# Patient Record
Sex: MALE | Race: WHITE
[De-identification: names, ages, dates, MRNs, and addresses within clinical notes are randomized per-mention and may not be internally consistent; named-entity substitution may affect disease eponyms.]

---

## 2017-10-18 ENCOUNTER — HOSPITAL ENCOUNTER (EMERGENCY)
Dept: HOSPITAL 45 - C.EDC | Age: 57
LOS: 1 days | Discharge: HOME | End: 2017-10-19
Payer: COMMERCIAL

## 2017-10-18 VITALS
WEIGHT: 152.34 LBS | BODY MASS INDEX: 22.56 KG/M2 | HEIGHT: 69.02 IN | BODY MASS INDEX: 22.56 KG/M2 | HEIGHT: 69.02 IN | WEIGHT: 152.34 LBS

## 2017-10-18 VITALS — TEMPERATURE: 98.6 F

## 2017-10-18 VITALS — OXYGEN SATURATION: 99 %

## 2017-10-18 DIAGNOSIS — R00.0: ICD-10-CM

## 2017-10-18 DIAGNOSIS — F17.200: ICD-10-CM

## 2017-10-18 DIAGNOSIS — F10.920: ICD-10-CM

## 2017-10-18 DIAGNOSIS — Z91.14: ICD-10-CM

## 2017-10-18 DIAGNOSIS — F41.9: Primary | ICD-10-CM

## 2017-10-18 LAB
ALP SERPL-CCNC: 83 U/L (ref 45–117)
ALT SERPL-CCNC: 30 U/L (ref 12–78)
ANION GAP SERPL CALC-SCNC: 11 MMOL/L (ref 3–11)
APAP SERPL-MCNC: < 2 UG/ML (ref 10–30)
APPEARANCE UR: CLEAR
AST SERPL-CCNC: 64 U/L (ref 15–37)
BASOPHILS # BLD: 0.02 K/UL (ref 0–0.2)
BASOPHILS NFR BLD: 0.4 %
BENZODIAZ UR-MCNC: (no result) UG/L
BILIRUB UR-MCNC: (no result) MG/DL
BUN SERPL-MCNC: 9 MG/DL (ref 7–18)
BUN/CREAT SERPL: 7 (ref 10–20)
CALCIUM SERPL-MCNC: 8.6 MG/DL (ref 8.5–10.1)
CHLORIDE SERPL-SCNC: 103 MMOL/L (ref 98–107)
CKMB/CK RATIO: 1.3 (ref 0–3)
CO2 SERPL-SCNC: 24 MMOL/L (ref 21–32)
COLOR UR: YELLOW
COMPLETE: YES
CREAT CL PREDICTED SERPL C-G-VRATE: 61.2 ML/MIN
CREAT SERPL-MCNC: 1.27 MG/DL (ref 0.6–1.4)
EOSINOPHIL NFR BLD AUTO: 223 K/UL (ref 130–400)
GLUCOSE SERPL-MCNC: 95 MG/DL (ref 70–99)
HCT VFR BLD CALC: 40.1 % (ref 42–52)
IG%: 0.2 %
IMM GRANULOCYTES NFR BLD AUTO: 29.6 %
INR PPP: 0.9 (ref 0.9–1.1)
LYMPHOCYTES # BLD: 1.58 K/UL (ref 1.2–3.4)
MAGNESIUM SERPL-MCNC: 2.1 MG/DL (ref 1.8–2.4)
MANUAL MICROSCOPIC REQUIRED?: NO
MCH RBC QN AUTO: 36.1 PG (ref 25–34)
MCHC RBC AUTO-ENTMCNC: 34.9 G/DL (ref 32–36)
MCV RBC AUTO: 103.4 FL (ref 80–100)
MONOCYTES NFR BLD: 12.4 %
NEUTROPHILS # BLD AUTO: 0 %
NEUTROPHILS NFR BLD AUTO: 57.4 %
NITRITE UR QL STRIP: (no result)
PARTIAL THROMBOPLASTIN RATIO: 1
PCP UR-MCNC: (no result) UG/L
PH UR STRIP: 5.5 [PH] (ref 4.5–7.5)
PMV BLD AUTO: 9 FL (ref 7.4–10.4)
POTASSIUM SERPL-SCNC: 3.4 MMOL/L (ref 3.5–5.1)
PROTHROMBIN TIME: 10 SECONDS (ref 9–12)
RBC # BLD AUTO: 3.88 M/UL (ref 4.7–6.1)
REVIEW REQ?: NO
SODIUM SERPL-SCNC: 138 MMOL/L (ref 136–145)
SP GR UR STRIP: 1.01 (ref 1–1.03)
URINE BILL WITH OR WITHOUT MIC: (no result)
URINE EPITHELIAL CELL AUTO: (no result) /LPF (ref 0–5)
UROBILINOGEN UR-MCNC: (no result) MG/DL
WBC # BLD AUTO: 5.34 K/UL (ref 4.8–10.8)

## 2017-10-18 NOTE — EMERGENCY ROOM VISIT NOTE
History


Report prepared by Samuel:  Anat Unger


Under the Supervision of:  Dr. Rodney Valdes D.O.


First contact with patient:  20:10


Chief Complaint:  ALCOHOL OVERDOSE


Stated Complaint:  alcohol overdose





History of Present Illness


The patient is a 57 year old male who presents to the Emergency Room with 

complaints of persistent alcohol intoxication starting PTA. The patient 

presents to the ED with the [facility] police. He was found walking around outside 

with a cup full of rum. He blew a .265 and was tachycardic with a rate of 152. 

He states that he had gone to the [facility] to speak with a 

counselor today because he has been stressed out. He also called [facility]. He 

notes that he ran out of his blood pressure medication and trazodone and has 

not taken them in several days. He fell last night after drinking. He denies 

any head injury or LOC. He has been having difficulty walking distances because 

he has been getting SOB. He denies any nausea, vomiting, chest pain, leg 

swelling, recent illness, abdominal pain, back pain, or fever. He denies having 

any thoughts of hurting himself. He feels anxious. He denies any drug use. He 

has a history of cancer, potassium problems, and cholecystectomy.





   Source of History:  patient, EMS


   Onset:  PTA


   Position:  other (global)


   Quality:  other (alcohol intoxication)


   Timing:  other (persistent)


   Associated Symptoms:  + SOB, No LOC, No fevers, No headache, No chest pain, 

No nausea, No vomiting, No abdominal pain, No back pain


Note:


Pt reports anxiety. Pt denies thoughts of hurting self, leg swelling, recent 

illness.





Review of Systems


See HPI for pertinent positives & negatives. A total of 10 systems reviewed and 

were otherwise negative.





Past Medical & Surgical


Medical Problems:


(1) History of torn meniscus of left knee


(2) History of torn meniscus of right knee


Surgical Problems:


(1) H/O knee surgery


(2) H/O prostate biopsy








Family History


No pertinent family history stated.





Social History


Smoking Status:  Current Every Day Smoker


Marital Status:  , in relationship


Occupation Status:  unemployed





Current/Historical Medications


Unable to Obtain Active Prescriptions or Reported Meds





Allergies


Coded Allergies:  


     No Known Allergies (Unverified , 10/18/17)





Physical Exam


Vital Signs











  Date Time  Temp Pulse Resp B/P (MAP) Pulse Ox O2 Delivery O2 Flow Rate FiO2


 


10/19/17 00:13  100 18 114/61 97 Nasal Cannula 2.0 


 


10/18/17 23:28  110 18 181/116 98 Room Air  


 


10/18/17 21:23  107 18 158/94 99 Room Air  


 


10/18/17 20:33  128      


 


10/18/17 20:28     99 Room Air  


 


10/18/17 20:22 37.0 135 18 179/118 99 Room Air  











Physical Exam


GENERAL:  Patient is awake, alert, somewhat anxious appearing but comfortable. 

Patient does not appear to be in pain. 


EYES: The conjunctivae are clear.  The pupils are round and reactive. 


EARS, NOSE, MOUTH AND THROAT: The nose is without any evidence of any 

deformity. Mucous membranes are moist tongue is midline 


NECK: The neck is nontender and supple.


RESPIRATORY: Normal respiratory effort is noted there is no evidence of 

wheezing rhonchi or rales


CARDIOVASCULAR:  Tachycardic but regular, no definite murmur noted. 


GASTROINTESTINAL: The abdomen is soft. Bowel sounds are present in all 

quadrants. Abdomen is nontender


BACK:  Abrasion and contusion noted to the left posterior ribs. No crepitus 

appreciated. No midline tenderness appreciated. 


MUSCULOSKELETAL/EXTREMITIES: There is no evidence of gross deformity full range 

of motion is noted in the hips and shoulders


SKIN: There is no obvious evidence of any rash. There are no petechiae, pallor 

or cyanosis noted. 


NEUROLOGIC:  Patient is awake alert and oriented x3 strength is symmetric





Medical Decision & Procedures


ER Provider


Diagnostic Interpretation:


X-ray results as stated below per interpretation by me and the radiologist. 

Radiology results as stated below per my review and radiologist interpretation:





SINGLE VIEW CHEST





CLINICAL HISTORY:  Overdose.





FINDINGS: An AP, portable, upright chest radiograph is compared to study dated


6/5/2014. The examination is degraded by portable technique and patient


rotation.  The heart is top normal for projection. There is mild atherosclerotic


calcification of the thoracic aorta. Chronic interstitial thickening is similar


to previous. No airspace consolidation, large pleural effusion, or pneumothorax


is seen. The skeletal structures appear osteopenic. There are healed left-sided


rib fractures.





IMPRESSION: No acute cardiopulmonary abnormality.





Electronically signed by:  Valente Sultana M.D.


10/18/2017 8:44 PM





Dictated Date/Time:  10/18/2017 8:43 PM








CT SCAN OF THE BRAIN WITHOUT IV CONTRAST





CLINICAL HISTORY: Overdose.





COMPARISON STUDY:  No priors.





TECHNIQUE: Unenhanced axial CT scan of the brain is performed from the vertex to


the skull base.





CT DOSE: 537.48 mGy.cm





FINDINGS:





Brain parenchyma: There are age advanced involutional changes noting  minimal


subcortical and periventricular microangiopathic change. There is no hemorrhage,


mass effect, or evidence of acute territorial ischemia by CT criteria.


Gray-white matter is preserved. No extra-axial fluid collection is seen.





Ventricles, sulci, cisterns: Prominent secondary to involutional change.





Intracranial vasculature: There is atherosclerotic calcification of the


cavernous carotid arteries.





Calvarium: Unremarkable.





Sinuses and mastoids: The visualized paranasal sinuses are clear. The mastoid


air cells are well pneumatized.





Orbits: The bony orbits are grossly intact.





IMPRESSION: There is no hemorrhage, mass effect, or evidence of acute


territorial ischemia by CT criteria.





Electronically signed by:  Valente Sultana M.D.


10/18/2017 9:15 PM





Dictated Date/Time:  10/18/2017 9:14 PM





Laboratory Results


10/18/17 20:35








Red Blood Count 3.88, Mean Corpuscular Volume 103.4, Mean Corpuscular 

Hemoglobin 36.1, Mean Corpuscular Hemoglobin Concent 34.9, Mean Platelet Volume 

9.0, Neutrophils (%) (Auto) 57.4, Lymphocytes (%) (Auto) 29.6, Monocytes (%) (

Auto) 12.4, Eosinophils (%) (Auto) 0.0, Basophils (%) (Auto) 0.4, Neutrophils # 

(Auto) 3.07, Lymphocytes # (Auto) 1.58, Monocytes # (Auto) 0.66, Eosinophils # (

Auto) 0.00, Basophils # (Auto) 0.02





10/18/17 20:35

















Test


  10/18/17


20:35 10/18/17


21:10


 


White Blood Count


  5.34 K/uL


(4.8-10.8) 


 


 


Red Blood Count


  3.88 M/uL


(4.7-6.1) 


 


 


Hemoglobin


  14.0 g/dL


(14.0-18.0) 


 


 


Hematocrit 40.1 % (42-52)  


 


Mean Corpuscular Volume


  103.4 fL


() 


 


 


Mean Corpuscular Hemoglobin


  36.1 pg


(25-34) 


 


 


Mean Corpuscular Hemoglobin


Concent 34.9 g/dl


(32-36) 


 


 


Platelet Count


  223 K/uL


(130-400) 


 


 


Mean Platelet Volume


  9.0 fL


(7.4-10.4) 


 


 


Neutrophils (%) (Auto) 57.4 %  


 


Lymphocytes (%) (Auto) 29.6 %  


 


Monocytes (%) (Auto) 12.4 %  


 


Eosinophils (%) (Auto) 0.0 %  


 


Basophils (%) (Auto) 0.4 %  


 


Neutrophils # (Auto)


  3.07 K/uL


(1.4-6.5) 


 


 


Lymphocytes # (Auto)


  1.58 K/uL


(1.2-3.4) 


 


 


Monocytes # (Auto)


  0.66 K/uL


(0.11-0.59) 


 


 


Eosinophils # (Auto)


  0.00 K/uL


(0-0.5) 


 


 


Basophils # (Auto)


  0.02 K/uL


(0-0.2) 


 


 


RDW Standard Deviation


  50.4 fL


(36.4-46.3) 


 


 


RDW Coefficient of Variation


  13.5 %


(11.5-14.5) 


 


 


Immature Granulocyte % (Auto) 0.2 %  


 


Immature Granulocyte # (Auto)


  0.01 K/uL


(0.00-0.02) 


 


 


Prothrombin Time


  10.0 SECONDS


(9.0-12.0) 


 


 


Prothromb Time International


Ratio 0.9 (0.9-1.1) 


  


 


 


Activated Partial


Thromboplast Time 24.8 SECONDS


(21.0-31.0) 


 


 


Partial Thromboplastin Ratio 1.0  


 


Anion Gap


  11.0 mmol/L


(3-11) 


 


 


Est Creatinine Clear Calc


Drug Dose 61.2 ml/min 


  


 


 


Estimated GFR (


American) 71.2 


  


 


 


Estimated GFR (Non-


American 61.4 


  


 


 


BUN/Creatinine Ratio 7.0 (10-20)  


 


Osmolality


  372 mOsm/kg


(280-300) 


 


 


Calcium Level


  8.6 mg/dl


(8.5-10.1) 


 


 


Magnesium Level


  2.1 mg/dl


(1.8-2.4) 


 


 


Total Bilirubin


  1.2 mg/dl


(0.2-1) 


 


 


Direct Bilirubin


  0.3 mg/dl


(0-0.2) 


 


 


Aspartate Amino Transf


(AST/SGOT) 64 U/L (15-37) 


  


 


 


Alanine Aminotransferase


(ALT/SGPT) 30 U/L (12-78) 


  


 


 


Alkaline Phosphatase


  83 U/L


() 


 


 


Total Creatine Kinase


  680 U/L


() 


 


 


Creatine Kinase MB


  9.0 ng/ml


(0.5-3.6) 


 


 


Creatine Kinase MB Ratio 1.3 (0-3.0)  


 


Troponin I


  0.024 ng/ml


(0-0.045) 


 


 


Total Protein


  8.7 gm/dl


(6.4-8.2) 


 


 


Albumin


  4.0 gm/dl


(3.4-5.0) 


 


 


Lipase


  292 U/L


() 


 


 


Salicylates Level


  < 1.7 mg/dl


(2.8-20) 


 


 


Acetaminophen Level


  < 2 ug/ml


(10-30) 


 


 


Ethyl Alcohol mg/dL


  294.7 mg/dl


(0-3) 


 


 


Urine Color  YELLOW 


 


Urine Appearance  CLEAR (CLEAR) 


 


Urine pH  5.5 (4.5-7.5) 


 


Urine Specific Gravity


  


  1.014


(1.000-1.030)


 


Urine Protein  1+ (NEG) 


 


Urine Glucose (UA)  NEG (NEG) 


 


Urine Ketones  NEG (NEG) 


 


Urine Occult Blood  1+ (NEG) 


 


Urine Nitrite  NEG (NEG) 


 


Urine Bilirubin  NEG (NEG) 


 


Urine Urobilinogen  NEG (NEG) 


 


Urine Leukocyte Esterase  NEG (NEG) 


 


Urine WBC (Auto)  0 /hpf (0-5) 


 


Urine RBC (Auto)  0-4 /hpf (0-4) 


 


Urine Hyaline Casts (Auto)  0 /lpf (0-5) 


 


Urine Epithelial Cells (Auto)


  


  5-10 /lpf


(0-5)


 


Urine Bacteria (Auto)  NEG (NEG) 


 


Urine Opiates Screen  NEG (NEG) 


 


Urine Methadone, Qualitative  NEG (NEG) 


 


Urine Barbiturates  NEG (NEG) 


 


Urine Phencyclidine (PCP)


Level 


  NEG (NEG) 


 


 


Ur


Amphetamine/Methamphetamine 


  NEG (NEG) 


 


 


MDMA (Ecstasy) Screen  NEG (NEG) 


 


Urine Benzodiazepines Screen  POS (NEG) 


 


Urine Cocaine Metabolite  NEG (NEG) 


 


Urine Marijuana (THC)  NEG (NEG) 





Laboratory results per my review.





Medications Administered











 Medications


  (Trade)  Dose


 Ordered  Sig/Purnima


 Route  Start Time


 Stop Time Status Last Admin


Dose Admin


 


 Sodium Chloride  1,000 ml @ 


 999 mls/hr  Q1H1M STAT


 IV  10/18/17 20:16


 10/18/17 21:16 DC 10/18/17 20:16


999 MLS/HR


 


 Ondansetron HCl


  (Zofran Inj)  4 mg  NOW  STAT


 IV  10/18/17 20:16


 10/18/17 20:17 DC 10/18/17 20:16


4 MG


 


 Trazodone HCl


  (Desyrel Tab)  400 mg  NOW  STAT


 PO  10/18/17 23:13


 10/18/17 23:14 DC 10/18/17 23:26


400 MG











ECG


Indication:  tachycardia


Rate (beats per minute):  119


Rhythm:  sinus tachycardia


Findings:  no ectopy, other (no acute ST segment abnormality)


Comparison ECG Date:  no prior available





ED Course


2014: The patient was evaluated in room C12B. A complete history and physical 

examination were performed. 





2016: Zofran Inj 4 mg IV, NSS 1000 ml @ 999 mls/hr IV.





2312: Mental health will evaluate the patient. 





2313: Trazodone HCl 400 mg PO.





2315: Trazodone HCl 400 mg PO.





Medical Decision


Prior records/ancillary studies reviewed.


Triage Nursing notes reviewed.


Additional history obtained from EMS.





The patient's history was concerning for possible psychiatric disturbance.





Differential diagnosis:


Etiologies such as mood disorder, infection, hypoglycemia, electrolyte 

abnormalities, cardiac sources, intracerebral event, toxicologic, neurologic, 

as well as others were entertained.





The patient is a 57-year-old male who presented to the emergency department for 

evaluation of anxiety and alcohol intoxication. The patient call crisis earlier 

today because of that and his family. The patient was found to be staggering 

and when police were called they also called for ambulance for medical 

evaluation. The patient was brought to the emergency Department awake and alert 

but clinically intoxicated. He was reevaluated multiple times. He was treated 

with IV fluids. He was also given some of his outpatient medication that he 

requested. The patient was evaluated by the mental health delegate and at this 

time the patient has significant anxiety over the death of a family member as 

well as his inability to get his outpatient medications. The patient was 

medically cleared in the emergency department. At this time we are still 

waiting for him to become less clinically intoxicated. I do feel that he will 

be able to be discharged home. He was evaluated by the mental health delegate 

in the emergency department and no suicidal or homicidal ideation with 

identified. The patient does not meet criteria this time for involuntary mental 

health admission. He will be reevaluated again in the morning when he is able 

to be discharged home. He was encouraged to follow-up with his primary care 

physician as well as his primary therapist as soon as possible. Also encouraged 

to return to the emergency department immediately if symptoms change worsen or 

need arises.





Medication Reconcilliation


Current Medication List:  was personally reviewed by me





Blood Pressure Screening


Patient's blood pressure:  Elevated blood pressure


Blood pressure disposition:  Referred to PCP





Impression





 Primary Impression:  


 Anxiety


 Additional Impressions:  


 Alcohol intoxication


 Tachycardia


 Noncompliance with medications





Scribe Attestation


The scribe's documentation has been prepared under my direction and personally 

reviewed by me in its entirety. I confirm that the note above accurately 

reflects all work, treatment, procedures, and medical decision making performed 

by me.





Departure Information


Dispostion


Home / Self-Care





Prescriptions





Unable to Obtain Active Prescriptions or Reported Meds





Referrals


Ted Jefferson M.D. (PCP)





Forms


HOME CARE DOCUMENTATION FORM,                                                 

               IMPORTANT VISIT INFORMATION





Patient Instructions


Anxiety Disorder, ED Alcohol Abuse, My [facility]





Additional Instructions





Call your family  in the morning to schedule a follow-up appointment. Avoid 

any further alcoholic beverages for the next 24 hours. Do not operate any heavy 

machinery including driving a vehicle for next 24 hours. Follow-up with your 

therapist as soon as possible. Call crisis or return to the emergency 

department immediately if symptoms change worsen or the need arises.





Problem Qualifiers








 Additional Impressions:  


 Alcohol intoxication


 Complication of substance-induced condition:  uncomplicated  Qualified Codes:  

F10.920 - Alcohol use, unspecified with intoxication, uncomplicated

## 2017-10-18 NOTE — DIAGNOSTIC IMAGING REPORT
CT SCAN OF THE BRAIN WITHOUT IV CONTRAST



CLINICAL HISTORY: Overdose.



COMPARISON STUDY:  No priors.



TECHNIQUE: Unenhanced axial CT scan of the brain is performed from the vertex to

the skull base.



CT DOSE: 537.48 mGy.cm



FINDINGS:



Brain parenchyma: There are age advanced involutional changes noting  minimal

subcortical and periventricular microangiopathic change. There is no hemorrhage,

mass effect, or evidence of acute territorial ischemia by CT criteria.

Gray-white matter is preserved. No extra-axial fluid collection is seen.



Ventricles, sulci, cisterns: Prominent secondary to involutional change.



Intracranial vasculature: There is atherosclerotic calcification of the

cavernous carotid arteries.



Calvarium: Unremarkable.



Sinuses and mastoids: The visualized paranasal sinuses are clear. The mastoid

air cells are well pneumatized.



Orbits: The bony orbits are grossly intact.





IMPRESSION: There is no hemorrhage, mass effect, or evidence of acute

territorial ischemia by CT criteria.







Electronically signed by:  Valente Sultana M.D.

10/18/2017 9:15 PM



Dictated Date/Time:  10/18/2017 9:14 PM

## 2017-10-18 NOTE — DIAGNOSTIC IMAGING REPORT
SINGLE VIEW CHEST



CLINICAL HISTORY:  Overdose.



FINDINGS: An AP, portable, upright chest radiograph is compared to study dated

6/5/2014. The examination is degraded by portable technique and patient

rotation.  The heart is top normal for projection. There is mild atherosclerotic

calcification of the thoracic aorta. Chronic interstitial thickening is similar

to previous. No airspace consolidation, large pleural effusion, or pneumothorax

is seen. The skeletal structures appear osteopenic. There are healed left-sided

rib fractures.



IMPRESSION: No acute cardiopulmonary abnormality.







Electronically signed by:  Valente Sultana M.D.

10/18/2017 8:44 PM



Dictated Date/Time:  10/18/2017 8:43 PM

## 2017-10-19 VITALS — OXYGEN SATURATION: 97 % | HEART RATE: 120 BPM | SYSTOLIC BLOOD PRESSURE: 137 MMHG | DIASTOLIC BLOOD PRESSURE: 82 MMHG

## 2017-10-21 LAB
HYDROXYETHYLFLURAZEPAM CONF: NEGATIVE NG/ML
HYDROXYMIDAZOLAM: NEGATIVE NG/ML
HYDROXYTRIAZOLAM CONF: NEGATIVE NG/ML
TEMAZEPAM UR-MCNC: NEGATIVE NG/ML

## 2017-12-12 ENCOUNTER — HOSPITAL ENCOUNTER (EMERGENCY)
Dept: HOSPITAL 45 - C.EDA | Age: 57
LOS: 1 days | Discharge: HOME | End: 2017-12-13
Payer: COMMERCIAL

## 2017-12-12 VITALS
HEIGHT: 69.02 IN | BODY MASS INDEX: 22.53 KG/M2 | BODY MASS INDEX: 22.53 KG/M2 | HEIGHT: 69.02 IN | WEIGHT: 152.12 LBS | WEIGHT: 152.12 LBS

## 2017-12-12 VITALS — TEMPERATURE: 98.24 F

## 2017-12-12 DIAGNOSIS — I10: ICD-10-CM

## 2017-12-12 DIAGNOSIS — Z98.890: ICD-10-CM

## 2017-12-12 DIAGNOSIS — F17.210: ICD-10-CM

## 2017-12-12 DIAGNOSIS — F10.129: Primary | ICD-10-CM

## 2017-12-12 DIAGNOSIS — F41.9: ICD-10-CM

## 2017-12-12 DIAGNOSIS — Z79.899: ICD-10-CM

## 2017-12-13 VITALS — OXYGEN SATURATION: 97 % | HEART RATE: 99 BPM | SYSTOLIC BLOOD PRESSURE: 177 MMHG | DIASTOLIC BLOOD PRESSURE: 106 MMHG

## 2017-12-13 LAB
ALBUMIN/GLOB SERPL: 0.8 {RATIO} (ref 0.9–2)
ALP SERPL-CCNC: 68 U/L (ref 45–117)
ALT SERPL-CCNC: 23 U/L (ref 12–78)
ANION GAP SERPL CALC-SCNC: 7 MMOL/L (ref 3–11)
APAP SERPL-MCNC: < 2 UG/ML (ref 10–30)
APPEARANCE UR: CLEAR
AST SERPL-CCNC: 29 U/L (ref 15–37)
BASOPHILS # BLD: 0.03 K/UL (ref 0–0.2)
BASOPHILS NFR BLD: 0.5 %
BENZODIAZ UR-MCNC: (no result) UG/L
BILIRUB UR-MCNC: (no result) MG/DL
BUN SERPL-MCNC: 12 MG/DL (ref 7–18)
BUN/CREAT SERPL: 14.8 (ref 10–20)
CALCIUM SERPL-MCNC: 8.1 MG/DL (ref 8.5–10.1)
CHLORIDE SERPL-SCNC: 106 MMOL/L (ref 98–107)
CO2 SERPL-SCNC: 25 MMOL/L (ref 21–32)
COLOR UR: YELLOW
COMPLETE: YES
CREAT CL PREDICTED SERPL C-G-VRATE: 92.4 ML/MIN
CREAT SERPL-MCNC: 0.84 MG/DL (ref 0.6–1.4)
EOSINOPHIL NFR BLD AUTO: 223 K/UL (ref 130–400)
GLOBULIN SER-MCNC: 4.7 GM/DL (ref 2.5–4)
GLUCOSE SERPL-MCNC: 96 MG/DL (ref 70–99)
HCT VFR BLD CALC: 40.4 % (ref 42–52)
IG%: 0.2 %
IMM GRANULOCYTES NFR BLD AUTO: 39.4 %
LYMPHOCYTES # BLD: 2.22 K/UL (ref 1.2–3.4)
MANUAL MICROSCOPIC REQUIRED?: NO
MCH RBC QN AUTO: 34.8 PG (ref 25–34)
MCHC RBC AUTO-ENTMCNC: 34.4 G/DL (ref 32–36)
MCV RBC AUTO: 101 FL (ref 80–100)
MONOCYTES NFR BLD: 12.6 %
NEUTROPHILS # BLD AUTO: 1.2 %
NEUTROPHILS NFR BLD AUTO: 46.1 %
NITRITE UR QL STRIP: (no result)
PCP UR-MCNC: (no result) UG/L
PH UR STRIP: 5 [PH] (ref 4.5–7.5)
PMV BLD AUTO: 9.3 FL (ref 7.4–10.4)
POTASSIUM SERPL-SCNC: 3.4 MMOL/L (ref 3.5–5.1)
RBC # BLD AUTO: 4 M/UL (ref 4.7–6.1)
REVIEW REQ?: NO
SODIUM SERPL-SCNC: 138 MMOL/L (ref 136–145)
SP GR UR STRIP: 1.02 (ref 1–1.03)
TSH SERPL-ACNC: 2.06 UIU/ML (ref 0.3–4.5)
URINE EPITHELIAL CELL AUTO: (no result) /LPF (ref 0–5)
UROBILINOGEN UR-MCNC: (no result) MG/DL
WBC # BLD AUTO: 5.64 K/UL (ref 4.8–10.8)
ZZUR CULT IF INDIC CLEAN CATCH: NO

## 2017-12-13 NOTE — EMERGENCY ROOM VISIT NOTE
History


Report prepared by Samuel:  Deng Estrada


Under the Supervision of:  Dr. Manav Marie M.D.


First contact with patient:  23:52


Chief Complaint:  ANXIETY


Stated Complaint:  ANXIETY





History of Present Illness


The patient is a 57 year old male who presents to the Emergency Room for a 

mental health evaluation. He has a past medical history of anxiety and cancer. 

This history is limited secondary to the patient's intoxication. He states that 

he was drinking alcohol all afternoon, with not having any drinks starting 6 

hours ago. He has been feeling extremely anxious over the past 5 days. He 

denies any suicidal ideation, homicidal ideation, or hallucinations. He notes 

that he recently got evicted from his house, but he now lives in an apartment. 

No one has been causing him any harm. He does not understand why he finds 

himself at bars drinking alcohol. He rotates his Trazodone with his alcohol 

consumption stating that he does not want to get addicted to either. He notes 

that he has been smoking more cigarettes recently. He denies any non-

prescription drugs.





   Source of History:  patient


   History Limited By:  intoxication


   Onset:  5 days ago


   Position:  other (Mental Health)


   Symptom Intensity:  mild


   Quality:  other (Anxiety)


   Timing:  constant


Note:


The patient is intoxicated with alcohol. He denies any SI, HI, or 

hallucinations.





Review of Systems


See HPI for pertinent positives & negatives. A total of 10 systems reviewed and 

were otherwise negative.





Past Medical & Surgical


Medical Problems:


(1) History of torn meniscus of left knee


(2) History of torn meniscus of right knee


Surgical Problems:


(1) H/O knee surgery


(2) H/O prostate biopsy








Family History


Omitted secondary to the patient's age.





Social History


Smoking Status:  Current Every Day Smoker


Alcohol Use:  heavy


Marital Status:  , in relationship


Housing Status:  lives alone


Occupation Status:  unemployed





Current/Historical Medications


Scheduled


Hydroxyzine Pamoate (Vistaril), 50 MG PO BID


Lisinopril (Prinivil), 20 MG PO DAILY


Montelukast Sodium (Montelukast Sodium), 1 TAB PO DAILY


Sertraline (Zoloft), 100 MG PO DAILY


Trazodone Hcl (Trazodone), 400 MG PO HS





Scheduled PRN


Flurbiprofen (Ansaid), 100 MG PO BID PRN for knee pain


Lorazepam (Ativan), 0.5 MG PO DAILY PRN for Anxiety





Allergies


Coded Allergies:  


     No Known Allergies (Unverified , 12/13/17)





Physical Exam


Vital Signs











  Date Time  Temp Pulse Resp B/P (MAP) Pulse Ox O2 Delivery O2 Flow Rate FiO2


 


12/13/17 01:08  99 18 177/106 97   


 


12/12/17 23:45 36.8 121 20 139/102 97 Room Air  











Physical Exam


GENERAL: Patient is heavily intoxicated.  Smells heavily of alcohol.  Well 

appearing and in no acute distress.


HEAD: No evidence of Trauma.  AT/NC


EYES: Injected conjunctiva.  Normal EOM.  Pupils equal/reactive.


ENT: Mucous membranes moist, no nasal congestion, .


NECK: No step-offs, no adenopathy, no meningismus, trachea is midline.


LUNGS: No dyspnea. Clear to auscultation and equal bilaterally. No wheeze, no 

rhonchi.


HEART: Mild tachycardic rate and regular rhythm.  No murmurs, rubs, gallops 

appreciated.


ABDOMEN: Soft, nontender, bowel sounds positive, no masses appreciated, no 

peritonitis.


BACK: No midline tenderness, no CVA tenderness


EXTREMITIES: Normal motion all extremities, no cyanosis, no edema.


NEUROLOGIC: Intoxicated. Alert, oriented.  No acute motor or sensory deficits, 

no focal weakness, cranial nerves grossly intact.


SKIN: No rash, no jaundice, no diaphoresis.





Medical Decision & Procedures


Laboratory Results


12/13/17 00:09








Red Blood Count 4.00, Mean Corpuscular Volume 101.0, Mean Corpuscular 

Hemoglobin 34.8, Mean Corpuscular Hemoglobin Concent 34.4, Mean Platelet Volume 

9.3, Neutrophils (%) (Auto) 46.1, Lymphocytes (%) (Auto) 39.4, Monocytes (%) (

Auto) 12.6, Eosinophils (%) (Auto) 1.2, Basophils (%) (Auto) 0.5, Neutrophils # 

(Auto) 2.60, Lymphocytes # (Auto) 2.22, Monocytes # (Auto) 0.71, Eosinophils # (

Auto) 0.07, Basophils # (Auto) 0.03





12/13/17 00:09

















Test


  12/12/17


23:54 12/13/17


00:09


 


Urine Color YELLOW  


 


Urine Appearance CLEAR (CLEAR)  


 


Urine pH 5.0 (4.5-7.5)  


 


Urine Specific Gravity


  1.024


(1.000-1.030) 


 


 


Urine Protein 1+ (NEG)  


 


Urine Glucose (UA) NEG (NEG)  


 


Urine Ketones NEG (NEG)  


 


Urine Occult Blood TRACE (NEG)  


 


Urine Nitrite NEG (NEG)  


 


Urine Bilirubin NEG (NEG)  


 


Urine Urobilinogen NEG (NEG)  


 


Urine Leukocyte Esterase NEG (NEG)  


 


Urine WBC (Auto) 1-5 /hpf (0-5)  


 


Urine RBC (Auto) 0-4 /hpf (0-4)  


 


Urine Hyaline Casts (Auto) 1-5 /lpf (0-5)  


 


Urine Epithelial Cells (Auto) 0-5 /lpf (0-5)  


 


Urine Bacteria (Auto) NEG (NEG)  


 


Urine Opiates Screen NEG (NEG)  


 


Urine Methadone, Qualitative NEG (NEG)  


 


Urine Barbiturates NEG (NEG)  


 


Urine Phencyclidine (PCP)


Level NEG (NEG) 


  


 


 


Ur


Amphetamine/Methamphetamine NEG (NEG) 


  


 


 


MDMA (Ecstasy) Screen NEG (NEG)  


 


Urine Benzodiazepines Screen NEG (NEG)  


 


Urine Cocaine Metabolite NEG (NEG)  


 


Urine Marijuana (THC) NEG (NEG)  


 


White Blood Count


  


  5.64 K/uL


(4.8-10.8)


 


Red Blood Count


  


  4.00 M/uL


(4.7-6.1)


 


Hemoglobin


  


  13.9 g/dL


(14.0-18.0)


 


Hematocrit  40.4 % (42-52) 


 


Mean Corpuscular Volume


  


  101.0 fL


()


 


Mean Corpuscular Hemoglobin


  


  34.8 pg


(25-34)


 


Mean Corpuscular Hemoglobin


Concent 


  34.4 g/dl


(32-36)


 


Platelet Count


  


  223 K/uL


(130-400)


 


Mean Platelet Volume


  


  9.3 fL


(7.4-10.4)


 


Neutrophils (%) (Auto)  46.1 % 


 


Lymphocytes (%) (Auto)  39.4 % 


 


Monocytes (%) (Auto)  12.6 % 


 


Eosinophils (%) (Auto)  1.2 % 


 


Basophils (%) (Auto)  0.5 % 


 


Neutrophils # (Auto)


  


  2.60 K/uL


(1.4-6.5)


 


Lymphocytes # (Auto)


  


  2.22 K/uL


(1.2-3.4)


 


Monocytes # (Auto)


  


  0.71 K/uL


(0.11-0.59)


 


Eosinophils # (Auto)


  


  0.07 K/uL


(0-0.5)


 


Basophils # (Auto)


  


  0.03 K/uL


(0-0.2)


 


RDW Standard Deviation


  


  50.3 fL


(36.4-46.3)


 


RDW Coefficient of Variation


  


  13.6 %


(11.5-14.5)


 


Immature Granulocyte % (Auto)  0.2 % 


 


Immature Granulocyte # (Auto)


  


  0.01 K/uL


(0.00-0.02)


 


Anion Gap


  


  7.0 mmol/L


(3-11)


 


Est Creatinine Clear Calc


Drug Dose 


  92.4 ml/min 


 


 


Estimated GFR (


American) 


  111.1 


 


 


Estimated GFR (Non-


American 


  95.8 


 


 


BUN/Creatinine Ratio  14.8 (10-20) 


 


Calcium Level


  


  8.1 mg/dl


(8.5-10.1)


 


Total Bilirubin


  


  0.2 mg/dl


(0.2-1)


 


Aspartate Amino Transf


(AST/SGOT) 


  29 U/L (15-37) 


 


 


Alanine Aminotransferase


(ALT/SGPT) 


  23 U/L (12-78) 


 


 


Alkaline Phosphatase


  


  68 U/L


()


 


Total Protein


  


  8.3 gm/dl


(6.4-8.2)


 


Albumin


  


  3.6 gm/dl


(3.4-5.0)


 


Globulin


  


  4.7 gm/dl


(2.5-4.0)


 


Albumin/Globulin Ratio  0.8 (0.9-2) 


 


Thyroid Stimulating Hormone


(TSH) 


  2.060 uIu/ml


(0.300-4.500)


 


Salicylates Level


  


  3.8 mg/dl


(2.8-20)


 


Acetaminophen Level


  


  < 2 ug/ml


(10-30)


 


Ethyl Alcohol mg/dL


  


  263.0 mg/dl


(0-3)





Laboratory results as reviewed by me.





ED Course


2352: The patient was evaluated in room A6. A complete history and physical 

exam was performed.





0041: The patient is now demanding discharge and becoming very belligerent. I 

was able to verbally deescalate him. I told him that he is unable to leave 

while he is intoxicated. I told him that he is able to call friends or family 

to come pick him up. He continues to deny any suicidal ideation or homicidal 

ideation.





0050: The patient is able to get a ride. 





0106: His friend is now here and is willing to take care of him. He understands 

that the patient is heavily intoxicated and that his blood pressure is 

elevated. I told him that when the patient olimpia up, he should tell them to 

follow up with his PCP about his pressures. He and patient agree that he is not 

a risk to himself or others.





0125: Reevaluated the patient. Discussed results and discharge instructions: He 

verbalized understanding and agreement.   The patient is ready for discharge.





Medical Decision


Differential: Alcohol Intoxication, Drug Intoxication, Electrolyte Abnormality, 

Trauma, Intracranial Event, Toxicological, Excited Delirium, Serotonin Syndrome

, amongst other pathologies entertained.





57 yr old male arrives via ems for evaluation of anxiety which has resolved.  

He is clearly intoxicated.  Labs unremarkable.  He is demanding discharge.  He 

has HTN which he states he knows about.  Discussed treatment but he wishes to 

go home.  Patient so irritated that he was here that he refused to sign 

discharge paperwork.  He is aware of our concerns regarding his alcohol use.  

He however has at no point made any suicidal/homicidal statements nor gestures.

  As he has sober friend here wishing to monitor him and take him home I do not 

have further reason to keep patient in emergency department.  He is aware he 

can return at any time if worsening or other concerns.  I tried explaining the 

need to get his blood pressure under better control and need for him to see 

PCP.  That said, BP of 170/100 is not grounds to keep him against his will even 

with intoxication.





Medication Reconcilliation


Current Medication List:  was personally reviewed by me





Impression





 Primary Impression:  


 Alcohol intoxication


 Additional Impression:  


 Anxiety





Scribe Attestation


The scribe's documentation has been prepared under my direction and personally 

reviewed by me in its entirety. I confirm that the note above accurately 

reflects all work, treatment, procedures, and medical decision making performed 

by me.





Departure Information


Dispostion


Home / Self-Care





Referrals


LIYAH Langston M.D. (MEDICAL) (PCP)





Forms


HOME CARE DOCUMENTATION FORM,                                                 

               IMPORTANT VISIT INFORMATION





Patient Instructions


Alcohol Intoxication - [facility], My [facility]





Additional Instructions





You initially came to Emergency Department due to anxiety, though have stated 

on arrival you were no longer anxious.  


You have declined further evaluation and deny other problems.


Your alcohol use is concerning for alcoholism.  You should discuss this with 

your primary care provider and loved ones.


We are always here to help you.  If you feel you need further evaluation return 

or call 911.


Call 911 if you ever feel you are at risk of harming yourself or others.





Problem Qualifiers

## 2018-01-05 ENCOUNTER — HOSPITAL ENCOUNTER (EMERGENCY)
Dept: HOSPITAL 45 - C.EDA | Age: 58
Discharge: HOME | End: 2018-01-05
Payer: COMMERCIAL

## 2018-01-05 VITALS — OXYGEN SATURATION: 93 % | SYSTOLIC BLOOD PRESSURE: 172 MMHG | DIASTOLIC BLOOD PRESSURE: 84 MMHG | HEART RATE: 101 BPM

## 2018-01-05 VITALS — TEMPERATURE: 97.7 F

## 2018-01-05 VITALS
WEIGHT: 147.49 LBS | BODY MASS INDEX: 21.84 KG/M2 | HEIGHT: 69.02 IN | WEIGHT: 147.49 LBS | HEIGHT: 69.02 IN | BODY MASS INDEX: 21.84 KG/M2
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
## 2018-01-05 DIAGNOSIS — F10.20: Primary | ICD-10-CM

## 2018-01-05 DIAGNOSIS — F17.200: ICD-10-CM

## 2018-01-05 DIAGNOSIS — Z87.828: ICD-10-CM

## 2018-01-05 DIAGNOSIS — Z79.899: ICD-10-CM

## 2018-01-05 DIAGNOSIS — Z98.890: ICD-10-CM

## 2018-01-05 LAB
ALBUMIN SERPL-MCNC: 3.9 GM/DL (ref 3.4–5)
ALP SERPL-CCNC: 63 U/L (ref 45–117)
ALT SERPL-CCNC: 86 U/L (ref 12–78)
AST SERPL-CCNC: 139 U/L (ref 15–37)
BASOPHILS # BLD: 0.03 K/UL (ref 0–0.2)
BASOPHILS NFR BLD: 0.7 %
BUN SERPL-MCNC: 13 MG/DL (ref 7–18)
CALCIUM SERPL-MCNC: 8.5 MG/DL (ref 8.5–10.1)
CO2 SERPL-SCNC: 27 MMOL/L (ref 21–32)
CREAT SERPL-MCNC: 0.95 MG/DL (ref 0.6–1.4)
EOS ABS #: 0.05 K/UL (ref 0–0.5)
EOSINOPHIL NFR BLD AUTO: 118 K/UL (ref 130–400)
GLUCOSE SERPL-MCNC: 89 MG/DL (ref 70–99)
HCT VFR BLD CALC: 37.6 % (ref 42–52)
HGB BLD-MCNC: 13 G/DL (ref 14–18)
IG#: 0 K/UL (ref 0–0.02)
IMM GRANULOCYTES NFR BLD AUTO: 35.9 %
LYMPHOCYTES # BLD: 1.59 K/UL (ref 1.2–3.4)
MCH RBC QN AUTO: 34.5 PG (ref 25–34)
MCHC RBC AUTO-ENTMCNC: 34.6 G/DL (ref 32–36)
MCV RBC AUTO: 99.7 FL (ref 80–100)
MONO ABS #: 0.52 K/UL (ref 0.11–0.59)
MONOCYTES NFR BLD: 11.7 %
NEUT ABS #: 2.24 K/UL (ref 1.4–6.5)
NEUTROPHILS # BLD AUTO: 1.1 %
NEUTROPHILS NFR BLD AUTO: 50.6 %
PMV BLD AUTO: 9.7 FL (ref 7.4–10.4)
POTASSIUM SERPL-SCNC: 3.2 MMOL/L (ref 3.5–5.1)
PROT SERPL-MCNC: 8.4 GM/DL (ref 6.4–8.2)
RED CELL DISTRIBUTION WIDTH CV: 14.8 % (ref 11.5–14.5)
RED CELL DISTRIBUTION WIDTH SD: 53.6 FL (ref 36.4–46.3)
SODIUM SERPL-SCNC: 138 MMOL/L (ref 136–145)
WBC # BLD AUTO: 4.43 K/UL (ref 4.8–10.8)

## 2018-01-05 NOTE — EMERGENCY ROOM VISIT NOTE
History


First contact with patient:  02:22


Chief Complaint:  DETOX REQUEST


Stated Complaint:  ALCOHOL OVERDOSE


Nursing Triage Summary:  


Pt requesting detox.  Last drink 1 hour ago.





History of Present Illness


The patient is a 57 year old male who presents to the Emergency Room requesting 

detox from alcohol.  The patient states that he is trying to quit drinking 

alcohol.  He states that his last drink was one hour ago.  He admits to 

drinking rum and coke tonight.  The patient states that typically when he stops 

drinking alcohol, he develops a "stomach flu," with symptoms of hot and cold 

flashes.  He states that he has detoxed before and has had inpatient treatment 

for this, however no rehab facilities will accept him due to his prostate 

cancer.  He states that he has had surgery and radiation for his prostate 

cancer and is still has an elevated PSA.  The patient reports that he drinks 

all day and typically has 4-6 drinks per day, each of which contain 

approximately 4 ounces of liquor.  He does not believe that he has a problem 

with alcohol.  The patient does have depression and recently quit taking his 

trazodone and Zoloft because he felt they were not helping him.  He denies any 

suicidal or homicidal ideations.  He denies any drug use.





Review of Systems


A complete 10 point review of systems was reviewed with the patient with 

pertinent positives and negatives as per history of present illness. All else 

were negative.





Past Medical/Surgical History


Medical Problems:


(1) History of torn meniscus of left knee


(2) History of torn meniscus of right knee


Surgical Problems:


(1) H/O knee surgery


(2) H/O prostate biopsy








Social History


Smoking Status:  Current Every Day Smoker


Alcohol Use:  heavy


Marital Status:  , in relationship


Housing Status:  lives alone


Occupation Status:  unemployed





Current/Historical Medications


Scheduled


Chlordiazepoxide (Librium), 25 MG PO UD


Hydroxyzine Pamoate (Vistaril), 50 MG PO BID


Lisinopril (Prinivil), 20 MG PO DAILY


Montelukast Sodium (Montelukast Sodium), 1 TAB PO DAILY


Sertraline (Zoloft), 100 MG PO DAILY


Trazodone Hcl (Trazodone), 400 MG PO HS





Scheduled PRN


Flurbiprofen (Ansaid), 100 MG PO BID PRN for knee pain


Lorazepam (Ativan), 0.5 MG PO DAILY PRN for Anxiety





Physical Exam


Vital Signs











  Date Time  Temp Pulse Resp B/P (MAP) Pulse Ox O2 Delivery O2 Flow Rate FiO2


 


1/5/18 06:38  101 20 172/84 93 Room Air  


 


1/5/18 06:35  107      


 


1/5/18 05:26  90 18 140/81 97 Nasal Cannula 2.0 


 


1/5/18 03:59  92 18 147/83 98 Nasal Cannula 2.0 


 


1/5/18 03:06  94 18 125/81 91 Room Air  


 


1/5/18 02:24  94      


 


1/5/18 02:22 36.5 100 20 146/103 97 Room Air  











Physical Exam


VITALS: Vitals are noted on the nurse's note and reviewed by myself.  Vital 

signs stable.


GENERAL: This is a 57-year-old male, lying in bed, appears to be visibly 

intoxicated, smells of ETOH.


SKIN: The skin was without erythema, edema, or bruising.  


HEAD: Normocephalic atraumatic.  


EARS: External auditory canals clear.  No hemotympanum.


EYES: Pupils equal round and reactive to light and accommodation. 


NOSE: No deformities noted. 


MOUTH: No loose or chipped teeth.  


NECK: No cervical spine tenderness.


HEART: Regular rate and rhythm without murmurs gallops or rubs.


LUNGS: Clear to auscultation bilaterally without wheezes, rales or rhonchi.  


ABDOMEN: Soft, nontender.


MUSCULOSKELETAL: Full range of motion throughout.  Strength intact throughout.  


NEURO: Patient was alert and oriented to person place and time.  Speech 

slurred.  Gross sensation intact.  Patient cooperative with examiner.





Medical Decision & Procedures


Laboratory Results


1/5/18 02:47








Red Blood Count 3.77, Mean Corpuscular Volume 99.7, Mean Corpuscular Hemoglobin 

34.5, Mean Corpuscular Hemoglobin Concent 34.6, Mean Platelet Volume 9.7, 

Neutrophils (%) (Auto) 50.6, Lymphocytes (%) (Auto) 35.9, Monocytes (%) (Auto) 

11.7, Eosinophils (%) (Auto) 1.1, Basophils (%) (Auto) 0.7, Neutrophils # (Auto

) 2.24, Lymphocytes # (Auto) 1.59, Monocytes # (Auto) 0.52, Eosinophils # (Auto

) 0.05, Basophils # (Auto) 0.03





1/5/18 02:47

















Test


  1/5/18


02:47


 


White Blood Count


  4.43 K/uL


(4.8-10.8)


 


Red Blood Count


  3.77 M/uL


(4.7-6.1)


 


Hemoglobin


  13.0 g/dL


(14.0-18.0)


 


Hematocrit 37.6 % (42-52) 


 


Mean Corpuscular Volume


  99.7 fL


()


 


Mean Corpuscular Hemoglobin


  34.5 pg


(25-34)


 


Mean Corpuscular Hemoglobin


Concent 34.6 g/dl


(32-36)


 


Platelet Count


  118 K/uL


(130-400)


 


Mean Platelet Volume


  9.7 fL


(7.4-10.4)


 


Neutrophils (%) (Auto) 50.6 % 


 


Lymphocytes (%) (Auto) 35.9 % 


 


Monocytes (%) (Auto) 11.7 % 


 


Eosinophils (%) (Auto) 1.1 % 


 


Basophils (%) (Auto) 0.7 % 


 


Neutrophils # (Auto)


  2.24 K/uL


(1.4-6.5)


 


Lymphocytes # (Auto)


  1.59 K/uL


(1.2-3.4)


 


Monocytes # (Auto)


  0.52 K/uL


(0.11-0.59)


 


Eosinophils # (Auto)


  0.05 K/uL


(0-0.5)


 


Basophils # (Auto)


  0.03 K/uL


(0-0.2)


 


RDW Standard Deviation


  53.6 fL


(36.4-46.3)


 


RDW Coefficient of Variation


  14.8 %


(11.5-14.5)


 


Immature Granulocyte % (Auto) 0.0 % 


 


Immature Granulocyte # (Auto)


  0.00 K/uL


(0.00-0.02)


 


Anion Gap


  12.0 mmol/L


(3-11)


 


Est Creatinine Clear Calc


Drug Dose 79.2 ml/min 


 


 


Estimated GFR (


American) 101.1 


 


 


Estimated GFR (Non-


American 87.3 


 


 


BUN/Creatinine Ratio 13.6 (10-20) 


 


Calcium Level


  8.5 mg/dl


(8.5-10.1)


 


Total Bilirubin


  1.1 mg/dl


(0.2-1)


 


Aspartate Amino Transf


(AST/SGOT) 139 U/L


(15-37)


 


Alanine Aminotransferase


(ALT/SGPT) 86 U/L (12-78) 


 


 


Alkaline Phosphatase


  63 U/L


()


 


Total Protein


  8.4 gm/dl


(6.4-8.2)


 


Albumin


  3.9 gm/dl


(3.4-5.0)


 


Globulin


  4.5 gm/dl


(2.5-4.0)


 


Albumin/Globulin Ratio 0.9 (0.9-2) 


 


Ethyl Alcohol mg/dL


  286.0 mg/dl


(0-3)











Medical Decision


Differential diagnosis includes alcohol intoxication, drug intoxication, 

metabolic abnormality, hypoglycemia, depression, anxiety among others.





The patient is a 57-year-old male who presents today complaining of alcohol 

intoxication and requesting rehabilitation.  Labs revealed a alcohol of 286.  

LFTs are elevated consistent with the patient's chronic alcohol use.  I had a 

lengthy discussion with the patient.  He is a daily drinker and states that he 

does not feel like he has an alcohol problem, however he does get withdrawal 

symptoms when he tries to stop drinking.  He feels that if he could avoid the 

withdrawal symptoms, he would be able to stop drinking.  I do feel the patient 

will be a good candidate for a Librium taper and this was prescribed to him.  

He has outpatient mental health resources and will contact them after discharge.





The patient's case was reviewed with Dr. Marie, ED attending physician, who 

agreed with my assessment and treatment plan.





Based on the patient's presentation and work up, I feel the patient is stable 

for outpatient treatment.  The patient was educated to return to the emergency 

department for any worsening of their current condition or new/concerning 

symptoms.  He will follow up with his PCP and mental health provider.





Impression





 Primary Impression:  


 Alcoholism





Departure Information


Dispostion


Home / Self-Care





Condition


GOOD





Prescriptions





Chlordiazepoxide (Librium) 25 Mg Cap


25 MG PO UD, #15 CAP


   50 mg q6 x 1 day, 25 mg q6 x 1 day, 25 mg bid x 1 day, then 25 mg at


   night x 1 day.


   Prov: Marina Winter .LUISA         1/5/18





Referrals


LIYAH Langston M.D. (MEDICAL) (PCP)





Patient Instructions


My [facility]





Additional Instructions





Take the Librium as prescribed.  This will help to prevent withdrawal symptoms.

  





Make sure to drink plenty of fluids.





Contact your mental health provider today to schedule follow up.





Return to the emergency department with any worsening or new/concerning 

symptoms.

## 2018-01-14 ENCOUNTER — HOSPITAL ENCOUNTER (EMERGENCY)
Dept: HOSPITAL 45 - C.EDC | Age: 58
LOS: 1 days | Discharge: HOME | End: 2018-01-15
Payer: COMMERCIAL

## 2018-01-14 VITALS
WEIGHT: 161.16 LBS | HEIGHT: 69.02 IN | BODY MASS INDEX: 23.87 KG/M2 | HEIGHT: 69.02 IN | WEIGHT: 161.16 LBS | BODY MASS INDEX: 23.87 KG/M2

## 2018-01-14 VITALS — TEMPERATURE: 97.7 F

## 2018-01-14 DIAGNOSIS — Z91.81: ICD-10-CM

## 2018-01-14 DIAGNOSIS — F17.200: ICD-10-CM

## 2018-01-14 DIAGNOSIS — E87.6: ICD-10-CM

## 2018-01-14 DIAGNOSIS — R41.82: Primary | ICD-10-CM

## 2018-01-14 DIAGNOSIS — Z98.890: ICD-10-CM

## 2018-01-14 DIAGNOSIS — F10.10: ICD-10-CM

## 2018-01-14 LAB
ALBUMIN SERPL-MCNC: 3.9 GM/DL (ref 3.4–5)
ALP SERPL-CCNC: 52 U/L (ref 45–117)
ALT SERPL-CCNC: 32 U/L (ref 12–78)
AST SERPL-CCNC: 23 U/L (ref 15–37)
BASOPHILS # BLD: 0.05 K/UL (ref 0–0.2)
BASOPHILS NFR BLD: 1 %
BUN SERPL-MCNC: 10 MG/DL (ref 7–18)
CALCIUM SERPL-MCNC: 8.4 MG/DL (ref 8.5–10.1)
CK MB SERPL-MCNC: 0.8 NG/ML (ref 0.5–3.6)
CO2 SERPL-SCNC: 30 MMOL/L (ref 21–32)
CREAT SERPL-MCNC: 0.88 MG/DL (ref 0.6–1.4)
EOS ABS #: 0.05 K/UL (ref 0–0.5)
EOSINOPHIL NFR BLD AUTO: 308 K/UL (ref 130–400)
GLUCOSE SERPL-MCNC: 96 MG/DL (ref 70–99)
HCT VFR BLD CALC: 37 % (ref 42–52)
HGB BLD-MCNC: 12.3 G/DL (ref 14–18)
IG#: 0.01 K/UL (ref 0–0.02)
IMM GRANULOCYTES NFR BLD AUTO: 33.6 %
INR PPP: 0.9 (ref 0.9–1.1)
LIPASE: 289 U/L (ref 73–393)
LYMPHOCYTES # BLD: 1.65 K/UL (ref 1.2–3.4)
MCH RBC QN AUTO: 34.1 PG (ref 25–34)
MCHC RBC AUTO-ENTMCNC: 33.2 G/DL (ref 32–36)
MCV RBC AUTO: 102.5 FL (ref 80–100)
MONO ABS #: 1.17 K/UL (ref 0.11–0.59)
MONOCYTES NFR BLD: 23.8 %
NEUT ABS #: 1.98 K/UL (ref 1.4–6.5)
NEUTROPHILS # BLD AUTO: 1 %
NEUTROPHILS NFR BLD AUTO: 40.4 %
PMV BLD AUTO: 9.1 FL (ref 7.4–10.4)
POTASSIUM SERPL-SCNC: 2.9 MMOL/L (ref 3.5–5.1)
PROT SERPL-MCNC: 8 GM/DL (ref 6.4–8.2)
PTT PATIENT: 21 SECONDS (ref 21–31)
RED CELL DISTRIBUTION WIDTH CV: 14.1 % (ref 11.5–14.5)
RED CELL DISTRIBUTION WIDTH SD: 52.9 FL (ref 36.4–46.3)
SODIUM SERPL-SCNC: 144 MMOL/L (ref 136–145)
WBC # BLD AUTO: 4.91 K/UL (ref 4.8–10.8)

## 2018-01-14 NOTE — DIAGNOSTIC IMAGING REPORT
CHEST ONE VIEW PORTABLE



HISTORY:      EVALUATE ALTERED MENTAL STATUS/WEAKNESS



COMPARISON: Chest 10/18/2017.



FINDINGS: The lungs are clear. The heart is normal in size. No pleural

effusions. No pneumothorax. Old, healed left-sided rib fractures.



IMPRESSION:

No acute process.







Electronically signed by:  Jose Bo M.D.

1/14/2018 10:04 PM



Dictated Date/Time:  1/14/2018 9:19 PM

## 2018-01-14 NOTE — EMERGENCY ROOM VISIT NOTE
History


Report prepared by Samuel:  Cornelius Dangelo


Under the Supervision of:  Dr. Rodney Valdes D.O.


First contact with patient:  20:56


Chief Complaint:  ALTERED MENTAL STATUS


Stated Complaint:  AMS





History of Present Illness


This HPI is limited due to the altered mental status of the patient. The 

patient is a 57 year old male who presents to the Emergency Room via emergency 

medical services due to a altered mental status. The patient notes that her 

fell "3 or 4 times." He also stated that he was just discharged from the 

hospital after a 5 days detox from alcohol. He denies drinking any alcohol 

today. EMS was called by the patient's neighbor who believed he was incoherent 

this evening .





   Source of History:  patient, EMS


   Position:  other (AMS)


   Quality:  other (AMS)


Note:


Patient notes 3-4 falling episodes.





Review of Systems


See HPI for pertinent positives & negatives. A total of 10 systems reviewed and 

were otherwise negative.





Past Medical & Surgical


Medical Problems:


(1) History of torn meniscus of left knee


(2) History of torn meniscus of right knee


Surgical Problems:


(1) H/O knee surgery


(2) H/O prostate biopsy








Social History


Smoking Status:  Current Every Day Smoker


Alcohol Use:  heavy


Marital Status:  , in relationship


Housing Status:  lives alone


Occupation Status:  unemployed





Current/Historical Medications


Scheduled


Chlordiazepoxide (Librium), 25 MG PO UD


Hydroxyzine Pamoate (Vistaril), 50 MG PO BID


Lisinopril (Prinivil), 20 MG PO DAILY


Montelukast Sodium (Singulair), 10 MG PO DAILY


Sertraline (Zoloft), 100 MG PO QAM


Trazodone Hcl (Trazodone), 400 MG PO HS





Scheduled PRN


Flurbiprofen (Flurbiprofen), 100 MG PO BID PRN for Pain


Lorazepam (Ativan), 0.5 MG PO DAILY PRN for Anxiety





Allergies


Coded Allergies:  


     No Known Allergies (Unverified , 1/14/18)





Physical Exam


Vital Signs











  Date Time  Temp Pulse Resp B/P (MAP) Pulse Ox O2 Delivery O2 Flow Rate FiO2


 


1/15/18 06:00  77 19 149/91 91 Room Air  


 


1/15/18 05:30    146/92    


 


1/15/18 05:05  85 18  92 Room Air  


 


1/15/18 05:00    137/89    


 


1/15/18 04:35  80 22  95 Room Air  


 


1/15/18 04:31    157/97    


 


1/15/18 04:16    123/98    


 


1/15/18 04:05  84 20  100 Room Air  


 


1/15/18 04:03  81      


 


1/15/18 04:00    148/93    


 


1/15/18 03:35  76 19  97 Room Air  


 


1/15/18 03:30    149/98    


 


1/15/18 03:05  80 20  97 Room Air  


 


1/15/18 03:00    138/91    


 


1/15/18 02:35  82 17  92 Room Air  


 


1/15/18 02:30    126/85    


 


1/15/18 02:05  83 21  91 Room Air  


 


1/15/18 02:00    146/89    


 


1/15/18 01:35  89 27  94 Room Air  


 


1/15/18 01:30  95 32  99 Room Air  


 


1/15/18 01:28    162/102    


 


1/15/18 01:00  84 19 145/93 99 Room Air  


 


1/15/18 00:32  83      


 


1/15/18 00:30  83 19 156/96 100   


 


1/15/18 00:27     87 Room Air  


 


1/15/18 00:26     98 Nasal Cannula 2.0 


 


1/15/18 00:00  87 19 139/89 94   


 


1/14/18 23:30  90 23 123/85 92   


 


1/14/18 23:00  91 23 133/87 93   


 


1/14/18 22:09 36.5 88 18 142/87 93 Room Air  


 


1/14/18 21:13     99 Room Air  


 


1/14/18 21:13     99 Room Air  


 


1/14/18 21:06  91      


 


1/14/18 20:56 36.3 95 20 173/109 99 Room Air  











Physical Exam


GENERAL:  Patient is awake, alert, somewhat anxious/aggressive appearing. 


EYES: The conjunctivae are clear.  The pupils are round and reactive. 


EARS, NOSE, MOUTH AND THROAT: The nose is without any evidence of any 

deformity. Mucous membranes are moist tongue is midline 


NECK: The neck is nontender and supple.


RESPIRATORY: Normal respiratory effort is noted there is no evidence of 

wheezing rhonchi or rales


CARDIOVASCULAR:  Regular rate and rhythm noted there no murmurs rubs or gallops 

normal S1 normal S2 


GASTROINTESTINAL: The abdomen is soft. Bowel sounds are present in all 

quadrants. Abdomen is nontender


MUSCULOSKELETAL/EXTREMITIES: There is no evidence of gross deformity full range 

of motion is noted in the hips and shoulders


SKIN: There is no obvious evidence of any rash. There are no petechiae, pallor 

or cyanosis noted. 


NEUROLOGIC:  Patient is oriented to person, place, and situation. Strength was 

symmetric, gait was steady.





Medical Decision & Procedures


ER Provider


Diagnostic Interpretation:


Radiology results as stated below per my review and radiologist interpretation:





CHEST ONE VIEW PORTABLE





HISTORY:      EVALUATE ALTERED MENTAL STATUS/WEAKNESS





COMPARISON: Chest 10/18/2017.





FINDINGS: The lungs are clear. The heart is normal in size. No pleural


effusions. No pneumothorax. Old, healed left-sided rib fractures.





IMPRESSION:


No acute process.











Electronically signed by:  Jose Bo M.D.


1/14/2018 10:04 PM





Dictated Date/Time:  1/14/2018 9:19 PM








HEAD CT NONCONTRAST





CT DOSE: 537.48 mGy.cm





HISTORY:      EVALUATE ALTERED MENTAL STATUS/WEAKNESS





TECHNIQUE: Multiaxial CT images of the head were performed without the use of


intravenous contrast. Automated exposure control was utilized for this study.  A


dose lowering technique was utilized adhering to the principles of ALARA.





Comparison: Head CT 10/18/2017.





Findings: The paranasal sinuses and mastoid air cells are clear. There is no


mass, hematoma, midline shift, acute infarct. The calvarium and skull base are


intact. Mild atrophic changes within the brain, unchanged. Focal small areas of


encephalomalacia within the left inferior frontal lobe and left anterior


temporal lobe remain unchanged. This is likely due to old trauma or old infarct.





Impression:


No significant change compared to the prior study. No acute intracranial


abnormality. 











Electronically signed by:  Jose Bo M.D.


1/14/2018 10:03 PM





Dictated Date/Time:  1/14/2018 10:00 PM





Laboratory Results


1/14/18 21:05








Red Blood Count 3.61, Mean Corpuscular Volume 102.5, Mean Corpuscular 

Hemoglobin 34.1, Mean Corpuscular Hemoglobin Concent 33.2, Mean Platelet Volume 

9.1, Neutrophils (%) (Auto) 40.4, Lymphocytes (%) (Auto) 33.6, Monocytes (%) (

Auto) 23.8, Eosinophils (%) (Auto) 1.0, Basophils (%) (Auto) 1.0, Neutrophils # 

(Auto) 1.98, Lymphocytes # (Auto) 1.65, Monocytes # (Auto) 1.17, Eosinophils # (

Auto) 0.05, Basophils # (Auto) 0.05





1/14/18 21:05

















Test


  1/14/18


21:01 1/14/18


21:05 1/14/18


21:08


 


Bedside Glucose


  96 mg/dl


(70-99) 


  


 


 


White Blood Count


  


  4.91 K/uL


(4.8-10.8) 


 


 


Red Blood Count


  


  3.61 M/uL


(4.7-6.1) 


 


 


Hemoglobin


  


  12.3 g/dL


(14.0-18.0) 


 


 


Hematocrit  37.0 % (42-52)  


 


Mean Corpuscular Volume


  


  102.5 fL


() 


 


 


Mean Corpuscular Hemoglobin


  


  34.1 pg


(25-34) 


 


 


Mean Corpuscular Hemoglobin


Concent 


  33.2 g/dl


(32-36) 


 


 


Platelet Count


  


  308 K/uL


(130-400) 


 


 


Mean Platelet Volume


  


  9.1 fL


(7.4-10.4) 


 


 


Neutrophils (%) (Auto)  40.4 %  


 


Lymphocytes (%) (Auto)  33.6 %  


 


Monocytes (%) (Auto)  23.8 %  


 


Eosinophils (%) (Auto)  1.0 %  


 


Basophils (%) (Auto)  1.0 %  


 


Neutrophils # (Auto)


  


  1.98 K/uL


(1.4-6.5) 


 


 


Lymphocytes # (Auto)


  


  1.65 K/uL


(1.2-3.4) 


 


 


Monocytes # (Auto)


  


  1.17 K/uL


(0.11-0.59) 


 


 


Eosinophils # (Auto)


  


  0.05 K/uL


(0-0.5) 


 


 


Basophils # (Auto)


  


  0.05 K/uL


(0-0.2) 


 


 


RDW Standard Deviation


  


  52.9 fL


(36.4-46.3) 


 


 


RDW Coefficient of Variation


  


  14.1 %


(11.5-14.5) 


 


 


Immature Granulocyte % (Auto)  0.2 %  


 


Immature Granulocyte # (Auto)


  


  0.01 K/uL


(0.00-0.02) 


 


 


Prothrombin Time


  


  9.8 SECONDS


(9.0-12.0) 


 


 


Prothromb Time International


Ratio 


  0.9 (0.9-1.1) 


  


 


 


Activated Partial


Thromboplast Time 


  21.0 SECONDS


(21.0-31.0) 


 


 


Partial Thromboplastin Ratio  0.8  


 


Anion Gap


  


  7.0 mmol/L


(3-11) 


 


 


Est Creatinine Clear Calc


Drug Dose 


  90.4 ml/min 


  


 


 


Estimated GFR (


American) 


  109.0 


  


 


 


Estimated GFR (Non-


American 


  94.0 


  


 


 


BUN/Creatinine Ratio  11.8 (10-20)  


 


Calcium Level


  


  8.4 mg/dl


(8.5-10.1) 


 


 


Magnesium Level


  


  1.8 mg/dl


(1.8-2.4) 


 


 


Total Bilirubin


  


  0.2 mg/dl


(0.2-1) 


 


 


Direct Bilirubin


  


  < 0.1 mg/dl


(0-0.2) 


 


 


Aspartate Amino Transf


(AST/SGOT) 


  23 U/L (15-37) 


  


 


 


Alanine Aminotransferase


(ALT/SGPT) 


  32 U/L (12-78) 


  


 


 


Alkaline Phosphatase


  


  52 U/L


() 


 


 


Total Creatine Kinase


  


  84 U/L


() 


 


 


Creatine Kinase MB


  


  0.8 ng/ml


(0.5-3.6) 


 


 


Creatine Kinase MB Ratio  1.0 (0-3.0)  


 


Troponin I


  


  < 0.015 ng/ml


(0-0.045) 


 


 


Total Protein


  


  8.0 gm/dl


(6.4-8.2) 


 


 


Albumin


  


  3.9 gm/dl


(3.4-5.0) 


 


 


Lipase


  


  289 U/L


() 


 


 


Thyroid Stimulating Hormone


(TSH) 


  1.040 uIu/ml


(0.300-4.500) 


 


 


Ethyl Alcohol mg/dL


  


  281.7 mg/dl


(0-3) 


 


 


Urine Color   YELLOW 


 


Urine Appearance   CLEAR (CLEAR) 


 


Urine pH   5.5 (4.5-7.5) 


 


Urine Specific Gravity


  


  


  1.010


(1.000-1.030)


 


Urine Protein   NEG (NEG) 


 


Urine Glucose (UA)   NEG (NEG) 


 


Urine Ketones   NEG (NEG) 


 


Urine Occult Blood   NEG (NEG) 


 


Urine Nitrite   NEG (NEG) 


 


Urine Bilirubin   NEG (NEG) 


 


Urine Urobilinogen   NEG (NEG) 


 


Urine Leukocyte Esterase   NEG (NEG) 


 


Urine Opiates Screen   NEG (NEG) 


 


Urine Methadone, Qualitative   NEG (NEG) 


 


Urine Barbiturates   NEG (NEG) 


 


Urine Phencyclidine (PCP)


Level 


  


  NEG (NEG) 


 


 


Ur


Amphetamine/Methamphetamine 


  


  NEG (NEG) 


 


 


MDMA (Ecstasy) Screen   NEG (NEG) 


 


Urine Benzodiazepines Screen   POS (NEG) 


 


Urine Cocaine Metabolite   NEG (NEG) 


 


Urine Marijuana (THC)   NEG (NEG) 





Laboratory results per my review.





Medications Administered











 Medications


  (Trade)  Dose


 Ordered  Sig/Purnima


 Route  Start Time


 Stop Time Status Last Admin


Dose Admin


 


 Potassium Chloride


  (Kcl 10 Meq /


 Wtr)  10 meq  NOW  STAT


 IV  1/14/18 21:54


 1/14/18 21:55 DC 1/14/18 22:10


10 MEQ











ECG


Indication:  altered mental status


Rate (beats per minute):  88


Rhythm:  normal sinus


Findings:  no acute ischemic change, no ectopy


Comparison ECG Date:  10/18/2017


Change:  no significant change





ED Course


2059: The patient was evaluated in room C9. A complete history and physical 

examination were performed. 





2054: Ordered Potassium Chloride 10 meq IV. 





0022: I checked on the patient at this time. He is sleeping in bed. 





0211: The patient will be signed out to Dr. Michelle at change of shift.





Medical Decision


Differential diagnosis:


Etiologies such as metabolic, infection, hypoglycemia, electrolyte abnormalities

, cardiac sources, intracerebral event, toxicologic, neurologic, as well as 

others were entertained.





Nursing notes reviewed. He slowly started to improve.





Additional history is obtained from the prehospital personnel.





Patient's previous electronic medical records were reviewed.





The patient is a 57-year-old male who presented to the emergency apartment for 

evaluation of altered mental status. Review the patient's previous medical 

records shows that he was here previously for alcohol intoxication and was 

recently admitted for alcohol detox. He did go home with a prescription for 

Librium. At this time I feel he may have taken too much alcohol and to much of 

his Librium. He was reevaluated multiple times. He slowly started to improve 

but I felt he required more time of observation. He was signed out to the night 

physician at change of shift. I reviewed the patient's laboratory and 

radiographic studies with her. Please see her note for continuation of care and 

final disposition.





Medication Reconcilliation


Current Medication List:  was personally reviewed by me





Blood Pressure Screening


Patient's blood pressure:  Elevated blood pressure


Blood pressure disposition:  Elevated BP felt to be situational





Impression





 Primary Impression:  


 Altered mental status


 Additional Impressions:  


 ETOH abuse


 Hypokalemia





Scribe Attestation


The scribe's documentation has been prepared under my direction and personally 

reviewed by me in its entirety. I confirm that the note above accurately 

reflects all work, treatment, procedures, and medical decision making performed 

by me.





Departure Information


Dispostion


Still a Patient (Patient will be signed out to Dr. Michelle at [facility])





Referrals


LIYAH Langston M.D. (MEDICAL) (PCP)





Forms


HOME CARE DOCUMENTATION FORM,                                                 

               IMPORTANT VISIT INFORMATION





Patient Instructions


My [facility]





Additional Instructions





Continue all medications as prescribed. Call your family  in the morning to 

schedule a follow-up appointment. Rest and avoid any strenuous activity. Avoid 

any further alcoholic beverages. Do not operate any heavy machinery including 

driving a vehicle for the next 24 hours.





Problem Qualifiers








 Primary Impression:  


 Altered mental status


 Altered mental status type:  unspecified  Qualified Codes:  R41.82 - Altered 

mental status, unspecified

## 2018-01-14 NOTE — DIAGNOSTIC IMAGING REPORT
HEAD CT NONCONTRAST



CT DOSE: 537.48 mGy.cm



HISTORY:      EVALUATE ALTERED MENTAL STATUS/WEAKNESS



TECHNIQUE: Multiaxial CT images of the head were performed without the use of

intravenous contrast. Automated exposure control was utilized for this study.  A

dose lowering technique was utilized adhering to the principles of ALARA.



Comparison: Head CT 10/18/2017.



Findings: The paranasal sinuses and mastoid air cells are clear. There is no

mass, hematoma, midline shift, acute infarct. The calvarium and skull base are

intact. Mild atrophic changes within the brain, unchanged. Focal small areas of

encephalomalacia within the left inferior frontal lobe and left anterior

temporal lobe remain unchanged. This is likely due to old trauma or old infarct.



Impression:

No significant change compared to the prior study. No acute intracranial

abnormality. 







Electronically signed by:  Jose Bo M.D.

1/14/2018 10:03 PM



Dictated Date/Time:  1/14/2018 10:00 PM

## 2018-01-15 VITALS — OXYGEN SATURATION: 98 %

## 2018-01-15 VITALS — OXYGEN SATURATION: 91 % | SYSTOLIC BLOOD PRESSURE: 149 MMHG | DIASTOLIC BLOOD PRESSURE: 91 MMHG | HEART RATE: 77 BPM

## 2018-01-15 NOTE — EMERGENCY ROOM VISIT NOTE
ED Visit Note


First contact with patient:  06:00


Patient signed out to me by Dr. Valdes.  Patient awake and alert at this time.  

States he only had one drink of Gatorade and vodka last night.  Discussed with 

patient and avoidance of continued alcohol use, risks of long-term alcohol use 

and potential complications to his health, he verbalized understanding.  

Patient with stable vital signs, and billing with a steady gait, no evidence of 

acute alcohol withdrawal or delirium tremens.

## 2018-01-22 ENCOUNTER — HOSPITAL ENCOUNTER (EMERGENCY)
Dept: HOSPITAL 45 - C.EDA | Age: 58
Discharge: HOME | End: 2018-01-22
Payer: COMMERCIAL

## 2018-01-22 VITALS
WEIGHT: 149.25 LBS | BODY MASS INDEX: 22.11 KG/M2 | HEIGHT: 69.02 IN | WEIGHT: 149.25 LBS | HEIGHT: 69.02 IN | BODY MASS INDEX: 22.11 KG/M2

## 2018-01-22 VITALS — DIASTOLIC BLOOD PRESSURE: 95 MMHG | SYSTOLIC BLOOD PRESSURE: 148 MMHG | OXYGEN SATURATION: 96 % | HEART RATE: 85 BPM

## 2018-01-22 VITALS — TEMPERATURE: 98.24 F

## 2018-01-22 DIAGNOSIS — F10.920: Primary | ICD-10-CM

## 2018-01-22 DIAGNOSIS — Z79.899: ICD-10-CM

## 2018-01-22 DIAGNOSIS — F32.9: ICD-10-CM

## 2018-01-22 DIAGNOSIS — C61: ICD-10-CM

## 2018-01-22 DIAGNOSIS — F17.200: ICD-10-CM

## 2018-01-22 DIAGNOSIS — Z87.828: ICD-10-CM

## 2018-01-22 DIAGNOSIS — F43.9: ICD-10-CM

## 2018-01-22 NOTE — EMERGENCY ROOM VISIT NOTE
History


Report prepared by Danielibmichael:  Fili Rodas


Under the Supervision of:  Dr. Paloma Sanches D.O.


First contact with patient:  00:38


Chief Complaint:  ALCOHOL OVERDOSE


Stated Complaint:  ALCOHOL OVERDOSE





History of Present Illness


The patient is a 57 year old male who presents to the Emergency Room with 

complaints of constant alcohol intoxication beginning today. He was seen in the 

ED a few days ago for similar symptoms. The patient states that he called 

mental health crisis today because he was feeling depressed, and was found to 

be unresponsive upon arrival of the . He states that he feels 

extremely depressed and stressed because he has a history of prostate cancer, 

and had blood work which showed that his "cancer levels are five times what 

they normally are" earlier this week. He states that he has been on a drinking 

binge due to the stress and depression. He notes that he has not been sleeping 

recently. The patient states that he was sleeping on the floor when the case 

manager arrived. He states that it is not abnormal for him to sleep on the 

floor due to his history in the .





   Source of History:  patient


   Onset:  Today


   Quality:  other (alcohol intoxication)


   Timing:  constant





Review of Systems


See HPI for pertinent positives & negatives. A total of 10 systems reviewed and 

were otherwise negative.





Past Medical & Surgical


Medical Problems:


(1) History of torn meniscus of left knee


(2) History of torn meniscus of right knee


Surgical Problems:


(1) H/O knee surgery


(2) H/O prostate biopsy








Family History


No pertinent family history stated.





Social History


Smoking Status:  Current Every Day Smoker


Alcohol Use:  heavy


Marital Status:  , in relationship


Housing Status:  lives alone


Occupation Status:  unemployed





Current/Historical Medications


Scheduled


Chlordiazepoxide (Librium), 25 MG PO UD


Hydroxyzine Pamoate (Vistaril), 50 MG PO BID


Lisinopril (Prinivil), 20 MG PO DAILY


Montelukast Sodium (Singulair), 10 MG PO DAILY


Sertraline (Zoloft), 100 MG PO QAM


Trazodone Hcl (Trazodone), 400 MG PO HS





Scheduled PRN


Flurbiprofen (Flurbiprofen), 100 MG PO BID PRN for Pain


Lorazepam (Ativan), 0.5 MG PO DAILY PRN for Anxiety





Allergies


Coded Allergies:  


     No Known Allergies (Unverified , 1/22/18)





Physical Exam


Vital Signs











  Date Time  Temp Pulse Resp B/P (MAP) Pulse Ox O2 Delivery O2 Flow Rate FiO2


 


1/22/18 02:14  85 18 148/95 96   


 


1/22/18 01:00  83 18 152/97 96 Room Air  


 


1/22/18 00:55 36.8 100 21 183/110 98 Room Air  


 


1/22/18 00:55  92      











Physical Exam


General: Pleasant, cooperative, coherent. Does not appear to be intoxicated. 

Smells of alcohol. 


HEENT: Head - normocephalic and atraumatic   Pupils are equal, round, and 

reactive to light.  Extraocular eye muscles are intact, and sclera are 

anicteric.   Nose -  moist nasal mucosa without discharge. Mouth - moist buccal 

mucosa.  Oropharynx is nonerythematous and there is no tonsillar exudate or 

edema noted.


Neck: Supple; no JVD, nuchal rigidity, cervical lymphadenopathy.


Heart: Regular rate and rhythm with no murmurs  appreciated.


Lungs: Expiratory wheezing. 


Abdomen: Soft, completely nontender, nondistended, with good bowel sounds.  

There are no palpable pulsatile masses or hepatosplenomegaly.  There is no 

guarding, rigidity, or rebound noted.


Extremities: No evidence of cyanosis, clubbing, or edema.  There are easily 

palpable peripheral pulses.


Skin: warm and dry with good turgor and no rashes.


Psych: Denies suicidal or homicidal ideation.





Medical Decision & Procedures


ED Course


0056: Past medical records reviewed. The patient was evaluated in room A9B. A 

complete history and physical exam was performed.





0110: The patient will be evaluated by the .  The patient is not 

suicidal and does not require inpatient psychiatric care.  He notes that he has 

money in his pocket for a taxi.





0130: Upon reevaluation, the patient is resting comfortably. He has been 

evaluated by the  who feels that the patient is safe for discharge, 

as he is not suicidal or homicidal. I discussed findings and results with the 

patient. He verbalized agreement of the treatment plan. The patient was 

discharged home.





Medical Decision


The patient is a 57 year old male who presents to the ED with alcohol 

intoxication. Differential diagnosis includes alcohol intoxication, syncope, 

mood disorder, and suicidal ideation.   





The patient called mobile crisis this evening.  When he arrived at his apartment

, he was on the floor unresponsive.  EMS and police were called.  The patient 

states that he did not fall onto the floor.  He laid himself down and explains 

that he will sleep on the floor occasionally he did call can help so that 

somebody would respond and talk to him.  He has been feeling more depressed 

secondary to his recent diagnosis of prostate cancer.  However, he is not 

suicidal or homicidal.  He has been drinking alcohol this evening but states 

that he drinks every day.  He does not appear to be acutely intoxicated.  He 

simply explains that he wanted to talk to someone because he was feeling 

anxious and depressed about his prostate cancer.





There were no signs of trauma on physical exam.  The patient's vitals were 

stable.





Medication Reconcilliation


Current Medication List:  was personally reviewed by me





Blood Pressure Screening


Patient's blood pressure:  Elevated blood pressure


Blood pressure disposition:  Elevated BP felt to be situational





Impression





 Primary Impression:  


 Alcohol intoxication





Scribe Attestation


The scribe's documentation has been prepared under my direction and personally 

reviewed by me in its entirety. I confirm that the note above accurately 

reflects all work, treatment, procedures, and medical decision making performed 

by me.





Departure Information


Dispostion


Home / Self-Care





Referrals


LIYAH Langston M.D. (MEDICAL) (PCP)





Forms


HOME CARE DOCUMENTATION FORM,                                                 

               IMPORTANT VISIT INFORMATION





Patient Instructions


ED Alcohol Intoxication, My [facility]





Additional Instructions





Rest.





Limit alcohol intake





Problem Qualifiers








 Primary Impression:  


 Alcohol intoxication


 Complication of substance-induced condition:  uncomplicated  Qualified Codes:  

F10.920 - Alcohol use, unspecified with intoxication, uncomplicated

## 2018-03-04 ENCOUNTER — HOSPITAL ENCOUNTER (EMERGENCY)
Dept: HOSPITAL 45 - C.EDB | Age: 58
Discharge: HOME | End: 2018-03-04
Payer: COMMERCIAL

## 2018-03-04 VITALS — OXYGEN SATURATION: 99 % | DIASTOLIC BLOOD PRESSURE: 79 MMHG | HEART RATE: 90 BPM | SYSTOLIC BLOOD PRESSURE: 120 MMHG

## 2018-03-04 VITALS — HEIGHT: 69.02 IN

## 2018-03-04 DIAGNOSIS — S01.91XA: Primary | ICD-10-CM

## 2018-03-04 DIAGNOSIS — F17.200: ICD-10-CM

## 2018-03-04 DIAGNOSIS — F10.99: ICD-10-CM
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
## 2018-03-04 NOTE — EMERGENCY ROOM VISIT NOTE
History


Report prepared by Samuel:  Sukh Clay


Under the Supervision of:  Dr. Jose Nunez D.O.


First contact with patient:  17:32


Chief Complaint:  LACERATION/CUT (SUT/DERMABOND)


Stated Complaint:  FALL, HEAD LACERATION





History of Present Illness


The patient is a 57 year old male who presents to the Emergency Room with 

complaints of a head laceration s/p mechanical fall. Per EMS, the patient took 

a tumble in his apartment and hit his head on the radiator. He states that for 

the past 3 weeks, he has been on a detox program for his alcoholism and today, 

he had 3 shots of rum and Coca Cola. He states for the first two days, he went 

through the delirium tremens. He states that his body was unable to handle the 

detox, was very stressed, and that he had to crawl on his hands and knees to 

use the bathroom. He states he has been on 10 days worth of pills and was on 

his last pill today (EMS found Hydroxyzine, Buspirone, Naltrexone, and 

Chlordiazepoxide from Dr. Langston). Per EMS, when he blacked out, there were 

several blood clots on the ground. The patient states that he has been through 

a detox program in the past. He states that his tetanus is UTD. Of note, the 

patient has cancer and would like his CT scans sent to [facility].





   Source of History:  patient, EMS


   Onset:  PTA


   Position:  head, other (global)


   Symptom Intensity:  pain rated as 0/10


   Timing:  other (1 episode of falling)


   Associated Symptoms:  + LOC


Note:


The patient reports delirium tremens. He has a bloody laceration.





Review of Systems


See HPI for pertinent positives & negatives. A total of 10 systems reviewed and 

were otherwise negative.





Past Medical & Surgical


Medical Problems:


(1) History of torn meniscus of left knee


(2) History of torn meniscus of right knee


Surgical Problems:


(1) H/O knee surgery


(2) H/O prostate biopsy








Social History


Smoking Status:  Current Every Day Smoker


Alcohol Use:  heavy


Marital Status:  , in relationship


Housing Status:  lives alone


Occupation Status:  unemployed





Current/Historical Medications


Scheduled


Chlordiazepoxide (Librium), 25 MG PO UD


Hydroxyzine Pamoate (Vistaril), 50 MG PO BID


Lisinopril (Prinivil), 20 MG PO DAILY


Montelukast Sodium (Singulair), 10 MG PO DAILY


Sertraline (Zoloft), 100 MG PO QAM


Trazodone Hcl (Trazodone), 400 MG PO HS





Scheduled PRN


Flurbiprofen (Flurbiprofen), 100 MG PO BID PRN for Pain


Lorazepam (Ativan), 0.5 MG PO DAILY PRN for Anxiety





Allergies


Coded Allergies:  


     No Known Allergies (Unverified , 3/4/18)





Physical Exam


Vital Signs











  Date Time  Temp Pulse Resp B/P (MAP) Pulse Ox O2 Delivery O2 Flow Rate FiO2


 


3/4/18 19:01  98 16 125/87 96 Room Air  


 


3/4/18 17:38  102 18 133/93 99 Room Air  











Physical Exam


CONSTITUTIONAL/VITAL SIGNS: Reviewed / noted above.


GENERAL: Non-toxic in appearance. 


INTEGUMENTARY: Warm, dry, and Pink.


HEAD: Normocephalic.3 cm laceration to the posterior apex of the head that is 

well approximated, non contaminated, and not bleeding.


EYES: without scleral icterus or trauma.


ENT/OROPHARYNX: clear and moist.


LYMPHADENOPATHY/NECK: Is supple without lymphadenopathy or meningismus.


RESPIRATORY: Lungs clear and equal.


CARDIOVASCULAR: Regular rate and rhythm.


GI/ABDOMEN: Soft and nontender. No organomegaly or pulsatile mass. No rebound 

or guarding. Normal bowel sounds.


EXTREMITIES: Warm and well perfused.


BACK: No CVA tenderness.


NEUROLOGICAL: Intact without focal deficits. 


PSYCHIATRIC: normal affect.


MUSCULOSKELETAL: Normally developed with good muscle tone.





Medical Decision & Procedures


ER Provider


Diagnostic Interpretation:


Radiology results as stated below per my review and radiologist interpretation:





CT SCAN OF THE BRAIN WITHOUT IV CONTRAST





CLINICAL HISTORY: Fall with head injury.





COMPARISON STUDY:  CT of the brain dated 1/14/2018.





TECHNIQUE: Unenhanced axial CT scan of the brain is performed from the vertex to


the skull base.





CT DOSE: 712.55 mGy.cm





FINDINGS:





Brain parenchyma: There are age advanced involutional changes noting  minimal


subcortical and periventricular microangiopathic change. Small foci of left


frontal and left temporal encephalomalacia are unchanged and consistent with


remote insults. There is no hemorrhage, mass effect, or evidence of acute


territorial ischemia by CT criteria. Gray-white matter is preserved. No


extra-axial fluid collection is seen.





Ventricles, sulci, cisterns: Prominent secondary to involutional change.





Intracranial vasculature: There is atherosclerotic calcification of the


cavernous carotid arteries.





Calvarium: There is no depressed calvarial fracture.





Soft tissues: There is a posterior parietal scalp contusion/laceration.





Sinuses and mastoids: The visualized paranasal sinuses are clear. The mastoid


air cells are well pneumatized.





Orbits: The bony orbits are grossly intact.








IMPRESSION: There is no hemorrhage, mass effect, or evidence of acute


territorial ischemia by CT criteria.











Electronically signed by:  Valente Sultana M.D.


3/4/2018 6:19 PM





Dictated Date/Time:  3/4/2018 6:17 PM





ED Course


1732: Previous medical records were reviewed. The patient was evaluated in room 

B2. A complete history and physical examination was performed.





1930: On reevaluation, the patient is doing well. I discussed the results and 

findings with the patient. He verbalized agreement of the treatment plan. The 

patient was discharged home.





Medical Decision


Differential includes close head injury, intracranial bleed, facial trauma, 

cervical spine trauma, chest and thoracic trauma, abdominal and intra-abdominal 

trauma, spine neurologic trauma, extremity trauma.





This is a 57-year-old male who presents the ED with a chief complaint of a 

fall.  The patient tripped and lost his balance and hit the back of his head on 

a radiator.  He suffered a small laceration of the posterior apex of his head.  

This is not bleeding and is not contaminated and was well approximated and does 

not require repair.  CT scan of brain did not show acute process.  The patient 

was told results.  He denies any additional injuries and his exam was otherwise 

unremarkable.  He is felt to be stable for discharge.  Tetanus shot is up-to-

date.





Medication Reconcilliation


Current Medication List:  was personally reviewed by me





Blood Pressure Screening


Patient's blood pressure:  Normal blood pressure


Blood pressure disposition:  Did not require urgent referral





Impression





 Primary Impression:  


 Head contusion





Scribe Attestation


The scribe's documentation has been prepared under my direction and personally 

reviewed by me in its entirety. I confirm that the note above accurately 

reflects all work, treatment, procedures, and medical decision making performed 

by me.





Departure Information


Dispostion


Home / Self-Care





Referrals


LIYAH Langston M.D. (MEDICAL) (PCP)





Patient Instructions


My [facility]





Additional Instructions





Follow-up with your doctor for further care and evaluation in 1-2 days.  Return 

to the emergency department for worsening or new symptoms or any concerns.


You have been examined and treated today on an emergency basis only. This is 

not a substitute for, or an effort to provide, complete comprehensive medical 

care. It is impossible to recognize and treat all injuries or illnesses in a 

single emergency department visit. It is therefore important that you follow up 

closely with your doctor.  Call as soon as possible for an appointment.

## 2018-03-04 NOTE — DIAGNOSTIC IMAGING REPORT
CT SCAN OF THE BRAIN WITHOUT IV CONTRAST



CLINICAL HISTORY: Fall with head injury.



COMPARISON STUDY:  CT of the brain dated 1/14/2018.



TECHNIQUE: Unenhanced axial CT scan of the brain is performed from the vertex to

the skull base.



CT DOSE: 712.55 mGy.cm



FINDINGS:



Brain parenchyma: There are age advanced involutional changes noting  minimal

subcortical and periventricular microangiopathic change. Small foci of left

frontal and left temporal encephalomalacia are unchanged and consistent with

remote insults. There is no hemorrhage, mass effect, or evidence of acute

territorial ischemia by CT criteria. Gray-white matter is preserved. No

extra-axial fluid collection is seen.



Ventricles, sulci, cisterns: Prominent secondary to involutional change.



Intracranial vasculature: There is atherosclerotic calcification of the

cavernous carotid arteries.



Calvarium: There is no depressed calvarial fracture.



Soft tissues: There is a posterior parietal scalp contusion/laceration.



Sinuses and mastoids: The visualized paranasal sinuses are clear. The mastoid

air cells are well pneumatized.



Orbits: The bony orbits are grossly intact.





IMPRESSION: There is no hemorrhage, mass effect, or evidence of acute

territorial ischemia by CT criteria.







Electronically signed by:  Valente Sultana M.D.

3/4/2018 6:19 PM



Dictated Date/Time:  3/4/2018 6:17 PM

## 2018-03-21 ENCOUNTER — HOSPITAL ENCOUNTER (INPATIENT)
Dept: HOSPITAL 45 - C.EDB | Age: 58
LOS: 5 days | Discharge: HOME | DRG: 897 | End: 2018-03-26
Attending: HOSPITALIST | Admitting: HOSPITALIST
Payer: COMMERCIAL

## 2018-03-21 VITALS
OXYGEN SATURATION: 97 % | DIASTOLIC BLOOD PRESSURE: 94 MMHG | TEMPERATURE: 98.78 F | SYSTOLIC BLOOD PRESSURE: 158 MMHG | HEART RATE: 100 BPM

## 2018-03-21 VITALS
HEIGHT: 69 IN | WEIGHT: 146.83 LBS | BODY MASS INDEX: 21.75 KG/M2 | WEIGHT: 146.83 LBS | BODY MASS INDEX: 21.75 KG/M2 | HEIGHT: 69 IN

## 2018-03-21 VITALS
OXYGEN SATURATION: 96 % | HEART RATE: 105 BPM | DIASTOLIC BLOOD PRESSURE: 74 MMHG | SYSTOLIC BLOOD PRESSURE: 103 MMHG | TEMPERATURE: 98.24 F

## 2018-03-21 DIAGNOSIS — F32.9: ICD-10-CM

## 2018-03-21 DIAGNOSIS — C61: ICD-10-CM

## 2018-03-21 DIAGNOSIS — D69.6: ICD-10-CM

## 2018-03-21 DIAGNOSIS — F10.239: Primary | ICD-10-CM

## 2018-03-21 DIAGNOSIS — F17.200: ICD-10-CM

## 2018-03-21 DIAGNOSIS — F41.8: ICD-10-CM

## 2018-03-21 DIAGNOSIS — Z91.14: ICD-10-CM

## 2018-03-21 DIAGNOSIS — I10: ICD-10-CM

## 2018-03-21 LAB
ALBUMIN SERPL-MCNC: 3.7 GM/DL (ref 3.4–5)
ALP SERPL-CCNC: 105 U/L (ref 45–117)
ALT SERPL-CCNC: 72 U/L (ref 12–78)
AST SERPL-CCNC: 117 U/L (ref 15–37)
BUN SERPL-MCNC: 10 MG/DL (ref 7–18)
CALCIUM SERPL-MCNC: 7.9 MG/DL (ref 8.5–10.1)
CO2 SERPL-SCNC: 22 MMOL/L (ref 21–32)
CREAT SERPL-MCNC: 0.84 MG/DL (ref 0.6–1.4)
EOSINOPHIL NFR BLD AUTO: 109 K/UL (ref 130–400)
GLUCOSE SERPL-MCNC: 68 MG/DL (ref 70–99)
HCT VFR BLD CALC: 36.5 % (ref 42–52)
HGB BLD-MCNC: 12.6 G/DL (ref 14–18)
MCH RBC QN AUTO: 35.2 PG (ref 25–34)
MCHC RBC AUTO-ENTMCNC: 34.5 G/DL (ref 32–36)
MCV RBC AUTO: 102 FL (ref 80–100)
PMV BLD AUTO: 9.2 FL (ref 7.4–10.4)
POTASSIUM SERPL-SCNC: 3.4 MMOL/L (ref 3.5–5.1)
PROT SERPL-MCNC: 8.4 GM/DL (ref 6.4–8.2)
RED CELL DISTRIBUTION WIDTH CV: 15.8 % (ref 11.5–14.5)
RED CELL DISTRIBUTION WIDTH SD: 58.8 FL (ref 36.4–46.3)
SODIUM SERPL-SCNC: 140 MMOL/L (ref 136–145)
WBC # BLD AUTO: 4.14 K/UL (ref 4.8–10.8)

## 2018-03-21 RX ADMIN — ALUMINUM ZIRCONIUM TRICHLOROHYDREX GLY SCH EA: 0.2 STICK TOPICAL at 23:34

## 2018-03-21 NOTE — DIAGNOSTIC IMAGING REPORT
CHEST CT WITH CONTRAST



CT DOSE: 219.62 mGy.cm



HISTORY: Acute head rotation with widening of the right paratracheal stripe seen

on comparison chest radiograph of same day  right side medistinum wide on chest

film



TECHNIQUE: Multiaxial CT images of the chest were performed following the

intravenous administration of contrast.  A dose lowering technique was utilized

adhering to the principles of ALARA.



COMPARISON:  Chest radiograph 3/21/2018 and 1/14/2018.



FINDINGS: 

Thyroid is homogeneous. No pathologic adenopathy identified. No mass of the

mediastinum to correlate with mediastinal prominence seen on comparison chest

radiograph. This finding was likely projectional and secondary to underlying

vascular pedicle.



Heart is normal in size. No pericardial effusion. Coronary arterial disease with

calcification of the aortic annulus. Moderate atherosclerosis of the thoracic

aorta. The left vertebral artery and a strictly from the aortic arch. There is

prominent atherosclerotic plaquing at the origin of the right subclavian artery

causing less than 50% luminal narrowing. The opacified pulmonary arterial tree

is unremarkable.



Mild upper lobe predominant centrilobular emphysema. No pneumothorax or pleural

effusion. There is mild bilateral bronchial wall thickening. There are no

suspicious pulmonary nodules or masses identified. Moderate amount of tracheal

secretions are noted.



Prior cholecystectomy. Fatty infiltration of the liver. Nodular thickening of

the left adrenal gland suggesting adrenal hyperplasia. Small sliding-type hiatal

hernia. Soft tissues are within normal limits. Multiple healed remote left-sided

rib fractures. Additionally there are multiple healing subacute appearing

fractures involving the anterior left third through seventh ribs.



IMPRESSION: 

1. No acute intrathoracic abnormality identified.

2. No focal abnormality, adenopathy or mass of the mediastinum to correlate with

mediastinal prominence seen on comparison chest radiograph.

3. Mild emphysema.

4. Hepatic steatosis.

5. Healing subacute appearing nondisplaced fractures of the anterior left third

through seventh ribs.

6. Prior cholecystectomy.







Electronically signed by:  Gonzales Monroe M.D.

3/21/2018 7:23 PM



Dictated Date/Time:  3/21/2018 7:16 PM

## 2018-03-21 NOTE — EMERGENCY ROOM VISIT NOTE
History


Report prepared by Samuel:  Willi Paredes


Under the Supervision of:  Dr. Valente Hernandez M.D.


First contact with patient:  17:05


Chief Complaint:  ALCOHOL OVERDOSE


Stated Complaint:  ETOH, MHID





History of Present Illness


The patient is a 57 year old male who presents to the Emergency Room with 

complaints of constant alcohol intoxication and suicidal ideations starting 

earlier today. Per the nursing staff, the patient has a history of alcohol abuse

, terminal stage 3 prostate cancer, and liver disease. The patient states that 

he has been getting treatment for his prostate cancer, and his next treatment 

is hormone treatment. He has not received chemotherapy or radiation. Per the 

nursing staff the patient has a history of withdrawal and seizures as well. The 

nursing staff states that the patient was making statements of "blowing his 

head off". The patient states that he currently feels "pretty drunk" and he 

states that he has been trying to detox for the past week. He is taking Librium 

trying to detox. The patient additionally notes that he has fallen 7 times 

within the past week.





Per the 302 statement, the patient wanted a last meal of steak, eggs, potato 

salad, and milton slaw, and then he was going to shoot himself with a rifle 

because he does not want his kids to have to spoon feed him like he had to do 

with his mother.





   Source of History:  patient, nursing staff, other (302 statement)


   Onset:  earlier today


   Position:  other (global)


   Symptom Intensity:  "pretty drunk"


   Quality:  other (alcohol intoxication and suicidal ideations)


   Timing:  constant


Note:


Associated symptoms: 7 falls this week





Review of Systems


See HPI for pertinent positives & negatives. A total of 10 systems reviewed and 

were otherwise negative.





Past Medical & Surgical


Medical Problems:


(1) History of torn meniscus of left knee


(2) History of torn meniscus of right knee


Surgical Problems:


(1) H/O knee surgery


(2) H/O prostate biopsy








Social History


Smoking Status:  Current Every Day Smoker


Alcohol Use:  heavy


Marital Status:  , in relationship


Housing Status:  lives alone


Occupation Status:  unemployed





Current/Historical Medications


Scheduled


Chlordiazepoxide (Librium), 25 MG PO TID


Hydroxyzine Pamoate (Vistaril), 1 CAP PO QAM


Lisinopril (Prinivil), 20 MG PO DAILY


Mometasone Furoate-Formoterol (Dulera 100/5 Mcg), 2 PUFFS INH BID


Montelukast Sodium (Singulair), 10 MG PO DAILY


Multivitamins/Minerals (Mvi With Minerals), 1 TAB PO DAILY


Sertraline (Zoloft), 100 MG PO QAM


Thiamine Hcl (Vitamin B-1), 100 MG PO DAILY


Trazodone Hcl (Trazodone), 400 MG PO HS





Scheduled PRN


Albuterol Hfa (Ventolin Hfa), 2 PUFF INH QID PRN for SOB/Wheezing





Allergies


Coded Allergies:  


     No Known Allergies (Unverified , 3/4/18)





Physical Exam


Vital Signs











  Date Time  Temp Pulse Resp B/P (MAP) Pulse Ox O2 Delivery O2 Flow Rate FiO2


 


3/21/18 19:08  90 18 173/105 100 Room Air  


 


3/21/18 18:11  75 19 125/103 97 Room Air  


 


3/21/18 17:40  96 20 155/101 96 Room Air  


 


3/21/18 16:59  87      


 


3/21/18 16:50     94 Room Air  


 


3/21/18 16:50 36.6 88 15 121/109 94 Room Air  











Physical Exam


GENERAL: Patient is in no acute distress.


HEENT: No acute trauma, normocephalic atraumatic, mucous membranes moist, no 

nasal congestion, no scleral icterus.


NECK: No stridor, no adenopathy, no meningismus, trachea is midline.


LUNGS: Clear to auscultation bilaterally, no wheeze, no rhonchi, breath sounds 

equal.


HEART: Without murmurs gallops or rubs, regular rate and rhythm.


ABDOMEN: Soft, nontender, bowel sounds positive, no hernias, no peritonitis.


EXTREMITIES: No cyanosis or edema, full range of motion of all the joints 

without pain or difficulty, no signs for acute trauma.


NEUROLOGIC: Seems intoxicated with alcohol. Moving all extremities. Some speech 

slur. Slightly confused. No focal motor deficits.


SKIN: No rash, no jaundice, no diaphoresis.





Medical Decision & Procedures


ER Provider


Diagnostic Interpretation:


Radiology results as stated below per my review and radiologist interpretation:











HEAD WITHOUT CONTRAST (CT)





CLINICAL HISTORY: 57 years-old Male with EVALUATE FOR PSYCH CLEARANCE.  Acute


alcohol intoxication. 6 right 3 clearance.  





TECHNIQUE: Multiple axial CT images of the head were obtained without contrast. 


A dose lowering technique was utilized adhering to the principles of ALARA.





CT DOSE:  782.75 mGycm





COMPARISON: CT head 3/4/2018.





FINDINGS: 





No acute intracranial hemorrhage, midline shift, intracranial mass,


hydrocephalus, territorial ischemia or abnormal extra-axial collection. Mild


brain atrophy. 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


redemonstrated. Ill-defined areas of low-attenuation within the subcortical and


periventricular white matter suggest chronic microvascular ischemic changes.





The calvarium is intact.  The paranasal sinuses, mastoid air cells, and middle


ear cavities are clear. Ovoid sclerotic focus of the right frontal bone adjacent


to the right frontal sinus is noted, likely benign.





IMPRESSION:  No acute intracranial abnormality.











The above report was generated using voice recognition software. It may contain


grammatical, syntax or spelling errors.





Electronically signed by:  Gonzales Monroe M.D.


3/21/2018 6:04 PM





Dictated Date/Time:  3/21/2018 6:01 PM




















CHEST ONE VIEW PORTABLE





HISTORY:  57 years-old Male Agitation acute agitation





COMPARISON: Chest radiograph 1/14/2018





TECHNIQUE: Portable AP view of the chest





FINDINGS: 


Cardiac silhouette is within normal limits.There is prominence of the right


paratracheal stripe appears new from prior study with mild mediastinal widening.


Atherosclerosis of the aorta. No pneumothorax, pleural effusion, focal airspace


consolidation or overt pulmonary edema. Degenerative changes are seen within the


shoulders and spine. Healed remote left-sided rib fractures.





IMPRESSION: 


1. Mild mediastinal prominence with widening of the right paratracheal stripe.


2. The lungs appear clear. 





The above report was generated using voice recognition software. It may contain


grammatical, syntax or spelling errors.





Electronically signed by:  Gonzales Monroe M.D.


3/21/2018 5:41 PM





Dictated Date/Time:  3/21/2018 5:39 PM


























CHEST CT WITH CONTRAST





CT DOSE: 219.62 mGy.cm





HISTORY: Acute head rotation with widening of the right paratracheal stripe seen


on comparison chest radiograph of same day  right side medistinum wide on chest


film





TECHNIQUE: Multiaxial CT images of the chest were performed following the


intravenous administration of contrast.  A dose lowering technique was utilized


adhering to the principles of ALARA.





COMPARISON:  Chest radiograph 3/21/2018 and 1/14/2018.





FINDINGS: 


Thyroid is homogeneous. No pathologic adenopathy identified. No mass of the


mediastinum to correlate with mediastinal prominence seen on comparison chest


radiograph. This finding was likely projectional and secondary to underlying


vascular pedicle.





Heart is normal in size. No pericardial effusion. Coronary arterial disease with


calcification of the aortic annulus. Moderate atherosclerosis of the thoracic


aorta. The left vertebral artery and a strictly from the aortic arch. There is


prominent atherosclerotic plaquing at the origin of the right subclavian artery


causing less than 50% luminal narrowing. The opacified pulmonary arterial tree


is unremarkable.





Mild upper lobe predominant centrilobular emphysema. No pneumothorax or pleural


effusion. There is mild bilateral bronchial wall thickening. There are no


suspicious pulmonary nodules or masses identified. Moderate amount of tracheal


secretions are noted.





Prior cholecystectomy. Fatty infiltration of the liver. Nodular thickening of


the left adrenal gland suggesting adrenal hyperplasia. Small sliding-type hiatal


hernia. Soft tissues are within normal limits. Multiple healed remote left-sided


rib fractures. Additionally there are multiple healing subacute appearing


fractures involving the anterior left third through seventh ribs.





IMPRESSION: 


1. No acute intrathoracic abnormality identified.


2. No focal abnormality, adenopathy or mass of the mediastinum to correlate with


mediastinal prominence seen on comparison chest radiograph.


3. Mild emphysema.


4. Hepatic steatosis.


5. Healing subacute appearing nondisplaced fractures of the anterior left third


through seventh ribs.


6. Prior cholecystectomy.





Electronically signed by:  Gonzales Monroe M.D.


3/21/2018 7:23 PM





Dictated Date/Time:  3/21/2018 7:16 PM





Laboratory Results


3/21/18 17:24








3/21/18 17:24

















Test


  3/21/18


17:24 3/21/18


17:33


 


Red Blood Count


  3.58 M/uL


(4.7-6.1) 


 


 


Mean Corpuscular Volume


  102.0 fL


() 


 


 


Mean Corpuscular Hemoglobin


  35.2 pg


(25-34) 


 


 


Mean Corpuscular Hemoglobin


Concent 34.5 g/dl


(32-36) 


 


 


RDW Standard Deviation


  58.8 fL


(36.4-46.3) 


 


 


RDW Coefficient of Variation


  15.8 %


(11.5-14.5) 


 


 


Mean Platelet Volume


  9.2 fL


(7.4-10.4) 


 


 


Anion Gap


  16.0 mmol/L


(3-11) 


 


 


Est Creatinine Clear Calc


Drug Dose 89.2 ml/min 


  


 


 


Estimated GFR (


American) 111.1 


  


 


 


Estimated GFR (Non-


American 95.8 


  


 


 


BUN/Creatinine Ratio 12.5 (10-20)  


 


Calcium Level


  7.9 mg/dl


(8.5-10.1) 


 


 


Magnesium Level


  1.9 mg/dl


(1.8-2.4) 


 


 


Total Bilirubin


  0.9 mg/dl


(0.2-1) 


 


 


Aspartate Amino Transf


(AST/SGOT) 117 U/L


(15-37) 


 


 


Alanine Aminotransferase


(ALT/SGPT) 72 U/L (12-78) 


  


 


 


Alkaline Phosphatase


  105 U/L


() 


 


 


Ammonia


  14.0 umol/L


(11-32) 


 


 


Troponin I


  0.018 ng/ml


(0-0.045) 


 


 


Total Protein


  8.4 gm/dl


(6.4-8.2) 


 


 


Albumin


  3.7 gm/dl


(3.4-5.0) 


 


 


Globulin


  4.7 gm/dl


(2.5-4.0) 


 


 


Albumin/Globulin Ratio 0.8 (0.9-2)  


 


Thyroid Stimulating Hormone


(TSH) 1.970 uIu/ml


(0.300-4.500) 


 


 


Salicylates Level


  < 1.7 mg/dl


(2.8-20) 


 


 


Acetaminophen Level


  < 2 ug/ml


(10-30) 


 


 


Ethyl Alcohol mg/dL


  413.9 mg/dl


(0-3) 


 


 


Urine Color  YELLOW 


 


Urine Appearance  CLEAR (CLEAR) 


 


Urine pH  6.0 (4.5-7.5) 


 


Urine Specific Gravity


  


  1.005


(1.000-1.030)


 


Urine Protein  TRACE (NEG) 


 


Urine Glucose (UA)  NEG (NEG) 


 


Urine Ketones  1+ (NEG) 


 


Urine Occult Blood  TRACE (NEG) 


 


Urine Nitrite  NEG (NEG) 


 


Urine Bilirubin  NEG (NEG) 


 


Urine Urobilinogen  NEG (NEG) 


 


Urine Leukocyte Esterase  NEG (NEG) 


 


Urine WBC (Auto)  0 /hpf (0-5) 


 


Urine RBC (Auto)  0-4 /hpf (0-4) 


 


Urine Hyaline Casts (Auto)  0 /lpf (0-5) 


 


Urine Epithelial Cells (Auto)  0-5 /lpf (0-5) 


 


Urine Bacteria (Auto)  NEG (NEG) 


 


Urine Opiates Screen  NEG (NEG) 


 


Urine Methadone, Qualitative  NEG (NEG) 


 


Urine Barbiturates  NEG (NEG) 


 


Urine Phencyclidine (PCP)


Level 


  NEG (NEG) 


 


 


Ur


Amphetamine/Methamphetamine 


  NEG (NEG) 


 


 


MDMA (Ecstasy) Screen  NEG (NEG) 


 


Urine Benzodiazepines Screen  NEG (NEG) 


 


Urine Cocaine Metabolite  NEG (NEG) 


 


Urine Marijuana (THC)  NEG (NEG) 





Laboratory results reviewed by me.





Medications Administered











 Medications


  (Trade)  Dose


 Ordered  Sig/Purnima


 Route  Start Time


 Stop Time Status Last Admin


Dose Admin


 


 Sodium Chloride  500 ml @ 


 999 mls/hr  Q31M STAT


 IV  3/21/18 17:08


 3/21/18 17:38 DC 3/21/18 17:39


999 MLS/HR


 


 Multivitamins 10


 ml/Thiamine HCl


 100 mg/Folic Acid


 1 mg/Sodium


 Chloride  1,011.2 ml


  @ 500 mls/


 hr  Q2H2M ONCE


 IV  3/21/18 17:15


 3/21/18 19:22 DC 3/21/18 17:39


500 MLS/HR











ECG Per My Interpretation


Indication:  other (alcohol intoxication)


Rate (beats per minute):  92


Rhythm:  normal sinus


Findings:  nonspecific-ST abn, other (no PVC or ST elevations)





ED Course


1705: The patient was evaluated in room B4. A complete history and physical 

exam was performed.





1708: Sodium Chloride 500 ml @ 999 mls/hr IV





1715: Multivitamins 10ml/ Thiamine HCl 100mg/ Folic Acid 1mg/ Sodium Chloride 

1011.2ml @ 500mls/hr IV





1920: Lisinopril 20mg PO





1940: Discussed the patient's case with Dr. Cristian Murguia Hospitalist. The 

patient will be evaluated for further management.





Medical Decision


Differential diagnoses considered include alcohol or drug abuse, suicidal 

ideation, electrolyte imbalance, anemia, dehydration, intracranial bleeding, 

pneumonia, and infection.





Patient does have a low white count, hemoglobin and platelet count-all these 

numbers are slightly low and not of real clinical concern.  No significant 

electrolyte abnormality, no kidney failure.  There was a slight elevation to 

the AST.  The patient appears to be in a euthyroid state.  EKG shows a sinus 

rhythm, no acute ischemia.  Cardiac enzyme testing 1 is not consistent with 

acute cardiac injury.  Brain CT shows no acute bleed or mass-effect.  Chest 

film does not show pneumonia, the mediastinum was slightly wide.  Chest CT was 

done, no evidence for aortic dissection or mediastinal mass.  Some old, healing 

rib fractures were seen.  Ammonia level was not elevated.  Urine tox was 

negative.  Alcohol level was over 400.  Aspirin and Tylenol levels were 

undetectable.  Urinalysis did not show evidence for infection.





The patient presents with suicidal ideation.  There is a 302 petition with a 

signed 302 warrant.  Patient talked about having a last meal and then shooting 

himself with his rifle.  He does need psychiatric care however, he is quite 

intoxicated with alcohol and has a history of delirium tremens and seizures 

with alcohol withdrawal.  He is not stable for a psychiatry admission.





I spoke with case management, I did talk with the psychiatric  as 

well.  I spoke with the patient.  The on-call medical hospitalist was 

consulted.  





Patient was given oral lisinopril for the higher blood pressure.  He received 

IV saline and then he received IV saline with multivitamins, thiamine and 

folate.





Medication Reconcilliation


Current Medication List:  was personally reviewed by me





Blood Pressure Screening


Patient's blood pressure:  Elevated blood pressure


Monitored by the hospitalist.





Consults


Time Called:  1938


Consulting Physician:  Dr. Cristian Murguia Hospitalist


Returned Call:  1940


Discussed the patient's case with Dr. Cristian Rendon. The patient 

will be evaluated for further management.





Impression





 Primary Impression:  


 Suicidal ideation


 Additional Impressions:  


 Alcohol overdose


 Alcohol abuse


 302 evaluation





Scribe Attestation


The scribe's documentation has been prepared under my direction and personally 

reviewed by me in its entirety. I confirm that the note above accurately 

reflects all work, treatment, procedures, and medical decision making performed 

by me.





Departure Information


Dispostion


Being Evaluated By Hospitalist





Referrals


LIYAH Langston M.D. (MEDICAL) (PCP)





Patient Instructions


My [facility]





Problem Qualifiers

## 2018-03-21 NOTE — EMERGENCY ROOM VISIT NOTE
ED Visit Note


First contact with patient:  17:05


The nursing staff asked me to see the patient again after he was seen by the 

hospitalist.  The patient had questions, I did answer the questions.  He had a 

lot of questions regarding the 302 petition.  As the patient was somewhat 

agitated, I did give a milligram of IV Ativan.  The Ativan may need to be 

repeated as his alcohol clears further.

## 2018-03-21 NOTE — DIAGNOSTIC IMAGING REPORT
CHEST ONE VIEW PORTABLE



HISTORY:  57 years-old Male Agitation acute agitation



COMPARISON: Chest radiograph 1/14/2018



TECHNIQUE: Portable AP view of the chest



FINDINGS: 

Cardiac silhouette is within normal limits.There is prominence of the right

paratracheal stripe appears new from prior study with mild mediastinal widening.

Atherosclerosis of the aorta. No pneumothorax, pleural effusion, focal airspace

consolidation or overt pulmonary edema. Degenerative changes are seen within the

shoulders and spine. Healed remote left-sided rib fractures.



IMPRESSION: 

1. Mild mediastinal prominence with widening of the right paratracheal stripe.

2. The lungs appear clear. 







The above report was generated using voice recognition software. It may contain

grammatical, syntax or spelling errors.







Electronically signed by:  Gonzales Monroe M.D.

3/21/2018 5:41 PM



Dictated Date/Time:  3/21/2018 5:39 PM

## 2018-03-21 NOTE — HISTORY AND PHYSICAL
History & Physical


Date & Time of Service:


Mar 21, 2018 at 21:06


Chief Complaint:


Etoh, Mhid


Primary Care Physician:


LIYAH Langston M.D. (MEDICAL)


History of Present Illness


This is a 57 year old M who with review of chart and emergency department 

records has history of alcohol misuse and patient's neighbor arranged for 

patient to be brought into hospital for concern of suicidal ideations. There is 

a signed document for involuntary examination and treatment as stated by a 

Hill Dutta who is the patient's neighbor where there is attestation of the 

following. "Juan Pablo has stated in my presents of a last meal of steak egg potato 

salad and milton slaw and then go for a bus ride and shoot himself with his rifle 

that is presently with his ex girlfriends mother. Juan Pablo made these statement 

today 03/21/2018 at 1530 and in the last 30 days he has told me this. The 

reason is because he does no want his children spoonfeeding him like he did his 

mother"





An agency called [facility] also involved in the 302 status. 





In the ED, patient was hypertensive to 180s and elevated alcohol level. Patient 

received banana bag, IV fluids, and Ativan





When seen by medical doctor for hospital admission patient reports that he is 

in the hospital because his neighbor thought he was confused. Patient reports 

that he also has history of blackouts which he attributes to recently reducing 

alcohol use and being on anti-alcohol medications. He cannot recall his active 

medications. Patient reports being on medications for prostate cancer. Patient 

denies problems with walking. 





When patient was told he was being admitted, patient became agitated saying "

you will not 302 me." Medical doctor explained to the patient of the situation. 

Patient also wanted to speak with emergency room physician. Patient was then 

subsequently more cooperative.





Past Medical/Surgical History


Medical Problems:


(1) Alcohol intoxication


(2) Alcohol intoxication


(3) Alcohol intoxication


(4) Alcoholism


(5) Altered mental status


(6) Anxiety


(7) Anxiety


(8) ETOH abuse


(9) Head contusion


(10) History of torn meniscus of left knee


(11) History of torn meniscus of right knee


(12) Hypokalemia


(13) Noncompliance with medications


(14) Prostate cancer


(15) Psychiatric exam requested by authority


(16) Right knee sprain


(17) Tachycardia


(18) Unsteady gait


(19) UTI (urinary tract infection)


(20) UTI (urinary tract infection)


Surgical Problems:


(1) H/O knee surgery


(2) H/O prostate biopsy








Social History


Smoking Status:  Current Every Day Smoker


Marital Status:  , in relationship


Occupational Status:  unemployed





Allergies


Coded Allergies:  


     No Known Allergies (Unverified , 3/4/18)





Home Medications


Scheduled


Chlordiazepoxide (Librium), 25 MG PO TID


Hydroxyzine Pamoate (Vistaril), 1 CAP PO QAM


Lisinopril (Prinivil), 20 MG PO DAILY


Mometasone Furoate-Formoterol (Dulera 100/5 Mcg), 2 PUFFS INH BID


Montelukast Sodium (Singulair), 10 MG PO DAILY


Multivitamins/Minerals (Mvi With Minerals), 1 TAB PO DAILY


Sertraline (Zoloft), 100 MG PO QAM


Thiamine Hcl (Vitamin B-1), 100 MG PO DAILY


Trazodone Hcl (Trazodone), 400 MG PO HS





Scheduled PRN


Albuterol Hfa (Ventolin Hfa), 2 PUFF INH QID PRN for SOB/Wheezing





Review of Systems


Constitutional:  No fever


Eyes:  No worsening of vision


ENT:  No hearing loss


Respiratory:  No cough, No sputum, No shortness of breath


Cardiovascular:  No chest pain


Abdomen:  No pain


Musculoskeletal:  No joint pain


Genitourinary - Male:  No dysuria


Neurologic:  + problem reported (reports history of confusion and blackouts)


Psychiatric:  + substance abuse (alcohol)


Endocrine:  No fatigue


Hematologic / Lymphatic:  No abnormal bleeding/bruising


Integumentary:  No rash, No itch





Physical Exam


Vital Signs











  Date Time  Temp Pulse Resp B/P (MAP) Pulse Ox O2 Delivery O2 Flow Rate FiO2


 


3/21/18 20:59     99 Nasal Cannula 2.0 


 


3/21/18 20:59     84 Room Air  


 


3/21/18 20:59  105      


 


3/21/18 20:31    170/112    


 


3/21/18 20:30  110 19  99 Room Air  


 


3/21/18 20:01    185/120    


 


3/21/18 20:00  107 18  98 Room Air  


 


3/21/18 19:31    182/121    


 


3/21/18 19:30  94 17  97 Room Air  


 


3/21/18 19:08  90 18 173/105 100 Room Air  


 


3/21/18 18:11  75 19 125/103 97 Room Air  


 


3/21/18 17:40  96 20 155/101 96 Room Air  


 


3/21/18 16:59  87      


 


3/21/18 16:50     94 Room Air  


 


3/21/18 16:50 36.6 88 15 121/109 94 Room Air  








General Appearance:  no apparent distress


Head:  normocephalic, atraumatic


Eyes:  normal inspection, PERRL, EOMI, sclerae normal


ENT:  normal ENT inspection, hearing grossly normal, pharynx normal


Neck:  supple, no JVD, trachea midline


Respiratory/Chest:  chest non-tender, lungs clear, normal breath sounds, no 

respiratory distress, no accessory muscle use


Cardiovascular:  regular rate, rhythm, no edema, no gallop, no murmur, normal 

peripheral pulses


Abdomen/GI:  normal bowel sounds, non tender, soft, no organomegaly


Back:  normal inspection, no CVA tenderness, no muscle spasm, normal range of 

motion


Extremities/Musculoskelatal:  normal inspection, no calf tenderness, no pedal 

edema, normal range of motion, non-tender


Neurologic/Psych:  alert, oriented x 3


Skin:  normal color, warm/dry, no rash





Diagnostics


Laboratory Results





Results Past 24 Hours








Test


  3/21/18


17:24 3/21/18


17:33 Range/Units


 


 


White Blood Count 4.14  4.8-10.8  K/uL


 


Red Blood Count 3.58  4.7-6.1  M/uL


 


Hemoglobin 12.6  14.0-18.0  g/dL


 


Hematocrit 36.5  42-52  %


 


Mean Corpuscular Volume 102.0    fL


 


Mean Corpuscular Hemoglobin 35.2  25-34  pg


 


Mean Corpuscular Hemoglobin


Concent 34.5


  


  32-36  g/dl


 


 


RDW Standard Deviation 58.8  36.4-46.3  fL


 


RDW Coefficient of Variation 15.8  11.5-14.5  %


 


Platelet Count 109  130-400  K/uL


 


Mean Platelet Volume 9.2  7.4-10.4  fL


 


Sodium Level 140  136-145  mmol/L


 


Potassium Level 3.4  3.5-5.1  mmol/L


 


Chloride Level 102    mmol/L


 


Carbon Dioxide Level 22  21-32  mmol/L


 


Anion Gap 16.0  3-11  mmol/L


 


Blood Urea Nitrogen 10  7-18  mg/dl


 


Creatinine


  0.84


  


  0.60-1.40


mg/dl


 


Est Creatinine Clear Calc


Drug Dose 89.2


  


   ml/min


 


 


Estimated GFR (


American) 111.1


  


   


 


 


Estimated GFR (Non-


American 95.8


  


   


 


 


BUN/Creatinine Ratio 12.5  10-20  


 


Random Glucose 68  70-99  mg/dl


 


Calcium Level 7.9  8.5-10.1  mg/dl


 


Magnesium Level 1.9  1.8-2.4  mg/dl


 


Total Bilirubin 0.9  0.2-1  mg/dl


 


Aspartate Amino Transf


(AST/SGOT) 117


  


  15-37  U/L


 


 


Alanine Aminotransferase


(ALT/SGPT) 72


  


  12-78  U/L


 


 


Alkaline Phosphatase 105    U/L


 


Ammonia 14.0  11-32  umol/L


 


Troponin I 0.018  0-0.045  ng/ml


 


Total Protein 8.4  6.4-8.2  gm/dl


 


Albumin 3.7  3.4-5.0  gm/dl


 


Globulin 4.7  2.5-4.0  gm/dl


 


Albumin/Globulin Ratio 0.8  0.9-2  


 


Thyroid Stimulating Hormone


(TSH) 1.970


  


  0.300-4.500


uIu/ml


 


Salicylates Level < 1.7  2.8-20  mg/dl


 


Acetaminophen Level < 2  10-30  ug/ml


 


Ethyl Alcohol mg/dL 413.9  0-3  mg/dl


 


Urine Color  YELLOW  


 


Urine Appearance  CLEAR CLEAR  


 


Urine pH  6.0 4.5-7.5  


 


Urine Specific Gravity  1.005 1.000-1.030  


 


Urine Protein  TRACE NEG  


 


Urine Glucose (UA)  NEG NEG  


 


Urine Ketones  1+ NEG  


 


Urine Occult Blood  TRACE NEG  


 


Urine Nitrite  NEG NEG  


 


Urine Bilirubin  NEG NEG  


 


Urine Urobilinogen  NEG NEG  


 


Urine Leukocyte Esterase  NEG NEG  


 


Urine WBC (Auto)  0 0-5  /hpf


 


Urine RBC (Auto)  0-4 0-4  /hpf


 


Urine Hyaline Casts (Auto)  0 0-5  /lpf


 


Urine Epithelial Cells (Auto)  0-5 0-5  /lpf


 


Urine Bacteria (Auto)  NEG NEG  


 


Urine Opiates Screen  NEG NEG  


 


Urine Methadone, Qualitative  NEG NEG  


 


Urine Barbiturates  NEG NEG  


 


Urine Phencyclidine (PCP)


Level 


  NEG


  NEG  


 


 


Ur


Amphetamine/Methamphetamine 


  NEG


  NEG  


 


 


MDMA (Ecstasy) Screen  NEG NEG  


 


Urine Benzodiazepines Screen  NEG NEG  


 


Urine Cocaine Metabolite  NEG NEG  


 


Urine Marijuana (THC)  NEG NEG  











Impression


Assessment and Plan


57 year old M with alcohol misuse with elevated alcohol level and admitted 

under 302 because of reported suicidal ideations from third party (patient's 

neighbor)





-awaiting full psychiatry evaluation for possible suicidal ideations, patient 

under 302 and cannot leave against medical advice


-admitted to telemetry service for monitoring for possible alcohol withdrawal, 

recheck alcohol level 


-patient on alcohol withdrawal protocol of gabapentin, Ativan, thiamine, folic 

acid


-CT head: No acute intracranial abnormality. Monitor for alcohol withdrawal or 

blackouts


-monitor for hypotension while on gabapentin protocol


-hypertension resolving after receiving lisinopril 20 mg in the emergency room. 

Continue Lisinopril daily


-check and replete electrolytes: mild hypokalemia serum potassium 3.4, will 

give IV potassium. serum magnesium 1.9 which is near normal and will give 1 

gram Iv magnesium to obtain serum magnesium level of 2


-review of outpatient medication records show patient has had in the past been 

on high dose trazodone 400 mg qhs, will hold this medication for now pending 

psychiatry evaluation to avoid excessive sedation , continue Zoloft for history 

of depression, adjust medications as recommended by psychiatry 


-history of prostate cancer, patient does not know what medication he has been 

taking for this problem, monitor urine output


-encourage substance cessation including smoking cessation and continue oral 

inhalers, patient may have COPD from smoking but this is unclear whether COPD 

was made in the past


-CT Chest: 1. No acute intrathoracic abnormality identified. 2. No focal 

abnormality, adenopathy or mass of the mediastinum to correlate with 

mediastinal prominence seen on comparison chest radiograph. 3. Mild emphysema. 

4. Hepatic steatosis. 5. Healing subacute appearing nondisplaced fractures of 

the anterior left third through seventh ribs.


6. Prior cholecystectomy.


-DVT ppx with SCDs due to thrombocytopenia





Resuscitation Status








VTE Prophylaxis


Will order VTE Prophylaxis:  Yes

## 2018-03-21 NOTE — DIAGNOSTIC IMAGING REPORT
HEAD WITHOUT CONTRAST (CT)



CLINICAL HISTORY: 57 years-old Male with EVALUATE FOR PSYCH CLEARANCE.  Acute

alcohol intoxication. 6 right 3 clearance.  



TECHNIQUE: Multiple axial CT images of the head were obtained without contrast. 

A dose lowering technique was utilized adhering to the principles of ALARA.



CT DOSE:  782.75 mGycm



COMPARISON: CT head 3/4/2018.



FINDINGS: 



No acute intracranial hemorrhage, midline shift, intracranial mass,

hydrocephalus, territorial ischemia or abnormal extra-axial collection. Mild

brain atrophy. 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

redemonstrated. Ill-defined areas of low-attenuation within the subcortical and

periventricular white matter suggest chronic microvascular ischemic changes.



The calvarium is intact.  The paranasal sinuses, mastoid air cells, and middle

ear cavities are clear. Ovoid sclerotic focus of the right frontal bone adjacent

to the right frontal sinus is noted, likely benign.



IMPRESSION:  No acute intracranial abnormality.







The above report was generated using voice recognition software. It may contain

grammatical, syntax or spelling errors.







Electronically signed by:  Gonzales Monroe M.D.

3/21/2018 6:04 PM



Dictated Date/Time:  3/21/2018 6:01 PM

## 2018-03-22 VITALS
HEART RATE: 101 BPM | OXYGEN SATURATION: 94 % | DIASTOLIC BLOOD PRESSURE: 90 MMHG | SYSTOLIC BLOOD PRESSURE: 160 MMHG | TEMPERATURE: 98.42 F

## 2018-03-22 VITALS
TEMPERATURE: 99.14 F | DIASTOLIC BLOOD PRESSURE: 103 MMHG | SYSTOLIC BLOOD PRESSURE: 161 MMHG | HEART RATE: 97 BPM | OXYGEN SATURATION: 97 %

## 2018-03-22 VITALS
TEMPERATURE: 98.06 F | DIASTOLIC BLOOD PRESSURE: 89 MMHG | HEART RATE: 102 BPM | OXYGEN SATURATION: 96 % | SYSTOLIC BLOOD PRESSURE: 151 MMHG

## 2018-03-22 VITALS
HEART RATE: 88 BPM | TEMPERATURE: 99.5 F | DIASTOLIC BLOOD PRESSURE: 88 MMHG | SYSTOLIC BLOOD PRESSURE: 152 MMHG | OXYGEN SATURATION: 96 %

## 2018-03-22 VITALS
TEMPERATURE: 99.5 F | DIASTOLIC BLOOD PRESSURE: 98 MMHG | SYSTOLIC BLOOD PRESSURE: 132 MMHG | OXYGEN SATURATION: 96 % | HEART RATE: 103 BPM

## 2018-03-22 VITALS — DIASTOLIC BLOOD PRESSURE: 90 MMHG | HEART RATE: 113 BPM | SYSTOLIC BLOOD PRESSURE: 160 MMHG

## 2018-03-22 VITALS
DIASTOLIC BLOOD PRESSURE: 97 MMHG | HEART RATE: 91 BPM | TEMPERATURE: 99.14 F | OXYGEN SATURATION: 97 % | SYSTOLIC BLOOD PRESSURE: 149 MMHG

## 2018-03-22 VITALS — SYSTOLIC BLOOD PRESSURE: 147 MMHG | DIASTOLIC BLOOD PRESSURE: 93 MMHG

## 2018-03-22 VITALS — OXYGEN SATURATION: 97 %

## 2018-03-22 LAB
ALBUMIN SERPL-MCNC: 3.2 GM/DL (ref 3.4–5)
ALP SERPL-CCNC: 89 U/L (ref 45–117)
ALT SERPL-CCNC: 54 U/L (ref 12–78)
AST SERPL-CCNC: 78 U/L (ref 15–37)
BASOPHILS # BLD: 0.01 K/UL (ref 0–0.2)
BASOPHILS NFR BLD: 0.2 %
BUN SERPL-MCNC: 9 MG/DL (ref 7–18)
CALCIUM SERPL-MCNC: 7.9 MG/DL (ref 8.5–10.1)
CO2 SERPL-SCNC: 23 MMOL/L (ref 21–32)
CREAT SERPL-MCNC: 0.74 MG/DL (ref 0.6–1.4)
EOS ABS #: 0.07 K/UL (ref 0–0.5)
EOSINOPHIL NFR BLD AUTO: 99 K/UL (ref 130–400)
GLUCOSE SERPL-MCNC: 101 MG/DL (ref 70–99)
HCT VFR BLD CALC: 33.9 % (ref 42–52)
HGB BLD-MCNC: 11.2 G/DL (ref 14–18)
IG#: 0.01 K/UL (ref 0–0.02)
IMM GRANULOCYTES NFR BLD AUTO: 20.3 %
LYMPHOCYTES # BLD: 1.13 K/UL (ref 1.2–3.4)
MCH RBC QN AUTO: 33.7 PG (ref 25–34)
MCHC RBC AUTO-ENTMCNC: 33 G/DL (ref 32–36)
MCV RBC AUTO: 102.1 FL (ref 80–100)
MONO ABS #: 0.55 K/UL (ref 0.11–0.59)
MONOCYTES NFR BLD: 9.9 %
NEUT ABS #: 3.79 K/UL (ref 1.4–6.5)
NEUTROPHILS # BLD AUTO: 1.3 %
NEUTROPHILS NFR BLD AUTO: 68.1 %
PHOSPHATE SERPL-MCNC: 2 MG/DL (ref 2.5–4.9)
PMV BLD AUTO: 9.4 FL (ref 7.4–10.4)
POTASSIUM SERPL-SCNC: 3.3 MMOL/L (ref 3.5–5.1)
PROT SERPL-MCNC: 7.1 GM/DL (ref 6.4–8.2)
RED CELL DISTRIBUTION WIDTH CV: 15.6 % (ref 11.5–14.5)
RED CELL DISTRIBUTION WIDTH SD: 57.6 FL (ref 36.4–46.3)
SODIUM SERPL-SCNC: 138 MMOL/L (ref 136–145)
WBC # BLD AUTO: 5.56 K/UL (ref 4.8–10.8)

## 2018-03-22 RX ADMIN — POTASSIUM CHLORIDE SCH MLS/HR: 10 INJECTION, SOLUTION INTRAVENOUS at 02:49

## 2018-03-22 RX ADMIN — LISINOPRIL SCH MG: 20 TABLET ORAL at 07:38

## 2018-03-22 RX ADMIN — GABAPENTIN SCH MG: 600 TABLET, FILM COATED ORAL at 17:56

## 2018-03-22 RX ADMIN — ALUMINUM ZIRCONIUM TRICHLOROHYDREX GLY SCH EA: 0.2 STICK TOPICAL at 07:36

## 2018-03-22 RX ADMIN — LORAZEPAM PRN MG: 2 INJECTION INTRAMUSCULAR; INTRAVENOUS at 11:37

## 2018-03-22 RX ADMIN — LORAZEPAM PRN MG: 2 INJECTION INTRAMUSCULAR; INTRAVENOUS at 08:52

## 2018-03-22 RX ADMIN — MONTELUKAST SODIUM SCH MG: 10 TABLET, FILM COATED ORAL at 07:37

## 2018-03-22 RX ADMIN — ALUMINUM ZIRCONIUM TRICHLOROHYDREX GLY SCH EA: 0.2 STICK TOPICAL at 15:59

## 2018-03-22 RX ADMIN — Medication SCH MG: at 07:37

## 2018-03-22 RX ADMIN — POTASSIUM CHLORIDE SCH MLS/HR: 10 INJECTION, SOLUTION INTRAVENOUS at 01:36

## 2018-03-22 RX ADMIN — GABAPENTIN SCH MG: 600 TABLET, FILM COATED ORAL at 03:17

## 2018-03-22 RX ADMIN — SERTRALINE HYDROCHLORIDE SCH MG: 100 TABLET, FILM COATED ORAL at 07:38

## 2018-03-22 RX ADMIN — Medication SCH TAB: at 07:37

## 2018-03-22 RX ADMIN — GABAPENTIN SCH MG: 600 TABLET, FILM COATED ORAL at 08:30

## 2018-03-22 RX ADMIN — FOLIC ACID SCH MLS/MIN: 5 INJECTION, SOLUTION INTRAMUSCULAR; INTRAVENOUS; SUBCUTANEOUS at 07:37

## 2018-03-22 RX ADMIN — POTASSIUM CHLORIDE SCH MLS/HR: 10 INJECTION, SOLUTION INTRAVENOUS at 00:15

## 2018-03-22 NOTE — PSYCHIATRIC CONSULTATION
Consultation


Date of Consultation


Mar 22, 2018.





Identifying Data


Juan Pablo Ferrer Jr. is a 57-year-old  male brought to the ED on a 

302 warrant with petitioning statement from neighbor for suicidal statements.  

Pt's BAL was 413.9 in the emergency room.  Pt admitted to the medical floor for 

alcohol detoxification and to be evaluated for suicidal statements.  Psychiatry 

consult requested to evaluate SI.  Pt unable to tolerate length of interview 

due to fatigue, reliability of history is unclear at this time.





Chief Complaint


"I'm fine, I don't even know why I need to be here.  I detox at home all the 

time".





History of Present Illness


Juan Pablo Ferrer Jr. is a 57-year-old male diagnosed with "terminal stage 3 

prostate cancer" seen on psychiatric consult service due to 302 for suicidal 

ideations and alcohol abuse.  Pt admitted to the medical floor for alcohol 

detox and placed on AWSS protocol as BAL was 413.9 in the ED.  302 petitioning 

statement completed by neighbor shares statements made on 3/21/18 from the 

patient stating he was planning to eat his "last supper, take a bus ride" and 

then shoot himself.  Pt is reported to have shared concerns about "being spoon-

fed" in light of his cancer diagnosis.  Pt was seen today along with Chano Colin RN.  Pt states he does not feel he needs to be in the hospital as his 

has a history of completing detox at home several times.  Pt states he took his 

last "pill" (presumed to be prescribed Librium) on Saturday, but began drinking 

again on 3/21/18.  Pt states he does not recall statements made to his neighbor 

about a plan for suicide and denies SI previously.  Pt states "my neighbor just 

likes to get in my business and make a mess of things, he's the one who called 

the ."  Pt adamantly denies SI despite descriptive plan written in 

petitioning statement.  Due to patient's current medical condition, not able to 

tolerate a more in-depth conversation at this time.  Pt shares that he sees 

Jannet at [facility] for medication management and was increased to 100mg of 

Zoloft about 1 year ago.  He denies low mood, change in appetite, SI, or any 

other indication of worsening depression.  Pt reports poor sleep for the past 

"12 years" and states he takes 400mg of Trazodone at bedtime.  Pt also sees 

Dimitry Mckeon for therapy.  Pt denies need to adjust psychiatric medications as 

he reports he has been doing well.  Pt does not share much about his cancer 

diagnosis during this encounter, and as he is frequently in-and-out of sleep, 

the provider was unable to gather more information.  Pt denies previous suicide 

attempts or inpatient mental health hospitalizations.  





Pt reports he had been in inpatient alcohol rehabilitation for 21 days in 2006 

where he states, "they even told me I wasn't an alcoholic, they said I was just 

a problem drinker".  Pt does state he has cut back his drinking since that 

time.  Pt reports drinking "half a pint" of rum on days he drinks.  Pt reports 

he frequently detoxes at home from his alcohol use.  Pt has 2 prescriptions for 

Librium from 2 provider appearing on [place] PDMP (01/05/2018 and 01/25/2018) as 

well as an older prescription for Ativan 0.5mg from a third prescriber.  Pt 

declines referral for inpatient rehabilitation at this time and states he does 

not have a problem.  Pt is unable to tolerate lengthy interview and drifts into 

sleep frequently despite verbal stimuli.  Will continue to gather collateral as 

he is likely to require medical attention for the next few days before 

consideration for rehab vs. inpatient mental health treatment.





Past Psychiatric History


Current OP Treatment:  psychiatrist (Jannet Jc PA-C ([facility])), 

therapist (Dimitry Mckeon)


Prior Psych Hospitalizations:  none


Access to a Gun:  No


Suicide Attempts:  No


Past Medication Trials


Unable to assess at initial encounter





Past Medical/Surgical History


History of Concussion/Seizure:  Yes (recent falls/contusions reported)





(1) Prostate cancer


(2) Alcohol abuse





Allergies


Allergies:  


Coded Allergies:  


     No Known Allergies (Unverified , 3/4/18)





Home Medications


Scheduled


Chlordiazepoxide (Librium), 25 MG PO TID


Hydroxyzine Pamoate (Vistaril), 1 CAP PO QAM


Lisinopril (Prinivil), 20 MG PO DAILY


Mometasone Furoate-Formoterol (Dulera 100/5 Mcg), 2 PUFFS INH BID


Montelukast Sodium (Singulair), 10 MG PO DAILY


Multivitamins/Minerals (Mvi With Minerals), 1 TAB PO DAILY


Sertraline (Zoloft), 100 MG PO QAM


Thiamine Hcl (Vitamin B-1), 100 MG PO DAILY


Trazodone Hcl (Trazodone), 400 MG PO HS





Scheduled PRN


Albuterol Hfa (Ventolin Hfa), 2 PUFF INH QID PRN for SOB/Wheezing





Alcohol Use


Alcohol Use In Past 12 Months:  Yes





Smoking Use


Smoking Status:  Current Every Day Smoker





Personal History


Lives in:  [place]


Work History:


disabled


Relationship History:  


Children:  3 children, aged 38, 36, and 19


Legal History:  reported ("driving under suspension", records of assault and 

terroristic threats in 2005)


Psychological Trauma History:  Denies Hx Traumatic Event





Review of Systems


Psych:  denies symptoms other than stated above


Constitutional: denied


Cardiovascular: denied


GI: reports diarrhea


Neurologic: denied


Musculoskeletal:  reports bilateral knee pain


Remainder of 10 body systems also reviewed and denied other than noted above.





Examination


Vital Signs





Vital Signs Past 12 Hours








  Date Time  Temp Pulse Resp B/P (MAP) Pulse Ox O2 Delivery O2 Flow Rate FiO2


 


3/22/18 11:06 36.9 101 18 160/90 (113) 94 Room Air  


 


3/22/18 08:00      Room Air  


 


3/22/18 07:33 37.5 103 20 132/98 (109) 96 Room Air  


 


3/22/18 04:00     97 Room Air  


 


3/22/18 04:00 36.7 102 18 151/89 (109) 96 Room Air  


 


3/22/18 00:00     97 Room Air  











Laboratory Results





Last 24 Hours








Test


  3/21/18


17:24 3/21/18


17:33 3/22/18


05:44


 


White Blood Count 4.14 K/uL   5.56 K/uL 


 


Red Blood Count 3.58 M/uL   3.32 M/uL 


 


Hemoglobin 12.6 g/dL   11.2 g/dL 


 


Hematocrit 36.5 %   33.9 % 


 


Mean Corpuscular Volume 102.0 fL   102.1 fL 


 


Mean Corpuscular Hemoglobin 35.2 pg   33.7 pg 


 


Mean Corpuscular Hemoglobin


Concent 34.5 g/dl 


  


  33.0 g/dl 


 


 


RDW Standard Deviation 58.8 fL   57.6 fL 


 


RDW Coefficient of Variation 15.8 %   15.6 % 


 


Platelet Count 109 K/uL   99 K/uL 


 


Mean Platelet Volume 9.2 fL   9.4 fL 


 


Sodium Level 140 mmol/L   138 mmol/L 


 


Potassium Level 3.4 mmol/L   3.3 mmol/L 


 


Chloride Level 102 mmol/L   101 mmol/L 


 


Carbon Dioxide Level 22 mmol/L   23 mmol/L 


 


Anion Gap 16.0 mmol/L   13.0 mmol/L 


 


Blood Urea Nitrogen 10 mg/dl   9 mg/dl 


 


Creatinine 0.84 mg/dl   0.74 mg/dl 


 


Est Creatinine Clear Calc


Drug Dose 89.2 ml/min 


  


  97.4 ml/min 


 


 


Estimated GFR (


American) 111.1 


  


  117.0 


 


 


Estimated GFR (Non-


American 95.8 


  


  101.0 


 


 


BUN/Creatinine Ratio 12.5   12.0 


 


Random Glucose 68 mg/dl   101 mg/dl 


 


Calcium Level 7.9 mg/dl   7.9 mg/dl 


 


Magnesium Level 1.9 mg/dl   1.8 mg/dl 


 


Total Bilirubin 0.9 mg/dl   1.1 mg/dl 


 


Aspartate Amino Transf


(AST/SGOT) 117 U/L 


  


  78 U/L 


 


 


Alanine Aminotransferase


(ALT/SGPT) 72 U/L 


  


  54 U/L 


 


 


Alkaline Phosphatase 105 U/L   89 U/L 


 


Ammonia 14.0 umol/L   


 


Troponin I 0.018 ng/ml   


 


Total Protein 8.4 gm/dl   7.1 gm/dl 


 


Albumin 3.7 gm/dl   3.2 gm/dl 


 


Globulin 4.7 gm/dl   3.9 gm/dl 


 


Albumin/Globulin Ratio 0.8   0.8 


 


Thyroid Stimulating Hormone


(TSH) 1.970 uIu/ml 


  


  


 


 


Salicylates Level < 1.7 mg/dl   


 


Acetaminophen Level < 2 ug/ml   


 


Ethyl Alcohol mg/dL 413.9 mg/dl   58.9 mg/dl 


 


Urine Color  YELLOW  


 


Urine Appearance  CLEAR  


 


Urine pH  6.0  


 


Urine Specific Gravity  1.005  


 


Urine Protein  TRACE  


 


Urine Glucose (UA)  NEG  


 


Urine Ketones  1+  


 


Urine Occult Blood  TRACE  


 


Urine Nitrite  NEG  


 


Urine Bilirubin  NEG  


 


Urine Urobilinogen  NEG  


 


Urine Leukocyte Esterase  NEG  


 


Urine WBC (Auto)  0 /hpf  


 


Urine RBC (Auto)  0-4 /hpf  


 


Urine Hyaline Casts (Auto)  0 /lpf  


 


Urine Epithelial Cells (Auto)  0-5 /lpf  


 


Urine Bacteria (Auto)  NEG  


 


Urine Opiates Screen  NEG  


 


Urine Methadone, Qualitative  NEG  


 


Urine Barbiturates  NEG  


 


Urine Phencyclidine (PCP)


Level 


  NEG 


  


 


 


Ur


Amphetamine/Methamphetamine 


  NEG 


  


 


 


MDMA (Ecstasy) Screen  NEG  


 


Urine Benzodiazepines Screen  NEG  


 


Urine Cocaine Metabolite  NEG  


 


Urine Marijuana (THC)  NEG  


 


Neutrophils (%) (Auto)   68.1 % 


 


Lymphocytes (%) (Auto)   20.3 % 


 


Monocytes (%) (Auto)   9.9 % 


 


Eosinophils (%) (Auto)   1.3 % 


 


Basophils (%) (Auto)   0.2 % 


 


Neutrophils # (Auto)   3.79 K/uL 


 


Lymphocytes # (Auto)   1.13 K/uL 


 


Monocytes # (Auto)   0.55 K/uL 


 


Eosinophils # (Auto)   0.07 K/uL 


 


Basophils # (Auto)   0.01 K/uL 


 


Immature Granulocyte % (Auto)   0.2 % 


 


Immature Granulocyte # (Auto)   0.01 K/uL 


 


Platelet Estimate   DECREASED 


 


Phosphorus Level   2.0 mg/dl 


 


Vitamin B12 Level   397 pg/mL 











Mental Examination


During interview pt is:  cooperative


Appearance:  disheveled (in hospital gown, malodorous, hair unkempt)


Eye contact is:  fair


Motor behavior is:  tremor


Speech:  normal in rate, rhythm & volume


Affect:  irritable (mildly when discussing neighbor and 302 warrant)


Mood is:  other ("fine")


Thought process:  goal directed, clear, coherent


Thought content:  reality based without delusions


Suicidal thought are:  denied (but reported by both neighbor and nursing )


Homicidal thoughts are:  denied


Hallucinations:  other (unable to assess during focused exam)


Cognition:  attention grossly intact, language grossly intact


Intelligence estimated to be:  consistent with level of education


Insight:  limited


Judgement:  limited





Impression / Recommendations


Impression


58 y/o  male admitted to the medical floor on a 302 warrant with a BAL 

of 413.9 for detoxification.  302 petition completed by patient's neighbor 

reporting statements of SI made over the last 30 days.  One remark on 3/21/18 

reporting patient had shared that he wanted his "last supper" and then planned 

to shoot himself.  Pt denying SI currently or in the past.  Pt's nurse reports 

statements made to her as well about SI pertaining to the progression of his 

prostate cancer diagnosis.  Primary recommendation would be inpatient 

rehabilitation for alcohol abuse, which patient is unwilling for at this point 

in time.  Will continue to monitor for mood and SI as he continues through 

detox to determine criteria for inpatient mental health treatment if patient 

continues to refuse rehab.  





At this point, patient's reports are not consistent with 302 petitioning 

statement.  Will need to gather collateral as well as continue to assess 

patient to determine extent of reported SI.  Pt unable to engage in in-depth 

conversation about cancer diagnosis and suicidal thoughts due to current 

medical state.  Denies SI, but will need to address statements made to neighbor 

and nursing when patient is better able to tolerate a full evaluation.





Risk Factors Assessment


Male:  Yes


:  Yes


/single/:  Yes


Access to guns:  No


Health problems:  Yes


Mental Health Diagnoses:  Yes


Substance use disorders:  Yes


Previous attempt:  No





Protective Factors Assessment


:  No


Responsible for young children:  No


Employed:  No





Recommendations





(1) Alcohol abuse


3/22


   - Primary recommendation for inpatient rehabilitation for alcohol abuse; pt 

reports he is unwilling at this time


   - Will continue to assess as detox continues; continue AWSS protocol and 

current medications


   - Would consider continuing to hold Trazodone in light of medications 

administered with AWSS protocol.  


   - Gather collateral from children and supports





(2) Depression


3/22


   - Continue Zoloft 100mg qAM


   - Will continue to assess SI and mood as detox continues


   - Gather collateral from children and other supports as to stability of mood


   - PCP, Dr. Langston, informed of multiple Librium/benzo prescribers as 

indicated on PDMP for future consideration


   - Will continue to offer recommendations as patient clears and can offer 

more history





Dr. Riana Estrada has personally been involved in the review of the above case 

and development of recommendations.

## 2018-03-22 NOTE — PROGRESS NOTE
Internal Med Progress Note


Date of Service:


Mar 22, 2018.


Provider Documentation:





SUBJECTIVE:





The patient was seen and examined in telemetry unit.


He was admitted with the suicidal ideation with a history of alcoholism.


He complains to have palpitation and bilateral hand tremors this morning.


Denies history of any chest pain shortness of breath or palpitation, she does 

not have any nausea and/or vomiting








OBJECTIVE:





Vital Signs-as noted below





Exam:


General-minimal distress at rest, anxiety and bilateral hand tremors


Eyes-normal


ENT-normal


Neck-supple


Lungs-clear to auscultate bilaterally


Heart-S1-S2 regular, no murmur appreciated


Abdomen-benign, soft, nontender, bowel sounds present


Extremities-no edema bilaterally


Neuro-alert awake oriented 3.


Tremor involving bilateral outstretched hands.


No focal neuro deficit noted.





Lab data as noted below.








CT Chest::1. No acute intrathoracic abnormality identified.


2. No focal abnormality, adenopathy or mass of the mediastinum to correlate with


mediastinal prominence seen on comparison chest radiograph.


3. Mild emphysema.


4. Hepatic steatosis.


5. Healing subacute appearing nondisplaced fractures of the anterior left third


through seventh ribs.


6. Prior cholecystectomy.








CT Head-Negative


ASSESSMENT & PLAN:








57 year old M with alcohol misuse with elevated alcohol level with h/o 

Alcoholism  and admitted under 302 because of reported suicidal ideations from 

third party (patient's neighbor)





 Suicidal Ideation 


-admitted to telemetry service for monitoring for possible alcohol withdrawal, 

recheck alcohol level 


-patient on alcohol withdrawal protocol of gabapentin, Ativan, thiamine, folic 

acid


-CT head: No acute intracranial abnormality. Monitor for alcohol withdrawal or 

blackouts


-Psychiatry consulted


-Likely to need In patient psychiatry care 








Alcoholism with Imminent withdrawal


Gabapentin Protocol


Ativan PRN


Check and replace Electrolytes as needed





Hypertension


Resolving after receiving lisinopril 20 mg in the emergency room. Continue 

Lisinopril daily


May need to add Clonidine 





Depression/Anxiety


Has been on high dose trazodone 400 mg qhs, will hold this medication for now 

pending psychiatry evaluation to avoid excessive sedation , 


Continue Zoloft for history of depression, adjust medications as recommended by 

psychiatry 





Prostate Cancer


-history of prostate cancer, patient does not know what medication he has been 

taking for this problem, monitor urine output











DVT ppx with SCDs due to thrombocytopenia





Code Status-Full





DISPOSITION


Likely to need Inpatient Psychiatry Care


Vital Signs:











  Date Time  Temp Pulse Resp B/P (MAP) Pulse Ox O2 Delivery O2 Flow Rate FiO2


 


3/22/18 11:06 36.9 101 18 160/90 (113) 94 Room Air  


 


3/22/18 08:00      Room Air  


 


3/22/18 07:33 37.5 103 20 132/98 (109) 96 Room Air  


 


3/22/18 04:00     97 Room Air  


 


3/22/18 04:00 36.7 102 18 151/89 (109) 96 Room Air  


 


3/22/18 00:00     97 Room Air  


 


3/21/18 23:36 36.8 105 18 103/74 (84) 96 Room Air  


 


3/21/18 22:04 37.1 100 19 158/94 97 Room Air  


 


3/21/18 21:49  114 18 124/83 97   


 


3/21/18 21:30  94 18 124/83 94 Nasal Cannula 2.0 


 


3/21/18 21:27  97 18 156/94 95 Nasal Cannula 2.0 


 


3/21/18 20:59     99 Nasal Cannula 2.0 


 


3/21/18 20:59     84 Room Air  


 


3/21/18 20:59  105      


 


3/21/18 20:31    170/112    


 


3/21/18 20:30  110 19  99 Room Air  


 


3/21/18 20:01    185/120    


 


3/21/18 20:00  107 18  98 Room Air  


 


3/21/18 19:31    182/121    


 


3/21/18 19:30  94 17  97 Room Air  


 


3/21/18 19:08  90 18 173/105 100 Room Air  


 


3/21/18 18:11  75 19 125/103 97 Room Air  


 


3/21/18 17:40  96 20 155/101 96 Room Air  


 


3/21/18 16:59  87      


 


3/21/18 16:50     94 Room Air  


 


3/21/18 16:50 36.6 88 15 121/109 94 Room Air  








Lab Results:





Results Past 24 Hours








Test


  3/21/18


17:24 3/21/18


17:33 3/22/18


05:44 Range/Units


 


 


White Blood Count 4.14  5.56 4.8-10.8  K/uL


 


Red Blood Count 3.58  3.32 4.7-6.1  M/uL


 


Hemoglobin 12.6  11.2 14.0-18.0  g/dL


 


Hematocrit 36.5  33.9 42-52  %


 


Mean Corpuscular Volume 102.0  102.1   fL


 


Mean Corpuscular Hemoglobin 35.2  33.7 25-34  pg


 


Mean Corpuscular Hemoglobin


Concent 34.5


  


  33.0


  32-36  g/dl


 


 


RDW Standard Deviation 58.8  57.6 36.4-46.3  fL


 


RDW Coefficient of Variation 15.8  15.6 11.5-14.5  %


 


Platelet Count 109  99 130-400  K/uL


 


Mean Platelet Volume 9.2  9.4 7.4-10.4  fL


 


Sodium Level 140  138 136-145  mmol/L


 


Potassium Level 3.4  3.3 3.5-5.1  mmol/L


 


Chloride Level 102  101   mmol/L


 


Carbon Dioxide Level 22  23 21-32  mmol/L


 


Anion Gap 16.0  13.0 3-11  mmol/L


 


Blood Urea Nitrogen 10  9 7-18  mg/dl


 


Creatinine


  0.84


  


  0.74


  0.60-1.40


mg/dl


 


Est Creatinine Clear Calc


Drug Dose 89.2


  


  97.4


   ml/min


 


 


Estimated GFR (


American) 111.1


  


  117.0


   


 


 


Estimated GFR (Non-


American 95.8


  


  101.0


   


 


 


BUN/Creatinine Ratio 12.5  12.0 10-20  


 


Random Glucose 68  101 70-99  mg/dl


 


Calcium Level 7.9  7.9 8.5-10.1  mg/dl


 


Magnesium Level 1.9  1.8 1.8-2.4  mg/dl


 


Total Bilirubin 0.9  1.1 0.2-1  mg/dl


 


Aspartate Amino Transf


(AST/SGOT) 117


  


  78


  15-37  U/L


 


 


Alanine Aminotransferase


(ALT/SGPT) 72


  


  54


  12-78  U/L


 


 


Alkaline Phosphatase 105  89   U/L


 


Ammonia 14.0   11-32  umol/L


 


Troponin I 0.018   0-0.045  ng/ml


 


Total Protein 8.4  7.1 6.4-8.2  gm/dl


 


Albumin 3.7  3.2 3.4-5.0  gm/dl


 


Globulin 4.7  3.9 2.5-4.0  gm/dl


 


Albumin/Globulin Ratio 0.8  0.8 0.9-2  


 


Thyroid Stimulating Hormone


(TSH) 1.970


  


  


  0.300-4.500


uIu/ml


 


Salicylates Level < 1.7   2.8-20  mg/dl


 


Acetaminophen Level < 2   10-30  ug/ml


 


Ethyl Alcohol mg/dL 413.9  58.9 0-3  mg/dl


 


Urine Color  YELLOW   


 


Urine Appearance  CLEAR  CLEAR  


 


Urine pH  6.0  4.5-7.5  


 


Urine Specific Gravity  1.005  1.000-1.030  


 


Urine Protein  TRACE  NEG  


 


Urine Glucose (UA)  NEG  NEG  


 


Urine Ketones  1+  NEG  


 


Urine Occult Blood  TRACE  NEG  


 


Urine Nitrite  NEG  NEG  


 


Urine Bilirubin  NEG  NEG  


 


Urine Urobilinogen  NEG  NEG  


 


Urine Leukocyte Esterase  NEG  NEG  


 


Urine WBC (Auto)  0  0-5  /hpf


 


Urine RBC (Auto)  0-4  0-4  /hpf


 


Urine Hyaline Casts (Auto)  0  0-5  /lpf


 


Urine Epithelial Cells (Auto)  0-5  0-5  /lpf


 


Urine Bacteria (Auto)  NEG  NEG  


 


Urine Opiates Screen  NEG  NEG  


 


Urine Methadone, Qualitative  NEG  NEG  


 


Urine Barbiturates  NEG  NEG  


 


Urine Phencyclidine (PCP)


Level 


  NEG


  


  NEG  


 


 


Ur


Amphetamine/Methamphetamine 


  NEG


  


  NEG  


 


 


MDMA (Ecstasy) Screen  NEG  NEG  


 


Urine Benzodiazepines Screen  NEG  NEG  


 


Urine Cocaine Metabolite  NEG  NEG  


 


Urine Marijuana (THC)  NEG  NEG  


 


Neutrophils (%) (Auto)   68.1  %


 


Lymphocytes (%) (Auto)   20.3  %


 


Monocytes (%) (Auto)   9.9  %


 


Eosinophils (%) (Auto)   1.3  %


 


Basophils (%) (Auto)   0.2  %


 


Neutrophils # (Auto)   3.79 1.4-6.5  K/uL


 


Lymphocytes # (Auto)   1.13 1.2-3.4  K/uL


 


Monocytes # (Auto)   0.55 0.11-0.59  K/uL


 


Eosinophils # (Auto)   0.07 0-0.5  K/uL


 


Basophils # (Auto)   0.01 0-0.2  K/uL


 


Immature Granulocyte % (Auto)   0.2  %


 


Immature Granulocyte # (Auto)   0.01 0.00-0.02  K/uL


 


Platelet Estimate   DECREASED  


 


Phosphorus Level   2.0 2.5-4.9  mg/dl


 


Vitamin B12 Level   397 211-911  pg/mL

## 2018-03-23 VITALS
DIASTOLIC BLOOD PRESSURE: 91 MMHG | SYSTOLIC BLOOD PRESSURE: 105 MMHG | HEART RATE: 98 BPM | TEMPERATURE: 98.6 F | OXYGEN SATURATION: 97 %

## 2018-03-23 VITALS — DIASTOLIC BLOOD PRESSURE: 91 MMHG | SYSTOLIC BLOOD PRESSURE: 137 MMHG

## 2018-03-23 VITALS
TEMPERATURE: 97.88 F | OXYGEN SATURATION: 96 % | SYSTOLIC BLOOD PRESSURE: 151 MMHG | HEART RATE: 93 BPM | DIASTOLIC BLOOD PRESSURE: 103 MMHG

## 2018-03-23 VITALS
TEMPERATURE: 98.6 F | SYSTOLIC BLOOD PRESSURE: 122 MMHG | OXYGEN SATURATION: 96 % | DIASTOLIC BLOOD PRESSURE: 81 MMHG | HEART RATE: 87 BPM

## 2018-03-23 VITALS
OXYGEN SATURATION: 97 % | SYSTOLIC BLOOD PRESSURE: 115 MMHG | DIASTOLIC BLOOD PRESSURE: 84 MMHG | TEMPERATURE: 99.14 F | HEART RATE: 90 BPM

## 2018-03-23 VITALS — OXYGEN SATURATION: 100 %

## 2018-03-23 VITALS
OXYGEN SATURATION: 100 % | HEART RATE: 89 BPM | DIASTOLIC BLOOD PRESSURE: 91 MMHG | SYSTOLIC BLOOD PRESSURE: 128 MMHG | TEMPERATURE: 98.96 F

## 2018-03-23 LAB
BUN SERPL-MCNC: 10 MG/DL (ref 7–18)
CALCIUM SERPL-MCNC: 8.5 MG/DL (ref 8.5–10.1)
CO2 SERPL-SCNC: 25 MMOL/L (ref 21–32)
CREAT SERPL-MCNC: 0.69 MG/DL (ref 0.6–1.4)
EOSINOPHIL NFR BLD AUTO: 74 K/UL (ref 130–400)
GLUCOSE SERPL-MCNC: 88 MG/DL (ref 70–99)
HCT VFR BLD CALC: 35.3 % (ref 42–52)
HGB BLD-MCNC: 11.9 G/DL (ref 14–18)
MCH RBC QN AUTO: 34.6 PG (ref 25–34)
MCHC RBC AUTO-ENTMCNC: 33.7 G/DL (ref 32–36)
MCV RBC AUTO: 102.6 FL (ref 80–100)
PHOSPHATE SERPL-MCNC: 2.9 MG/DL (ref 2.5–4.9)
PMV BLD AUTO: 9.5 FL (ref 7.4–10.4)
POTASSIUM SERPL-SCNC: 3.4 MMOL/L (ref 3.5–5.1)
RED CELL DISTRIBUTION WIDTH CV: 15.3 % (ref 11.5–14.5)
RED CELL DISTRIBUTION WIDTH SD: 56.9 FL (ref 36.4–46.3)
SODIUM SERPL-SCNC: 131 MMOL/L (ref 136–145)
WBC # BLD AUTO: 4.08 K/UL (ref 4.8–10.8)

## 2018-03-23 RX ADMIN — GABAPENTIN SCH MG: 600 TABLET, FILM COATED ORAL at 10:03

## 2018-03-23 RX ADMIN — ALUMINUM ZIRCONIUM TRICHLOROHYDREX GLY SCH EA: 0.2 STICK TOPICAL at 08:50

## 2018-03-23 RX ADMIN — LORAZEPAM PRN MG: 2 INJECTION INTRAMUSCULAR; INTRAVENOUS at 11:15

## 2018-03-23 RX ADMIN — GABAPENTIN SCH MG: 600 TABLET, FILM COATED ORAL at 01:28

## 2018-03-23 RX ADMIN — MONTELUKAST SODIUM SCH MG: 10 TABLET, FILM COATED ORAL at 07:39

## 2018-03-23 RX ADMIN — SERTRALINE HYDROCHLORIDE SCH MG: 100 TABLET, FILM COATED ORAL at 07:38

## 2018-03-23 RX ADMIN — LOPERAMIDE HYDROCHLORIDE PRN MG: 2 CAPSULE ORAL at 10:03

## 2018-03-23 RX ADMIN — ALUMINUM ZIRCONIUM TRICHLOROHYDREX GLY SCH EA: 0.2 STICK TOPICAL at 23:50

## 2018-03-23 RX ADMIN — Medication SCH TAB: at 07:39

## 2018-03-23 RX ADMIN — LISINOPRIL SCH MG: 20 TABLET ORAL at 07:38

## 2018-03-23 RX ADMIN — ACETAMINOPHEN SCH MG: 500 TABLET, COATED ORAL at 20:40

## 2018-03-23 RX ADMIN — ALUMINUM ZIRCONIUM TRICHLOROHYDREX GLY SCH EA: 0.2 STICK TOPICAL at 00:00

## 2018-03-23 RX ADMIN — GABAPENTIN SCH MG: 600 TABLET, FILM COATED ORAL at 20:42

## 2018-03-23 RX ADMIN — Medication SCH MG: at 07:39

## 2018-03-23 RX ADMIN — ALUMINUM ZIRCONIUM TRICHLOROHYDREX GLY SCH EA: 0.2 STICK TOPICAL at 23:45

## 2018-03-23 RX ADMIN — FOLIC ACID SCH MLS/MIN: 5 INJECTION, SOLUTION INTRAMUSCULAR; INTRAVENOUS; SUBCUTANEOUS at 08:28

## 2018-03-23 NOTE — PSYCHIATRIC PROGRESS NOTES
Progress Note


Date of Service


Mar 23, 2018.





Interval History


Juan Pablo Ferrer Jr. is a 57-year-old  male brought to the ED on a 

302 warrant with petitioning statement from neighbor for suicidal statements.  

Pt's BAL was 413.9 in the emergency room.  Pt admitted to the medical floor for 

alcohol detoxification and to be evaluated for suicidal statements.  Psychiatry 

consult requested to evaluate SI.





Chief Complaint


"I'm feeling a little bit better, they had me up and walking around today - I 

was a little unstable".





Subjective


Patient's records reviewed and updates discussed with psychiatric nurse 

liaison.  Pt was seen for follow-up visit to further assess suicidal ideation 

and determine recommendations for discharge planning.  Pt was willing to share 

more about his interaction with his neighbor leading to the 302 warrant.  Pt 

states he was watching TV, "made a comment about the people down there are 

going to kill themselves - and next thing I knew my neighbor had called the 

police".  Pt states he has joked about suicide in front of his neighbor and 

feels that his comments were blown out of proportion.  It is also highly likely 

that the statements were made while the patient was intoxicated as he arrived 

at the ED with a BAL of 413.9.  Pt states he had a conversation with his son 

several years ago about his plan to kill himself by "taking a bottle of pills, 

having a drink, watching my sports, and dying" if he got to the point of 

requiring constant assistance with ADLs.  Pt is future oriented at this time 

and denies any intent to act on these plans in the near future.  Pt states, "I'

m no where near that point, I'm still living."  Pt shares his prostate CA 

diagnosis was made in 2014 with immediate removal of his prostate to follow.  

He states his PSA continues to be elevated despite prostatectomy, although 

recent scans have not showed any metastasis elsewhere in the body.  He states 

he is undergoing hormonal treatment at this time and will begin chemotherapy if 

this is not effective.  Pt states, "I could have 10-15 years, maybe longer."  

Pt states, "I was down and out for the first 2 years after they told me, and 

then I woke up and said f*** this, I'm going to live my life.  I have no 

intention to stop doing that right now."  Pt reports that he has shared with 

his therapist his plan to die by suicide when the assistance he requires 

becomes too great.  Pt also freely shares that he has support from both his 

therapist and [facility] and states he has used these resources in the past and 

feels able to use them again for any mental health needs in the meantime.  Pt 

denies current active SI and continues to refuse recommendations for inpatient 

ETOH rehabilitation or voluntary mental health admission.  He denies any other 

needs or concerns today.





Review of Systems


Psych:  denies symptoms other than stated above


Constitutional: denied


Cardiovascular: reports lightheadedness with standing


GI: reports occasional nausea


Neurologic: denied


Remainder of 10 body systems also reviewed and denied other than noted above.





Mental Status Exam


During interview pt is:  cooperative


Appearance:  disheveled (in hospital gown, malodorous, hair unkempt)


Eye contact is:  good


Motor behavior is:  no abnormal motor movements (pt observed while laying in bed

; reports instability with ambulation)


Speech:  normal in rate, rhythm & volume


Affect:  irritable (when discussing 302), other (limited range of affect; but 

not necessarilty depressed)


Mood is:  other ("better")


Thought process:  goal directed, clear, coherent


Thought content:  reality based without delusions


Suicidal thought are:  denied (but reports intermittent SI in relation to 

terminal CA diagnosis), Plan: present (has previously mentioned shooting 

himself or overdosing on pills), Intent: denied (Denies intent to act on 

thoughts; "it could be 10-15 years, who knows!")


Homicidal thoughts are:  denied


Hallucinations:  denies auditory, denies visual


Cognition:  attention grossly intact, language grossly intact


Intelligence estimated to be:  consistent with level of education


Insight:  limited


Judgement:  limited





Impression


56 y/o  male admitted to the medical floor on a 302 warrant with a BAL 

of 413.9 for detoxification.  302 petition completed by patient's neighbor 

reporting statements of SI made over the last 30 days.  One remark on 3/21/18 

reporting patient had shared that he wanted his "last supper" and then planned 

to shoot himself.  Neighbor address that patient does not have access to a gun 

in his petitioning statement.  





Pt confirms intermittent SI when considering potential decline of health in 

light of his prostate CA diagnosis.  He states he is unsure of the prognosis of 

his current treatment but "could have 10-15 more years."  Pt appears hopeful 

about this and declines any active SI or safety concerns if he were to be 

discharged.  It is not likely that patient would benefit from inpatient mental 

health treatment, especially as it would be an involuntary admission and his SI 

is consistently targeted to if he reaches a point of significant deterioration 

in health and functioning.  Multiple attempts have been made to obtain extra 

collateral information for safety planning.  Liaison nurse and this provider 

have attempted for BEVERLY for son and have called petitioner and patient's 

therapist for collateral information.  Follow up for medication management and 

therapy appointment have been made by liaison nurse.  





Would recommend inpatient ETOH rehabilitation, but no acute indication for 

involuntary inpatient mental health treatment.  Due to the unlikelihood of 

mitigation of risk factors if admitted to the MHU, would recommend disposition 

of the 302 warrant by treating physician.  Pt aware of recommendations given by 

this team and should ideally be discharged AMA if choosing to leave without 

adhering to these recommendations or those made by his primary medical team.





Plan





(1) Alcohol abuse


3/22


   - Primary recommendation for inpatient rehabilitation for alcohol abuse; pt 

reports he is unwilling at this time


   - Will continue to assess as detox continues; continue AWSS protocol and 

current medications


   - Would consider continuing to hold Trazodone in light of medications 

administered with AWSS protocol.  


   - Gather collateral from children and supports





3/23


   - Again, primary recommendation would be for consideration of inpatient D&A 

rehabilitation - pt remains unwilling


   - AWSS protocol with taper per medical team; would advise against benzos at 

discharge due to history of alcohol abuse


   - Would recommend disposing of any benzodiazepines patient may have brought 

with him to the ED


   - Attempts made to gather collateral from 19-year-old son (refused to sign 

BEVERLY) and neighbor (petitioner - no callback)


   - Recommendation to discharge AMA as patient unwilling to follow 

recommendations for rehabilitation at this time





(2) Depression


3/22


   - Continue Zoloft 100mg qAM


   - Will continue to assess SI and mood as detox continues


   - Gather collateral from children and other supports as to stability of mood


   - PCP, Dr. Langston, informed of multiple Librium/benzo prescribers as 

indicated on PDMP for future consideration


   - Will continue to offer recommendations as patient clears and can offer 

more history





3/23


   - Continue Zoloft 100mg qAM


   - Admits to intermittent SI; no acts of furtherance or current intent.  


   - Several attempts made in obtaining extra collateral for safety planning:  

neighbor (302 petitioner) called twice, no callback, pt refused to sign BEVERLY for 

son, and attempts made to speak with patient's therapist.  


   - Appointment made with PCP, Dr. Langston, to manage psychiatric medications as 

patient is non-compliant and has been fired from previous prescriber.  


   - Therapy appointment scheduled with Dimitry Mckeon, patient's therapist, for 

soonest appointment available on April 4th.  


   - 302 petitioning statements reports patient's gun is currently with his ex-

girlfriend's mother; patient reports he does not have access to a gun.  


   - Will continue to attempt to gain collateral as patient nears medical 

clearance; however, unlikely to receive information to change current 

recommendations


   - Would recommend disposition of 302 warrant and AMA discharge once patient 

is medically cleared, as he is not agreeable to rehab and is not likely to 

benefit from inpatient mental health treatment.





Dr. Salgado has personally been involved in the review of the above case and 

development of recommendations.





Risk Factors Assessment


Male:  Yes


:  Yes


/single/:  Yes


Health problems:  Yes


Mental Health Diagnoses:  Yes


Substance use disorders:  Yes


Previous attempt:  No





Protective Factors Assessment


:  No


Responsible for young children:  No


Employed:  No





Data


Vital Signs Last 24 Hrs:











  Date Time  Temp Pulse Resp B/P (MAP) Pulse Ox O2 Delivery O2 Flow Rate FiO2


 


3/23/18 15:18 37.3 90 18 115/84 (94) 97 Room Air  


 


3/23/18 12:00      Room Air  


 


3/23/18 11:20 37.0 98 18 105/91 (96) 97 Room Air  


 


3/23/18 08:00      Room Air  


 


3/23/18 07:15 37.0 87 18 122/81 (95) 96 Room Air  


 


3/23/18 04:01 36.6 93 18 151/103 (119) 96 Room Air  


 


3/23/18 04:00      Room Air  


 


3/22/18 23:59      Room Air  


 


3/22/18 23:18 37.3 97 20 161/103 (122) 97 Room Air  


 


3/22/18 20:00      Room Air  


 


3/22/18 19:19 37.3 91 20 149/97 (114) 97 Room Air  


 


3/22/18 16:00      Room Air  








Meds Administered Last 24 Hrs:





Meds Administered (Past 24Hrs)








 Medications


  (Trade)  Dose


 Ordered  Sig/Purnima


 Route  Start Time


 Stop Time Status Last Admin


Dose Admin


 


 Sodium Chloride  500 ml @ 


 999 mls/hr  Q31M STAT


 IV  3/21/18 17:08


 3/21/18 17:38 DC 3/21/18 17:39


999 MLS/HR


 


 Multivitamins 10


 ml/Thiamine HCl


 100 mg/Folic Acid


 1 mg/Sodium


 Chloride  1,011.2 ml


  @ 500 mls/


 hr  Q2H2M ONCE


 IV  3/21/18 17:15


 3/21/18 19:22 DC 3/21/18 17:39


500 MLS/HR


 


 Lisinopril


  (Zestril Tab)  20 mg  NOW  STAT


 PO  3/21/18 19:20


 3/21/18 19:22 DC 3/21/18 20:20


20 MG


 


 Lorazepam


  (Ativan Inj)  1 mg  NOW  STAT


 IV  3/21/18 20:15


 3/21/18 20:16 DC 3/21/18 20:21


1 MG


 


 Folic Acid 1 mg/


 Syringe  10 ml @ 5


 mls/min  QAM


 IV  3/22/18 09:00


 4/21/18 08:59  3/23/18 08:28


5 MLS/MIN


 


 Lorazepam


  (Ativan Inj)  2 mg  Q8H  PRN


 IV  3/21/18 21:15


 4/20/18 21:14  3/23/18 11:15


2 MG


 


 Lorazepam


  (Ativan Inj)  PRN Dosing


 -Active


 Protocol  Q1H  PRN


 IV  3/21/18 21:15


 4/20/18 21:14  3/22/18 11:37


1 MG


 


 Gabapentin


  (Neurontin Tab)  1,200 mg  NOW  STAT


 PO  3/21/18 22:10


 3/21/18 22:17 DC 3/21/18 22:41


1,200 MG


 


 Lisinopril


  (Zestril Tab)  20 mg  DAILY


 PO  3/22/18 09:00


 4/21/18 08:59  3/23/18 07:38


20 MG


 


 Montelukast Sodium


  (Singulair Tab)  10 mg  DAILY


 PO  3/22/18 09:00


 4/21/18 08:59  3/23/18 07:39


10 MG


 


 Multivitamins/


 Minerals


  (Multivitamin W/


 Minerals Tab)  1 tab  DAILY


 PO  3/22/18 09:00


 4/21/18 08:59  3/23/18 07:39


1 TAB


 


 Sertraline HCl


  (Zoloft Tab)  100 mg  QAM


 PO  3/22/18 09:00


 4/21/18 08:59  3/23/18 07:38


100 MG


 


 Thiamine HCl


  (Vitamin B-1 Tab)  100 mg  DAILY


 PO  3/22/18 09:00


 4/21/18 08:59  3/23/18 07:39


100 MG


 


 Gabapentin


  (Neurontin Tab)  600 mg  Q6H


 PO  3/22/18 04:00


 3/22/18 10:01 DC 3/22/18 08:30


600 MG


 


 Gabapentin


  (Neurontin Tab)  600 mg  Q8H


 PO  3/22/18 18:00


 3/23/18 10:01 DC 3/23/18 10:03


600 MG


 


 Magnesium Sulfate


 1 gm/Prmx  100 ml @ 


 100 mls/hr  NOW  STAT


 IV  3/21/18 23:35


 3/22/18 00:34 DC 3/22/18 00:15


100 MLS/HR


 


 Potassium


 Chloride 10 meq/


 Prmx  100 ml @ 


 100 mls/hr  Q1H


 IV  3/21/18 23:45


 3/22/18 02:44 DC 3/22/18 02:49


100 MLS/HR


 


 Potassium Chloride


  (Klor-Con M10)  40 meq  NOW  STAT


 PO  3/22/18 07:31


 3/22/18 07:38 DC 3/22/18 07:48


40 MEQ


 


 Potassium


 Phosphate 24 mmol/


 Sodium Chloride  508 ml @ 


 125 mls/hr  ONE  ONCE


 IV  3/22/18 08:00


 3/22/18 12:03 DC 3/22/18 08:30


125 MLS/HR


 


 Nicotine


  (Nicoderm Cq 7


 Mg Patch)  1 patch  NOW  STAT


 TD  3/23/18 00:37


 3/23/18 00:42 DC 3/23/18 01:25


1 PATCH


 


 Potassium Chloride


  (Klor-Con M10)  40 meq  NOW  STAT


 PO  3/23/18 08:08


 3/23/18 08:11 DC 3/23/18 08:27


40 MEQ


 


 Loperamide HCl


  (Imodium Cap)  2 mg  Q6H  PRN


 PO  3/23/18 09:45


 4/22/18 09:44  3/23/18 10:03


2 MG








Lab Results Last 24 Hrs:





Last 24 Hours








Test


  3/23/18


05:55


 


White Blood Count 4.08 K/uL 


 


Red Blood Count 3.44 M/uL 


 


Hemoglobin 11.9 g/dL 


 


Hematocrit 35.3 % 


 


Mean Corpuscular Volume 102.6 fL 


 


Mean Corpuscular Hemoglobin 34.6 pg 


 


Mean Corpuscular Hemoglobin


Concent 33.7 g/dl 


 


 


RDW Standard Deviation 56.9 fL 


 


RDW Coefficient of Variation 15.3 % 


 


Platelet Count 74 K/uL 


 


Mean Platelet Volume 9.5 fL 


 


Sodium Level 131 mmol/L 


 


Potassium Level 3.4 mmol/L 


 


Chloride Level 97 mmol/L 


 


Carbon Dioxide Level 25 mmol/L 


 


Anion Gap 9.0 mmol/L 


 


Blood Urea Nitrogen 10 mg/dl 


 


Creatinine 0.69 mg/dl 


 


Est Creatinine Clear Calc


Drug Dose 104.7 ml/min 


 


 


Estimated GFR (


American) 120.4 


 


 


Estimated GFR (Non-


American 103.9 


 


 


BUN/Creatinine Ratio 14.8 


 


Random Glucose 88 mg/dl 


 


Calcium Level 8.5 mg/dl 


 


Phosphorus Level 2.9 mg/dl 


 


Magnesium Level 1.6 mg/dl

## 2018-03-23 NOTE — PROGRESS NOTE
Internal Med Progress Note


Date of Service:


Mar 23, 2018.


Provider Documentation:





SUBJECTIVE:





The patient was seen and examined in telemetry unit.


He was admitted with the suicidal ideation with a history of alcoholism.


He complains to have palpitation and bilateral hand tremors this morning.


Denies history of any chest pain shortness of breath or palpitation, she does 

not have any nausea and/or vomiting





Clinically a lot better 


Mild tachycardia on ambulation


Withdrawal symptoms are better 








OBJECTIVE:





Vital Signs-as noted below





Exam:


General-minimal distress at rest, anxiety and bilateral hand tremors


Eyes-normal


ENT-normal


Neck-supple


Lungs-clear to auscultate bilaterally


Heart-S1-S2 regular, no murmur appreciated


Abdomen-benign, soft, nontender, bowel sounds present


Extremities-no edema bilaterally


Neuro-alert awake oriented 3.


Tremor involving bilateral outstretched hands.


No focal neuro deficit noted.





Lab data as noted below.








CT Chest::1. No acute intrathoracic abnormality identified.


2. No focal abnormality, adenopathy or mass of the mediastinum to correlate with


mediastinal prominence seen on comparison chest radiograph.


3. Mild emphysema.


4. Hepatic steatosis.


5. Healing subacute appearing nondisplaced fractures of the anterior left third


through seventh ribs.


6. Prior cholecystectomy.








CT Head-Negative


ASSESSMENT & PLAN:








57 year old M with alcohol misuse with elevated alcohol level with h/o 

Alcoholism  and admitted under 302 because of reported suicidal ideations from 

third party (patient's neighbor)





 Suicidal Ideation 


-admitted to telemetry service for monitoring for possible alcohol withdrawal, 

recheck alcohol level 


-patient on alcohol withdrawal protocol of gabapentin, Ativan, thiamine, folic 

acid


-CT head: No acute intracranial abnormality. Monitor for alcohol withdrawal or 

blackouts


-Psychiatry consulted-appreciate input 


-Needs further evaluation of this issue and probable inpatient psychiatric care

  











Alcoholism with Imminent withdrawal


Gabapentin Protocol


Ativan PRN


Check and replace Electrolytes as needed


No more withdrawal symptoms





Hypertension


Resolving after receiving lisinopril 20 mg in the emergency room. Continue 

Lisinopril daily


May need to add Clonidine 


Controlled 





Depression/Anxiety


Has been on high dose trazodone 400 mg qhs, will hold this medication for now 

pending psychiatry evaluation to avoid excessive sedation , 


Continue Zoloft for history of depression, adjust medications as recommended by 

psychiatry 





Prostate Cancer


-history of prostate cancer, patient does not know what medication he has been 

taking for this problem, monitor urine output











DVT ppx with SCDs due to thrombocytopenia





Code Status-Full





DISPOSITION


Likely to need Inpatient Psychiatry Care


Medically stable to go to Inpatient psychiatric acre


Vital Signs:











  Date Time  Temp Pulse Resp B/P (MAP) Pulse Ox O2 Delivery O2 Flow Rate FiO2


 


3/23/18 12:00      Room Air  


 


3/23/18 11:20 37.0 98 18 105/91 (96) 97 Room Air  


 


3/23/18 08:00      Room Air  


 


3/23/18 07:15 37.0 87 18 122/81 (95) 96 Room Air  


 


3/23/18 04:01 36.6 93 18 151/103 (119) 96 Room Air  


 


3/23/18 04:00      Room Air  


 


3/22/18 23:59      Room Air  


 


3/22/18 23:18 37.3 97 20 161/103 (122) 97 Room Air  


 


3/22/18 20:00      Room Air  


 


3/22/18 19:19 37.3 91 20 149/97 (114) 97 Room Air  


 


3/22/18 16:00      Room Air  


 


3/22/18 15:22 37.5 88 20 152/88 (109) 96 Room Air  








Lab Results:





Results Past 24 Hours








Test


  3/23/18


05:55 Range/Units


 


 


White Blood Count 4.08 4.8-10.8  K/uL


 


Red Blood Count 3.44 4.7-6.1  M/uL


 


Hemoglobin 11.9 14.0-18.0  g/dL


 


Hematocrit 35.3 42-52  %


 


Mean Corpuscular Volume 102.6   fL


 


Mean Corpuscular Hemoglobin 34.6 25-34  pg


 


Mean Corpuscular Hemoglobin


Concent 33.7


  32-36  g/dl


 


 


RDW Standard Deviation 56.9 36.4-46.3  fL


 


RDW Coefficient of Variation 15.3 11.5-14.5  %


 


Platelet Count 74 130-400  K/uL


 


Mean Platelet Volume 9.5 7.4-10.4  fL


 


Sodium Level 131 136-145  mmol/L


 


Potassium Level 3.4 3.5-5.1  mmol/L


 


Chloride Level 97   mmol/L


 


Carbon Dioxide Level 25 21-32  mmol/L


 


Anion Gap 9.0 3-11  mmol/L


 


Blood Urea Nitrogen 10 7-18  mg/dl


 


Creatinine


  0.69


  0.60-1.40


mg/dl


 


Est Creatinine Clear Calc


Drug Dose 104.7


   ml/min


 


 


Estimated GFR (


American) 120.4


   


 


 


Estimated GFR (Non-


American 103.9


   


 


 


BUN/Creatinine Ratio 14.8 10-20  


 


Random Glucose 88 70-99  mg/dl


 


Calcium Level 8.5 8.5-10.1  mg/dl


 


Phosphorus Level 2.9 2.5-4.9  mg/dl


 


Magnesium Level 1.6 1.8-2.4  mg/dl

## 2018-03-23 NOTE — PSYCHIATRIC PROGRESS NOTES
Psychiatric Progress Note


Date of Service


Mar 23, 2018.





Notes


administrative review of case as [facility] unit director as at risk patient with 

active 302 warrant nearing medical clearance for detox from [facility].  The patient 

has consistently denied active SI.  He does have a diagnosis of prostate CA and 

is pursuing additional treatment on an ongoing basis.  He mentions that when it 

got to the point he couldn't care for himself he'd plan to OD on pills and has 

made general statements about self-harm while intoxicated.  Liaison staff and [employer] have made significant efforts to encourage him to sign BEVERLY for family to 

encourage family in safety planning; liaison has left 2 messages for neighbor (

petitioner).  302 warrant was utilized to bring patient to ED for treatment for 

severe intoxication and he has been cooperative as being managed medically for 

withdrawal but is refusing rehab placement.





Alcohol dependence is primary issue and there is no current psychosis or active 

SI interfering with his medical decision making.  Petition confirms that no gun 

in home.  Patient was likely given benzo tapers in the past for detox, 

currently on Neurontin taper.  I would advise against him receiving any prn 

Ativan at discharge and if any controlled substance scripts are among his 

possessions I would recommend that they be destroyed.  





He does have risk factors for SI and would benefit from more intensive 

treatment for his alcohol use disorder but he is declining and he is not 

commitable under [place] law.  There is no evidence that his ETOH ingestion was a 

suicide attempt.  He is refusing inpatient mental health admission on a 

voluntary basis and inpatient mental health treatment is unlikely to mitigate 

remaining risk factors.





If patient would attempt to leave the hospital prior to medical clearance it 

would clearly be against medical advice but it is team recommendation that 302 

warrant be dispositioned (declined) by treating physician.  He is already aware 

that refusal of rehab rec is AMA.

## 2018-03-24 VITALS
SYSTOLIC BLOOD PRESSURE: 155 MMHG | DIASTOLIC BLOOD PRESSURE: 90 MMHG | OXYGEN SATURATION: 96 % | TEMPERATURE: 98.42 F | HEART RATE: 98 BPM

## 2018-03-24 VITALS
DIASTOLIC BLOOD PRESSURE: 79 MMHG | HEART RATE: 75 BPM | SYSTOLIC BLOOD PRESSURE: 126 MMHG | OXYGEN SATURATION: 98 % | TEMPERATURE: 99.32 F

## 2018-03-24 VITALS
DIASTOLIC BLOOD PRESSURE: 109 MMHG | TEMPERATURE: 98.42 F | SYSTOLIC BLOOD PRESSURE: 174 MMHG | OXYGEN SATURATION: 97 % | HEART RATE: 75 BPM

## 2018-03-24 VITALS — HEART RATE: 90 BPM | DIASTOLIC BLOOD PRESSURE: 94 MMHG | SYSTOLIC BLOOD PRESSURE: 152 MMHG

## 2018-03-24 VITALS
SYSTOLIC BLOOD PRESSURE: 162 MMHG | OXYGEN SATURATION: 97 % | TEMPERATURE: 98.78 F | HEART RATE: 81 BPM | DIASTOLIC BLOOD PRESSURE: 94 MMHG

## 2018-03-24 VITALS — DIASTOLIC BLOOD PRESSURE: 84 MMHG | SYSTOLIC BLOOD PRESSURE: 126 MMHG

## 2018-03-24 VITALS
DIASTOLIC BLOOD PRESSURE: 107 MMHG | HEART RATE: 102 BPM | OXYGEN SATURATION: 98 % | TEMPERATURE: 98.42 F | SYSTOLIC BLOOD PRESSURE: 142 MMHG

## 2018-03-24 VITALS
SYSTOLIC BLOOD PRESSURE: 161 MMHG | OXYGEN SATURATION: 98 % | DIASTOLIC BLOOD PRESSURE: 97 MMHG | TEMPERATURE: 98.78 F | HEART RATE: 76 BPM

## 2018-03-24 VITALS — OXYGEN SATURATION: 96 %

## 2018-03-24 LAB
BUN SERPL-MCNC: 14 MG/DL (ref 7–18)
CALCIUM SERPL-MCNC: 8.4 MG/DL (ref 8.5–10.1)
CO2 SERPL-SCNC: 24 MMOL/L (ref 21–32)
CREAT SERPL-MCNC: 0.79 MG/DL (ref 0.6–1.4)
EOSINOPHIL NFR BLD AUTO: 83 K/UL (ref 130–400)
GLUCOSE SERPL-MCNC: 114 MG/DL (ref 70–99)
HCT VFR BLD CALC: 32.3 % (ref 42–52)
HGB BLD-MCNC: 11 G/DL (ref 14–18)
MCH RBC QN AUTO: 34.7 PG (ref 25–34)
MCHC RBC AUTO-ENTMCNC: 34.1 G/DL (ref 32–36)
MCV RBC AUTO: 101.9 FL (ref 80–100)
PHOSPHATE SERPL-MCNC: 2.5 MG/DL (ref 2.5–4.9)
PMV BLD AUTO: 10 FL (ref 7.4–10.4)
POTASSIUM SERPL-SCNC: 3.4 MMOL/L (ref 3.5–5.1)
RED CELL DISTRIBUTION WIDTH CV: 15 % (ref 11.5–14.5)
RED CELL DISTRIBUTION WIDTH SD: 56 FL (ref 36.4–46.3)
SODIUM SERPL-SCNC: 130 MMOL/L (ref 136–145)
WBC # BLD AUTO: 4.85 K/UL (ref 4.8–10.8)

## 2018-03-24 RX ADMIN — Medication SCH TAB: at 08:28

## 2018-03-24 RX ADMIN — Medication SCH MG: at 08:28

## 2018-03-24 RX ADMIN — SERTRALINE HYDROCHLORIDE SCH MG: 100 TABLET, FILM COATED ORAL at 08:28

## 2018-03-24 RX ADMIN — LOPERAMIDE HYDROCHLORIDE PRN MG: 2 CAPSULE ORAL at 08:30

## 2018-03-24 RX ADMIN — ACETAMINOPHEN SCH MG: 500 TABLET, COATED ORAL at 22:19

## 2018-03-24 RX ADMIN — ACETAMINOPHEN SCH MG: 500 TABLET, COATED ORAL at 05:32

## 2018-03-24 RX ADMIN — MAGNESIUM SULFATE IN DEXTROSE SCH MLS/HR: 10 INJECTION, SOLUTION INTRAVENOUS at 10:00

## 2018-03-24 RX ADMIN — ACETAMINOPHEN SCH MG: 500 TABLET, COATED ORAL at 14:12

## 2018-03-24 RX ADMIN — MONTELUKAST SODIUM SCH MG: 10 TABLET, FILM COATED ORAL at 08:28

## 2018-03-24 RX ADMIN — ALUMINUM ZIRCONIUM TRICHLOROHYDREX GLY SCH EA: 0.2 STICK TOPICAL at 23:41

## 2018-03-24 RX ADMIN — GABAPENTIN SCH MG: 600 TABLET, FILM COATED ORAL at 08:28

## 2018-03-24 RX ADMIN — LORAZEPAM PRN MG: 2 INJECTION INTRAMUSCULAR; INTRAVENOUS at 22:19

## 2018-03-24 RX ADMIN — NICOTINE SCH PATCH: 7 PATCH, EXTENDED RELEASE TRANSDERMAL at 08:29

## 2018-03-24 RX ADMIN — MAGNESIUM SULFATE IN DEXTROSE SCH MLS/HR: 10 INJECTION, SOLUTION INTRAVENOUS at 11:00

## 2018-03-24 RX ADMIN — LISINOPRIL SCH MG: 20 TABLET ORAL at 08:28

## 2018-03-24 RX ADMIN — FOLIC ACID SCH MLS/MIN: 5 INJECTION, SOLUTION INTRAMUSCULAR; INTRAVENOUS; SUBCUTANEOUS at 08:28

## 2018-03-24 NOTE — PSYCHIATRIC PROGRESS NOTES
Psychiatric Progress Note


Date of Service


Mar 24, 2018.





Notes


see note from yesterday--patient now on 2nd floor, hasn't required any more prn 

Ativan, he remains oriented and continues to deny suicidal ideation without any 

evidence of delirium or psychosis impeding his medical decision making about 

declining rehab.  He does not meet inpatient admission criteria for mental 

health at this time, particularly not on an involuntary commitment.  Patient 

states he has had no contact with ex and her family for 6 years.  He is 

agreeable to seeing Dimitry Mckeon.  302 warrant dispositioned.  He is encouraged 

to continue his medical treatment until primary service feels appropriate for 

discharge.  ROIs have already been addressed for his outpatient providers and I 

have no addition recs other than what is outlined in yesterday's note.

## 2018-03-24 NOTE — PROGRESS NOTE
Internal Med Progress Note


Date of Service:


Mar 24, 2018.


Provider Documentation:





SUBJECTIVE:





The patient was seen and examined in telemetry unit.


He was admitted with the suicidal ideation with a history of alcoholism.


He complains to have palpitation and bilateral hand tremors this morning.


Denies history of any chest pain shortness of breath or palpitation, she does 

not have any nausea and/or vomiting








Tachycardia resolved 


Ambulating without any difficulty





OBJECTIVE:





Vital Signs-as noted below





Exam:


General-minimal distress at rest, anxiety and bilateral hand tremors


Eyes-normal


ENT-normal


Neck-supple


Lungs-clear to auscultate bilaterally


Heart-S1-S2 regular, no murmur appreciated


Abdomen-benign, soft, nontender, bowel sounds present


Extremities-no edema bilaterally


Neuro-alert awake oriented 3.


Tremor involving bilateral outstretched hands.


No focal neuro deficit noted.





Lab data as noted below.








CT Chest::1. No acute intrathoracic abnormality identified.


2. No focal abnormality, adenopathy or mass of the mediastinum to correlate with


mediastinal prominence seen on comparison chest radiograph.


3. Mild emphysema.


4. Hepatic steatosis.


5. Healing subacute appearing nondisplaced fractures of the anterior left third


through seventh ribs.


6. Prior cholecystectomy.








CT Head-Negative


ASSESSMENT & PLAN:








57 year old M with alcohol misuse with elevated alcohol level with h/o 

Alcoholism  and admitted under 302 because of reported suicidal ideations from 

third party (patient's neighbor)





 Suicidal Ideation 


-admitted to telemetry service for monitoring for possible alcohol withdrawal, 

recheck alcohol level 


-patient on alcohol withdrawal protocol of gabapentin, Ativan, thiamine, folic 

acid


-CT head: No acute intracranial abnormality. Monitor for alcohol withdrawal or 

blackouts


-Psychiatry consulted-appreciate input 


-Needs further evaluation of this issue and probable inpatient psychiatric care 


-Denies any more suicidal Ideation 


-Appreciate Psychiatry follow up and recommendation 


 











Alcoholism with Imminent withdrawal


Gabapentin Protocol


Ativan PRN


Check and replace Electrolytes as needed


No more withdrawal symptoms


Continue Gabapentin protocol





Hypertension


Resolving after receiving lisinopril 20 mg in the emergency room. Continue 

Lisinopril daily


May need to add Clonidine 


Controlled now 





Depression/Anxiety


Has been on high dose trazodone 400 mg qhs, will hold this medication for now 

pending psychiatry evaluation to avoid excessive sedation , 


Continue Zoloft for history of depression, adjust medications as recommended by 

psychiatry 


Has OP appointment with his Psychologist





Prostate Cancer


-history of prostate cancer, patient does not know what medication he has been 

taking for this problem, monitor urine output


-no acute issue











DVT ppx with SCDs due to thrombocytopenia





Code Status-Full





DISPOSITION


Likely to need Inpatient Psychiatry Care


Medically stable to go to Inpatient psychiatric acre


No inpatient Psychiatry care needed as per Psychiatry


Increase ambulation 


Likely to be discharged in a day or two.


Vital Signs:











  Date Time  Temp Pulse Resp B/P (MAP) Pulse Ox O2 Delivery O2 Flow Rate FiO2


 


3/24/18 08:00      Room Air  


 


3/24/18 07:42  90  152/94 (113)  Room Air  


 


3/24/18 07:39 36.9 102 22 142/107 (119) 98 Room Air  


 


3/24/18 06:20    126/84 (98)    


 


3/24/18 04:00      Room Air  


 


3/24/18 03:51 36.9 75 19 174/109 (130) 97 Room Air  


 


3/24/18 00:30 37.1 76 19 161/97 (118) 98 Room Air  


 


3/23/18 23:15    137/91 (106)    


 


3/23/18 23:15      Room Air  


 


3/23/18 20:06     100 Room Air  


 


3/23/18 19:36 37.2 89 18 128/91 (103) 100 Room Air  


 


3/23/18 16:00      Room Air  


 


3/23/18 15:18 37.3 90 18 115/84 (94) 97 Room Air  


 


3/23/18 12:00      Room Air  








Lab Results:





Results Past 24 Hours








Test


  3/24/18


05:47 Range/Units


 


 


White Blood Count 4.85 4.8-10.8  K/uL


 


Red Blood Count 3.17 4.7-6.1  M/uL


 


Hemoglobin 11.0 14.0-18.0  g/dL


 


Hematocrit 32.3 42-52  %


 


Mean Corpuscular Volume 101.9   fL


 


Mean Corpuscular Hemoglobin 34.7 25-34  pg


 


Mean Corpuscular Hemoglobin


Concent 34.1


  32-36  g/dl


 


 


RDW Standard Deviation 56.0 36.4-46.3  fL


 


RDW Coefficient of Variation 15.0 11.5-14.5  %


 


Platelet Count 83 130-400  K/uL


 


Mean Platelet Volume 10.0 7.4-10.4  fL


 


Sodium Level 130 136-145  mmol/L


 


Potassium Level 3.4 3.5-5.1  mmol/L


 


Chloride Level 97   mmol/L


 


Carbon Dioxide Level 24 21-32  mmol/L


 


Anion Gap 9.0 3-11  mmol/L


 


Blood Urea Nitrogen 14 7-18  mg/dl


 


Creatinine


  0.79


  0.60-1.40


mg/dl


 


Est Creatinine Clear Calc


Drug Dose 94.6


   ml/min


 


 


Estimated GFR (


American) 113.9


   


 


 


Estimated GFR (Non-


American 98.3


   


 


 


BUN/Creatinine Ratio 17.4 10-20  


 


Random Glucose 114 70-99  mg/dl


 


Calcium Level 8.4 8.5-10.1  mg/dl


 


Phosphorus Level 2.5 2.5-4.9  mg/dl


 


Magnesium Level 1.5 1.8-2.4  mg/dl

## 2018-03-25 VITALS
SYSTOLIC BLOOD PRESSURE: 160 MMHG | DIASTOLIC BLOOD PRESSURE: 97 MMHG | HEART RATE: 77 BPM | OXYGEN SATURATION: 99 % | TEMPERATURE: 98.78 F

## 2018-03-25 VITALS
HEART RATE: 77 BPM | SYSTOLIC BLOOD PRESSURE: 174 MMHG | OXYGEN SATURATION: 99 % | TEMPERATURE: 98.6 F | DIASTOLIC BLOOD PRESSURE: 93 MMHG

## 2018-03-25 VITALS
OXYGEN SATURATION: 99 % | TEMPERATURE: 98.6 F | SYSTOLIC BLOOD PRESSURE: 174 MMHG | DIASTOLIC BLOOD PRESSURE: 93 MMHG | HEART RATE: 77 BPM

## 2018-03-25 VITALS
OXYGEN SATURATION: 98 % | HEART RATE: 78 BPM | DIASTOLIC BLOOD PRESSURE: 85 MMHG | TEMPERATURE: 98.6 F | SYSTOLIC BLOOD PRESSURE: 139 MMHG

## 2018-03-25 VITALS
HEART RATE: 72 BPM | OXYGEN SATURATION: 99 % | SYSTOLIC BLOOD PRESSURE: 110 MMHG | TEMPERATURE: 98.06 F | DIASTOLIC BLOOD PRESSURE: 68 MMHG

## 2018-03-25 VITALS — OXYGEN SATURATION: 99 %

## 2018-03-25 LAB
BUN SERPL-MCNC: 11 MG/DL (ref 7–18)
CALCIUM SERPL-MCNC: 9 MG/DL (ref 8.5–10.1)
CO2 SERPL-SCNC: 23 MMOL/L (ref 21–32)
CREAT SERPL-MCNC: 0.7 MG/DL (ref 0.6–1.4)
GLUCOSE SERPL-MCNC: 89 MG/DL (ref 70–99)
PHOSPHATE SERPL-MCNC: 3.2 MG/DL (ref 2.5–4.9)
POTASSIUM SERPL-SCNC: 4.5 MMOL/L (ref 3.5–5.1)
SODIUM SERPL-SCNC: 128 MMOL/L (ref 136–145)

## 2018-03-25 RX ADMIN — MONTELUKAST SODIUM SCH MG: 10 TABLET, FILM COATED ORAL at 07:49

## 2018-03-25 RX ADMIN — GABAPENTIN SCH MG: 600 TABLET, FILM COATED ORAL at 10:53

## 2018-03-25 RX ADMIN — ACETAMINOPHEN SCH MG: 500 TABLET, COATED ORAL at 06:06

## 2018-03-25 RX ADMIN — ALUMINUM ZIRCONIUM TRICHLOROHYDREX GLY SCH EA: 0.2 STICK TOPICAL at 23:25

## 2018-03-25 RX ADMIN — ACETAMINOPHEN SCH MG: 500 TABLET, COATED ORAL at 21:45

## 2018-03-25 RX ADMIN — GABAPENTIN SCH MG: 600 TABLET, FILM COATED ORAL at 11:34

## 2018-03-25 RX ADMIN — ACETAMINOPHEN SCH MG: 500 TABLET, COATED ORAL at 14:35

## 2018-03-25 RX ADMIN — Medication SCH TAB: at 07:49

## 2018-03-25 RX ADMIN — ALUMINUM ZIRCONIUM TRICHLOROHYDREX GLY SCH EA: 0.2 STICK TOPICAL at 07:48

## 2018-03-25 RX ADMIN — NICOTINE SCH PATCH: 7 PATCH, EXTENDED RELEASE TRANSDERMAL at 07:48

## 2018-03-25 RX ADMIN — LOPERAMIDE HYDROCHLORIDE PRN MG: 2 CAPSULE ORAL at 05:05

## 2018-03-25 RX ADMIN — LORAZEPAM PRN MG: 2 INJECTION INTRAMUSCULAR; INTRAVENOUS at 07:55

## 2018-03-25 RX ADMIN — CLONIDINE HYDROCHLORIDE SCH MG: 0.1 TABLET ORAL at 20:03

## 2018-03-25 RX ADMIN — ALUMINUM ZIRCONIUM TRICHLOROHYDREX GLY SCH EA: 0.2 STICK TOPICAL at 15:22

## 2018-03-25 RX ADMIN — SERTRALINE HYDROCHLORIDE SCH MG: 100 TABLET, FILM COATED ORAL at 07:49

## 2018-03-25 RX ADMIN — LISINOPRIL SCH MG: 20 TABLET ORAL at 07:48

## 2018-03-25 RX ADMIN — Medication SCH MG: at 07:49

## 2018-03-25 NOTE — PROGRESS NOTE
Internal Med Progress Note


Date of Service:


Mar 25, 2018.


Provider Documentation:





SUBJECTIVE:





The patient was seen and examined in telemetry unit.


He was admitted with the suicidal ideation with a history of alcoholism.


He complains to have palpitation and bilateral hand tremors this morning.


Denies history of any chest pain shortness of breath or palpitation, she does 

not have any nausea and/or vomiting








Tachycardia resolved 


Ambulating without any difficulty





3/25


No complaints ,ambulating well ,wants to be discharged





OBJECTIVE:





Vital Signs-as noted below





Exam:


General-minimal distress at rest,


Minimal to no tremor of the outstretched hands


Eyes-normal


ENT-normal


Neck-supple


Lungs-clear to auscultate bilaterally


Heart-S1-S2 regular, no murmur appreciated


Abdomen-benign, soft, nontender, bowel sounds present


Extremities-no edema bilaterally


Neuro-alert awake oriented 3.


Tremor involving bilateral outstretched hands.-much improved 


No focal neuro deficit noted.





Lab data as noted below.








CT Chest::1. No acute intrathoracic abnormality identified.


2. No focal abnormality, adenopathy or mass of the mediastinum to correlate with


mediastinal prominence seen on comparison chest radiograph.


3. Mild emphysema.


4. Hepatic steatosis.


5. Healing subacute appearing nondisplaced fractures of the anterior left third


through seventh ribs.


6. Prior cholecystectomy.








CT Head-Negative


ASSESSMENT & PLAN:








57 year old M with alcohol misuse with elevated alcohol level with h/o 

Alcoholism  and admitted under 302 because of reported suicidal ideations from 

third party (patient's neighbor)





 Suicidal Ideation -denied to have any to the Psychiatrist


-admitted to telemetry service for monitoring for possible alcohol withdrawal, 

recheck alcohol level 


-patient on alcohol withdrawal protocol of gabapentin, Ativan, thiamine, folic 

acid


-CT head: No acute intracranial abnormality. Monitor for alcohol withdrawal or 

blackouts


-Psychiatry consulted-appreciate input 


-Needs further evaluation of this issue and probable inpatient psychiatric care 


-Denies any more suicidal Ideation and does not require any inpatient 

psychiatry care 


-Social service to arrange area of aging follow up as an OP -to keep an eye on 

him


-Denies to go for any in patient rehab for Alcoholism


 











Alcoholism with Imminent withdrawal


Gabapentin Protocol


Ativan PRN


Check and replace Electrolytes as needed


No more withdrawal symptoms


Continue Gabapentin protocol





Hypertension


Resolving after receiving lisinopril 20 mg in the emergency room. Continue 

Lisinopril daily


May need to add Clonidine 


Controlled now 


A little high today


Add clonidine  for better control 





Depression/Anxiety


Has been on high dose trazodone 400 mg qhs, will hold this medication for now 

pending psychiatry evaluation to avoid excessive sedation , 


Continue Zoloft for history of depression, adjust medications as recommended by 

psychiatry 


Has OP appointment with his Psychologist





Prostate Cancer


-history of prostate cancer, patient does not know what medication he has been 

taking for this problem, monitor urine output


-no acute issue











DVT ppx with SCDs due to thrombocytopenia





Code Status-Full





DISPOSITION


Likely to need Inpatient Psychiatry Care


Medically stable to go to Inpatient psychiatric acre


No inpatient Psychiatry care needed as per Psychiatry


Increase ambulation 


Likely to be discharged in a day or two.


Vital Signs:











  Date Time  Temp Pulse Resp B/P (MAP) Pulse Ox O2 Delivery O2 Flow Rate FiO2


 


3/25/18 07:43 37.0 77 18 174/93 (120) 99 Room Air  


 


3/25/18 04:02 37.1 77 18 160/97 (118) 99 Room Air  


 


3/25/18 04:00      Room Air  


 


3/25/18 00:19 37.0 78 19 139/85 (103) 98 Room Air  


 


3/24/18 23:40      Room Air  


 


3/24/18 20:00     96 Room Air  


 


3/24/18 19:41 36.9 98 18 155/90 (111) 96 Room Air  


 


3/24/18 16:00      Room Air  


 


3/24/18 15:29 37.4 75 20 126/79 (95) 98 Room Air  


 


3/24/18 12:00      Room Air  


 


3/24/18 11:45 37.1 81 20 162/94 (116) 97 Room Air  








Lab Results:





Results Past 24 Hours








Test


  3/25/18


05:29 Range/Units


 


 


Sodium Level 128 136-145  mmol/L


 


Potassium Level 4.5 3.5-5.1  mmol/L


 


Chloride Level 97   mmol/L


 


Carbon Dioxide Level 23 21-32  mmol/L


 


Anion Gap 8.0 3-11  mmol/L


 


Blood Urea Nitrogen 11 7-18  mg/dl


 


Creatinine


  0.70


  0.60-1.40


mg/dl


 


Est Creatinine Clear Calc


Drug Dose 107.0


   ml/min


 


 


Estimated GFR (


American) 119.7


   


 


 


Estimated GFR (Non-


American 103.3


   


 


 


BUN/Creatinine Ratio 15.3 10-20  


 


Random Glucose 89 70-99  mg/dl


 


Calcium Level 9.0 8.5-10.1  mg/dl


 


Phosphorus Level 3.2 2.5-4.9  mg/dl


 


Magnesium Level 1.9 1.8-2.4  mg/dl

## 2018-03-26 VITALS
SYSTOLIC BLOOD PRESSURE: 144 MMHG | OXYGEN SATURATION: 97 % | HEART RATE: 69 BPM | DIASTOLIC BLOOD PRESSURE: 85 MMHG | TEMPERATURE: 98.06 F

## 2018-03-26 VITALS
OXYGEN SATURATION: 99 % | SYSTOLIC BLOOD PRESSURE: 109 MMHG | DIASTOLIC BLOOD PRESSURE: 68 MMHG | TEMPERATURE: 97.88 F | HEART RATE: 69 BPM

## 2018-03-26 VITALS
TEMPERATURE: 98.24 F | DIASTOLIC BLOOD PRESSURE: 82 MMHG | HEART RATE: 56 BPM | OXYGEN SATURATION: 100 % | SYSTOLIC BLOOD PRESSURE: 147 MMHG

## 2018-03-26 VITALS
DIASTOLIC BLOOD PRESSURE: 82 MMHG | TEMPERATURE: 98.24 F | OXYGEN SATURATION: 100 % | SYSTOLIC BLOOD PRESSURE: 147 MMHG | HEART RATE: 56 BPM

## 2018-03-26 LAB
BUN SERPL-MCNC: 14 MG/DL (ref 7–18)
CALCIUM SERPL-MCNC: 8.9 MG/DL (ref 8.5–10.1)
CO2 SERPL-SCNC: 27 MMOL/L (ref 21–32)
CREAT SERPL-MCNC: 0.83 MG/DL (ref 0.6–1.4)
GLUCOSE SERPL-MCNC: 81 MG/DL (ref 70–99)
POTASSIUM SERPL-SCNC: 4.2 MMOL/L (ref 3.5–5.1)
SODIUM SERPL-SCNC: 132 MMOL/L (ref 136–145)

## 2018-03-26 RX ADMIN — SERTRALINE HYDROCHLORIDE SCH MG: 100 TABLET, FILM COATED ORAL at 07:57

## 2018-03-26 RX ADMIN — NICOTINE SCH PATCH: 7 PATCH, EXTENDED RELEASE TRANSDERMAL at 07:56

## 2018-03-26 RX ADMIN — ACETAMINOPHEN SCH MG: 500 TABLET, COATED ORAL at 14:00

## 2018-03-26 RX ADMIN — Medication SCH TAB: at 07:56

## 2018-03-26 RX ADMIN — ACETAMINOPHEN SCH MG: 500 TABLET, COATED ORAL at 06:14

## 2018-03-26 RX ADMIN — Medication SCH MG: at 07:57

## 2018-03-26 RX ADMIN — ALUMINUM ZIRCONIUM TRICHLOROHYDREX GLY SCH EA: 0.2 STICK TOPICAL at 15:15

## 2018-03-26 RX ADMIN — CLONIDINE HYDROCHLORIDE SCH MG: 0.1 TABLET ORAL at 07:56

## 2018-03-26 RX ADMIN — MONTELUKAST SODIUM SCH MG: 10 TABLET, FILM COATED ORAL at 07:56

## 2018-03-26 RX ADMIN — ALUMINUM ZIRCONIUM TRICHLOROHYDREX GLY SCH EA: 0.2 STICK TOPICAL at 07:54

## 2018-03-26 RX ADMIN — LISINOPRIL SCH MG: 20 TABLET ORAL at 07:57

## 2018-03-26 NOTE — DISCHARGE INSTRUCTIONS
Discharge Instructions


Date of Service


Mar 26, 2018.





Admission


Reason for Admission:  Alcohol Abuse, Psychiatric Exam Requested By Autho





Discharge


Discharge Diagnosis / Problem:  Possible Suicidal Ideation-ruled out,Depression,

Alcohol abuse





Discharge Goals


Goal(s):  Prevent Disease Progression





Activity Recommendations


Activity Limitations:  resume your previous activity





.





Instructions / Follow-Up


Instructions / Follow-Up


DR Langston on 3/28/18 at 1:45 PM. Therapy with Dimitry Mckeon on 4/4/18





Current Hospital Diet


Patient's current hospital diet: Regular Diet





Discharge Diet


Recommended Diet:  Regular Diet





Pending Studies


Studies pending at discharge:  no





Medical Emergencies








.


Who to Call and When:





Medical Emergencies:  If at any time you feel your situation is an emergency, 

please call 911 immediately.





.





Non-Emergent Contact


Non-Emergency issues call your:  Primary Care Provider





.





Past History


Medical & Surgical History:  


(1) Suicidal ideation


(2) Alcohol abuse


(3) Depression


(4) Prostate cancer


(5) H/O knee surgery


(6) H/O prostate biopsy


.








"Provider Documentation" section prepared by Silvano Carmona.








.

## 2018-03-26 NOTE — PROGRESS NOTE
Internal Med Progress Note


Date of Service:


Mar 26, 2018.


Provider Documentation:





SUBJECTIVE:





The patient was seen and examined in telemetry unit.


He was admitted with the suicidal ideation with a history of alcoholism.


He complains to have palpitation and bilateral hand tremors this morning.


Denies history of any chest pain shortness of breath or palpitation, she does 

not have any nausea and/or vomiting








Tachycardia resolved 


Ambulating without any difficulty





3/25


No complaints ,ambulating well ,wants to be discharged





3/26


Denies any symptoms 


Ambulating without any difficulty 


Cleared from psychiatrist to be discharged 








OBJECTIVE:





Vital Signs-as noted below





Exam:


General-minimal distress at rest,


Minimal to no tremor of the outstretched hands


Eyes-normal


ENT-normal


Neck-supple


Lungs-clear to auscultate bilaterally


Heart-S1-S2 regular, no murmur appreciated


Abdomen-benign, soft, nontender, bowel sounds present


Extremities-no edema bilaterally


Neuro-alert awake oriented 3.


Tremor involving bilateral outstretched hands.-much improved 


No focal neuro deficit noted.





Lab data as noted below.








CT Chest::1. No acute intrathoracic abnormality identified.


2. No focal abnormality, adenopathy or mass of the mediastinum to correlate with


mediastinal prominence seen on comparison chest radiograph.


3. Mild emphysema.


4. Hepatic steatosis.


5. Healing subacute appearing nondisplaced fractures of the anterior left third


through seventh ribs.


6. Prior cholecystectomy.








CT Head-Negative


ASSESSMENT & PLAN:








57 year old M with alcohol misuse with elevated alcohol level with h/o 

Alcoholism  and admitted under 302 because of reported suicidal ideations from 

third party (patient's neighbor)





 Suicidal Ideation -denied to have any to the Psychiatrist


-admitted to telemetry service for monitoring for possible alcohol withdrawal, 

recheck alcohol level 


-patient on alcohol withdrawal protocol of gabapentin, Ativan, thiamine, folic 

acid


-CT head: No acute intracranial abnormality. Monitor for alcohol withdrawal or 

blackouts


-Psychiatry consulted-appreciate input 


-Needs further evaluation of this issue and probable inpatient psychiatric care 


-Denies any more suicidal Ideation and does not require any inpatient 

psychiatry care 


-Social service to arrange area of aging follow up as an OP -to keep an eye on 

him


-Denies to go for any in patient rehab for Alcoholism


-Will discharge home today 


Has OP Psychologist appointment 





 


Alcoholism with Imminent withdrawal


Gabapentin Protocol


Ativan PRN


Check and replace Electrolytes as needed


No more withdrawal symptoms


Continue Gabapentin protocol


No In patient rehab





Hypertension


Resolving after receiving lisinopril 20 mg in the emergency room. Continue 

Lisinopril daily


May need to add Clonidine 


Controlled now 


A little high today


Add clonidine  for better control 


Controlled now





Depression/Anxiety


Has been on high dose trazodone 400 mg qhs, will hold this medication for now 

pending psychiatry evaluation to avoid excessive sedation , 


Continue Zoloft for history of depression, adjust medications as recommended by 

psychiatry 


Has OP appointment with his Psychologist





Prostate Cancer


-history of prostate cancer, patient does not know what medication he has been 

taking for this problem, monitor urine output


-no acute issue











DVT ppx with SCDs due to thrombocytopenia





Code Status-Full





DISPOSITION


Likely to need Inpatient Psychiatry Care


Medically stable to go to Inpatient psychiatric acre


No inpatient Psychiatry care needed as per Psychiatry


Increase ambulation 


Discharge today


Vital Signs:











  Date Time  Temp Pulse Resp B/P (MAP) Pulse Ox O2 Delivery O2 Flow Rate FiO2


 


3/26/18 08:14 36.7 69 16 144/85 (104) 97 Room Air  


 


3/26/18 08:00      Room Air  


 


3/26/18 00:31 36.6 69 18 109/68 (82) 99 Room Air  


 


3/26/18 00:15      Room Air  


 


3/25/18 16:00     99 Room Air  


 


3/25/18 15:44 36.7 72 16 110/68 (82) 99 Room Air  


 


3/25/18 13:48     99 Room Air  








Lab Results:





Results Past 24 Hours








Test


  3/26/18


10:41 Range/Units


 


 


Sodium Level 132 136-145  mmol/L


 


Potassium Level 4.2 3.5-5.1  mmol/L


 


Chloride Level 99   mmol/L


 


Carbon Dioxide Level 27 21-32  mmol/L


 


Anion Gap 6.0 3-11  mmol/L


 


Blood Urea Nitrogen 14 7-18  mg/dl


 


Creatinine


  0.83


  0.60-1.40


mg/dl


 


Est Creatinine Clear Calc


Drug Dose 90.3


   ml/min


 


 


Estimated GFR (


American) 111.6


   


 


 


Estimated GFR (Non-


American 96.3


   


 


 


BUN/Creatinine Ratio 17.2 10-20  


 


Random Glucose 81 70-99  mg/dl


 


Calcium Level 8.9 8.5-10.1  mg/dl
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
## 2018-04-08 ENCOUNTER — HOSPITAL ENCOUNTER (EMERGENCY)
Dept: HOSPITAL 45 - C.EDC | Age: 58
LOS: 1 days | Discharge: HOME | End: 2018-04-09
Payer: COMMERCIAL

## 2018-04-08 VITALS
BODY MASS INDEX: 21.49 KG/M2 | WEIGHT: 145.06 LBS | WEIGHT: 145.06 LBS | HEIGHT: 69.02 IN | HEIGHT: 69.02 IN | TEMPERATURE: 98.42 F | BODY MASS INDEX: 21.49 KG/M2

## 2018-04-08 DIAGNOSIS — Z85.46: ICD-10-CM

## 2018-04-08 DIAGNOSIS — F17.210: ICD-10-CM

## 2018-04-08 DIAGNOSIS — Z79.899: ICD-10-CM

## 2018-04-08 DIAGNOSIS — F32.9: ICD-10-CM

## 2018-04-08 DIAGNOSIS — R31.0: Primary | ICD-10-CM

## 2018-04-08 NOTE — EMERGENCY ROOM VISIT NOTE
History


Report prepared by Samuel:  Rima Pelaez


Under the Supervision of:  Dr. Paloma Sanches D.O.


First contact with patient:  23:36


Chief Complaint:  HEMATURIA


Stated Complaint:  HEMATURIA





History of Present Illness


The patient is a 57 year old male who presents to the Emergency Room brought in 

by EMS with complaints of persistent general hematuria since today. He states 

that he usually urinates pink urine intermittently, though states that he has 

been urinating pure red urine since today. He has a history of prostate cancer. 

He notes that he has had radiation therapy. He states his next step is hormone 

treatment. He notes that he has been delaying the CT scan required to begin the 

hormonal treatment. He notes his last PSA level was five times higher than his 

original diagnosing PSA level. He reports that he is paranoid to the point of 

avoiding treatment. He notes his bone scan performed at [facility] was 

reassuring. He states that he has been drinking tonight. Per EMS, the patient 

admitted to consuming a fifth of alcohol. He states he smokes a pack of 

cigarettes every three days. He denies any abdominal pain. He denies any issues 

passing urine. He notes his prostate surgery was about four years ago.





   Source of History:  patient


   Onset:  since today


   Position:  other (general )


   Quality:  other (hematuria)


   Timing:  other (persistent)


   Associated Symptoms:  No abdominal pain


Note:


He denies any issues passing urine.





Review of Systems


See HPI for pertinent positives & negatives. A total of 10 systems reviewed and 

were otherwise negative.





Past Medical & Surgical


Medical Problems:


(1) Depression


(2) History of torn meniscus of left knee


(3) History of torn meniscus of right knee


(4) Psychiatric exam requested by authority


Surgical Problems:


(1) H/O knee surgery


(2) H/O prostate biopsy








Family History





No pertinent family history





Social History


Smoking Status:  Current Every Day Smoker


Alcohol Use:  heavy


Marital Status:  


Housing Status:  lives alone


Occupation Status:  unemployed





Current/Historical Medications


Scheduled


Clonidine HCl (Clonidine HCl), 0.1 MG PO BID


Folic Acid (Folic Acid), 1 MG PO DAILY


Lisinopril (Prinivil), 20 MG PO DAILY


Mometasone Furoate-Formoterol (Dulera 100/5 Mcg), 2 PUFFS INH BID


Montelukast Sodium (Singulair), 10 MG PO DAILY


Multivitamins/Minerals (Mvi With Minerals), 1 TAB PO DAILY


Nicotine (Nicoderm Cq 7 Mg Patch), 1 PATCH EXT QAM


Sertraline (Zoloft), 100 MG PO QAM


Thiamine Hcl (Vitamin B-1), 100 MG PO DAILY





Scheduled PRN


Albuterol Hfa (Ventolin Hfa), 2 PUFF INH QID PRN for SOB/Wheezing





Allergies


Coded Allergies:  


     No Known Allergies (Unverified , 4/9/18)





Physical Exam


Vital Signs











  Date Time  Temp Pulse Resp B/P (MAP) Pulse Ox O2 Delivery O2 Flow Rate FiO2


 


4/9/18 01:19  88 18 144/86 97   


 


4/8/18 23:28 36.9 103 18 164/100 99 Room Air  











Physical Exam


HEENT: Head - normocephalic and atraumatic   Pupils are equal, round, and 

reactive to light.  Extraocular eye muscles are intact, and sclera are 

anicteric.   Nose -  moist nasal mucosa without discharge. Mouth - moist buccal 

mucosa.  Oropharynx is nonerythematous and there is no tonsillar exudate or 

edema noted.


Neck: Supple; no JVD, nuchal rigidity, cervical lymphadenopathy.


Heart: Regular rate and rhythm.  There is a normal S1 and S2 with no murmurs, 

clicks, or gallops appreciated.


Lungs: Inspiratory and expiratory wheezing bilaterally.


Abdomen: Soft, completely nontender, nondistended, with good bowel sounds.  

There are no palpable pulsatile masses or hepatosplenomegaly.  There is no 

guarding, rigidity, or rebound noted.


Extremities: No evidence of cyanosis, clubbing, or edema.  There are easily 

palpable peripheral pulses.


Skin: warm and dry with good turgor and no rashes.





Medical Decision & Procedures


Laboratory Results


4/9/18 00:08








4/9/18 00:08

















Test


  4/9/18


00:08


 


Red Blood Count


  3.50 M/uL


(4.7-6.1)


 


Mean Corpuscular Volume


  102.6 fL


()


 


Mean Corpuscular Hemoglobin


  35.7 pg


(25-34)


 


Mean Corpuscular Hemoglobin


Concent 34.8 g/dl


(32-36)


 


RDW Standard Deviation


  55.3 fL


(36.4-46.3)


 


RDW Coefficient of Variation


  14.8 %


(11.5-14.5)


 


Mean Platelet Volume


  9.2 fL


(7.4-10.4)


 


Prothrombin Time


  9.2 SECONDS


(9.0-12.0)


 


Prothromb Time International


Ratio 0.9 (0.9-1.1) 


 


 


Activated Partial


Thromboplast Time 23.4 SECONDS


(21.0-31.0)


 


Partial Thromboplastin Ratio 0.9 


 


Anion Gap


  15.0 mmol/L


(3-11)


 


Est Creatinine Clear Calc


Drug Dose 89.2 ml/min 


 


 


Estimated GFR (


American) 111.6 


 


 


Estimated GFR (Non-


American 96.3 


 


 


BUN/Creatinine Ratio 16.4 (10-20) 


 


Calcium Level


  8.8 mg/dl


(8.5-10.1)


 


Total Bilirubin


  0.5 mg/dl


(0.2-1)


 


Aspartate Amino Transf


(AST/SGOT) 93 U/L (15-37) 


 


 


Alanine Aminotransferase


(ALT/SGPT) 64 U/L (12-78) 


 


 


Alkaline Phosphatase


  108 U/L


()


 


Total Protein


  8.1 gm/dl


(6.4-8.2)


 


Albumin


  3.7 gm/dl


(3.4-5.0)


 


Globulin


  4.4 gm/dl


(2.5-4.0)


 


Albumin/Globulin Ratio 0.8 (0.9-2) 





Laboratory results per my review.





Procedure


Bladder scan-162 mL





The patient refused Alas catheter placement.





ED Course


0038: Past medical records reviewed. The patient was evaluated in room C5. A 

complete history and physical exam was performed.  Labs were drawn as above.  

Bladder scan was obtained.  I suggested that we place a Alas catheter in order 

to irrigate the patient's bladder but he refused.





0005: He urinated in a cup and it was light pink.





0104: I reassessed the patient at this time. He is feeling better and resting 

comfortably. He urinated again and it was light pink. I discussed the results 

and treatment plan with the patient. I answered all pertaining questions that 

he had. He expressed understanding and verbalized agreement.  I encouraged the 

patient to follow through with the CT scan as suggested by his primary doctors.

  I also suggest that he contact Dr. Marcelino today with regards to the 

hematuria.  The patient will be discharged home.





Medical Decision


The patient is a 57 year old male who presents to the ED with hematuria. 

Differential diagnosis includes UTI, hematuria, bladder carcinoma, alcohol 

intoxication, and metastatic prostate cancer.





Lab results showed: WBC 5.9. Hemoglobin 12.5. Normal renal function. Normal 

glucose. Normal LFTs, expect AST is 93. Normal COAGs.





This is a 57-year-old male patient presents to the emergency department with 

gross hematuria.  Patient has a history of prostate cancer for which he 

underwent resection and radiation.  He describes frequently having light pink 

urine but became more concerned today when he had gross marleen blood from the 

urethra.  The patient had no symptoms of urinary retention.  On my physical exam

, the patient was able to urinate on its own a light pink urine.  He refused 

Alas catheter placement for irrigation of the bladder.  I recommended follow-

up with his urologist.  He was told return to the emergency department for 

increased hematuria and/or urinary retention symptoms.





Medication Reconcilliation


Current Medication List:  was personally reviewed by me





Blood Pressure Screening


Patient's blood pressure:  Elevated blood pressure


secondary to intoxication





Impression





 Primary Impression:  


 Hematuria





Scribe Attestation


The scribe's documentation has been prepared under my direction and personally 

reviewed by me in its entirety. I confirm that the note above accurately 

reflects all work, treatment, procedures, and medical decision making performed 

by me.





Departure Information


Dispostion


Home / Self-Care





Referrals


LIYAH Langston M.D. (MEDICAL) (PCP)





Patient Instructions


My [facility]





Problem Qualifiers








 Primary Impression:  


 Hematuria


 Hematuria type:  gross  Qualified Codes:  R31.0 - Gross hematuria

## 2018-04-09 VITALS — OXYGEN SATURATION: 97 % | SYSTOLIC BLOOD PRESSURE: 144 MMHG | DIASTOLIC BLOOD PRESSURE: 86 MMHG | HEART RATE: 88 BPM

## 2018-04-09 LAB
ALBUMIN SERPL-MCNC: 3.7 GM/DL (ref 3.4–5)
ALP SERPL-CCNC: 108 U/L (ref 45–117)
ALT SERPL-CCNC: 64 U/L (ref 12–78)
AST SERPL-CCNC: 93 U/L (ref 15–37)
BUN SERPL-MCNC: 14 MG/DL (ref 7–18)
CALCIUM SERPL-MCNC: 8.8 MG/DL (ref 8.5–10.1)
CO2 SERPL-SCNC: 21 MMOL/L (ref 21–32)
CREAT SERPL-MCNC: 0.83 MG/DL (ref 0.6–1.4)
EOSINOPHIL NFR BLD AUTO: 191 K/UL (ref 130–400)
GLUCOSE SERPL-MCNC: 96 MG/DL (ref 70–99)
HCT VFR BLD CALC: 35.9 % (ref 42–52)
HGB BLD-MCNC: 12.5 G/DL (ref 14–18)
INR PPP: 0.9 (ref 0.9–1.1)
MCH RBC QN AUTO: 35.7 PG (ref 25–34)
MCHC RBC AUTO-ENTMCNC: 34.8 G/DL (ref 32–36)
MCV RBC AUTO: 102.6 FL (ref 80–100)
PMV BLD AUTO: 9.2 FL (ref 7.4–10.4)
POTASSIUM SERPL-SCNC: 3.7 MMOL/L (ref 3.5–5.1)
PROT SERPL-MCNC: 8.1 GM/DL (ref 6.4–8.2)
PTT PATIENT: 23.4 SECONDS (ref 21–31)
RED CELL DISTRIBUTION WIDTH CV: 14.8 % (ref 11.5–14.5)
RED CELL DISTRIBUTION WIDTH SD: 55.3 FL (ref 36.4–46.3)
SODIUM SERPL-SCNC: 140 MMOL/L (ref 136–145)
WBC # BLD AUTO: 5.98 K/UL (ref 4.8–10.8)

## 2018-08-19 ENCOUNTER — HOSPITAL ENCOUNTER (EMERGENCY)
Dept: HOSPITAL 45 - C.EDA | Age: 58
Discharge: HOME | End: 2018-08-19
Payer: COMMERCIAL

## 2018-08-19 VITALS
WEIGHT: 142.64 LBS | HEIGHT: 69.02 IN | WEIGHT: 142.64 LBS | TEMPERATURE: 98.24 F | HEIGHT: 69.02 IN | BODY MASS INDEX: 21.13 KG/M2 | BODY MASS INDEX: 21.13 KG/M2

## 2018-08-19 VITALS — SYSTOLIC BLOOD PRESSURE: 148 MMHG | HEART RATE: 98 BPM | OXYGEN SATURATION: 98 % | DIASTOLIC BLOOD PRESSURE: 72 MMHG

## 2018-08-19 VITALS — OXYGEN SATURATION: 100 %
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
## 2018-08-19 DIAGNOSIS — F17.210: ICD-10-CM

## 2018-08-19 DIAGNOSIS — C61: ICD-10-CM

## 2018-08-19 DIAGNOSIS — F10.220: Primary | ICD-10-CM

## 2018-08-19 DIAGNOSIS — Z79.899: ICD-10-CM

## 2018-08-19 DIAGNOSIS — F32.9: ICD-10-CM

## 2018-08-19 LAB
ALBUMIN SERPL-MCNC: 2.9 GM/DL (ref 3.4–5)
ALP SERPL-CCNC: 258 U/L (ref 45–117)
ALT SERPL-CCNC: 99 U/L (ref 12–78)
AST SERPL-CCNC: 259 U/L (ref 15–37)
BASOPHILS # BLD: 0.01 K/UL (ref 0–0.2)
BASOPHILS NFR BLD: 0.3 %
BUN SERPL-MCNC: 18 MG/DL (ref 7–18)
CALCIUM SERPL-MCNC: 7.7 MG/DL (ref 8.5–10.1)
CO2 SERPL-SCNC: 26 MMOL/L (ref 21–32)
CREAT SERPL-MCNC: 1.01 MG/DL (ref 0.6–1.4)
EOS ABS #: 0 K/UL (ref 0–0.5)
EOSINOPHIL NFR BLD AUTO: 213 K/UL (ref 130–400)
GLUCOSE SERPL-MCNC: 86 MG/DL (ref 70–99)
HCT VFR BLD CALC: 35.6 % (ref 42–52)
HGB BLD-MCNC: 12.4 G/DL (ref 14–18)
IG#: 0 K/UL (ref 0–0.02)
IMM GRANULOCYTES NFR BLD AUTO: 31.1 %
INR PPP: 1.1 (ref 0.9–1.1)
LYMPHOCYTES # BLD: 1.15 K/UL (ref 1.2–3.4)
MCH RBC QN AUTO: 36.3 PG (ref 25–34)
MCHC RBC AUTO-ENTMCNC: 34.8 G/DL (ref 32–36)
MCV RBC AUTO: 104.1 FL (ref 80–100)
MONO ABS #: 0.41 K/UL (ref 0.11–0.59)
MONOCYTES NFR BLD: 11.1 %
NEUT ABS #: 2.13 K/UL (ref 1.4–6.5)
NEUTROPHILS # BLD AUTO: 0 %
NEUTROPHILS NFR BLD AUTO: 57.5 %
PMV BLD AUTO: 9.8 FL (ref 7.4–10.4)
POTASSIUM SERPL-SCNC: 3.4 MMOL/L (ref 3.5–5.1)
PROT SERPL-MCNC: 7.7 GM/DL (ref 6.4–8.2)
RED CELL DISTRIBUTION WIDTH CV: 15.6 % (ref 11.5–14.5)
RED CELL DISTRIBUTION WIDTH SD: 59 FL (ref 36.4–46.3)
SODIUM SERPL-SCNC: 135 MMOL/L (ref 136–145)
WBC # BLD AUTO: 3.7 K/UL (ref 4.8–10.8)

## 2018-08-19 NOTE — EMERGENCY ROOM VISIT NOTE
History


First contact with patient:  16:58


Chief Complaint:  WEAKNESS


Stated Complaint:  LETHARGIC


Nursing Triage Summary:  


Patient presents to ER via EMS.  Per EMS, patient c/o of lethargy this morning 


and blood in the urine.  Patient reports "I have prostate cancer.  I finished 


the radiation therapy several months ago.  I have an appointment next week with 


my doctor.  He is starting me on hormone therapy.  I've been weak.  I drank 


alcohol today but could not drink like I usually do because I was weak.  Yes, I 


am an alcoholic.  I drink every day.  I have blood in the urine because of the 


cancer.  It started after surgery."





History of Present Illness


The patient is a 58 year old male who presents to the Emergency Room via EMS 

with complaints of weakness and lethargy.  The patient is slightly altered; 

therefore, the history is slightly limited.  The patient says that he has been 

weak to the point where he has difficulty walking short distances for the last 

several days.  The patient has a history of alcoholism.  He says that he has 

decreased his alcohol intake to 2 drinks per day from 6 drinks per day (vodka 

and a iced tea) over the last week.  The patient is currently being treated for 

prostate cancer.  He is supposed to start hormone therapy this week.  He denies 

any infectious etiology such as fever or chills.  No dysuria.  No cough.  No 

nausea or vomiting.  No changes in bowel movements.  No abdominal pain.





Review of Systems


10 system review performed and  negative unless noted in HPI or below





Past Medical/Surgical History


Medical Problems:


(1) Depression


(2) History of torn meniscus of left knee


(3) History of torn meniscus of right knee


(4) Psychiatric exam requested by authority


(5) Seizure


Surgical Problems:


(1) H/O knee surgery


(2) H/O prostate biopsy


Prostate cancer


Alcoholism





Family History





No pertinent family history





Social History


Smoking Status:  Current Every Day Smoker


Alcohol Use:  heavy


Drug Use:  none


Marital Status:  


Housing Status:  lives alone


Occupation Status:  unemployed, disabled





Current/Historical Medications


Scheduled


Bicalutamide (Casodex), 50 MG PO DAILY


Cephalexin Monohydrate (Cephalexin), 500 MG PO TID


Flurbiprofen (Ansaid), 100 MG PO BID


Folic Acid (Folvite), 1 MG PO DAILY


Mometasone Furoate-Formoterol (Dulera 200/5 Mcg), 2 PUFFS INH BID


Montelukast Sodium (Montelukast Sodium), 10 MG PO DAILY


Multiple Vitamin (Multivitamin), 1 TAB PO DAILY


Nicotine (Nicoderm Cq 14MG Patch), 1 PATCH TD QAM


Phenytoin Sodium (Dilantin), 100 MG PO TID


Quetiapine Fumarate (Quetiapine Fumarate), 50 MG PO TID


Ranitidine HCl (Ranitidine HCl), 150 MG PO BID


Sertraline (Zoloft), 100 MG PO DAILY


Thiamine Hcl (Vitamin B-1), 100 MG PO DAILY


Tolterodine Tartrate (Tolterodine Tartrate), 2 MG PO BID


Trazodone Hcl (Trazodone), 200 MG PO HS





Scheduled PRN


Albuterol Hfa (Ventolin Hfa), 2 PUFFS INH QID PRN for Cough/Wheezing


Lorazepam (Ativan), 0.5 MG PO BID PRN for Withdrawal Symptoms





Physical Exam


Vital Signs











  Date Time  Temp Pulse Resp B/P (MAP) Pulse Ox O2 Delivery O2 Flow Rate FiO2


 


8/19/18 21:28  98 18 148/72 98   


 


8/19/18 20:39  101      


 


8/19/18 19:35  90 20 129/90 100 Room Air  


 


8/19/18 18:05  88 17 125/86 93 Room Air  


 


8/19/18 16:43     100 Room Air  


 


8/19/18 16:34  105      


 


8/19/18 16:20 36.8 97 18 113/81 100 Room Air  











Physical Exam


GENERAL: 58-year-old male, appears older than stated age.  Slightly altered., 

in no acute distress, 


SKIN: The skin was warm and dry


HEAD: Normocephalic atraumatic.  


EYES: Pupils equal round and reactive to light and accommodation.  Conjunctivae 

without injection, sclerae without icterus.  Extraocular movements intact.  

Horizontal nystagmus noted


MOUTH: Mucous membranes dry.  White plaque noted on the tongue.


NECK: Supple without nuchal rigidity.  Mild JVD noted.


HEART: Regular rate and rhythm without murmurs gallops or rubs.


LUNGS: Clear to auscultation bilaterally without wheezes, rales or rhonchi.   

No accessory muscle use.


ABDOMEN: Positive bowel sounds x 4.Soft, nontender, without organomegaly. No 

guarding or rebound tenderness.


MUSCULOSKELETAL: No muscle atrophy, erythema, or edema noted. Strength 5/5 

throughout.


NEURO: Patient was alert and oriented to person place and time.  Patient 

slightly confused and altered.  Answering some questions appropriately.  Does 

not follow commands.





Medical Decision & Procedures


ER Provider


Diagnostic Interpretation:


CT of the head without contrast


IMPRESSION:  No acute intracranial findings. 











Electronically signed by:  Watson Francis M.D.





Chest x-ray


IMPRESSION:  Possible minimal right upper lung airspace opacity. 














Electronically signed by:  Watson Francis M.D.


8/19/2018 5:55 PM





Dictated Date/Time:  8/19/2018 5:53 PM





 The status of this report is Signed.   


 Draft = Not yet reviewed or approved by Radiologist.  


 Signed = Reviewed and approved by Radiologist.


<AttendingPhy></AttendingPhy> <FamilyPhy>Celso Gilliam D.O.</FamilyPhy> <

PrimaryPhy>Celso Gilliam D.O.</PrimaryPhy> <UnitNumber>[id number]</

UnitNumber> <VisitNumber>





Laboratory Results


8/19/18 17:28








Red Blood Count 3.42, Mean Corpuscular Volume 104.1, Mean Corpuscular 

Hemoglobin 36.3, Mean Corpuscular Hemoglobin Concent 34.8, Mean Platelet Volume 

9.8, Neutrophils (%) (Auto) 57.5, Lymphocytes (%) (Auto) 31.1, Monocytes (%) (

Auto) 11.1, Eosinophils (%) (Auto) 0.0, Basophils (%) (Auto) 0.3, Neutrophils # 

(Auto) 2.13, Lymphocytes # (Auto) 1.15, Monocytes # (Auto) 0.41, Eosinophils # (

Auto) 0.00, Basophils # (Auto) 0.01





8/19/18 17:28

















Test


  8/19/18


17:28 8/19/18


17:37 8/19/18


18:11


 


White Blood Count


  3.70 K/uL


(4.8-10.8) 


  


 


 


Red Blood Count


  3.42 M/uL


(4.7-6.1) 


  


 


 


Hemoglobin


  12.4 g/dL


(14.0-18.0) 


  


 


 


Hematocrit 35.6 % (42-52)   


 


Mean Corpuscular Volume


  104.1 fL


() 


  


 


 


Mean Corpuscular Hemoglobin


  36.3 pg


(25-34) 


  


 


 


Mean Corpuscular Hemoglobin


Concent 34.8 g/dl


(32-36) 


  


 


 


Platelet Count


  213 K/uL


(130-400) 


  


 


 


Mean Platelet Volume


  9.8 fL


(7.4-10.4) 


  


 


 


Neutrophils (%) (Auto) 57.5 %   


 


Lymphocytes (%) (Auto) 31.1 %   


 


Monocytes (%) (Auto) 11.1 %   


 


Eosinophils (%) (Auto) 0.0 %   


 


Basophils (%) (Auto) 0.3 %   


 


Neutrophils # (Auto)


  2.13 K/uL


(1.4-6.5) 


  


 


 


Lymphocytes # (Auto)


  1.15 K/uL


(1.2-3.4) 


  


 


 


Monocytes # (Auto)


  0.41 K/uL


(0.11-0.59) 


  


 


 


Eosinophils # (Auto)


  0.00 K/uL


(0-0.5) 


  


 


 


Basophils # (Auto)


  0.01 K/uL


(0-0.2) 


  


 


 


RDW Standard Deviation


  59.0 fL


(36.4-46.3) 


  


 


 


RDW Coefficient of Variation


  15.6 %


(11.5-14.5) 


  


 


 


Immature Granulocyte % (Auto) 0.0 %   


 


Immature Granulocyte # (Auto)


  0.00 K/uL


(0.00-0.02) 


  


 


 


Prothrombin Time


  11.9 SECONDS


(9.0-12.0) 


  


 


 


Prothromb Time International


Ratio 1.1 (0.9-1.1) 


  


  


 


 


Anion Gap


  10.0 mmol/L


(3-11) 


  


 


 


Est Creatinine Clear Calc


Drug Dose 71.2 ml/min 


  


  


 


 


Estimated GFR (


American) 93.3 


  


  


 


 


Estimated GFR (Non-


American 80.5 


  


  


 


 


BUN/Creatinine Ratio 18.2 (10-20)   


 


Calcium Level


  7.7 mg/dl


(8.5-10.1) 


  


 


 


Magnesium Level


  1.7 mg/dl


(1.8-2.4) 


  


 


 


Total Bilirubin


  1.9 mg/dl


(0.2-1) 


  


 


 


Aspartate Amino Transf


(AST/SGOT) 259 U/L


(15-37) 


  


 


 


Alanine Aminotransferase


(ALT/SGPT) 99 U/L (12-78) 


  


  


 


 


Alkaline Phosphatase


  258 U/L


() 


  


 


 


Total Protein


  7.7 gm/dl


(6.4-8.2) 


  


 


 


Albumin


  2.9 gm/dl


(3.4-5.0) 


  


 


 


Globulin


  4.8 gm/dl


(2.5-4.0) 


  


 


 


Albumin/Globulin Ratio 0.6 (0.9-2)   


 


Ethyl Alcohol mg/dL


  260.5 mg/dl


(0-3) 


  


 


 


Ammonia


  


  29.5 umol/L


(11-32) 


 


 


Vitamin B12 Level


  


  


  1063 pg/mL


(211-911)


 


Folate


  


  


  5.14 ng/mL


(>5.38)











Medications Administered











 Medications


  (Trade)  Dose


 Ordered  Sig/Purnima


 Route  Start Time


 Stop Time Status Last Admin


Dose Admin


 


 Sodium Chloride  1,000 ml @ 


 200 mls/hr  Q5H


 IV  8/19/18 17:15


 8/19/18 19:03 DC 8/19/18 17:42


200 MLS/HR


 


 Multivitamins 10


 ml/Thiamine HCl


 100 mg/Folic Acid


 1 mg/Sodium


 Chloride  1,011.2 ml


  @ 0 mls/hr  Q0M ONCE


 IV  8/19/18 19:15


 8/19/18 19:16 DC 8/19/18 19:15


0 MLS/HR


 


 Magnesium Oxide


  (Mag-Ox Tab)  400 mg  STK-MED ONCE


 .ROUTE  8/19/18 21:26


 8/19/18 21:27 DC 8/19/18 21:26


400 MG











ECG Per My Interpretation


Indication:  altered mental status


Rate (beats per minute):  89


Rhythm:  normal sinus


Findings:  prolonged QT (QT interval has increased)


Change:  no significant change (QT slightly longer.  Otherwise, unchanged)





ED Course


Patient was seen and examined


Vital signs including  blood pressure were reviewed


medications list was verified with patient


Labs were obtained, and a saline lock was established


An EKG was performed and reviewed.  The patient was put on a monitor.


He was started on normal saline at 200 mL's an hour.  This was later switched 

to a banana bag at 200 cc/h


Imaging was performed and reviewed


The patient was reassessed and much more coherent.  We discussed his workup.  

He voiced understanding.  He was comfortable being discharged home.


The patient was given 1 dose of mag oxide 400 mg p.o. prior to departure


The case was also discussed with my supervising physician who is in agreement 

with my plan


I reviewed discharge instructions the patient.  They voiced understanding and 

had no further questions.





Medical Decision


Differential diagnosis: Metabolic encephalopathy secondary to dehydration, 

electrolyte abnormality, infection, hepatorenal disease, intoxication, drug use

, intracranial abnormality among others were entertained





This patient is a 58-year-old male who presents to the emergency department 

with increased fatigue.  He has a history of alcoholism.  On exam, he was 

altered.  Labs conform an elevated alcohol level at 260.  This likely explains 

his altered mental status.  The patient did mentally clear throughout his 

emergency department stay.  His vital signs are stable.  His H&H are stable.  

His LFTs are significantly elevated-likely secondary to drinking.  This was 

discussed with the patient.  He was also complaining of possibly passing blood 

through his urine or stool.  He was unsure.  I performed a guaiac, which was 

negative.  He does have blood in his urine, which happens to him occasionally 

since being diagnosed with prostate cancer.  The patient underwent radiation 

therapy a few weeks ago for prostate cancer.  He is afebrile.  He has no 

infectious complaints.  I do not suspect pneumonia.  The patient has a slightly 

prolonged QT, likely secondary to some of his medications.  He was given 1 dose 

of mag oxide prior to departure.  He is asymptomatic with this.  I do not 

believe this is the cause of his weakness.  I believe his fatigue is likely 

secondary to his LFTs.  As his mental status has improved, I believe he is 

stable to be discharged home.  The patient was highly encouraged to follow-up 

closely with his primary care physician.  He was cautioned on signs for which 

to return to the emergency department.





This chart was completed in part utilizing Dragon Speech Voice Recognition 

software. Attempts were made to minimize the grammatical errors, random word 

insertions, pronoun errors and incomplete sentences. Any formal questions or 

concerns about the content, text or information contained within the body of 

this dictation should be directly addressed to the provider for clarification.





Medication Reconcilliation


Current Medication List:  was personally reviewed by me





Blood Pressure Screening


Patient's blood pressure:  Low blood pressure





Impression





 Primary Impression:  


 Alcohol intoxication





Departure Information


Dispostion


Home / Self-Care





Condition


FAIR





Referrals


Celso Gilliam, D.O. (PCP)





Patient Instructions


My [facility]





Additional Instructions





You have been evaluated in the emergency department for weakness.  Your alcohol 

level was elevated today.  Your liver function tests are also abnormal.  This 

is likely contributing to your symptoms.





Please continue current medications as prescribed.





Please follow-up with your primary care physician as soon as possible.  Call 

tomorrow morning for a follow-up appointment.





Do not hesitate to return to the emergency department with any new, worsening 

or concerning symptoms; especially, increased weakness, lightheadedness, heart 

palpitations, vomiting, abdominal pain, passing blood per rectum or fever





It was a pleasure participating in your care today

## 2018-08-19 NOTE — DIAGNOSTIC IMAGING REPORT
CHEST ONE VIEW PORTABLE



CLINICAL HISTORY: Altered mental status.    



COMPARISON STUDY:  Chest radiograph July 10, 2018.



FINDINGS: Incidental note is made of multiple old left-sided rib fractures.

There is no pneumothorax or pleural effusion. There may be minimal right upper

lung airspace opacity. Cardiomediastinal silhouette is normal. 



IMPRESSION:  Possible minimal right upper lung airspace opacity. 









Electronically signed by:  Watson Francis M.D.

8/19/2018 5:55 PM



Dictated Date/Time:  8/19/2018 5:53 PM

## 2018-08-19 NOTE — DIAGNOSTIC IMAGING REPORT
CT OF THE HEAD WITHOUT CONTRAST



CLINICAL HISTORY: Altered mental status.    



COMPARISON STUDY:  Head CT July 1, 2018. 



CT DOSE: 537.48 mGy.cm



TECHNIQUE: Helical axial images of the head were obtained without IV contrast.

Automated exposure control was utilized for the study.  A dose lowering

technique was utilized adhering to the principles of ALARA.





FINDINGS: No acute intracranial hemorrhage, midline shift or mass effect is

present. Ventricular system is stable. Basilar cisterns are patent.

Encephalomalacia within the left anterior temporal and frontal lobe is

unchanged. There are no findings to suggest acute dural sinus thrombosis or

acute territorial infarct. The appearance of the brain is unchanged. There are

no significant calvarial abnormalities. Visualized portions of the sinuses and

the mastoid air cells are clear.



IMPRESSION:  No acute intracranial findings. 







Electronically signed by:  Watson Francis M.D.

8/19/2018 6:10 PM



Dictated Date/Time:  8/19/2018 6:07 PM

## 2021-06-07 ENCOUNTER — HOSPITAL ENCOUNTER (INPATIENT)
Dept: HOSPITAL 45 - ED | Age: 61
LOS: 25 days | Discharge: NURSING, REHAB OR LONG-TERM CARE | DRG: 668 | End: 2021-07-02

## 2021-06-07 LAB
ALBUMIN SERPL-MCNC: 2.4 GM/DL (ref 3.4–5)
ALBUMIN/GLOB SERPL: 0.6 {RATIO} (ref 0.9–2)
ALP SERPL-CCNC: 133 U/L (ref 45–117)
ALT SERPL-CCNC: 38 U/L (ref 12–78)
ANION GAP SERPL CALC-SCNC: 15 MMOL/L (ref 3–11)
ARTERIAL PATENCY WRIST A: (no result)
BASE EXCESS BLDV CALC-SCNC: -4.5 MEQ/L
BILIRUB SERPL-MCNC: 1.4 MG/DL (ref 0.2–1)
BUN SERPL-MCNC: 40 MG/DL (ref 7–18)
CALCIUM SERPL-MCNC: 7.7 MG/DL (ref 8.5–10.1)
CHLORIDE SERPL-SCNC: 120 MMOL/L (ref 98–107)
CK SERPL-CCNC: 258 U/L (ref 39–308)
CO2 SERPL-SCNC: 15 MMOL/L (ref 21–32)
CREAT CL PREDICTED SERPL C-G-VRATE: 31.7 ML/MIN
DEPRECATED RDW RBC: 59.4 FL (ref 36.4–46.3)
EPI CELLS #/AREA URNS LPF: (no result) /LPF (ref 0–5)
GLOBULIN SER CALC-MCNC: 4.1 GM/DL (ref 2.5–4)
GLUCOSE SERPL-MCNC: 119 MG/DL (ref 70–99)
HCO3 BLDA-SCNC: 16 MMOL/L (ref 19–24)
HCO3 BLDV-SCNC: 19 MMOL/L
HCT VFR BLD AUTO: 8.2 % (ref 42–52)
HCT VFR BLD AUTO: 8.4 % (ref 42–52)
HGB BLD-MCNC: 2.4 G/DL (ref 14–18)
HGB BLD-MCNC: 2.4 G/DL (ref 14–18)
IMM GRANULOCYTES # BLD: 0.02 K/UL (ref 0–0.02)
IMM GRANULOCYTES # BLD: 0.03 K/UL (ref 0–0.02)
IMM GRANULOCYTES NFR BLD AUTO: 0.3 %
IMM GRANULOCYTES NFR BLD AUTO: 0.4 %
LIPASE SERPL-CCNC: 204 U/L (ref 73–393)
MAGNESIUM SERPL-MCNC: 1.7 MG/DL (ref 1.8–2.4)
MCH RBC QN AUTO: 28.6 PG (ref 25–34)
MCH RBC QN AUTO: 29.6 PG (ref 25–34)
MCV RBC: 100 FL (ref 80–100)
MCV RBC: 101.2 FL (ref 80–100)
PCO2 BLDA: 24 MMHG (ref 35–46)
PCO2 BLDV: 29 MMHG (ref 38–50)
PH BLDV: 7.44 [PH] (ref 7.36–7.41)
PLATELET # BLD AUTO: 205 K/UL (ref 130–400)
PLATELET # BLD AUTO: 231 K/UL (ref 130–400)
PO2 BLDA: 159 MMHG (ref 80–95)
PO2 BLDV: 24 MMHG
POTASSIUM SERPL-SCNC: 4 MMOL/L (ref 3.5–5.1)
PROT SERPL-MCNC: 6.5 GM/DL (ref 6.4–8.2)
PROTHROM ACT/NOR PPP: 1.3 % (ref 0.9–1.1)
PROTHROM ACT/NOR PPP: 12.8 SECONDS (ref 9–12)
RBC # BLD AUTO: 0.81 M/UL (ref 4.7–6.1)
RBC # BLD AUTO: 0.84 M/UL (ref 4.7–6.1)
SAO2 % BLDA: 99.3 % (ref 90–95)
SAO2 % BLDV: 63.6 %
SODIUM SERPL-SCNC: 150 MMOL/L (ref 136–145)
TROPONIN I: < 0.015 NG/ML (ref 0–0.04)
WBC # BLD AUTO: 7.32 K/UL (ref 4.8–10.8)
WBC # BLD AUTO: 8.53 K/UL (ref 4.8–10.8)

## 2021-06-07 RX ADMIN — PANTOPRAZOLE SODIUM SCH MLS/HR: 40 INJECTION, POWDER, FOR SOLUTION INTRAVENOUS at 22:05

## 2021-06-07 NOTE — CT SCAN REPORT
CT ANGIOGRAPHY OF THE CHEST, PULMONARY EMBOLUS PROTOCOL



CLINICAL HISTORY: Metastatic ca, cp, fall, PE



COMPARISON STUDY:  Chest CT March 21, 2020. Chest radiograph performed earlier today. 



TECHNIQUE: Following IV administration of 117 mL of Optiray, helical axial images of the chest were o
btained utilizing the pulmonary embolus protocol.  Maximal intensity projections and sagittal and cor
onal reformats were viewed on an independent 3D workstation.  IV contrast was administered without co
mplication.  Automated exposure control was utilized for the study.  A dose lowering technique was ut
ilized adhering to the principles of ALARA.



FINDINGS:  There is no evidence for traumatic injury to the thoracic aorta. No central pulmonary embo
li are identified. Remainder of pulmonary arteries are suboptimally assessed due to respiratory motio
n. No pneumothorax or pleural effusion is noted. Lungs are suboptimally assessed due to respiratory m
otion. There may be minimal groundglass opacity within the left lower lobe. There is no significant c
onsolidation. Numerous healing bilateral rib fractures are noted. There is a compression fracture of 
the superior endplate of T11 which is likely subacute. There is an old T12 compression fracture. Sens
itivity for detection of acute rib fractures is diminished given motion artifact. An acute nondisplac
ed proximal left humeral fracture is noted. Abdomen and pelvis will be reported separately.



IMPRESSION:  



1. Exam significantly compromised by motion artifact. No central pulmonary emboli. Remainder of pulmo
nary arteries suboptimally assessed on this exam.



2. Acute nondisplaced proximal left humeral fracture better depicted on the left shoulder radiographs
.



3. Numerous healing bilateral rib fractures. No acute rib fractures identified although sensitivity d
iminished given motion artifact.



4. T11 compression fracture, likely subacute. Old T12 compression fracture.





: Negative or not required by law.















Electronically signed by:  Watson Francis M.D.

6/7/2021 7:59 PM

## 2021-06-07 NOTE — XRAY REPORT
XR chest 1V portable



CLINICAL HISTORY: Chest Pain



COMPARISON STUDY:  Chest radiograph April 30, 2021.



FINDINGS: Lung volumes are normal. Lungs are clear. There is no pneumothorax or pleural effusion. Car
diac size is normal. Mediastinal contours are normal. There is no evidence for pulmonary edema. Sever
al old bilateral fractures are noted. Apparent increased sclerosis of the diaphysis of the left humer
us is noted.



IMPRESSION:  No acute cardiopulmonary findings. 





: Negative or not required by law.









Electronically signed by:  Watson Francis M.D.

6/7/2021 5:42 PM

## 2021-06-07 NOTE — EMERGENCY DEPARTMENT NOTE
Impression & Plan


 Severe anemia, Alcohol withdrawal, Metabolic acidosis, Acute hypernatremia, 

Acute renal failure, Hypomagnesemia, Gross hematuria, Closed fracture of left 

proximal humerus





ED Provider Note





 NAME: CITLALY FERRO JR


AGE: 60 SEX: M


ARRIVES VIA: Ambulance


INFORMANT: Patient,


ED PROVIDER(S): Gera Braun MD





CHIEF COMPLAINT: Weakness, fall, alcoholism





PLAN:


Disposition: Admit





MEDICAL DECISION MAKING:


The patient is a 60-year-old gentleman with a past medical history of metastatic

prostate cancer, alcoholism, UTI who presents to the emergency department for 

evaluation of gross blood in his urine in setting of being seen in the emergency

department a month ago for similar complaint noted to be intoxicated at that 

time, diagnosed with UTI and treated with cefdinir.  Patient himself is a poor 

historian and provides completely different story than EMS report.  He has 

ecchymosis to his left shoulder which she reports was from his home nursing aide

where her large necklace hit him in the shoulder and because of the extensive 

bruising. He denies any falls to me. He reports ongoing blood in his urine. 

Denies bloody or black stools. Per EMS report the patient was brought in for 

presumed fall where he was found on the floor with empty vodka bottles 

throughout his apartment.  It was noted that he was confused, dizzy, weak and 

shortness of breath.  He was wheezing. The patient reported that he could not 

walk more than 6 feet.  He was found to be 82% on room air per EMS.  BSG was 222





On arrival the patient is acute on chronically ill-appearing, fatigued but no 

acute distress, afebrile with heart rate in the 110s/120s and vital signs 

otherwise stable.  Brought in by EMS on 3 L nasal cannula but denies being on 

oxygen at home.  He has scattered wheezes throughout.  Abdomen with mild 

fullness suprapubically and post void bladder scan did show retention with 750 

cc noted and so Alas catheter was placed.





EKG with accelerated junctional tachycardia without overt ST elevation.  Chest 

x-ray negative for acute cardiopulmonary process.  Plain film of the left 

shoulder shows nondisplaced left proximal humerus fracture.


Initial CBC was abnormal and so this was repeated to ensure accuracy and was 

similarly abnormal with H/H 2.4/8.4 down from 11.2/34.4 on May 6th.  MCV is 

macrocytic and so likely reflects a component of malnutrition in setting with 

alcoholism.  Otherwise WBC and platelets are within normal limits.


Initial chemistry demonstrates anion gap metabolic acidosis with anion gap 15, 

bicarb of 15 and acute renal failure with creatinine of 2.2 with BUN 40.  Sodium

is 150 with approximate 3 L free water deficit.  Lactate initially was 3.8, 

improved to 2.2. VBG without significant acidemia with pH of 7.44 but with a c

omponent of compensation with PCO2 of 29.  Serum osmolality is 323 with no 

osmolar gap. The patient's blood alcohol was undetectable. Thus, raising 

suspicion for component of alcohol withdrawal and seizures which may have led to

the patient's fall.  Additionally, given the patient is cachectic appearing in 

setting of alcoholism there is suspicion for a component of alcoholic 

ketoacidosis and so in addition to banana bag he was given D5 NSS.  Mental 

status was noted to improve with treatment though was noted to become more 

tremulous suspicious for withdrawal and so was given Ativan with some im

provement in his tremulousness. COVID-19 PCR was negative. 


The patient's hypoxia and acidosis decision was made to proceed with CT of the 

chest to exclude PE given his critical illness.  Additionally, the patient was 

consented for blood transfusion and type and cross ordered with transfusion 

initiated given confirmation of his anemia.  CT head negative for acute process.

CT of the chest was negative for PE or focal infiltrates.  CT on pelvis 

demonstrates dilatation of bilateral renal collecting systems consistent with 

the patient's urinary retention but no overt hydronephrosis.  Note is made of 

mass versus hemorrhage in the bladder. The patient did have persistent 

wheezing/tightness and so he was placed on CPAP with additional duo nebs 

administered with improvement in the patient's respiratory status.  He was 

however showing additional signs of withdrawal and so administered additional 

Ativan. Case was discussed with Dr. Sewell, [facility] hospitalist, who will 

evaluate the patient for admission.





Triage Nursing notes reviewed and agree them.


Additional history obtained from EMS


Prior medical records reviewed


Vital Signs: reviewed and remarkable for Tachycardia





Differential diagnosis:


Infection, dehydration, metabolic abnormality, hypo/hyperglycemia, electrolyte 

disturbance, anemia, hypoxia, cardiac sources, intracerebral event, toxicologic,

neurologic, as well as other pathologies. 





ER treatment provided:


See below.





Diagnostics interpreted by me:


ECG: Accelerated junctional tachycardia, 126 bpm, nonspecific ST and T wave 

abnormalities, no overt ST elevation.


Cardiac Monitoring: An order for continuous cardiac monitoring was placed and 

demonstrated Accelerated junctional tachycardia, 126 bpm, no ectopy.





Laboratory studies:


See below





Imaging studies:


See below





Consultation(s):


Case was discussed with Dr. Sewell, [facility] hospitalist, who will evaluate the

patient for admission.





HPI: The patient is a 60-year-old gentleman with a past medical history of 

metastatic prostate cancer, alcoholism, UTI who presents to the emergency 

department for evaluation of gross blood in his urine in setting of being seen 

in the emergency department a month ago for similar complaint noted to be intox

icated at that time, diagnosed with UTI and treated with cefdinir.  Patient 

himself is a poor historian and provides completely different story than EMS 

report.  He has ecchymosis to his left shoulder which she reports was from his 

home nursing aide where her large necklace hit him in the shoulder and because 

of the extensive bruising. He denies any falls to me. He reports ongoing blood 

in his urine. Denies bloody or black stools. Per EMS report the patient was 

brought in for presumed fall where he was found on the floor with empty vodka 

bottles throughout his apartment.  It was noted that he was confused, dizzy, 

weak and shortness of breath.  He was wheezing. The patient reported that he co

uld not walk more than 6 feet.  He was found to be 82% on room air per EMS.  BSG

was 222





ROS: See above HPI for pertinent positives & negatives. A total of 10 systems 

reviewed and were otherwise negative.





PAST MEDICAL HISTORY:See Below


PAST SURGICAL HISTORY:See Below


FAMILY HISTORY:See Below


SOCIAL HISTORY:See Below





HOME MEDICATIONS:See Below


ALLERGIES:See Below





VITALS:See Below


PHYSICAL EXAMINATION:


GENERAL: Awake, alert, fatigued, disheveled, acute on chronically-appearing, in 

no distress 


HENT: Normocephalic, atraumatic. Oropharynx with dry mucous membranes and 

otherwise unremarkable.


EYES: Normal conjunctiva. Sclera non-icteric.


NECK: Supple. No nuchal rigidity. FROM. No JVD. No midline tenderness to 

palpation or step-offs.


RESPIRATORY: Clear to auscultation.


CARDIAC: Regular rate, normal rhythm. Extremities warm and well perfused. Pulses

equal.


ABDOMEN: Prepubic fullness with mild discomfort without discrete tenderness. No 

rebound or guarding. No masses.


RECTAL: Deferred.


MUSCULOSKELETAL: Chest examination reveals no tenderness however with ecchymosis

of the left chest wall and shoulder.  The back is symmetrical on inspection 

without obvious abnormality. No midline TL spine tenderness to palpation or 

step-offs. There is no CVA tenderness to palpation. No joint edema.


EXTREMITIES: Left upper extremity with limited range of motion both actively and

passively. No tenderness to palpation. Distal PMS intact.  Calves are equal size

bilaterally and non-tender. No edema. No discoloration.


NEURO: Alert and oriented to self and place but drowsy appearing.  No focal 

sensory or motor deficits noted.


SKIN: No rash or jaundice noted.





ED COURSE:





Critical Care:


I have personally spent greater than 75 minutes of critical care time in the 

direct management of this patient.  This includes bedside care, interpretation 

of diagnostic studies, and testing, discussion with consultants, patient, and 

family members, and other required patient management activities.  This 75 

minutes is in excess of all separately billable procedures.








Gera Braun MD 








Past Med/Surg History


Medical History (Reviewed 06/08/21 @ 02:21 by Gera Braun MD)





Altered mental status


Confusion


Depression


Elevated troponin


History of torn meniscus of left knee


History of torn meniscus of right knee


Prostate cancer (06/02/14)


   "Rising PSA


   Status post ultrasound-guided biopsies 06/02/2014


   Biopsy stage T2c Nano 4+5 with perineural invasion


   Status post robotic prostatectomy 08/25/2014


   Pathologic stage [id number] Perry 4+4 Tertiary 5


   Post prostatectomy rising PSA


   Status post completion of radiation therapy 05/19/2015 received 7040 cGy"


   


   *** On 05/26/15 16:14 Jeanine Horowitz wrote ***


   "Rising PSA


   Status post ultrasound-guided biopsies 06/02/2014


   Biopsy stage T2c Nano 4+5 with perineural invasion


   Status post robotic prostatectomy 08/25/2014


   Pathologic stage [id number] Nano 4+4


   Post prostatectomy rising PSA


   Status post completion of radiation therapy 05/19/2015 received 7040 cGy"


Psychiatric exam requested by authority


Right knee sprain


UTI (urinary tract infection)








Surgical History (Reviewed 06/08/21 @ 02:21 by Gera Braun MD)





H/O knee surgery


H/O prostate biopsy





Family History (Reviewed 06/08/21 @ 02:21 by Gera Braun MD)


Other   No pertinent family history











Social History (Reviewed 06/08/21 @ 02:21 by Gera Braun MD)


Smoking Status:  Unknown if ever smoked 


Tobacco Type:  Cigarettes 


Hx Alcohol Use:  Yes 


Preferred Language:  English 


Feels Safe at Home:  Yes 











Allergies


                                    Allergies











Allergy/AdvReac Type Severity Reaction Status Date / Time


 


lisinopril Allergy Severe ANGIOEDEMA Verified 06/07/21 17:09


 


oxybutynin [From Ditropan] AdvReac Unknown fever & Verified 06/07/21 17:09





   perhaps  





   seizures,  





   was  





   detoxing  





   @time  














Home Meds


                                Home Medications











 Medication  Instructions  Recorded  Confirmed


 


folic acid 1 mg PO QAM 04/02/19 06/07/21


 


furosemide 20 mg PO QAM 06/02/19 06/07/21


 


fluticasone propionate 2 spray INTRANASAL DAILY 04/30/21 06/07/21


 


trazodone 300 mg PO HS 04/30/21 06/07/21














Results & Data (ED)


Vital Signs


                               Vital Signs - 24 hr











 06/07/21


15:29 06/07/21


15:30 06/07/21


15:40


 


Temperature   


 


Temperature Source   


 


Pulse Rate 130 H 129 H 123 H


 


Pulse Rate [Apical]   


 


Pulse Rate from SpO2 Sensor 130 H 129 H 123 H


 


Pulse Rhythm   


 


Pulse Rhythm [Apical]   


 


Pulse Strength   


 


Respiratory Rate 29 H 29 H 29 H


 


Respiratory Effort / Characteristics   


 


Respiratory Pattern   


 


Blood Pressure 126/64  


 


Blood Pressure [Right Arm]   


 


Blood Pressure Mean 84  


 


Blood Pressure Mean [Right Arm]   


 


Blood Pressure Position   


 


Blood Pressure Position [Right Arm]   


 


Pulse Oximetry 100 100 100


 


Oxygen Delivery Method   


 


Oxygen Flow Rate   


 


Fraction of Inspired Oxygen   


 


Sepsis Recent Fever Within 48 Hours   


 


Sepsis New/Unexplained Change in Mental Status   


 


Sepsis Action Taken by Nursing   














 06/07/21


15:42 06/07/21


15:50 06/07/21


15:55


 


Temperature 36.5 C  


 


Temperature Source Oral  


 


Pulse Rate 127 H 122 H 122 H


 


Pulse Rate [Apical] 127 H  


 


Pulse Rate from SpO2 Sensor  122 H 123 H


 


Pulse Rhythm Regular  


 


Pulse Rhythm [Apical] Regular  


 


Pulse Strength Normal  


 


Respiratory Rate 20 29 H 28 H


 


Respiratory Effort / Characteristics   


 


Respiratory Pattern   


 


Blood Pressure 126/64  124/62


 


Blood Pressure [Right Arm] 126/64  


 


Blood Pressure Mean 84  82


 


Blood Pressure Mean [Right Arm] 84  


 


Blood Pressure Position Lying  


 


Blood Pressure Position [Right Arm] Lying  


 


Pulse Oximetry 100 100 100


 


Oxygen Delivery Method Nasal Cannula  


 


Oxygen Flow Rate 3  


 


Fraction of Inspired Oxygen   


 


Sepsis Recent Fever Within 48 Hours No  


 


Sepsis New/Unexplained Change in Mental Status N/A  


 


Sepsis Action Taken by Nursing No Action


Required  














 06/07/21


16:00 06/07/21


16:01 06/07/21


16:10


 


Temperature   


 


Temperature Source   


 


Pulse Rate 124 H 124 H 123 H


 


Pulse Rate [Apical]   


 


Pulse Rate from SpO2 Sensor 123 H 124 H 123 H


 


Pulse Rhythm   


 


Pulse Rhythm [Apical]   


 


Pulse Strength   


 


Respiratory Rate 31 H 31 H 25 H


 


Respiratory Effort / Characteristics   


 


Respiratory Pattern   


 


Blood Pressure 112/64  


 


Blood Pressure [Right Arm]   


 


Blood Pressure Mean 80  


 


Blood Pressure Mean [Right Arm]   


 


Blood Pressure Position   


 


Blood Pressure Position [Right Arm]   


 


Pulse Oximetry 96 95 96


 


Oxygen Delivery Method   


 


Oxygen Flow Rate   


 


Fraction of Inspired Oxygen   


 


Sepsis Recent Fever Within 48 Hours   


 


Sepsis New/Unexplained Change in Mental Status   


 


Sepsis Action Taken by Nursing   














 06/07/21


16:20 06/07/21


16:22 06/07/21


16:30


 


Temperature   


 


Temperature Source   


 


Pulse Rate 124 H 102 H 123 H


 


Pulse Rate [Apical]   


 


Pulse Rate from SpO2 Sensor 123 H  124 H


 


Pulse Rhythm   


 


Pulse Rhythm [Apical]   


 


Pulse Strength   


 


Respiratory Rate 30 H 20 26 H


 


Respiratory Effort / Characteristics   


 


Respiratory Pattern   


 


Blood Pressure   


 


Blood Pressure [Right Arm]   


 


Blood Pressure Mean   


 


Blood Pressure Mean [Right Arm]   


 


Blood Pressure Position   


 


Blood Pressure Position [Right Arm]   


 


Pulse Oximetry 97 97 97


 


Oxygen Delivery Method  Nasal Cannula 


 


Oxygen Flow Rate  3 


 


Fraction of Inspired Oxygen   


 


Sepsis Recent Fever Within 48 Hours   


 


Sepsis New/Unexplained Change in Mental Status   


 


Sepsis Action Taken by Nursing   














 06/07/21


16:40 06/07/21


16:50 06/07/21


17:00


 


Temperature   


 


Temperature Source   


 


Pulse Rate 128 H 126 H 120 H


 


Pulse Rate [Apical]   


 


Pulse Rate from SpO2 Sensor 128 H 127 H 121 H


 


Pulse Rhythm   


 


Pulse Rhythm [Apical]   


 


Pulse Strength   


 


Respiratory Rate 29 H 27 H 26 H


 


Respiratory Effort / Characteristics   


 


Respiratory Pattern   


 


Blood Pressure   111/58 L


 


Blood Pressure [Right Arm]   


 


Blood Pressure Mean   75


 


Blood Pressure Mean [Right Arm]   


 


Blood Pressure Position   


 


Blood Pressure Position [Right Arm]   


 


Pulse Oximetry 97 98 100


 


Oxygen Delivery Method   


 


Oxygen Flow Rate   


 


Fraction of Inspired Oxygen   


 


Sepsis Recent Fever Within 48 Hours   


 


Sepsis New/Unexplained Change in Mental Status   


 


Sepsis Action Taken by Nursing   














 06/07/21


17:01 06/07/21


18:08 06/07/21


19:11


 


Temperature   36.6 C


 


Temperature Source   Oral


 


Pulse Rate 122 H  136 H


 


Pulse Rate [Apical]  123 H 


 


Pulse Rate from SpO2 Sensor 122 H  


 


Pulse Rhythm   


 


Pulse Rhythm [Apical]   


 


Pulse Strength   


 


Respiratory Rate 25 H 23 24


 


Respiratory Effort / Characteristics  Spontaneous 


 


Respiratory Pattern   


 


Blood Pressure   120/50 L


 


Blood Pressure [Right Arm]   


 


Blood Pressure Mean   73


 


Blood Pressure Mean [Right Arm]   


 


Blood Pressure Position   


 


Blood Pressure Position [Right Arm]   


 


Pulse Oximetry 98 97 100


 


Oxygen Delivery Method  Room Air 


 


Oxygen Flow Rate   4


 


Fraction of Inspired Oxygen   


 


Sepsis Recent Fever Within 48 Hours   


 


Sepsis New/Unexplained Change in Mental Status   


 


Sepsis Action Taken by Nursing   














 06/07/21


19:19 06/07/21


19:28 06/07/21


19:43


 


Temperature  36.5 C 36.5 C


 


Temperature Source  Oral Oral


 


Pulse Rate  128 H 


 


Pulse Rate [Apical] 131 H  


 


Pulse Rate from SpO2 Sensor   


 


Pulse Rhythm   


 


Pulse Rhythm [Apical]   


 


Pulse Strength   


 


Respiratory Rate 24 28 H 


 


Respiratory Effort / Characteristics   


 


Respiratory Pattern   


 


Blood Pressure  109/70 


 


Blood Pressure [Right Arm] 109/70  


 


Blood Pressure Mean  83 


 


Blood Pressure Mean [Right Arm] 83  


 


Blood Pressure Position   


 


Blood Pressure Position [Right Arm]   


 


Pulse Oximetry 92 90 


 


Oxygen Delivery Method Nasal Cannula  


 


Oxygen Flow Rate 2 8 


 


Fraction of Inspired Oxygen   


 


Sepsis Recent Fever Within 48 Hours   


 


Sepsis New/Unexplained Change in Mental Status   


 


Sepsis Action Taken by Nursing   














 06/07/21


19:46 06/07/21


19:48 06/07/21


19:53


 


Temperature   


 


Temperature Source   


 


Pulse Rate 127 H 119 H 


 


Pulse Rate [Apical]   119 H


 


Pulse Rate from SpO2 Sensor 127 H  


 


Pulse Rhythm   


 


Pulse Rhythm [Apical]   


 


Pulse Strength   


 


Respiratory Rate 22 32 H 24


 


Respiratory Effort / Characteristics  Spontaneous


Labored


Short of Breath Spontaneous


Short of Breath


 


Respiratory Pattern  Tachypnea 


 


Blood Pressure 105/70  


 


Blood Pressure [Right Arm]   


 


Blood Pressure Mean 81  


 


Blood Pressure Mean [Right Arm]   


 


Blood Pressure Position   


 


Blood Pressure Position [Right Arm]   


 


Pulse Oximetry 92 98 100


 


Oxygen Delivery Method BiPAP  BiPAP


 


Oxygen Flow Rate   


 


Fraction of Inspired Oxygen  70 


 


Sepsis Recent Fever Within 48 Hours   


 


Sepsis New/Unexplained Change in Mental Status   


 


Sepsis Action Taken by Nursing   














 06/07/21


20:01 06/07/21


20:13 06/07/21


20:16


 


Temperature  36.7 C 


 


Temperature Source  Oral 


 


Pulse Rate 120 H  123 H


 


Pulse Rate [Apical]   


 


Pulse Rate from SpO2 Sensor 119 H  118 H


 


Pulse Rhythm   


 


Pulse Rhythm [Apical]   


 


Pulse Strength   


 


Respiratory Rate 28 H  27 H


 


Respiratory Effort / Characteristics   


 


Respiratory Pattern   


 


Blood Pressure 145/82 H  144/101 H


 


Blood Pressure [Right Arm]   


 


Blood Pressure Mean 103  115


 


Blood Pressure Mean [Right Arm]   


 


Blood Pressure Position   


 


Blood Pressure Position [Right Arm]   


 


Pulse Oximetry 100  91


 


Oxygen Delivery Method BiPAP  BiPAP


 


Oxygen Flow Rate   


 


Fraction of Inspired Oxygen   


 


Sepsis Recent Fever Within 48 Hours   


 


Sepsis New/Unexplained Change in Mental Status   


 


Sepsis Action Taken by Nursing   














 06/07/21


20:31 06/07/21


20:36 06/07/21


20:47


 


Temperature  36.5 C 


 


Temperature Source  Oral 


 


Pulse Rate 121 H  120 H


 


Pulse Rate [Apical]   


 


Pulse Rate from SpO2 Sensor 127 H  114 H


 


Pulse Rhythm   


 


Pulse Rhythm [Apical]   


 


Pulse Strength   


 


Respiratory Rate 30 H  17


 


Respiratory Effort / Characteristics   


 


Respiratory Pattern   


 


Blood Pressure 153/81 H  117/94


 


Blood Pressure [Right Arm]   


 


Blood Pressure Mean 105  101


 


Blood Pressure Mean [Right Arm]   


 


Blood Pressure Position   


 


Blood Pressure Position [Right Arm]   


 


Pulse Oximetry 98  97


 


Oxygen Delivery Method BiPAP  BiPAP


 


Oxygen Flow Rate   


 


Fraction of Inspired Oxygen   


 


Sepsis Recent Fever Within 48 Hours   


 


Sepsis New/Unexplained Change in Mental Status   


 


Sepsis Action Taken by Nursing   














 06/07/21


20:51 06/07/21


21:00


 


Temperature 36.8 C 


 


Temperature Source Oral 


 


Pulse Rate  116 H


 


Pulse Rate [Apical]  


 


Pulse Rate from SpO2 Sensor  114 H


 


Pulse Rhythm  


 


Pulse Rhythm [Apical]  


 


Pulse Strength  


 


Respiratory Rate  22


 


Respiratory Effort / Characteristics  


 


Respiratory Pattern  


 


Blood Pressure  114/99


 


Blood Pressure [Right Arm]  


 


Blood Pressure Mean  104


 


Blood Pressure Mean [Right Arm]  


 


Blood Pressure Position  


 


Blood Pressure Position [Right Arm]  


 


Pulse Oximetry  91


 


Oxygen Delivery Method  BiPAP


 


Oxygen Flow Rate  


 


Fraction of Inspired Oxygen  


 


Sepsis Recent Fever Within 48 Hours  


 


Sepsis New/Unexplained Change in Mental Status  


 


Sepsis Action Taken by Nursing  











Laboratory Data


Attestation: I reviewed the patient's lab results.





Result diagrams: 


                                                        06/07/21 18:25          





                                                        06/08/21 00:03          





                                   Lab Results











  06/07/21 06/07/21 06/07/21 Range/Units





  15:53 15:53 15:53 


 


WBC    Cancelled  


 


RBC    Cancelled  


 


Hgb    Cancelled  


 


Hct    Cancelled  


 


MCV    Cancelled  


 


MCH    Cancelled  


 


MCHC    Cancelled  


 


RDW Std Deviation    Cancelled  


 


RDW Coeff of Nell    Cancelled  


 


Plt Count    Cancelled  


 


MPV    Cancelled  


 


Immature Gran % (Auto)    Cancelled  


 


Neut % (Auto)    Cancelled  


 


Lymph % (Auto)    Cancelled  


 


Mono % (Auto)    Cancelled  


 


Eos % (Auto)    Cancelled  


 


Baso % (Auto)    Cancelled  


 


Neut # (Auto)    Cancelled  


 


Lymph # (Auto)    Cancelled  


 


Mono # (Auto)    Cancelled  


 


Eos # (Auto)    Cancelled  


 


Baso # (Auto)    Cancelled  


 


Immature Gran # (Auto)    Cancelled  


 


Absolute Nucleated RBC    Cancelled  


 


Nucleated RBC % (auto)    Cancelled  


 


Neutrophils % (Manual)    Cancelled  


 


Band Neutrophils %    Cancelled  


 


Lymphocytes % (Manual)    Cancelled  


 


Prolymphocyte %    Cancelled  


 


Reactive Lymphs % (Man)    Cancelled  


 


Monocytes % (Manual)    Cancelled  


 


Eosinophils % (Manual)    Cancelled  


 


Basophils % (Manual)    Cancelled  


 


Metamyelocytes % (Man)    Cancelled  


 


Myelocytes % (Man)    Cancelled  


 


Promyelocytes % (Man)    Cancelled  


 


Blast Cells % (Manual)    Cancelled  


 


Plasma Cell % (Manual)    Cancelled  


 


Other Cells %    Cancelled  


 


Nucleated RBC %    Cancelled  


 


Neutrophils # (Manual)    Cancelled  


 


Band Neutrophils #    Cancelled  


 


Total Absolute Neuts    Cancelled  


 


Lymphocytes # (Manual)    Cancelled  


 


Prolymphocyte #    Cancelled  


 


Reactive Lymphs #    Cancelled  


 


Total Abs Lymphocytes    Cancelled  


 


Monocytes # (Manual)    Cancelled  


 


Eosinophils # (Manual)    Cancelled  


 


Basophils # (Manual)    Cancelled  


 


Metamyelocytes # (Man)    Cancelled  


 


Myelocytes # (Manual)    Cancelled  


 


Promyelocytes # (Man)    Cancelled  


 


Blast Cells # (Man)    Cancelled  


 


Plasma Cell # (Manual)    Cancelled  


 


Other Cells #    Cancelled  


 


Nucleated RBCs # (Man)    Cancelled  


 


Hypersegmented Neuts    Cancelled  


 


Hyposegmented Neuts    Cancelled  


 


Hypogranular Neuts    Cancelled  


 


Large Granular Lymphs    Cancelled  


 


# Lrg Granular Lymphs    Cancelled  


 


Hairy Cells    Cancelled  


 


Smudge Cells    Cancelled  


 


Toxic Granulation    Cancelled  


 


Toxic Vacuolation    Cancelled  


 


Dohle Bodies    Cancelled  


 


Fadi Rods    Cancelled  


 


Platelet Estimate    Cancelled  


 


Hypogranular Platelets    Cancelled  


 


Clumped Platelets    Cancelled  


 


Giant Platelets    Cancelled  


 


Platelet Satelliting    Cancelled  


 


RBC Morphology    Cancelled  


 


Polychromasia    Cancelled  


 


Hypochromasia    Cancelled  


 


Poikilocytosis    Cancelled  


 


Basophilic Stippling    Cancelled  


 


Anisocytosis    Cancelled  


 


Microcytosis    Cancelled  


 


Macrocytosis    Cancelled  


 


Spherocytes    Cancelled  


 


Pappenheimer Bodies    Cancelled  


 


Sickle Cells    Cancelled  


 


Target Cells    Cancelled  


 


Tear Drop Cells    Cancelled  


 


Ovalocytes    Cancelled  


 


Stomatocytes    Cancelled  


 


Cason-Jolly Bodies    Cancelled  


 


Echinocytes    Cancelled  


 


Acanthocytes (Spur)    Cancelled  


 


Rouleaux    Cancelled  


 


RBC Agglutinates    Cancelled  


 


Schistocytes    Cancelled  


 


RBC Morph Comment    Cancelled  


 


Sezary Cell    Cancelled  


 


PT  Cancelled    


 


INR  Cancelled    


 


APTT  Cancelled    


 


PTT Ratio  Cancelled    


 


VBG pH     


 


VBG pCO2     


 


VBG pO2     


 


VBG HCO3     


 


VBG O2 Saturation     


 


VBG Base Excess     


 


Barometric Pressure     


 


Sodium   150 H   (136-145)  mmol/L


 


Potassium   4.0   (3.5-5.1)  mmol/L


 


Chloride   120 H   ()  mmol/L


 


Carbon Dioxide   15 L   (21-32)  mmol/L


 


Anion Gap   15.0 H   (3-11)  


 


BUN   40 H   (7-18)  mg/dl


 


Creatinine   2.22 H   (0.6-1.4)  mg/dl


 


Est Cr Clr Drug Dosing   31.7   ml/min


 


Est GFR ( Amer)   35.5   ml/min


 


Est GFR (Non-Af Amer)   30.6   ml/min


 


BUN/Creatinine Ratio   18.0   (10-20)  


 


Glucose   119 H   (70-99)  mg/dl


 


Osmolality     (280-300)  mOsm/kg


 


Lactate     (0.4-2.0)  mmol/L


 


Calcium   7.7 L   (8.5-10.1)  mg/dl


 


Phosphorus   4.3   (2.5-4.9)  mg/dl


 


Magnesium   1.7 L   (1.8-2.4)  mg/dl


 


Total Bilirubin   1.4 H   (0.2-1)  mg/dl


 


Direct Bilirubin      (0-0.2)  mg/dl


 


AST   69 H   (15-37)  U/L


 


ALT   38   (12-78)  U/L


 


Alkaline Phosphatase   133 H   ()  U/L


 


Total Creatine Kinase   258   ()  U/L


 


Troponin I   < 0.015   (0-0.045)  ng/ml


 


Total Protein   6.5   (6.4-8.2)  gm/dl


 


Albumin   2.4 L   (3.4-5.0)  gm/dl


 


Globulin   4.1 H   (2.5-4.0)  gm/dl


 


Albumin/Globulin Ratio   0.6 L   (0.9-2)  


 


Lipase   204   ()  U/L


 


Procalcitonin     (0-0.5)  ng/ml


 


Specimen Hemolysis      


 


Urine Color     


 


Urine Appearance     (Clear)  


 


Urine pH     (4.5-7.5)  


 


Ur Specific Gravity     (1.000-1.030)  


 


Urine Protein     (Negative)  


 


Urine Glucose (UA)     (Negative)  


 


Urine Ketones     (Negative)  


 


Urine Blood     (Negative)  


 


Urine Nitrite     (Negative)  


 


Urine Bilirubin     (Negative)  


 


Urine Urobilinogen     (Negative)  


 


Ur Leukocyte Esterase     (Negative)  


 


Urine RBC     (0-4)  /hpf


 


Urine WBC     (0-5)  /hpf


 


Ur Epithelial Cells     (0-5)  /lpf


 


Urine Bacteria     (Negative)  


 


Phenytoin     (10-20)  mcg/ml


 


Ethyl Alcohol mg/dL     (0-3)  mg/dl


 


COVID-19 Eval Order     


 


SARS-CoV-2 (PCR)     (Negative)  


 


Blood Type     


 


Blood Type Recheck     


 


Antibody Screen     


 


Crossmatch     














  06/07/21 06/07/21 06/07/21 Range/Units





  15:53 15:53 16:41 


 


WBC     


 


RBC     


 


Hgb     


 


Hct     


 


MCV     


 


MCH     


 


MCHC     


 


RDW Std Deviation     


 


RDW Coeff of Nell     


 


Plt Count     


 


MPV     


 


Immature Gran % (Auto)     


 


Neut % (Auto)     


 


Lymph % (Auto)     


 


Mono % (Auto)     


 


Eos % (Auto)     


 


Baso % (Auto)     


 


Neut # (Auto)     


 


Lymph # (Auto)     


 


Mono # (Auto)     


 


Eos # (Auto)     


 


Baso # (Auto)     


 


Immature Gran # (Auto)     


 


Absolute Nucleated RBC     


 


Nucleated RBC % (auto)     


 


Neutrophils % (Manual)     


 


Band Neutrophils %     


 


Lymphocytes % (Manual)     


 


Prolymphocyte %     


 


Reactive Lymphs % (Man)     


 


Monocytes % (Manual)     


 


Eosinophils % (Manual)     


 


Basophils % (Manual)     


 


Metamyelocytes % (Man)     


 


Myelocytes % (Man)     


 


Promyelocytes % (Man)     


 


Blast Cells % (Manual)     


 


Plasma Cell % (Manual)     


 


Other Cells %     


 


Nucleated RBC %     


 


Neutrophils # (Manual)     


 


Band Neutrophils #     


 


Total Absolute Neuts     


 


Lymphocytes # (Manual)     


 


Prolymphocyte #     


 


Reactive Lymphs #     


 


Total Abs Lymphocytes     


 


Monocytes # (Manual)     


 


Eosinophils # (Manual)     


 


Basophils # (Manual)     


 


Metamyelocytes # (Man)     


 


Myelocytes # (Manual)     


 


Promyelocytes # (Man)     


 


Blast Cells # (Man)     


 


Plasma Cell # (Manual)     


 


Other Cells #     


 


Nucleated RBCs # (Man)     


 


Hypersegmented Neuts     


 


Hyposegmented Neuts     


 


Hypogranular Neuts     


 


Large Granular Lymphs     


 


# Lrg Granular Lymphs     


 


Hairy Cells     


 


Smudge Cells     


 


Toxic Granulation     


 


Toxic Vacuolation     


 


Dohle Bodies     


 


Fadi Rods     


 


Platelet Estimate     


 


Hypogranular Platelets     


 


Clumped Platelets     


 


Giant Platelets     


 


Platelet Satelliting     


 


RBC Morphology     


 


Polychromasia     


 


Hypochromasia     


 


Poikilocytosis     


 


Basophilic Stippling     


 


Anisocytosis     


 


Microcytosis     


 


Macrocytosis     


 


Spherocytes     


 


Pappenheimer Bodies     


 


Sickle Cells     


 


Target Cells     


 


Tear Drop Cells     


 


Ovalocytes     


 


Stomatocytes     


 


Cason-Jolly Bodies     


 


Echinocytes     


 


Acanthocytes (Spur)     


 


Rouleaux     


 


RBC Agglutinates     


 


Schistocytes     


 


RBC Morph Comment     


 


Sezary Cell     


 


PT     


 


INR     


 


APTT     


 


PTT Ratio     


 


VBG pH     


 


VBG pCO2     


 


VBG pO2     


 


VBG HCO3     


 


VBG O2 Saturation     


 


VBG Base Excess     


 


Barometric Pressure     


 


Sodium     (136-145)  mmol/L


 


Potassium     (3.5-5.1)  mmol/L


 


Chloride     ()  mmol/L


 


Carbon Dioxide     (21-32)  mmol/L


 


Anion Gap     (3-11)  


 


BUN     (7-18)  mg/dl


 


Creatinine     (0.6-1.4)  mg/dl


 


Est Cr Clr Drug Dosing     ml/min


 


Est GFR ( Amer)     ml/min


 


Est GFR (Non-Af Amer)     ml/min


 


BUN/Creatinine Ratio     (10-20)  


 


Glucose     (70-99)  mg/dl


 


Osmolality     (280-300)  mOsm/kg


 


Lactate     (0.4-2.0)  mmol/L


 


Calcium     (8.5-10.1)  mg/dl


 


Phosphorus     (2.5-4.9)  mg/dl


 


Magnesium     (1.8-2.4)  mg/dl


 


Total Bilirubin     (0.2-1)  mg/dl


 


Direct Bilirubin     (0-0.2)  mg/dl


 


AST     (15-37)  U/L


 


ALT     (12-78)  U/L


 


Alkaline Phosphatase     ()  U/L


 


Total Creatine Kinase     ()  U/L


 


Troponin I     (0-0.045)  ng/ml


 


Total Protein     (6.4-8.2)  gm/dl


 


Albumin     (3.4-5.0)  gm/dl


 


Globulin     (2.5-4.0)  gm/dl


 


Albumin/Globulin Ratio     (0.9-2)  


 


Lipase     ()  U/L


 


Procalcitonin     (0-0.5)  ng/ml


 


Specimen Hemolysis     


 


Urine Color   Red   


 


Urine Appearance   Clear   (Clear)  


 


Urine pH   6.5   (4.5-7.5)  


 


Ur Specific Gravity   1.025   (1.000-1.030)  


 


Urine Protein   3+ H   (Negative)  


 


Urine Glucose (UA)   Negative   (Negative)  


 


Urine Ketones   Negative   (Negative)  


 


Urine Blood   3+ H   (Negative)  


 


Urine Nitrite   Negative   (Negative)  


 


Urine Bilirubin   2+ H   (Negative)  


 


Urine Urobilinogen   Negative   (Negative)  


 


Ur Leukocyte Esterase   Negative   (Negative)  


 


Urine RBC   >30 H   (0-4)  /hpf


 


Urine WBC   0-5   (0-5)  /hpf


 


Ur Epithelial Cells   0-5   (0-5)  /lpf


 


Urine Bacteria   Negative   (Negative)  


 


Phenytoin  < 0.4 L    (10-20)  mcg/ml


 


Ethyl Alcohol mg/dL     (0-3)  mg/dl


 


COVID-19 Eval Order     


 


SARS-CoV-2 (PCR)     (Negative)  


 


Blood Type    A Positive  


 


Blood Type Recheck     


 


Antibody Screen    NEGATIVE  


 


Crossmatch    See Detail  














  06/07/21 06/07/21 06/07/21 Range/Units





  16:41 16:41 16:41 


 


WBC    Cancelled  


 


RBC    Cancelled  


 


Hgb    Cancelled  


 


Hct    Cancelled  


 


MCV    Cancelled  


 


MCH    Cancelled  


 


MCHC    Cancelled  


 


RDW Std Deviation    Cancelled  


 


RDW Coeff of Nell    Cancelled  


 


Plt Count    Cancelled  


 


MPV    Cancelled  


 


Immature Gran % (Auto)    Cancelled  


 


Neut % (Auto)    Cancelled  


 


Lymph % (Auto)    Cancelled  


 


Mono % (Auto)    Cancelled  


 


Eos % (Auto)    Cancelled  


 


Baso % (Auto)    Cancelled  


 


Neut # (Auto)    Cancelled  


 


Lymph # (Auto)    Cancelled  


 


Mono # (Auto)    Cancelled  


 


Eos # (Auto)    Cancelled  


 


Baso # (Auto)    Cancelled  


 


Immature Gran # (Auto)    Cancelled  


 


Absolute Nucleated RBC    Cancelled  


 


Nucleated RBC % (auto)    Cancelled  


 


Neutrophils % (Manual)    Cancelled  


 


Band Neutrophils %    Cancelled  


 


Lymphocytes % (Manual)    Cancelled  


 


Prolymphocyte %    Cancelled  


 


Reactive Lymphs % (Man)    Cancelled  


 


Monocytes % (Manual)    Cancelled  


 


Eosinophils % (Manual)    Cancelled  


 


Basophils % (Manual)    Cancelled  


 


Metamyelocytes % (Man)    Cancelled  


 


Myelocytes % (Man)    Cancelled  


 


Promyelocytes % (Man)    Cancelled  


 


Blast Cells % (Manual)    Cancelled  


 


Plasma Cell % (Manual)    Cancelled  


 


Other Cells %    Cancelled  


 


Nucleated RBC %    Cancelled  


 


Neutrophils # (Manual)    Cancelled  


 


Band Neutrophils #    Cancelled  


 


Total Absolute Neuts    Cancelled  


 


Lymphocytes # (Manual)    Cancelled  


 


Prolymphocyte #    Cancelled  


 


Reactive Lymphs #    Cancelled  


 


Total Abs Lymphocytes    Cancelled  


 


Monocytes # (Manual)    Cancelled  


 


Eosinophils # (Manual)    Cancelled  


 


Basophils # (Manual)    Cancelled  


 


Metamyelocytes # (Man)    Cancelled  


 


Myelocytes # (Manual)    Cancelled  


 


Promyelocytes # (Man)    Cancelled  


 


Blast Cells # (Man)    Cancelled  


 


Plasma Cell # (Manual)    Cancelled  


 


Other Cells #    Cancelled  


 


Nucleated RBCs # (Man)    Cancelled  


 


Hypersegmented Neuts    Cancelled  


 


Hyposegmented Neuts    Cancelled  


 


Hypogranular Neuts    Cancelled  


 


Large Granular Lymphs    Cancelled  


 


# Lrg Granular Lymphs    Cancelled  


 


Hairy Cells    Cancelled  


 


Smudge Cells    Cancelled  


 


Toxic Granulation    Cancelled  


 


Toxic Vacuolation    Cancelled  


 


Dohle Bodies    Cancelled  


 


Fadi Rods    Cancelled  


 


Platelet Estimate    Cancelled  


 


Hypogranular Platelets    Cancelled  


 


Clumped Platelets    Cancelled  


 


Giant Platelets    Cancelled  


 


Platelet Satelliting    Cancelled  


 


RBC Morphology    Cancelled  


 


Polychromasia    Cancelled  


 


Hypochromasia    Cancelled  


 


Poikilocytosis    Cancelled  


 


Basophilic Stippling    Cancelled  


 


Anisocytosis    Cancelled  


 


Microcytosis    Cancelled  


 


Macrocytosis    Cancelled  


 


Spherocytes    Cancelled  


 


Pappenheimer Bodies    Cancelled  


 


Sickle Cells    Cancelled  


 


Target Cells    Cancelled  


 


Tear Drop Cells    Cancelled  


 


Ovalocytes    Cancelled  


 


Stomatocytes    Cancelled  


 


Cason-Jolly Bodies    Cancelled  


 


Echinocytes    Cancelled  


 


Acanthocytes (Spur)    Cancelled  


 


Rouleaux    Cancelled  


 


RBC Agglutinates    Cancelled  


 


Schistocytes    Cancelled  


 


RBC Morph Comment    Cancelled  


 


Sezary Cell    Cancelled  


 


PT     


 


INR     


 


APTT     


 


PTT Ratio     


 


VBG pH  Cancelled    


 


VBG pCO2  Cancelled    


 


VBG pO2  Cancelled    


 


VBG HCO3  Cancelled    


 


VBG O2 Saturation  Cancelled    


 


VBG Base Excess  Cancelled    


 


Barometric Pressure  Cancelled    


 


Sodium     (136-145)  mmol/L


 


Potassium     (3.5-5.1)  mmol/L


 


Chloride     ()  mmol/L


 


Carbon Dioxide     (21-32)  mmol/L


 


Anion Gap     (3-11)  


 


BUN     (7-18)  mg/dl


 


Creatinine     (0.6-1.4)  mg/dl


 


Est Cr Clr Drug Dosing     ml/min


 


Est GFR ( Amer)     ml/min


 


Est GFR (Non-Af Amer)     ml/min


 


BUN/Creatinine Ratio     (10-20)  


 


Glucose     (70-99)  mg/dl


 


Osmolality     (280-300)  mOsm/kg


 


Lactate     (0.4-2.0)  mmol/L


 


Calcium     (8.5-10.1)  mg/dl


 


Phosphorus     (2.5-4.9)  mg/dl


 


Magnesium     (1.8-2.4)  mg/dl


 


Total Bilirubin     (0.2-1)  mg/dl


 


Direct Bilirubin     (0-0.2)  mg/dl


 


AST     (15-37)  U/L


 


ALT     (12-78)  U/L


 


Alkaline Phosphatase     ()  U/L


 


Total Creatine Kinase     ()  U/L


 


Troponin I     (0-0.045)  ng/ml


 


Total Protein     (6.4-8.2)  gm/dl


 


Albumin     (3.4-5.0)  gm/dl


 


Globulin     (2.5-4.0)  gm/dl


 


Albumin/Globulin Ratio     (0.9-2)  


 


Lipase     ()  U/L


 


Procalcitonin     (0-0.5)  ng/ml


 


Specimen Hemolysis     


 


Urine Color     


 


Urine Appearance     (Clear)  


 


Urine pH     (4.5-7.5)  


 


Ur Specific Gravity     (1.000-1.030)  


 


Urine Protein     (Negative)  


 


Urine Glucose (UA)     (Negative)  


 


Urine Ketones     (Negative)  


 


Urine Blood     (Negative)  


 


Urine Nitrite     (Negative)  


 


Urine Bilirubin     (Negative)  


 


Urine Urobilinogen     (Negative)  


 


Ur Leukocyte Esterase     (Negative)  


 


Urine RBC     (0-4)  /hpf


 


Urine WBC     (0-5)  /hpf


 


Ur Epithelial Cells     (0-5)  /lpf


 


Urine Bacteria     (Negative)  


 


Phenytoin     (10-20)  mcg/ml


 


Ethyl Alcohol mg/dL   < 3.0   (0-3)  mg/dl


 


COVID-19 Eval Order     


 


SARS-CoV-2 (PCR)     (Negative)  


 


Blood Type     


 


Blood Type Recheck     


 


Antibody Screen     


 


Crossmatch     














  06/07/21 06/07/21 06/07/21 Range/Units





  16:41 16:45 16:45 


 


WBC     


 


RBC     


 


Hgb     


 


Hct     


 


MCV     


 


MCH     


 


MCHC     


 


RDW Std Deviation     


 


RDW Coeff of Nell     


 


Plt Count     


 


MPV     


 


Immature Gran % (Auto)     


 


Neut % (Auto)     


 


Lymph % (Auto)     


 


Mono % (Auto)     


 


Eos % (Auto)     


 


Baso % (Auto)     


 


Neut # (Auto)     


 


Lymph # (Auto)     


 


Mono # (Auto)     


 


Eos # (Auto)     


 


Baso # (Auto)     


 


Immature Gran # (Auto)     


 


Absolute Nucleated RBC     


 


Nucleated RBC % (auto)     


 


Neutrophils % (Manual)     


 


Band Neutrophils %     


 


Lymphocytes % (Manual)     


 


Prolymphocyte %     


 


Reactive Lymphs % (Man)     


 


Monocytes % (Manual)     


 


Eosinophils % (Manual)     


 


Basophils % (Manual)     


 


Metamyelocytes % (Man)     


 


Myelocytes % (Man)     


 


Promyelocytes % (Man)     


 


Blast Cells % (Manual)     


 


Plasma Cell % (Manual)     


 


Other Cells %     


 


Nucleated RBC %     


 


Neutrophils # (Manual)     


 


Band Neutrophils #     


 


Total Absolute Neuts     


 


Lymphocytes # (Manual)     


 


Prolymphocyte #     


 


Reactive Lymphs #     


 


Total Abs Lymphocytes     


 


Monocytes # (Manual)     


 


Eosinophils # (Manual)     


 


Basophils # (Manual)     


 


Metamyelocytes # (Man)     


 


Myelocytes # (Manual)     


 


Promyelocytes # (Man)     


 


Blast Cells # (Man)     


 


Plasma Cell # (Manual)     


 


Other Cells #     


 


Nucleated RBCs # (Man)     


 


Hypersegmented Neuts     


 


Hyposegmented Neuts     


 


Hypogranular Neuts     


 


Large Granular Lymphs     


 


# Lrg Granular Lymphs     


 


Hairy Cells     


 


Smudge Cells     


 


Toxic Granulation     


 


Toxic Vacuolation     


 


Dohle Bodies     


 


Fadi Rods     


 


Platelet Estimate     


 


Hypogranular Platelets     


 


Clumped Platelets     


 


Giant Platelets     


 


Platelet Satelliting     


 


RBC Morphology     


 


Polychromasia     


 


Hypochromasia     


 


Poikilocytosis     


 


Basophilic Stippling     


 


Anisocytosis     


 


Microcytosis     


 


Macrocytosis     


 


Spherocytes     


 


Pappenheimer Bodies     


 


Sickle Cells     


 


Target Cells     


 


Tear Drop Cells     


 


Ovalocytes     


 


Stomatocytes     


 


Cason-Jolly Bodies     


 


Echinocytes     


 


Acanthocytes (Spur)     


 


Rouleaux     


 


RBC Agglutinates     


 


Schistocytes     


 


RBC Morph Comment     


 


Sezary Cell     


 


PT  Cancelled    


 


INR  Cancelled    


 


APTT  Cancelled    


 


PTT Ratio  Cancelled    


 


VBG pH     


 


VBG pCO2     


 


VBG pO2     


 


VBG HCO3     


 


VBG O2 Saturation     


 


VBG Base Excess     


 


Barometric Pressure     


 


Sodium     (136-145)  mmol/L


 


Potassium     (3.5-5.1)  mmol/L


 


Chloride     ()  mmol/L


 


Carbon Dioxide     (21-32)  mmol/L


 


Anion Gap     (3-11)  


 


BUN     (7-18)  mg/dl


 


Creatinine     (0.6-1.4)  mg/dl


 


Est Cr Clr Drug Dosing     ml/min


 


Est GFR ( Amer)     ml/min


 


Est GFR (Non-Af Amer)     ml/min


 


BUN/Creatinine Ratio     (10-20)  


 


Glucose     (70-99)  mg/dl


 


Osmolality     (280-300)  mOsm/kg


 


Lactate     (0.4-2.0)  mmol/L


 


Calcium     (8.5-10.1)  mg/dl


 


Phosphorus     (2.5-4.9)  mg/dl


 


Magnesium     (1.8-2.4)  mg/dl


 


Total Bilirubin     (0.2-1)  mg/dl


 


Direct Bilirubin     (0-0.2)  mg/dl


 


AST     (15-37)  U/L


 


ALT     (12-78)  U/L


 


Alkaline Phosphatase     ()  U/L


 


Total Creatine Kinase     ()  U/L


 


Troponin I     (0-0.045)  ng/ml


 


Total Protein     (6.4-8.2)  gm/dl


 


Albumin     (3.4-5.0)  gm/dl


 


Globulin     (2.5-4.0)  gm/dl


 


Albumin/Globulin Ratio     (0.9-2)  


 


Lipase     ()  U/L


 


Procalcitonin     (0-0.5)  ng/ml


 


Specimen Hemolysis     


 


Urine Color     


 


Urine Appearance     (Clear)  


 


Urine pH     (4.5-7.5)  


 


Ur Specific Gravity     (1.000-1.030)  


 


Urine Protein     (Negative)  


 


Urine Glucose (UA)     (Negative)  


 


Urine Ketones     (Negative)  


 


Urine Blood     (Negative)  


 


Urine Nitrite     (Negative)  


 


Urine Bilirubin     (Negative)  


 


Urine Urobilinogen     (Negative)  


 


Ur Leukocyte Esterase     (Negative)  


 


Urine RBC     (0-4)  /hpf


 


Urine WBC     (0-5)  /hpf


 


Ur Epithelial Cells     (0-5)  /lpf


 


Urine Bacteria     (Negative)  


 


Phenytoin     (10-20)  mcg/ml


 


Ethyl Alcohol mg/dL     (0-3)  mg/dl


 


COVID-19 Eval Order   Covid19 at [facility]   


 


SARS-CoV-2 (PCR)    NEGATIVE  (Negative)  


 


Blood Type     


 


Blood Type Recheck     


 


Antibody Screen     


 


Crossmatch     














  06/07/21 06/07/21 06/07/21 Range/Units





  17:42 17:55 17:55 


 


WBC   8.53   


 


RBC   0.84 L   


 


Hgb   2.4 L*   


 


Hct   8.4 L*   


 


MCV   100.0   


 


MCH   28.6   


 


MCHC   28.6 L   


 


RDW Std Deviation   59.4 H   


 


RDW Coeff of Nell   16.9 H   


 


Plt Count   231   


 


MPV   9.3   


 


Immature Gran % (Auto)   0.4   


 


Neut % (Auto)   72.6   


 


Lymph % (Auto)   13.5   


 


Mono % (Auto)   12.8   


 


Eos % (Auto)   0.5   


 


Baso % (Auto)   0.2   


 


Neut # (Auto)   6.20   


 


Lymph # (Auto)   1.15 L   


 


Mono # (Auto)   1.09 H   


 


Eos # (Auto)   0.04   


 


Baso # (Auto)   0.02   


 


Immature Gran # (Auto)   0.03 H   


 


Absolute Nucleated RBC     


 


Nucleated RBC % (auto)     


 


Neutrophils % (Manual)     


 


Band Neutrophils %     


 


Lymphocytes % (Manual)     


 


Prolymphocyte %     


 


Reactive Lymphs % (Man)     


 


Monocytes % (Manual)     


 


Eosinophils % (Manual)     


 


Basophils % (Manual)     


 


Metamyelocytes % (Man)     


 


Myelocytes % (Man)     


 


Promyelocytes % (Man)     


 


Blast Cells % (Manual)     


 


Plasma Cell % (Manual)     


 


Other Cells %     


 


Nucleated RBC %     


 


Neutrophils # (Manual)     


 


Band Neutrophils #     


 


Total Absolute Neuts     


 


Lymphocytes # (Manual)     


 


Prolymphocyte #     


 


Reactive Lymphs #     


 


Total Abs Lymphocytes     


 


Monocytes # (Manual)     


 


Eosinophils # (Manual)     


 


Basophils # (Manual)     


 


Metamyelocytes # (Man)     


 


Myelocytes # (Manual)     


 


Promyelocytes # (Man)     


 


Blast Cells # (Man)     


 


Plasma Cell # (Manual)     


 


Other Cells #     


 


Nucleated RBCs # (Man)     


 


Hypersegmented Neuts     


 


Hyposegmented Neuts     


 


Hypogranular Neuts     


 


Large Granular Lymphs     


 


# Lrg Granular Lymphs     


 


Hairy Cells     


 


Smudge Cells     


 


Toxic Granulation     


 


Toxic Vacuolation     


 


Dohle Bodies     


 


Fadi Rods     


 


Platelet Estimate     


 


Hypogranular Platelets     


 


Clumped Platelets     


 


Giant Platelets     


 


Platelet Satelliting     


 


RBC Morphology     


 


Polychromasia     


 


Hypochromasia   Present   


 


Poikilocytosis     


 


Basophilic Stippling     


 


Anisocytosis     


 


Microcytosis     


 


Macrocytosis     


 


Spherocytes     


 


Pappenheimer Bodies     


 


Sickle Cells     


 


Target Cells     


 


Tear Drop Cells     


 


Ovalocytes     


 


Stomatocytes     


 


Cason-Jolly Bodies     


 


Echinocytes     


 


Acanthocytes (Spur)     


 


Rouleaux     


 


RBC Agglutinates     


 


Schistocytes     


 


RBC Morph Comment     


 


Sezary Cell     


 


PT    12.8 H  


 


INR    1.3 H  


 


APTT    Cancelled  


 


PTT Ratio    Cancelled  


 


VBG pH     


 


VBG pCO2     


 


VBG pO2     


 


VBG HCO3     


 


VBG O2 Saturation     


 


VBG Base Excess     


 


Barometric Pressure     


 


Sodium     (136-145)  mmol/L


 


Potassium     (3.5-5.1)  mmol/L


 


Chloride     ()  mmol/L


 


Carbon Dioxide     (21-32)  mmol/L


 


Anion Gap     (3-11)  


 


BUN     (7-18)  mg/dl


 


Creatinine     (0.6-1.4)  mg/dl


 


Est Cr Clr Drug Dosing     ml/min


 


Est GFR ( Amer)     ml/min


 


Est GFR (Non-Af Amer)     ml/min


 


BUN/Creatinine Ratio     (10-20)  


 


Glucose     (70-99)  mg/dl


 


Osmolality  323 H    (280-300)  mOsm/kg


 


Lactate     (0.4-2.0)  mmol/L


 


Calcium     (8.5-10.1)  mg/dl


 


Phosphorus     (2.5-4.9)  mg/dl


 


Magnesium     (1.8-2.4)  mg/dl


 


Total Bilirubin     (0.2-1)  mg/dl


 


Direct Bilirubin     (0-0.2)  mg/dl


 


AST     (15-37)  U/L


 


ALT     (12-78)  U/L


 


Alkaline Phosphatase     ()  U/L


 


Total Creatine Kinase     ()  U/L


 


Troponin I     (0-0.045)  ng/ml


 


Total Protein     (6.4-8.2)  gm/dl


 


Albumin     (3.4-5.0)  gm/dl


 


Globulin     (2.5-4.0)  gm/dl


 


Albumin/Globulin Ratio     (0.9-2)  


 


Lipase     ()  U/L


 


Procalcitonin     (0-0.5)  ng/ml


 


Specimen Hemolysis     


 


Urine Color     


 


Urine Appearance     (Clear)  


 


Urine pH     (4.5-7.5)  


 


Ur Specific Gravity     (1.000-1.030)  


 


Urine Protein     (Negative)  


 


Urine Glucose (UA)     (Negative)  


 


Urine Ketones     (Negative)  


 


Urine Blood     (Negative)  


 


Urine Nitrite     (Negative)  


 


Urine Bilirubin     (Negative)  


 


Urine Urobilinogen     (Negative)  


 


Ur Leukocyte Esterase     (Negative)  


 


Urine RBC     (0-4)  /hpf


 


Urine WBC     (0-5)  /hpf


 


Ur Epithelial Cells     (0-5)  /lpf


 


Urine Bacteria     (Negative)  


 


Phenytoin     (10-20)  mcg/ml


 


Ethyl Alcohol mg/dL     (0-3)  mg/dl


 


COVID-19 Eval Order     


 


SARS-CoV-2 (PCR)     (Negative)  


 


Blood Type     


 


Blood Type Recheck     


 


Antibody Screen     


 


Crossmatch     














  06/07/21 06/07/21 06/07/21 Range/Units





  17:55 17:55 17:55 


 


WBC     


 


RBC     


 


Hgb     


 


Hct     


 


MCV     


 


MCH     


 


MCHC     


 


RDW Std Deviation     


 


RDW Coeff of Nell     


 


Plt Count     


 


MPV     


 


Immature Gran % (Auto)     


 


Neut % (Auto)     


 


Lymph % (Auto)     


 


Mono % (Auto)     


 


Eos % (Auto)     


 


Baso % (Auto)     


 


Neut # (Auto)     


 


Lymph # (Auto)     


 


Mono # (Auto)     


 


Eos # (Auto)     


 


Baso # (Auto)     


 


Immature Gran # (Auto)     


 


Absolute Nucleated RBC     


 


Nucleated RBC % (auto)     


 


Neutrophils % (Manual)     


 


Band Neutrophils %     


 


Lymphocytes % (Manual)     


 


Prolymphocyte %     


 


Reactive Lymphs % (Man)     


 


Monocytes % (Manual)     


 


Eosinophils % (Manual)     


 


Basophils % (Manual)     


 


Metamyelocytes % (Man)     


 


Myelocytes % (Man)     


 


Promyelocytes % (Man)     


 


Blast Cells % (Manual)     


 


Plasma Cell % (Manual)     


 


Other Cells %     


 


Nucleated RBC %     


 


Neutrophils # (Manual)     


 


Band Neutrophils #     


 


Total Absolute Neuts     


 


Lymphocytes # (Manual)     


 


Prolymphocyte #     


 


Reactive Lymphs #     


 


Total Abs Lymphocytes     


 


Monocytes # (Manual)     


 


Eosinophils # (Manual)     


 


Basophils # (Manual)     


 


Metamyelocytes # (Man)     


 


Myelocytes # (Manual)     


 


Promyelocytes # (Man)     


 


Blast Cells # (Man)     


 


Plasma Cell # (Manual)     


 


Other Cells #     


 


Nucleated RBCs # (Man)     


 


Hypersegmented Neuts     


 


Hyposegmented Neuts     


 


Hypogranular Neuts     


 


Large Granular Lymphs     


 


# Lrg Granular Lymphs     


 


Hairy Cells     


 


Smudge Cells     


 


Toxic Granulation     


 


Toxic Vacuolation     


 


Dohle Bodies     


 


Fadi Rods     


 


Platelet Estimate     


 


Hypogranular Platelets     


 


Clumped Platelets     


 


Giant Platelets     


 


Platelet Satelliting     


 


RBC Morphology     


 


Polychromasia     


 


Hypochromasia     


 


Poikilocytosis     


 


Basophilic Stippling     


 


Anisocytosis     


 


Microcytosis     


 


Macrocytosis     


 


Spherocytes     


 


Pappenheimer Bodies     


 


Sickle Cells     


 


Target Cells     


 


Tear Drop Cells     


 


Ovalocytes     


 


Stomatocytes     


 


Cason-Jolly Bodies     


 


Echinocytes     


 


Acanthocytes (Spur)     


 


Rouleaux     


 


RBC Agglutinates     


 


Schistocytes     


 


RBC Morph Comment     


 


Sezary Cell     


 


PT     


 


INR     


 


APTT     


 


PTT Ratio     


 


VBG pH   7.44 H   


 


VBG pCO2   29 L   


 


VBG pO2   24   


 


VBG HCO3   19   


 


VBG O2 Saturation   63.6   


 


VBG Base Excess   -4.5   


 


Barometric Pressure   733.2   


 


Sodium     (136-145)  mmol/L


 


Potassium     (3.5-5.1)  mmol/L


 


Chloride     ()  mmol/L


 


Carbon Dioxide     (21-32)  mmol/L


 


Anion Gap     (3-11)  


 


BUN     (7-18)  mg/dl


 


Creatinine     (0.6-1.4)  mg/dl


 


Est Cr Clr Drug Dosing     ml/min


 


Est GFR ( Amer)     ml/min


 


Est GFR (Non-Af Amer)     ml/min


 


BUN/Creatinine Ratio     (10-20)  


 


Glucose     (70-99)  mg/dl


 


Osmolality     (280-300)  mOsm/kg


 


Lactate    3.8 H*  (0.4-2.0)  mmol/L


 


Calcium     (8.5-10.1)  mg/dl


 


Phosphorus     (2.5-4.9)  mg/dl


 


Magnesium     (1.8-2.4)  mg/dl


 


Total Bilirubin     (0.2-1)  mg/dl


 


Direct Bilirubin     (0-0.2)  mg/dl


 


AST     (15-37)  U/L


 


ALT     (12-78)  U/L


 


Alkaline Phosphatase     ()  U/L


 


Total Creatine Kinase     ()  U/L


 


Troponin I     (0-0.045)  ng/ml


 


Total Protein     (6.4-8.2)  gm/dl


 


Albumin     (3.4-5.0)  gm/dl


 


Globulin     (2.5-4.0)  gm/dl


 


Albumin/Globulin Ratio     (0.9-2)  


 


Lipase     ()  U/L


 


Procalcitonin     (0-0.5)  ng/ml


 


Specimen Hemolysis     


 


Urine Color     


 


Urine Appearance     (Clear)  


 


Urine pH     (4.5-7.5)  


 


Ur Specific Gravity     (1.000-1.030)  


 


Urine Protein     (Negative)  


 


Urine Glucose (UA)     (Negative)  


 


Urine Ketones     (Negative)  


 


Urine Blood     (Negative)  


 


Urine Nitrite     (Negative)  


 


Urine Bilirubin     (Negative)  


 


Urine Urobilinogen     (Negative)  


 


Ur Leukocyte Esterase     (Negative)  


 


Urine RBC     (0-4)  /hpf


 


Urine WBC     (0-5)  /hpf


 


Ur Epithelial Cells     (0-5)  /lpf


 


Urine Bacteria     (Negative)  


 


Phenytoin     (10-20)  mcg/ml


 


Ethyl Alcohol mg/dL     (0-3)  mg/dl


 


COVID-19 Eval Order     


 


SARS-CoV-2 (PCR)     (Negative)  


 


Blood Type     


 


Blood Type Recheck  A Positive    


 


Antibody Screen     


 


Crossmatch     














  06/07/21 06/07/21 Range/Units





  17:58 18:25 


 


WBC   7.32  


 


RBC   0.81 L  


 


Hgb   2.4 L*  


 


Hct   8.2 L*  


 


MCV   101.2 H  


 


MCH   29.6  


 


MCHC   29.3 L  


 


RDW Std Deviation    


 


RDW Coeff of Nell    


 


Plt Count   205  


 


MPV    


 


Immature Gran % (Auto)   0.3  


 


Neut % (Auto)   75.3  


 


Lymph % (Auto)   12.6  


 


Mono % (Auto)   11.6  


 


Eos % (Auto)   0.1  


 


Baso % (Auto)   0.1  


 


Neut # (Auto)   5.51  


 


Lymph # (Auto)   0.92 L  


 


Mono # (Auto)   0.85 H  


 


Eos # (Auto)   0.01  


 


Baso # (Auto)   0.01  


 


Immature Gran # (Auto)   0.02  


 


Absolute Nucleated RBC    


 


Nucleated RBC % (auto)    


 


Neutrophils % (Manual)    


 


Band Neutrophils %    


 


Lymphocytes % (Manual)    


 


Prolymphocyte %    


 


Reactive Lymphs % (Man)    


 


Monocytes % (Manual)    


 


Eosinophils % (Manual)    


 


Basophils % (Manual)    


 


Metamyelocytes % (Man)    


 


Myelocytes % (Man)    


 


Promyelocytes % (Man)    


 


Blast Cells % (Manual)    


 


Plasma Cell % (Manual)    


 


Other Cells %    


 


Nucleated RBC %    


 


Neutrophils # (Manual)    


 


Band Neutrophils #    


 


Total Absolute Neuts    


 


Lymphocytes # (Manual)    


 


Prolymphocyte #    


 


Reactive Lymphs #    


 


Total Abs Lymphocytes    


 


Monocytes # (Manual)    


 


Eosinophils # (Manual)    


 


Basophils # (Manual)    


 


Metamyelocytes # (Man)    


 


Myelocytes # (Manual)    


 


Promyelocytes # (Man)    


 


Blast Cells # (Man)    


 


Plasma Cell # (Manual)    


 


Other Cells #    


 


Nucleated RBCs # (Man)    


 


Hypersegmented Neuts    


 


Hyposegmented Neuts    


 


Hypogranular Neuts    


 


Large Granular Lymphs    


 


# Lrg Granular Lymphs    


 


Hairy Cells    


 


Smudge Cells    


 


Toxic Granulation    


 


Toxic Vacuolation    


 


Dohle Bodies    


 


Fadi Rods    


 


Platelet Estimate    


 


Hypogranular Platelets    


 


Clumped Platelets    


 


Giant Platelets    


 


Platelet Satelliting    


 


RBC Morphology    


 


Polychromasia    


 


Hypochromasia   Present  


 


Poikilocytosis    


 


Basophilic Stippling    


 


Anisocytosis    


 


Microcytosis    


 


Macrocytosis    


 


Spherocytes    


 


Pappenheimer Bodies    


 


Sickle Cells    


 


Target Cells    


 


Tear Drop Cells    


 


Ovalocytes    


 


Stomatocytes    


 


Cason-Jolly Bodies    


 


Echinocytes    


 


Acanthocytes (Spur)    


 


Rouleaux    


 


RBC Agglutinates    


 


Schistocytes    


 


RBC Morph Comment    


 


Sezary Cell    


 


PT    


 


INR    


 


APTT    


 


PTT Ratio    


 


VBG pH    


 


VBG pCO2    


 


VBG pO2    


 


VBG HCO3    


 


VBG O2 Saturation    


 


VBG Base Excess    


 


Barometric Pressure    


 


Sodium    (136-145)  mmol/L


 


Potassium    (3.5-5.1)  mmol/L


 


Chloride    ()  mmol/L


 


Carbon Dioxide    (21-32)  mmol/L


 


Anion Gap    (3-11)  


 


BUN    (7-18)  mg/dl


 


Creatinine    (0.6-1.4)  mg/dl


 


Est Cr Clr Drug Dosing    ml/min


 


Est GFR ( Amer)    ml/min


 


Est GFR (Non-Af Amer)    ml/min


 


BUN/Creatinine Ratio    (10-20)  


 


Glucose    (70-99)  mg/dl


 


Osmolality    (280-300)  mOsm/kg


 


Lactate    (0.4-2.0)  mmol/L


 


Calcium    (8.5-10.1)  mg/dl


 


Phosphorus    (2.5-4.9)  mg/dl


 


Magnesium    (1.8-2.4)  mg/dl


 


Total Bilirubin    (0.2-1)  mg/dl


 


Direct Bilirubin    (0-0.2)  mg/dl


 


AST    (15-37)  U/L


 


ALT    (12-78)  U/L


 


Alkaline Phosphatase    ()  U/L


 


Total Creatine Kinase    ()  U/L


 


Troponin I    (0-0.045)  ng/ml


 


Total Protein    (6.4-8.2)  gm/dl


 


Albumin    (3.4-5.0)  gm/dl


 


Globulin    (2.5-4.0)  gm/dl


 


Albumin/Globulin Ratio    (0.9-2)  


 


Lipase    ()  U/L


 


Procalcitonin  0.39   (0-0.5)  ng/ml


 


Specimen Hemolysis    


 


Urine Color    


 


Urine Appearance    (Clear)  


 


Urine pH    (4.5-7.5)  


 


Ur Specific Gravity    (1.000-1.030)  


 


Urine Protein    (Negative)  


 


Urine Glucose (UA)    (Negative)  


 


Urine Ketones    (Negative)  


 


Urine Blood    (Negative)  


 


Urine Nitrite    (Negative)  


 


Urine Bilirubin    (Negative)  


 


Urine Urobilinogen    (Negative)  


 


Ur Leukocyte Esterase    (Negative)  


 


Urine RBC    (0-4)  /hpf


 


Urine WBC    (0-5)  /hpf


 


Ur Epithelial Cells    (0-5)  /lpf


 


Urine Bacteria    (Negative)  


 


Phenytoin    (10-20)  mcg/ml


 


Ethyl Alcohol mg/dL    (0-3)  mg/dl


 


COVID-19 Eval Order    


 


SARS-CoV-2 (PCR)    (Negative)  


 


Blood Type    


 


Blood Type Recheck    


 


Antibody Screen    


 


Crossmatch    











Administered Medications








Pantoprazole Sodium 40 mg/ (Dextrose)  100 mls @ 20 mls/hr IV Q5H ANDRE


   Stop: 07/07/21 21:59


   Last Admin: 06/07/21 22:05  Dose: 8 mg/hr, 20 mls/hr


   Documented by: [id number]


Ceftriaxone Sodium 1,000 mg/ (Dextrose)  50 mls @ 100 mls/hr IV Q24H ANDRE; 

Protocol


   Stop: 06/18/21 00:59


   Last Admin: 06/08/21 01:39  Dose: 100 mls/hr


   Documented by: [id number]


Ipratropium Bromide (Ipratropium Bromide Neb Soln 0.02% 2.5 Ml Vial)  0.5 mg INH

Q6R ANDRE


   Stop: 07/08/21 00:59


   Last Admin: 06/08/21 00:47  Dose: 0.5 mg


   Documented by: [id number]


Levalbuterol HCl (Levalbuterol 1.25mg/0.5ml Neb)  1.25 mg INH Q6R ANDRE


   Stop: 07/08/21 00:59


   Last Admin: 06/08/21 00:47  Dose: 1.25 mg


   Documented by: [id number]


Thiamine HCl (Thiamine Hcl 100 Mg Tab)  100 mg PO QAM ANDRE


   Stop: 07/07/21 23:29


   Last Admin: 06/08/21 01:24 Dose:  Not Given


   Documented by: [id number]





Discontinued Medications





Albuterol (Albut/Ipratrop 3mg/0.5mg Neb 3 Ml Vial)  3 ml NEB NOW STA


   Stop: 06/07/21 17:56


   Last Admin: 06/07/21 18:08  Dose: 3 ml


   Documented by: [id number]


Albuterol (Albut/Ipratrop 3mg/0.5mg Neb 3 Ml Vial)  3 ml NEB NOW STA


   Stop: 06/07/21 19:35


   Last Admin: 06/07/21 19:51  Dose: 3 ml


   Documented by: [id number]


Gabapentin (Gabapentin 600 Mg Tab)  600 mg PO NOW STA


   Stop: 06/07/21 23:23


   Last Admin: 06/08/21 01:23 Dose:  Not Given


   Documented by: [id number]


Sodium Chloride (Nss)  500 mls @ 999 mls/hr IV .Q31M ONE


   Stop: 06/07/21 16:45


   Last Infusion: 06/07/21 17:32  Dose: 0 mls/hr


   Documented by: [id number]


   Admin: 06/07/21 16:58  Dose: 999 mls/hr


   Documented by: [id number]


Multivitamins 10 ml/ Thiamine HCl 100 mg/ Folic Acid 1 mg/Sodium Chloride  

1,011.2 mls @ 1,011.2 mls/hr IV .Q1H ONE


   Stop: 06/07/21 17:31


   Last Infusion: 06/07/21 21:18  Dose: 0 mls/hr


   Documented by: [id number]


   Infusion: 06/07/21 20:18  Dose: 1,011.2 mls/hr


   Documented by: [id number]


   Infusion: 06/07/21 17:53  Dose: 0 mls/hr


   Documented by: [id number]


   Admin: 06/07/21 17:52  Dose: 1,011.2 mls/hr


   Documented by: [id number]


Thiamine HCl 200 mg/ Sodium (Chloride)  52 mls @ 208 mls/hr IV NOW STA


   Stop: 06/07/21 16:46


   Last Infusion: 06/07/21 18:03  Dose: 0 mls/hr


   Documented by: [id number]


   Admin: 06/07/21 17:46  Dose: 208 mls/hr


   Documented by: [id number]


Dextrose/Sodium Chloride (D5w And Nss)  1,000 mls @ 999 mls/hr IV .Q1H1M STA


   Stop: 06/07/21 18:20


   Last Infusion: 06/07/21 18:41  Dose: 0 mls/hr


   Documented by: [id number]


   Admin: 06/07/21 17:46  Dose: 999 mls/hr


   Documented by: [id number]


Magnesium Sulfate/Dextrose (Magnesium Sulfate / D5w)  1 gm in 100 mls @ 100 

mls/hr IV NOW STA


   Stop: 06/07/21 18:45


   Last Infusion: 06/07/21 20:15  Dose: 0 mls/hr


   Documented by: [id number]


   Admin: 06/07/21 19:15  Dose: 100 mls/hr


   Documented by: [id number]


Lorazepam (Ativan)  2 mg in 4 mls @ 4 mls/min IV NOW STA


   Stop: 06/07/21 17:56


   Last Admin: 06/07/21 17:59  Dose: 4 mls/min


   Documented by: [id number]


Lorazepam (Ativan)  1 mg in 2 mls @ 2 mls/min IV NOW STA


   Stop: 06/07/21 19:35


   Last Admin: 06/07/21 19:39  Dose: 2 mls/min


   Documented by: [id number]


Sodium Chloride (1/2 Nss)  1,000 mls @ 999 mls/hr IV .Q1H1M ONE


   Stop: 06/07/21 21:20


   Last Infusion: 06/07/21 21:42  Dose: 0 mls/hr


   Documented by: [id number]


   Admin: 06/07/21 20:41  Dose: 999 mls/hr


   Documented by: [id number]


Lorazepam (Ativan)  2 mg in 4 mls @ 4 mls/min IV NOW STA


   Stop: 06/07/21 20:27


   Last Admin: 06/07/21 20:29  Dose: 4 mls/min


   Documented by: [id number]


Lorazepam (Ativan)  1 mg in 2 mls @ 2 mls/min IV NOW STA


   Stop: 06/07/21 20:52


   Last Admin: 06/07/21 21:00  Dose: 2 mls/min


   Documented by: [id number]


Sodium Chloride (1/2 Nss)  1,000 mls @ 100 mls/hr IV .Q10H ONE


   Stop: 06/08/21 07:24


   Last Admin: 06/07/21 21:49  Dose: 100 mls/hr


   Documented by: [id number]


Pantoprazole Sodium (Protonix Bolus/Drip)  0 mls @ 1 mls/hr IV ONE STA


   Stop: 06/07/21 21:22


   Last Admin: 06/07/21 22:11 Dose:  Not Given


   Documented by: [id number]


Pantoprazole Sodium 80 mg/ (Dextrose)  120 mls @ 480 mls/hr IV NOW STA


   Stop: 06/07/21 21:49


   Last Infusion: 06/07/21 22:20  Dose: 0 mls/hr


   Documented by: [id number]


   Admin: 06/07/21 22:05  Dose: 480 mls/hr


   Documented by: [id number]


Ioversol (Optiray 350 500ml)  117 ml IV ONCE ONE


   Stop: 06/07/21 17:33


   Last Admin: 06/07/21 17:32  Dose: 1 ml


   Documented by: [id number]


Methylprednisolone (Methylprednisolone 125 Mg/2 Ml Vial)  125 mg IV NOW STA


   Stop: 06/07/21 17:56


   Last Admin: 06/07/21 18:36  Dose: 125 mg


   Documented by: [id number]








Imaging Data


Radiologist's Impression:               





Abdomen/Pelvis CT  06/07/21 16:15


CT OF THE ABDOMEN AND PELVIS WITH CONTRAST


 


CLINICAL HISTORY: hematuria, metastatic prostate ca


 


COMPARISON STUDY:  Right upper quadrant ultrasound May 19, 2018. CT of the 

abdomen and pelvis June 11, 2014.


 


TECHNIQUE: Following IV administration of 117 mL of Optiray, axial images of the

abdomen and pelvis were obtained from the lung bases to the proximal femurs. 

Images were reviewed in the axial, sagittal, and coronal planes. IV contrast was

administered without complication.  Automated exposure control was utilized for 

the study.  A dose lowering technique was utilized adhering to the principles of

ALARA.


 


FINDINGS: A T12 compression fracture is old. A T11 compression fracture is 

likely subacute. Numerous healing bilateral rib fractures are noted. No acute 

lumbar spine or pelvic fractures identified. Evaluation of the abdomen and 

pelvis is compromised by motion artifact. Nodularity of the left adrenal gland 

is unchanged since CT of June 11, 2014. The liver is cirrhotic. There is a 

recanalized paraumbilical vein. There is no biliary ductal dilatation status 

post cholecystectomy. The spleen, right adrenal gland and pancreas are grossly 

unremarkable. There is mild bilateral collecting system dilatation dilatation. 

There is no evidence for a bowel obstruction. The appendix is normal. A Alas 

balloon and gas within the bladder noted. There is bladder wall thickening. 

Moderate amount of hyperdense material is noted within the dependent aspect the 

bladder. There is likely surgically absent. Note is made of a 2.8 x 0.8 cm 

aortocaval elongated density with adjacent infiltration. This is nonspecific. A 

smaller elongated density adjacent to the left adrenal gland is also noted. No 

pathologic enlarged lymph nodes are present.


 


IMPRESSION:  


 


1. No acute traumatic findings within the abdomen or pelvis.


 


2. Moderate amount of hyperdense material within the dependent aspect of the 

bladder. This favors hemorrhage. Underlying mass is considered less likely but 

cannot be excluded. Bladder wall thickening.


 


3. Cirrhosis with recanalized paraumbilical vein indicative of portal 

hypertension.


 


4. Exam compromised by motion artifact.


 


 


: Negative or not required by law.


 


 


 


 


Electronically signed by:  Watson Francis M.D.


6/7/2021 8:12 PM








Chest CTA  06/07/21 16:15


CT ANGIOGRAPHY OF THE CHEST, PULMONARY EMBOLUS PROTOCOL


 


CLINICAL HISTORY: Metastatic ca, cp, fall, PE


 


COMPARISON STUDY:  Chest CT March 21, 2020. Chest radiograph performed earlier 

today. 


 


TECHNIQUE: Following IV administration of 117 mL of Optiray, helical axial 

images of the chest were obtained utilizing the pulmonary embolus protocol.  

Maximal intensity projections and sagittal and coronal reformats were viewed on 

an independent 3D workstation.  IV contrast was administered without 

complication.  Automated exposure control was utilized for the study.  A dose 

lowering technique was utilized adhering to the principles of ALARA.


 


FINDINGS:  There is no evidence for traumatic injury to the thoracic aorta. No 

central pulmonary emboli are identified. Remainder of pulmonary arteries are 

suboptimally assessed due to respiratory motion. No pneumothorax or pleural 

effusion is noted. Lungs are suboptimally assessed due to respiratory motion. 

There may be minimal groundglass opacity within the left lower lobe. There is no

significant consolidation. Numerous healing bilateral rib fractures are noted. 

There is a compression fracture of the superior endplate of T11 which is likely 

subacute. There is an old T12 compression fracture. Sensitivity for detection of

acute rib fractures is diminished given motion artifact. An acute nondisplaced 

proximal left humeral fracture is noted. Abdomen and pelvis will be reported 

separately.


 


IMPRESSION:  


 


1. Exam significantly compromised by motion artifact. No central pulmonary 

emboli. Remainder of pulmonary arteries suboptimally assessed on this exam.


 


2. Acute nondisplaced proximal left humeral fracture better depicted on the left

shoulder radiographs.


 


3. Numerous healing bilateral rib fractures. No acute rib fractures identified 

although sensitivity diminished given motion artifact.


 


4. T11 compression fracture, likely subacute. Old T12 compression fracture.


 


 


: Negative or not required by law.


 


 


 


 


 


 


 


Electronically signed by:  Watson Francis M.D.


6/7/2021 7:59 PM








Head CT  06/07/21 16:15


CT OF THE HEAD WITHOUT CONTRAST


 


CLINICAL HISTORY: metastatic cancer, confusion, fall


 


COMPARISON STUDY:  Head CT April 30, 2021. 


 


CT DOSE: 1365.42 mGy.cm


 


TECHNIQUE: Helical axial images of the head were obtained without IV contrast. 

Automated exposure control was utilized for the study.  A dose lowering techniqu

e was utilized adhering to the principles of ALARA.


 


 


FINDINGS: No acute intracranial hemorrhage, midline shift or mass effect is 

present. Ventricular system is stable. Basilar cisterns are patent. There are no

extra axial collections. Bifrontal encephalomalacia, greater on the left, is 

unchanged. Encephalomalacia within the anterior left temporal lobe is also 

unchanged. There is no calvarial fracture. There are no findings to suggest 

acute dural sinus thrombosis or acute territorial infarct. A 6 mm sclerotic 

lesion within the frontal bone is unchanged.


 


IMPRESSION:  No acute intracranial findings. No change in appearance of the 

brain.


 


 


 


: Negative or not required by law.


 


 


 


 


Electronically signed by:  Watson Francis M.D.


6/7/2021 7:38 PM








Chest X-Ray  06/07/21 16:16


XR chest 1V portable


 


CLINICAL HISTORY: Chest Pain


 


COMPARISON STUDY:  Chest radiograph April 30, 2021.


 


FINDINGS: Lung volumes are normal. Lungs are clear. There is no pneumothorax or 

pleural effusion. Cardiac size is normal. Mediastinal contours are normal. There

is no evidence for pulmonary edema. Several old bilateral fractures are noted. 

Apparent increased sclerosis of the diaphysis of the left humerus is noted.


 


IMPRESSION:  No acute cardiopulmonary findings. 


 


 


: Negative or not required by law.


 


 


 


 


Electronically signed by:  Watson Francis M.D.


6/7/2021 5:42 PM








Shoulder X-Ray  06/07/21 16:32


XR shoulder LT min 2V routine


 


CLINICAL HISTORY: Left shoulder pain following fall.


 


COMPARISON: Chest radiograph April 30, 2021.


 


FINDINGS:  Alignment of the left acromioclavicular and glenohumeral joints is 

seen in anatomic. Note is made of an acute comminuted fracture within the left 

humeral head and neck. Fracture through the greater tuberosity is minimally 

displaced. There may be increased sclerosis of the diaphysis of the left humer

us. 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 There 

is mild osteoarthritis of the left glenohumeral joint. Several left orbit 

fractures are incidentally noted.


 


IMPRESSION: 


 


1. Acute fracture of the left humeral head and neck. Fracture essentially 

nondisplaced. Fracture through greater tuberosity minimally displaced.


 


2. Possible increased sclerosis of the diaphysis of the left humerus. This is 

probably artifactual however skeletal lesions could appear similar.


 


 


 


: Negative or not required by law.


 


 


 


 


Electronically signed by:  Watson Francis M.D.


6/7/2021 5:28 PM














Discharge Plan


Visit Data


Chief Complaint: Hematuria





Stated Complaint: HUMATURIA





ED Provider: Gera Braun





Discharge Problem:


 Severe anemia, Alcohol withdrawal, Metabolic acidosis, Acute hypernatremia, 

Acute renal failure, Hypomagnesemia, Gross hematuria, Closed fracture of left 

proximal humerus








Patient Disposition: Admitted As Inpatient





Discharge Instructions


Interventions:


ED Discharge Assessment   Last Done: 06/07/21 22:26











Discharge Problem:


Alcohol withdrawal


Qualifiers:


 Complication of substance-induced condition: with delirium Qualified Code(s): 

F10.231 - Alcohol dependence with withdrawal delirium

## 2021-06-07 NOTE — HISTORY & PHYSICAL REPORT
Date of Service


June 7, 2021


 





Assessment & Plan


(1) Encephalopathy: 


      Multifactorial :





Alcohol withdrawal


Hyponatremia, ARF secondary to illness


Hypoxemic respiratory failure secondary to COPD exacerbation





Severe anemia, hemoglobin drop from baseline secondary to multiple sources of 

bleeding : 


UGI B in a newly diagnosed cirrhotic patient based on CT


 bleed (history chronic hematuria, metastatic prostate cancer status post 

surgery status post chemotherapy/Lupron therapy)


Traumatic left shoulder ecchymosis





hypertension, stable 


seizure disorder (medication noncompliance) 


Steroid-induced hyperglycemia rule out DM


Ongoing tobacco abuse





ICU monitoring


ALEC S/DT precautions/seizure precautions


Monitor creatinine response to IVF (utilize half-normal IV fluid for now), hold 

home diuretic for now


BiPAP, recheck ABG


Steroid course, nebs RTC





Transfuse PRBC to maintain hemoglobin greater than 7, anemia work-up


IV PPI, Ceftriaxone for SBP prophylaxis in a cirrhotic patient with UGIB


GI consult Re: UGI B





Urology consult Re: Hematuria


Orthopedics consult Re: Left humeral fracture





Check hemoglobin A1c





Nicotine patch as needed





DVT prophylaxis.  SCDs RE left shoulder/GI/ bleed causing severe anemia


Full code








Attempted to contact patient's cousin (Mr. Denzel Ferrer, [phone]) 

over the phone to obtain additional history and provide update on patient 

situation and plan of care.  No answer.





Patient ex-girlfriend/alternative contact (Ms. Jamia Rebollar, [phone]) we will 

try to reach patient cussing a.m.








Total critical time was 50 minutes.

















History of Present Illness


Primary Care Provider: Dorian Castellon MD





 History obtained from patient, patient ex-girlfriend and records.  


Unable to obtain history from patient secondary to obtunded state.  





Medical history is significant for hypertension, chronic obstructive pulmonary 

disease,


ongoing tobacco/ alcohol abuse, seizure disorder (medication noncompliance), 


chronic hematuria, metastatic prostate cancer status post surgery status post 

chemotherapy /Lupron therapy, 


chronic anemia (baseline hemoglobin of 11). 





Last confinement


July 2018 for angioedema secondary to lisinopril and alcohol withdrawal.





Recent ER visit May 2021 for gross hematuria attributed to cystitis.


Patient discharged on Cefdinir course.





Patient ex-girlfriend last spoke to patient a few weeks ago.


Patient still drinking alcohol excessively as per girlfriend.


Not taking anti-seizure medications for over a year which patient claims was 

making him pass out.





Patient had a fall at his apartment today where he lives alone.


Upon EMS arrival, patient found on the floor with blood over the apartment and 

empty vodka bottles throughout.  Patient noted to be confused, dizzy, weak and 

short of breath.  Noted to have inspiratory wheezing.


Patient noted to have a large bruise on the left shoulder which patient blamed 

on hitting the large necklace off a home nursing aide.





Patient brought to the ER for evaluation.





Solu-Medrol, neb treatment given for possible COPD exacerbation.


BiPAP subsequently initiated for worsening respiratory distress.





2 units packed RBC transfused at the ER with note of hemoglobin of 2.














MED history as above :





No documentation of prior EGDs on outpatient records.


Normal colonoscopy from 2011.





SURGERIES: Radical prostatectomy with lymphadenectomy, umbilical hernia repair, 

knee surgery, prostate biopsy, vasectomy, cholecystectomy.


 


ALLERGIES:  No known drug allergies.


 


FAMILY HISTORY:  Diabetes, heart disease, prostate cancer, ALS.


 


PERSONAL AND SOCIAL HISTORY:  One-fourth pack daily.  Alcohol abuse as per 

records.  Disabled.


 Allergies 














Allergy/AdvReac Type Severity Reaction Status Date / Time


 


lisinopril Allergy Severe ANGIOEDEMA Verified 06/07/21 17:09


 


oxybutynin [From Ditropan] AdvReac Unknown fever & Verified 06/07/21 17:09





   perhaps  





   seizures,  





   was  





   detoxing  





   @time  








 Home Medications 


                                        











 Medication  Instructions  Recorded  Confirmed  Type


 


folic acid 1 mg PO QAM 04/02/19 06/07/21 History


 


furosemide 20 mg PO QAM 06/02/19 06/07/21 History


 


fluticasone propionate 2 spray INTRANASAL DAILY 04/30/21 06/07/21 History


 


trazodone 300 mg PO HS 04/30/21 06/07/21 History








 








Past Med/Surg History


Medical History (Reviewed 06/08/21 @ 02:21 by Gera Braun MD)





Altered mental status


Confusion


Depression


Elevated troponin


History of torn meniscus of left knee


History of torn meniscus of right knee


Prostate cancer (06/02/14)


   "Rising PSA


   Status post ultrasound-guided biopsies 06/02/2014


   Biopsy stage T2c Nano 4+5 with perineural invasion


   Status post robotic prostatectomy 08/25/2014


   Pathologic stage [id number] Mertzon 4+4 Tertiary 5


   Post prostatectomy rising PSA


   Status post completion of radiation therapy 05/19/2015 received 7040 cGy"


   


   *** On 05/26/15 16:14 Jeanine Horowitz wrote ***


   "Rising PSA


   Status post ultrasound-guided biopsies 06/02/2014


   Biopsy stage T2c Mertzon 4+5 with perineural invasion


   Status post robotic prostatectomy 08/25/2014


   Pathologic stage [id number] Mertzon 4+4


   Post prostatectomy rising PSA


   Status post completion of radiation therapy 05/19/2015 received 7040 cGy"


Psychiatric exam requested by authority


Right knee sprain


UTI (urinary tract infection)








Surgical History (Reviewed 06/08/21 @ 02:21 by Gera Braun MD)





H/O knee surgery


H/O prostate biopsy





Family History (Reviewed 06/08/21 @ 02:21 by Gera Braun MD)


Other   No pertinent family history











Social History (Reviewed 06/08/21 @ 02:21 by Gera Braun MD)


Smoking Status:  Unknown if ever smoked 


Tobacco Type:  Cigarettes 


Hx Alcohol Use:  Yes 


Preferred Language:  English 


Feels Safe at Home:  Yes 














Review of Systems








Review of Systems:   Could not be reliably obtained











Physical Exam








Physical Exam:   


GENERAL: Obtunded, restless, respiratory distress





SKIN: Pallor, warm





HEENT: Pale palpebral conjunctivae, no ptosis, dry buccal mucosa, BiPAP in place





NECK : Supple, no tenderness





CHEST : Decreased breath sounds, occasional expiratory wheezes 





HEART : Tachycardic, no obvious murmurs





ABDOMEN: Some distention, hypogastric tenderness 





RECTAL : Intact sphincter, dark stool (FOBT positive)





EXTREMITIES : No LE swelling/tenderness, tender ecchymosis left shoulder, no 

other conspicuous deformities noted 





NEUROLOGIC : Obtunded, no facial asymmetry, no other gross focality














Results & Data


Results & Data ([facility])


Vital Signs (Past 12 Hours)


                                   Vital Signs











  Temp Pulse Pulse Resp BP BP Pulse Ox


 


 06/07/21 21:06  36.6 C      


 


 06/07/21 20:51  36.8 C      


 


 06/07/21 20:47   120 H   17  117/94   97


 


 06/07/21 20:36  36.5 C      


 


 06/07/21 20:31   121 H   30 H  153/81 H   98


 


 06/07/21 20:16   123 H   27 H  144/101 H   91


 


 06/07/21 20:13  36.7 C      


 


 06/07/21 20:01   120 H   28 H  145/82 H   100


 


 06/07/21 19:53    119 H  24    100


 


 06/07/21 19:48   119 H   32 H    98


 


 06/07/21 19:46   127 H   22  105/70   92


 


 06/07/21 19:43  36.5 C      


 


 06/07/21 19:28  36.5 C  128 H   28 H  109/70   90


 


 06/07/21 19:19    131 H  24   109/70  92


 


 06/07/21 19:11  36.6 C  136 H   24  120/50 L   100


 


 06/07/21 18:08    123 H  23    97


 


 06/07/21 17:01   122 H   25 H    98


 


 06/07/21 17:00   120 H   26 H  111/58 L   100


 


 06/07/21 16:50   126 H   27 H    98


 


 06/07/21 16:40   128 H   29 H    97


 


 06/07/21 16:30   123 H   26 H    97


 


 06/07/21 16:22   102 H   20    97


 


 06/07/21 16:20   124 H   30 H    97


 


 06/07/21 16:10   123 H   25 H    96


 


 06/07/21 16:01   124 H   31 H    95


 


 06/07/21 16:00   124 H   31 H  112/64   96


 


 06/07/21 15:55   122 H   28 H  124/62   100


 


 06/07/21 15:50   122 H   29 H    100


 


 06/07/21 15:42  36.5 C  127 H  127 H  20  126/64  126/64  100


 


 06/07/21 15:40   123 H   29 H    100


 


 06/07/21 15:30   129 H   29 H    100


 


 06/07/21 15:29   130 H   29 H  126/64   100











Laboratory Results





                               Laboratory Results











WBC  7.32 K/uL (4.8-10.8)   06/07/21  18:25    


 


RBC  0.81 M/uL (4.7-6.1)  L  06/07/21  18:25    


 


Hgb  2.4 g/dL (14.0-18.0)  L*  06/07/21  18:25    


 


Hct  8.2 % (42-52)  L*  06/07/21  18:25    


 


MCV  101.2 fL ()  H  06/07/21  18:25    


 


MCH  29.6 pg (25-34)   06/07/21  18:25    


 


MCHC  29.3 g/dL (32-36)  L  06/07/21  18:25    


 


RDW Std Deviation  59.4 fL (36.4-46.3)  H  06/07/21  17:55    


 


RDW Coeff of Nell  16.9 % (11.5-14.5)  H  06/07/21  17:55    


 


Plt Count  205 K/uL (130-400)   06/07/21  18:25    


 


MPV  9.3 fL (7.4-10.4)   06/07/21  17:55    


 


Immature Gran % (Auto)  0.3 %  06/07/21  18:25    


 


Neut % (Auto)  75.3 %  06/07/21  18:25    


 


Lymph % (Auto)  12.6 %  06/07/21  18:25    


 


Mono % (Auto)  11.6 %  06/07/21  18:25    


 


Eos % (Auto)  0.1 %  06/07/21  18:25    


 


Baso % (Auto)  0.1 %  06/07/21  18:25    


 


Neut # (Auto)  5.51 K/uL (1.4-6.5)   06/07/21  18:25    


 


Lymph # (Auto)  0.92 K/uL (1.2-3.4)  L  06/07/21  18:25    


 


Mono # (Auto)  0.85 K/uL (0.11-0.59)  H  06/07/21  18:25    


 


Eos # (Auto)  0.01 K/uL (0-0.5)   06/07/21  18:25    


 


Baso # (Auto)  0.01 K/uL (0-0.2)   06/07/21  18:25    


 


Immature Gran # (Auto)  0.02 K/uL (0.00-0.02)   06/07/21  18:25    


 


Absolute Nucleated RBC  Cancelled   06/07/21  16:41    


 


Nucleated RBC % (auto)  Cancelled   06/07/21  16:41    


 


Neutrophils % (Manual)  Cancelled   06/07/21  16:41    


 


Band Neutrophils %  Cancelled   06/07/21  16:41    


 


Lymphocytes % (Manual)  Cancelled   06/07/21  16:41    


 


Prolymphocyte %  Cancelled   06/07/21  16:41    


 


Reactive Lymphs % (Man)  Cancelled   06/07/21  16:41    


 


Monocytes % (Manual)  Cancelled   06/07/21  16:41    


 


Eosinophils % (Manual)  Cancelled   06/07/21  16:41    


 


Basophils % (Manual)  Cancelled   06/07/21  16:41    


 


Metamyelocytes % (Man)  Cancelled   06/07/21  16:41    


 


Myelocytes % (Man)  Cancelled   06/07/21  16:41    


 


Promyelocytes % (Man)  Cancelled   06/07/21  16:41    


 


Blast Cells % (Manual)  Cancelled   06/07/21  16:41    


 


Plasma Cell % (Manual)  Cancelled   06/07/21  16:41    


 


Other Cells %  Cancelled   06/07/21  16:41    


 


Nucleated RBC %  Cancelled   06/07/21  16:41    


 


Neutrophils # (Manual)  Cancelled   06/07/21  16:41    


 


Band Neutrophils #  Cancelled   06/07/21  16:41    


 


Total Absolute Neuts  Cancelled   06/07/21  16:41    


 


Lymphocytes # (Manual)  Cancelled   06/07/21  16:41    


 


Prolymphocyte #  Cancelled   06/07/21  16:41    


 


Reactive Lymphs #  Cancelled   06/07/21  16:41    


 


Total Abs Lymphocytes  Cancelled   06/07/21  16:41    


 


Monocytes # (Manual)  Cancelled   06/07/21  16:41    


 


Eosinophils # (Manual)  Cancelled   06/07/21  16:41    


 


Basophils # (Manual)  Cancelled   06/07/21  16:41    


 


Metamyelocytes # (Man)  Cancelled   06/07/21  16:41    


 


Myelocytes # (Manual)  Cancelled   06/07/21  16:41    


 


Promyelocytes # (Man)  Cancelled   06/07/21  16:41    


 


Blast Cells # (Man)  Cancelled   06/07/21  16:41    


 


Plasma Cell # (Manual)  Cancelled   06/07/21  16:41    


 


Other Cells #  Cancelled   06/07/21  16:41    


 


Nucleated RBCs # (Man)  Cancelled   06/07/21  16:41    


 


Hypersegmented Neuts  Cancelled   06/07/21  16:41    


 


Hyposegmented Neuts  Cancelled   06/07/21  16:41    


 


Hypogranular Neuts  Cancelled   06/07/21  16:41    


 


Large Granular Lymphs  Cancelled   06/07/21  16:41    


 


# Lrg Granular Lymphs  Cancelled   06/07/21  16:41    


 


Hairy Cells  Cancelled   06/07/21  16:41    


 


Smudge Cells  Cancelled   06/07/21  16:41    


 


Toxic Granulation  Cancelled   06/07/21  16:41    


 


Toxic Vacuolation  Cancelled   06/07/21  16:41    


 


Dohle Bodies  Cancelled   06/07/21  16:41    


 


Fadi Rods  Cancelled   06/07/21  16:41    


 


Platelet Estimate  Cancelled   06/07/21  16:41    


 


Hypogranular Platelets  Cancelled   06/07/21  16:41    


 


Clumped Platelets  Cancelled   06/07/21  16:41    


 


Giant Platelets  Cancelled   06/07/21  16:41    


 


Platelet Satelliting  Cancelled   06/07/21  16:41    


 


RBC Morphology  Cancelled   06/07/21  16:41    


 


Polychromasia  Cancelled   06/07/21  16:41    


 


Hypochromasia  Present   06/07/21  18:25    


 


Poikilocytosis  Cancelled   06/07/21  16:41    


 


Basophilic Stippling  Cancelled   06/07/21  16:41    


 


Anisocytosis  Cancelled   06/07/21  16:41    


 


Microcytosis  Cancelled   06/07/21  16:41    


 


Macrocytosis  Cancelled   06/07/21  16:41    


 


Spherocytes  Cancelled   06/07/21  16:41    


 


Pappenheimer Bodies  Cancelled   06/07/21  16:41    


 


Sickle Cells  Cancelled   06/07/21  16:41    


 


Target Cells  Cancelled   06/07/21  16:41    


 


Tear Drop Cells  Cancelled   06/07/21  16:41    


 


Ovalocytes  Cancelled   06/07/21  16:41    


 


Stomatocytes  Cancelled   06/07/21  16:41    


 


Cason-Jolly Bodies  Cancelled   06/07/21  16:41    


 


Echinocytes  Cancelled   06/07/21  16:41    


 


Acanthocytes (Spur)  Cancelled   06/07/21  16:41    


 


Rouleaux  Cancelled   06/07/21  16:41    


 


RBC Agglutinates  Cancelled   06/07/21  16:41    


 


Schistocytes  Cancelled   06/07/21  16:41    


 


RBC Morph Comment  Cancelled   06/07/21  16:41    


 


Sezary Cell  Cancelled   06/07/21  16:41    


 


PT  12.8 Seconds (9.0-12.0)  H  06/07/21  17:55    


 


INR  1.3  (0.9-1.1)  H  06/07/21  17:55    


 


APTT  Cancelled   06/07/21  17:55    


 


PTT Ratio  Cancelled   06/07/21  17:55    


 


VBG pH  7.44  (7.36-7.41)  H  06/07/21  17:55    


 


VBG pCO2  29 mmHg (38-50)  L  06/07/21  17:55    


 


VBG pO2  24 mmHg  06/07/21  17:55    


 


VBG HCO3  19 mmol/L  06/07/21  17:55    


 


VBG O2 Saturation  63.6 %  06/07/21  17:55    


 


VBG Base Excess  -4.5 mEq/L  06/07/21  17:55    


 


Barometric Pressure  733.2 mm/Hg  06/07/21  17:55    


 


Sodium  150 mmol/L (136-145)  H  06/07/21  15:53    


 


Potassium  4.0 mmol/L (3.5-5.1)   06/07/21  15:53    


 


Chloride  120 mmol/L ()  H  06/07/21  15:53    


 


Carbon Dioxide  15 mmol/L (21-32)  L  06/07/21  15:53    


 


Anion Gap  15.0  (3-11)  H  06/07/21  15:53    


 


BUN  40 mg/dl (7-18)  H  06/07/21  15:53    


 


Creatinine  2.22 mg/dl (0.6-1.4)  H  06/07/21  15:53    


 


Est Cr Clr Drug Dosing  31.7 ml/min  06/07/21  15:53    


 


Est GFR ( Amer)  35.5 ml/min  06/07/21  15:53    


 


Est GFR (Non-Af Amer)  30.6 ml/min  06/07/21  15:53    


 


BUN/Creatinine Ratio  18.0  (10-20)   06/07/21  15:53    


 


Glucose  119 mg/dl (70-99)  H  06/07/21  15:53    


 


Osmolality  323 mOsm/kg (280-300)  H  06/07/21  17:42    


 


Lactate  3.8 mmol/L (0.4-2.0)  H*  06/07/21  17:55    


 


Calcium  7.7 mg/dl (8.5-10.1)  L  06/07/21  15:53    


 


Phosphorus  4.3 mg/dl (2.5-4.9)   06/07/21  15:53    


 


Magnesium  1.7 mg/dl (1.8-2.4)  L  06/07/21  15:53    


 


Total Bilirubin  1.4 mg/dl (0.2-1)  H  06/07/21  15:53    


 


Direct Bilirubin   mg/dl (0-0.2)   06/07/21  15:53    


 


AST  69 U/L (15-37)  H  06/07/21  15:53    


 


ALT  38 U/L (12-78)   06/07/21  15:53    


 


Alkaline Phosphatase  133 U/L ()  H  06/07/21  15:53    


 


Total Creatine Kinase  258 U/L ()   06/07/21  15:53    


 


Troponin I  < 0.015 ng/ml (0-0.045)   06/07/21  15:53    


 


Total Protein  6.5 gm/dl (6.4-8.2)   06/07/21  15:53    


 


Albumin  2.4 gm/dl (3.4-5.0)  L  06/07/21  15:53    


 


Globulin  4.1 gm/dl (2.5-4.0)  H  06/07/21  15:53    


 


Albumin/Globulin Ratio  0.6  (0.9-2)  L  06/07/21  15:53    


 


Lipase  204 U/L ()   06/07/21  15:53    


 


Specimen Hemolysis     06/07/21  15:53    


 


Urine Color  Red   06/07/21  15:53    


 


Urine Appearance  Clear  (Clear)   06/07/21  15:53    


 


Urine pH  6.5  (4.5-7.5)   06/07/21  15:53    


 


Ur Specific Gravity  1.025  (1.000-1.030)   06/07/21  15:53    


 


Urine Protein  3+  (Negative)  H  06/07/21  15:53    


 


Urine Glucose (UA)  Negative  (Negative)   06/07/21  15:53    


 


Urine Ketones  Negative  (Negative)   06/07/21  15:53    


 


Urine Blood  3+  (Negative)  H  06/07/21  15:53    


 


Urine Nitrite  Negative  (Negative)   06/07/21  15:53    


 


Urine Bilirubin  2+  (Negative)  H  06/07/21  15:53    


 


Urine Urobilinogen  Negative  (Negative)   06/07/21  15:53    


 


Ur Leukocyte Esterase  Negative  (Negative)   06/07/21  15:53    


 


Urine RBC  >30 /hpf (0-4)  H  06/07/21  15:53    


 


Urine WBC  0-5 /hpf (0-5)   06/07/21  15:53    


 


Ur Epithelial Cells  0-5 /lpf (0-5)   06/07/21  15:53    


 


Urine Bacteria  Negative  (Negative)   06/07/21  15:53    


 


Phenytoin  < 0.4 mcg/ml (10-20)  L  06/07/21  15:53    


 


Ethyl Alcohol mg/dL  < 3.0 mg/dl (0-3)   06/07/21  16:41    


 


COVID-19 Eval Order  Covid19 at [facility]   06/07/21  16:45    


 


SARS-CoV-2 (PCR)  NEGATIVE  (Negative)   06/07/21  16:45    


 


Blood Type  A Positive   06/07/21  16:41    


 


Blood Type Recheck  A Positive   06/07/21  17:55    


 


Antibody Screen  NEGATIVE   06/07/21  16:41    


 


Crossmatch  See Detail   06/07/21  16:41    








                                   Impressions





Abdomen/Pelvis CT  06/07/21 16:15


CT OF THE ABDOMEN AND PELVIS WITH CONTRAST


 


CLINICAL HISTORY: hematuria, metastatic prostate ca


 


COMPARISON STUDY:  Right upper quadrant ultrasound May 19, 2018. CT of the 

abdomen and pelvis June 11, 2014.


 


TECHNIQUE: Following IV administration of 117 mL of Optiray, axial images of the

abdomen and pelvis were obtained from the lung bases to the proximal femurs. 

Images were reviewed in the axial, sagittal, and coronal planes. IV contrast was

administered without complication.  Automated exposure control was utilized for 

the study.  A dose lowering technique was utilized adhering to the principles of

ALARA.


 


FINDINGS: A T12 compression fracture is old. A T11 compression fracture is 

likely subacute. Numerous healing bilateral rib fractures are noted. No acute 

lumbar spine or pelvic fractures identified. Evaluation of the abdomen and 

pelvis is compromised by motion artifact. Nodularity of the left adrenal gland 

is unchanged since CT of June 11, 2014. The liver is cirrhotic. There is a 

recanalized paraumbilical vein. There is no biliary ductal dilatation status 

post cholecystectomy. The spleen, right adrenal gland and pancreas are grossly 

unremarkable. There is mild bilateral collecting system dilatation dilatation. 

There is no evidence for a bowel obstruction. The appendix is normal. A Alas 

balloon and gas within the bladder noted. There is bladder wall thickening. 

Moderate amount of hyperdense material is noted within the dependent aspect the 

bladder. There is likely surgically absent. Note is made of a 2.8 x 0.8 cm 

aortocaval elongated density with adjacent infiltration. This is nonspecific. A 

smaller elongated density adjacent to the left adrenal gland is also noted. No 

pathologic enlarged lymph nodes are present.


 


IMPRESSION:  


 


1. No acute traumatic findings within the abdomen or pelvis.


 


2. Moderate amount of hyperdense material within the dependent aspect of the 

bladder. This favors hemorrhage. Underlying mass is considered less likely but 

cannot be excluded. Bladder wall thickening.


 


3. Cirrhosis with recanalized paraumbilical vein indicative of portal 

hypertension.


 


4. Exam compromised by motion artifact.


 


 


: Negative or not required by law.


 


 


 


 


Electronically signed by:  Watson Francis M.D.


6/7/2021 8:12 PM








Chest CTA  06/07/21 16:15


CT ANGIOGRAPHY OF THE CHEST, PULMONARY EMBOLUS PROTOCOL


 


CLINICAL HISTORY: Metastatic ca, cp, fall, PE


 


COMPARISON STUDY:  Chest CT March 21, 2020. Chest radiograph performed earlier 

today. 


 


TECHNIQUE: Following IV administration of 117 mL of Optiray, helical axial 

images of the chest were obtained utilizing the pulmonary embolus protocol.  

Maximal intensity projections and sagittal and coronal reformats were viewed on 

an independent 3D workstation.  IV contrast was administered without 

complication.  Automated exposure control was utilized for the study.  A dose 

lowering technique was utilized adhering to the principles of ALARA.


 


FINDINGS:  There is no evidence for traumatic injury to the thoracic aorta. No 

central pulmonary emboli are identified. Remainder of pulmonary arteries are 

suboptimally assessed due to respiratory motion. No pneumothorax or pleural 

effusion is noted. Lungs are suboptimally assessed due to respiratory motion. 

There may be minimal groundglass opacity within the left lower lobe. There is no

significant consolidation. Numerous healing bilateral rib fractures are noted. 

There is a compression fracture of the superior endplate of T11 which is likely 

subacute. There is an old T12 compression fracture. Sensitivity for detection of

acute rib fractures is diminished given motion artifact. An acute nondisplaced 

proximal left humeral fracture is noted. Abdomen and pelvis will be reported 

separately.


 


IMPRESSION:  


 


1. Exam significantly compromised by motion artifact. No central pulmonary 

emboli. Remainder of pulmonary arteries suboptimally assessed on this exam.


 


2. Acute nondisplaced proximal left humeral fracture better depicted on the left

shoulder radiographs.


 


3. Numerous healing bilateral rib fractures. No acute rib fractures identified 

although sensitivity diminished given motion artifact.


 


4. T11 compression fracture, likely subacute. Old T12 compression fracture.


 


 


: Negative or not required by law.


 


 


 


 


 


 


 


Electronically signed by:  Watson Francis M.D.


6/7/2021 7:59 PM








Head CT  06/07/21 16:15


CT OF THE HEAD WITHOUT CONTRAST


 


CLINICAL HISTORY: metastatic cancer, confusion, fall


 


COMPARISON STUDY:  Head CT April 30, 2021. 


 


CT DOSE: 1365.42 mGy.cm


 


TECHNIQUE: Helical axial images of the head were obtained without IV contrast. 

Automated exposure control was utilized for the study.  A dose lowering tech

nique was utilized adhering to the principles of ALARA.


 


 


FINDINGS: No acute intracranial hemorrhage, midline shift or mass effect is 

present. Ventricular system is stable. Basilar cisterns are patent. There are no

extra axial collections. Bifrontal encephalomalacia, greater on the left, is 

unchanged. Encephalomalacia within the anterior left temporal lobe is also 

unchanged. There is no calvarial fracture. There are no findings to suggest 

acute dural sinus thrombosis or acute territorial infarct. A 6 mm sclerotic 

lesion within the frontal bone is unchanged.


 


IMPRESSION:  No acute intracranial findings. No change in appearance of the 

brain.


 


 


 


: Negative or not required by law.


 


 


 


 


Electronically signed by:  Watson Francis M.D.


6/7/2021 7:38 PM








Chest X-Ray  06/07/21 16:16


XR chest 1V portable


 


CLINICAL HISTORY: Chest Pain


 


COMPARISON STUDY:  Chest radiograph April 30, 2021.


 


FINDINGS: Lung volumes are normal. Lungs are clear. There is no pneumothorax or 

pleural effusion. Cardiac size is normal. Mediastinal contours are normal. There

is no evidence for pulmonary edema. Several old bilateral fractures are noted. 

Apparent increased sclerosis of the diaphysis of the left humerus is noted.


 


IMPRESSION:  No acute cardiopulmonary findings. 


 


 


: Negative or not required by law.


 


 


 


 


Electronically signed by:  Watson Francis M.D.


6/7/2021 5:42 PM








Shoulder X-Ray  06/07/21 16:32


XR shoulder LT min 2V routine


 


CLINICAL HISTORY: Left shoulder pain following fall.


 


COMPARISON: Chest radiograph April 30, 2021.


 


FINDINGS:  Alignment of the left acromioclavicular and glenohumeral joints is 

seen in anatomic. Note is made of an acute comminuted fracture within the left 

humeral head and neck. Fracture through the greater tuberosity is minimally 

displaced. There may be increased sclerosis of the diaphysis of the left 

humerus. 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 

There is mild osteoarthritis of the left glenohumeral joint. Several left orbit 

fractures are incidentally noted.


 


IMPRESSION: 


 


1. Acute fracture of the left humeral head and neck. Fracture essentially 

nondisplaced. Fracture through greater tuberosity minimally displaced.


 


2. Possible increased sclerosis of the diaphysis of the left humerus. This is 

probably artifactual however skeletal lesions could appear similar.


 


 


 


: Negative or not required by law.


 


 


 


 


Electronically signed by:  Watson Francis M.D.


6/7/2021 5:28 PM











Diagnostic Findings


EKG as per my interpretation: Rate 125, junctional rhythm








Code Status & VTE Plan


VTE Prophylaxis Plan


VTE Prophylaxis will be ordered: Yes

## 2021-06-07 NOTE — CRITICAL CARE CONSULTATION
Date of Consultation


June 7, 2021


 





Assessment & Plan


(1) Severe anemia: 


      Reason Critically Ill: 60-year-old male with acute blood loss anemia with 

hemoglobin to from multiple potential sources with primary source bladder 

hemorrhage with hematuria, and alcohol withdrawal.  Patient receiving 4 units 

RBCs and being admitted to ICU at this time.





Neuro -


Alcohol withdrawal/encephalopathylikely alcohol withdrawal as patient is known 

alcoholic with current use.  EtOH negative on admission


   -History of seizures and noncompliant with medications, continue with seizure

precaution


   -ALEC S protocol, IV Ativan as needed


   -Thiamine, folic acid, multivitamin


   -CT head negative for acute intracranial finding


   -Mildly elevated BUN, LFTs mildly elevated as well.  Ammonia pending





Cardiac -


Normal sinus rhythm and hemodynamically stable on monitor.  Continuous 

monitoring on telemetry


Troponin negative





Respiratory -


Patient placed on BiPAP in the emergency department however there is no 

significant hypoxia or hypercapnia on ABG.  


History COPD?  Not on outpatient meds.  Does have history of tobacco abuse


Transition to nasal cannula in the ICU and maintaining oxygen saturation.  

Continuous monitoring on pulse ox


Nebs as needed


Chest x-ray without acute cardiopulmonary findings


CTA chest negative for PE





GI - 


CirrhosisCT abdomen pelvis with cirrhosis with recanalized periumbilical vein 

indicating portal hypertension


   -Patient is post cholecystectomy


   -LFTs mildly elevated, will trend for now


   -Ammonia pending


   


RENAL/LYTES -


AKIinitial creatinine 2.22, improving now 1.7


   -Trend BMPs and replete as indicated


   -Continue gentle fluid resuscitation and blood transfusions as indicated


   -Avoid nephrotoxins and renally adjust medication


   -Monitor





 - 


Hematuriasuspected as primary source of acute blood loss anemia


   -CT abdomen pelvis showing moderate amount of hyperdense material within the 

dependent aspect of the bladder favoring hemorrhage.  Underlying mass considered

less likely but cannot be excluded per CT report.


   -History of metastatic prostate cancer, status post surgery, status post 

chemotherapy/Lupron therapy


   -Urology consulted and aware, plan to optimize patient with transfusions 

overnight prior to intervention





ENDO - 


No history of diabetes or thyroid disease, ICU hyperglycemic protocol





HEME -


Severe anemiapatient with initial hemoglobin of 2.4 on arrival to ER, now 

improved to 8.3 following 4 unit RBC transfusion


   -CT abdomen and pelvisno acute traumatic findings within the abdomen or 

pelvis.  Hyperdense material within the dependent aspect of the bladder favoring

hemorrhage.


      -Given patient's hematuria would suspect this is primary source of blee

ding and urology is aware


   -Patient also with heme positive stool.  No melena passed since arrival to 

ICU.  Unfortunately patient encephalopathic and cannot obtain clear history.


      -Started on Protonix drip in ED


      -GI consulted, will follow recommendations


   -Trend hemoglobin every 6 hours and transfuse as indicated


   -No coagulopathy at this time.  We will hold on FFP/cryo at this time


   -Anemic work-up pending


   -Trend CBCs





ID -


Ceftriaxone for SBP prophylaxis in cirrhotic patient with questionable upper GI 

bleed





Orthoacute fracture of the left humeral head and neck.  Fracture essentially no

ndisplaced placed.  Fracture through greater tuberosity minimally displaced


   -Consult to Ortho





LINES/IV ACCESS - 


Peripheral IVs





DVT PROPHYLAXIS -


SCDs





I have personally spent 55 minutes of critical care time in the direct 

management of this patient. This is a life/limb threatening event. This includes

time spent evaluating patient, direct bedside care, chart review, placing 

orders, interpretation of diagnostic studies, discussion with consultants, 

patient, and family members, as well as other required patient management 

activities. 


This time is exclusive of all separately billable procedures, and teaching time 

and separate from and in addition to any other critical care service time.





Thank you for allowing us to participate in the care of this patient.  Please 

refer to my attending physician's documentation for any further recommendations.





(2) Alcohol withdrawal: 


(3) Metabolic acidosis: 


(4) Gross hematuria: 


(5) Closed fracture of left proximal humerus: 


(6) Encephalopathy: 


(7) Prostate cancer: 


(8) LARA (acute kidney injury):





History of Present Illness


Attending Physician: Jarrod Loving MD








History of Present Illness


60-year-old male with PMH including alcohol and tobacco abuse, seizure disorder 

(medication noncompliance), chronic hematuria, metastatic prostate cancer status

post surgery status post chemo/Lupron therapy, HTN, COPD.  He was recently 

admitted to the emergency department in May for gross hematuria which was 

attributed to cystitis and was discharged on cefdinir course.  Patient had a 

fall earlier in the day at his apartment.  EMS found the patient on the floor 

with blood all over the apartment an empty bottle of vodka.  Patient was noted 

to be confused and weak and short of breath.  Was noted to have inspiratory 

wheezing.  He was also noted to have a large bruise on his left shoulder.  

Patient was placed on BiPAP for respiratory distress and transferred to the 

emergency department where he was found to have a hemoglobin of 2.  Was also 

found to have an acute  fracture of the left humeral head and neck with fracture

essentially nondisplaced, fracture through the greater tuberosity minimally 

displaced.  Patient had a recent admission for gross hematuria which was thought

to be due to UTI and he completed treatment.  He is also noted to have gross 

hematuria on this admission which is suspected to be primary source of anemia, 

however he also has a heme positive stool sample.  Consults to urology, 

gastroenterology, Ortho.  Per my conversation with hospitalist, urology 

recommended optimizing patient in ICU overnight with transfusion.  Plan for 

intervention in a.m.  Patient is to receive 4 units RBCs.  No coagulopathy 

abnormalities at this time.  Will admit to ICU for further management.


Allergies














Allergy/AdvReac Type Severity Reaction Status Date / Time


 


lisinopril Allergy Severe ANGIOEDEMA Verified 06/07/21 17:09


 


oxybutynin [From Ditropan] AdvReac Unknown fever & Verified 06/07/21 17:09





   perhaps  





   seizures,  





   was  





   detoxing  





   @time  











Home Medications


                                        











 Medication  Instructions  Recorded  Confirmed  Type


 


folic acid 1 mg PO QAM 04/02/19 06/07/21 History


 


furosemide 20 mg PO QAM 06/02/19 06/07/21 History


 


fluticasone propionate 2 spray INTRANASAL DAILY 04/30/21 06/07/21 History


 


trazodone 300 mg PO HS 04/30/21 06/07/21 History














Patient History


Medical History (Reviewed 06/08/21 @ 05:51 by Gilbert Mcrae PA-C)





Altered mental status


Confusion


Depression


Elevated troponin


History of torn meniscus of left knee


History of torn meniscus of right knee


Prostate cancer (06/02/14)


   "Rising PSA


   Status post ultrasound-guided biopsies 06/02/2014


   Biopsy stage T2c Sidney 4+5 with perineural invasion


   Status post robotic prostatectomy 08/25/2014


   Pathologic stage [id number] Sidney 4+4 Tertiary 5


   Post prostatectomy rising PSA


   Status post completion of radiation therapy 05/19/2015 received 7040 cGy"


   


   *** On 05/26/15 16:14 Jeanine Horowitz wrote ***


   "Rising PSA


   Status post ultrasound-guided biopsies 06/02/2014


   Biopsy stage T2c Nano 4+5 with perineural invasion


   Status post robotic prostatectomy 08/25/2014


   Pathologic stage [id number] Nano 4+4


   Post prostatectomy rising PSA


   Status post completion of radiation therapy 05/19/2015 received 7040 cGy"


Psychiatric exam requested by authority


Right knee sprain


UTI (urinary tract infection)








Surgical History (Reviewed 06/08/21 @ 05:51 by Gilbert Mcrae PA-C)





H/O knee surgery


H/O prostate biopsy





Family History (Reviewed 06/08/21 @ 05:51 by Gilbert Mcrae PA-C)


Other   No pertinent family history











Social History (Reviewed 06/08/21 @ 05:51 by Gilbert Mcrae PA-C)


Smoking Status:  Unknown if ever smoked 


Tobacco Type:  Cigarettes 


Preferred Language:  English 


 Required:  No 


Current Living Situation Comment:  unknown 


Feels Safe at Home:  Yes 


Assistive Devices:  BiPap and Oxygen - Continuous 











Review of Systems








Review of Systems:   Unobtainable due to cognitive status  








Physical Exam








Constitutional:   + ill appearing and + thin    Confused


 


Eyes:   PERRL, conjunctivae normal, anicteric sclerae  


 


ENMT:   external ear and nose normal, oropharynx normal  


 


Neck:   trachea midline, no thyromegaly  


 


Respiratory:   normal respiratory effort, lungs clear to auscultation  


 


Cardiovascular:   RRR, no murmur, no edema    Heart Sounds: normal S1 and normal

S2    Vessels: no JVD    Extremities: normal capillary refill; no edema  


 


Gastrointestinal (Abdomen):   normal bowel sounds, soft, nontender, no 

hepatosplenomegaly  


 


Musculoskeletal:   no cyanosis or clubbing, extremities motor strength 5/5  


 


Skin:   no rashes, warm and dry  


 


Neurologic:   Patient confused, however PERRLA, no facial palsy, no dysarthria, 

symmetrical strength and movement


 


Psychiatric:   Orientation: oriented to person; + uncooperative    Eye Contact: 

+ poor eye contact    Motor Behavior: + tremor  


 


Genitourinary:   Indwelling Alas catheter with hematuria urine output








Results & Data


Results & Data ([facility])


Vital Signs (Past 12 Hours)


                                   Vital Signs











  Temp Pulse Pulse Resp BP BP Pulse Ox


 


 06/07/21 22:56        97


 


 06/07/21 22:52   105 H   32 H    97


 


 06/07/21 22:32  36.3 C L      


 


 06/07/21 22:30   91 H   40 H  127/89   100


 


 06/07/21 22:17  36.4 C L  96 H   23  141/94 H   94


 


 06/07/21 22:15   92 H   20  120/72   100


 


 06/07/21 22:01  36.6 C      


 


 06/07/21 22:00   100 H   24  140/95   100


 


 06/07/21 21:46   101 H   21  138/80   98


 


 06/07/21 21:37        97


 


 06/07/21 21:36  36.9 C      


 


 06/07/21 21:30   93 H   21  119/79   100


 


 06/07/21 21:16   102 H   22  141/73 H   100


 


 06/07/21 21:06  36.6 C      


 


 06/07/21 21:00   116 H   22  114/99   91


 


 06/07/21 20:51  36.8 C      


 


 06/07/21 20:47   120 H   17  117/94   97


 


 06/07/21 20:36  36.5 C      


 


 06/07/21 20:31   121 H   30 H  153/81 H   98


 


 06/07/21 20:16   123 H   27 H  144/101 H   91


 


 06/07/21 20:13  36.7 C      


 


 06/07/21 20:01   120 H   28 H  145/82 H   100


 


 06/07/21 19:53    119 H  24    100


 


 06/07/21 19:48   119 H   32 H    98


 


 06/07/21 19:46   127 H   22  105/70   92


 


 06/07/21 19:43  36.5 C      


 


 06/07/21 19:28  36.5 C  128 H   28 H  109/70   90


 


 06/07/21 19:19    131 H  24   109/70  92


 


 06/07/21 19:11  36.6 C  136 H   24  120/50 L   100


 


 06/07/21 18:08    123 H  23    97


 


 06/07/21 17:01   122 H   25 H    98


 


 06/07/21 17:00   120 H   26 H  111/58 L   100


 


 06/07/21 16:50   126 H   27 H    98


 


 06/07/21 16:40   128 H   29 H    97


 


 06/07/21 16:30   123 H   26 H    97


 


 06/07/21 16:22   102 H   20    97


 


 06/07/21 16:20   124 H   30 H    97


 


 06/07/21 16:10   123 H   25 H    96


 


 06/07/21 16:01   124 H   31 H    95


 


 06/07/21 16:00   124 H   31 H  112/64   96


 


 06/07/21 15:55   122 H   28 H  124/62   100


 


 06/07/21 15:50   122 H   29 H    100


 


 06/07/21 15:42  36.5 C  127 H  127 H  20  126/64  126/64  100


 


 06/07/21 15:40   123 H   29 H    100


 


 06/07/21 15:30   129 H   29 H    100


 


 06/07/21 15:29   130 H   29 H  126/64   100














Coding


Level of Care Code


Critical Care 1st 30-74 mins





Diagnoses


Severe anemia  D64.9


Alcohol withdrawal  F10.231


      Complication of substance-induced condition: with delirium


Metabolic acidosis  E87.2


Gross hematuria  R31.0


Closed fracture of left proximal humerus  S42.202A


Encephalopathy  G93.40


Prostate cancer  C61


LARA (acute kidney injury)  N17.9





(1) Alcohol withdrawal


  Complication of substance-induced condition: with delirium  Qualified Code(s):

F10.231 - Alcohol dependence with withdrawal delirium

## 2021-06-07 NOTE — CT SCAN REPORT
CT OF THE HEAD WITHOUT CONTRAST



CLINICAL HISTORY: metastatic cancer, confusion, fall



COMPARISON STUDY:  Head CT April 30, 2021. 



CT DOSE: 1365.42 mGy.cm



TECHNIQUE: Helical axial images of the head were obtained without IV contrast. Automated exposure con
trol was utilized for the study.  A dose lowering technique was utilized adhering to the principles o
f ALARA.





FINDINGS: No acute intracranial hemorrhage, midline shift or mass effect is present. Ventricular syst
em is stable. Basilar cisterns are patent. There are no extra axial collections. Bifrontal encephalom
alacia, greater on the left, is unchanged. Encephalomalacia within the anterior left temporal lobe is
 also unchanged. There is no calvarial fracture. There are no findings to suggest acute dural sinus t
hrombosis or acute territorial infarct. A 6 mm sclerotic lesion within the frontal bone is unchanged.




IMPRESSION:  No acute intracranial findings. No change in appearance of the brain.







: Negative or not required by law.









Electronically signed by:  Watson Francis M.D.

6/7/2021 7:38 PM

## 2021-06-07 NOTE — CT SCAN REPORT
CT OF THE ABDOMEN AND PELVIS WITH CONTRAST



CLINICAL HISTORY: hematuria, metastatic prostate ca



COMPARISON STUDY:  Right upper quadrant ultrasound May 19, 2018. CT of the abdomen and pelvis June 11
, 2014.



TECHNIQUE: Following IV administration of 117 mL of Optiray, axial images of the abdomen and pelvis w
ere obtained from the lung bases to the proximal femurs. Images were reviewed in the axial, sagittal,
 and coronal planes. IV contrast was administered without complication.  Automated exposure control w
as utilized for the study.  A dose lowering technique was utilized adhering to the principles of RAJAT GUZMAN.



FINDINGS: A T12 compression fracture is old. A T11 compression fracture is likely subacute. Numerous 
healing bilateral rib fractures are noted. No acute lumbar spine or pelvic fractures identified. Eval
uation of the abdomen and pelvis is compromised by motion artifact. Nodularity of the left adrenal gl
and is unchanged since CT of June 11, 2014. The liver is cirrhotic. There is a recanalized paraumbili
janeth vein. There is no biliary ductal dilatation status post cholecystectomy. The spleen, right adrena
l gland and pancreas are grossly unremarkable. There is mild bilateral collecting system dilatation d
ilatation. There is no evidence for a bowel obstruction. The appendix is normal. A Alas balloon and 
gas within the bladder noted. There is bladder wall thickening. Moderate amount of hyperdense materia
l is noted within the dependent aspect the bladder. There is likely surgically absent. Note is made o
f a 2.8 x 0.8 cm aortocaval elongated density with adjacent infiltration. This is nonspecific. A smal
ler elongated density adjacent to the left adrenal gland is also noted. No pathologic enlarged lymph 
nodes are present.



IMPRESSION:  



1. No acute traumatic findings within the abdomen or pelvis.



2. Moderate amount of hyperdense material within the dependent aspect of the bladder. This favors hem
orrhage. Underlying mass is considered less likely but cannot be excluded. Bladder wall thickening.



3. Cirrhosis with recanalized paraumbilical vein indicative of portal hypertension.



4. Exam compromised by motion artifact.





: Negative or not required by law.









Electronically signed by:  Watson Francis M.D.

6/7/2021 8:12 PM

## 2021-06-07 NOTE — XRAY REPORT
XR shoulder LT min 2V routine



CLINICAL HISTORY: Left shoulder pain following fall.



COMPARISON: Chest radiograph April 30, 2021.



FINDINGS:  Alignment of the left acromioclavicular and glenohumeral joints is seen in anatomic. Note 
is made of an acute comminuted fracture within the left humeral head and neck. Fracture through the g
reater tuberosity is minimally displaced. There may be increased sclerosis of the diaphysis of the le
ft humerus. 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 There is mild osteo
arthritis of the left glenohumeral joint. Several left orbit fractures are incidentally noted.



IMPRESSION: 



1. Acute fracture of the left humeral head and neck. Fracture essentially nondisplaced. Fracture thro
ugh greater tuberosity minimally displaced.



2. Possible increased sclerosis of the diaphysis of the left humerus. This is probably artifactual ho
wever skeletal lesions could appear similar.







: Negative or not required by law.









Electronically signed by:  Watson Francis M.D.

6/7/2021 5:28 PM

## 2021-06-07 NOTE — ELECTROCARDIOGRAM REPORT
Test Reason : 

Blood Pressure : ***/*** mmHG

Vent. Rate : 126 BPM     Atrial Rate : 129 BPM

   P-R Int : 000 ms          QRS Dur : 074 ms

    QT Int : 406 ms       P-R-T Axes : 000 082 062 degrees

   QTc Int : 588 ms

 

*** Poor data quality, interpretation may be adversely affected

Possible Sinus tachycardia

Nonspecific ST and T wave abnormality

Abnormal ECG

When compared with ECG of 30-APR-2021 00:23,

Non-specific change in ST segment in Anterolateral leads

Nonspecific T wave abnormality now evident in Inferior leads

Nonspecific T wave abnormality now evident in Anterolateral leads

Confirmed by Tito Bai ([id number]) on 6/7/2021 11:09:34 PM

 

Referred By:  ED           Confirmed By:Tito Bai

## 2021-06-08 LAB
ALBUMIN SERPL-MCNC: 2.4 GM/DL (ref 3.4–5)
ALBUMIN/GLOB SERPL: 0.6 {RATIO} (ref 0.9–2)
ALP SERPL-CCNC: 132 U/L (ref 45–117)
ALT SERPL-CCNC: 37 U/L (ref 12–78)
ANION GAP SERPL CALC-SCNC: 10 MMOL/L (ref 3–11)
ANION GAP SERPL CALC-SCNC: 11 MMOL/L (ref 3–11)
BILIRUB SERPL-MCNC: 4.7 MG/DL (ref 0.2–1)
BUN SERPL-MCNC: 28 MG/DL (ref 7–18)
BUN SERPL-MCNC: 29 MG/DL (ref 7–18)
CALCIUM SERPL-MCNC: 6.6 MG/DL (ref 8.5–10.1)
CALCIUM SERPL-MCNC: 7.2 MG/DL (ref 8.5–10.1)
CHLORIDE SERPL-SCNC: 116 MMOL/L (ref 98–107)
CHLORIDE SERPL-SCNC: 117 MMOL/L (ref 98–107)
CO2 SERPL-SCNC: 17 MMOL/L (ref 21–32)
CO2 SERPL-SCNC: 18 MMOL/L (ref 21–32)
CREAT CL PREDICTED SERPL C-G-VRATE: 39.3 ML/MIN
CREAT CL PREDICTED SERPL C-G-VRATE: 42.1 ML/MIN
DEPRECATED RDW RBC: 49 FL (ref 36.4–46.3)
FERRITIN SERPL-MCNC: 53.5 NG/ML (ref 8–388)
FIBRINOGEN PPP-MCNC: 246 MG/DL (ref 184–400)
GLOBULIN SER CALC-MCNC: 4.1 GM/DL (ref 2.5–4)
GLUCOSE SERPL-MCNC: 149 MG/DL (ref 70–99)
GLUCOSE SERPL-MCNC: 151 MG/DL (ref 70–99)
HBA1C MFR BLD: 5.1 % (ref 4.5–5.6)
HCT VFR BLD AUTO: 24.9 % (ref 42–52)
HCT VFR BLD AUTO: 25.2 % (ref 42–52)
HCT VFR BLD AUTO: 25.6 % (ref 42–52)
HCT VFR BLD AUTO: 26.5 % (ref 42–52)
HGB BLD-MCNC: 8.4 G/DL (ref 14–18)
HGB BLD-MCNC: 8.4 G/DL (ref 14–18)
HGB BLD-MCNC: 8.6 G/DL (ref 14–18)
HGB BLD-MCNC: 8.9 G/DL (ref 14–18)
IMM GRANULOCYTES # BLD: 0.06 K/UL (ref 0–0.02)
IMM GRANULOCYTES NFR BLD AUTO: 0.5 %
IRON SERPL-MCNC: 223 MCG/DL (ref 35–175)
MAGNESIUM SERPL-MCNC: 2 MG/DL (ref 1.8–2.4)
MCH RBC QN AUTO: 29.3 PG (ref 25–34)
MCV RBC: 86.8 FL (ref 80–100)
PLATELET # BLD AUTO: 151 K/UL (ref 130–400)
POTASSIUM SERPL-SCNC: 3.9 MMOL/L (ref 3.5–5.1)
POTASSIUM SERPL-SCNC: 3.9 MMOL/L (ref 3.5–5.1)
PROT SERPL-MCNC: 6.5 GM/DL (ref 6.4–8.2)
RBC # BLD AUTO: 2.87 M/UL (ref 4.7–6.1)
RETICS #: 0.08 10^6/UL (ref 0.02–0.1)
SODIUM SERPL-SCNC: 144 MMOL/L (ref 136–145)
SODIUM SERPL-SCNC: 145 MMOL/L (ref 136–145)
TOTAL IRON BINDING CAPACITY: 236 MCG/DL (ref 250–450)
TRANSFERRIN SERPL-MCNC: 178 MG/DL (ref 200–360)
TSH SERPL-ACNC: 2.08 UIU/ML (ref 0.3–4.5)
VIT B12 SERPL-MCNC: 815 PG/ML (ref 193–986)
WBC # BLD AUTO: 10.97 K/UL (ref 4.8–10.8)

## 2021-06-08 RX ADMIN — IPRATROPIUM BROMIDE SCH: 0.5 SOLUTION RESPIRATORY (INHALATION) at 14:18

## 2021-06-08 RX ADMIN — LEVALBUTEROL SCH MG: 1.25 SOLUTION, CONCENTRATE RESPIRATORY (INHALATION) at 00:47

## 2021-06-08 RX ADMIN — CEFTRIAXONE SODIUM SCH MLS/HR: 1 INJECTION, POWDER, FOR SOLUTION INTRAVENOUS at 01:39

## 2021-06-08 RX ADMIN — GABAPENTIN SCH: 100 CAPSULE ORAL at 03:42

## 2021-06-08 RX ADMIN — FLUTICASONE PROPIONATE SCH: 50 SPRAY, METERED NASAL at 09:56

## 2021-06-08 RX ADMIN — LEVALBUTEROL SCH: 1.25 SOLUTION, CONCENTRATE RESPIRATORY (INHALATION) at 07:58

## 2021-06-08 RX ADMIN — LACTULOSE SCH GM: 10 SOLUTION ORAL at 22:17

## 2021-06-08 RX ADMIN — FOLIC ACID SCH MLS/MIN: 5 INJECTION, SOLUTION INTRAMUSCULAR; INTRAVENOUS; SUBCUTANEOUS at 10:55

## 2021-06-08 RX ADMIN — THIAMINE HYDROCHLORIDE SCH MLS/HR: 100 INJECTION, SOLUTION INTRAMUSCULAR; INTRAVENOUS at 22:19

## 2021-06-08 RX ADMIN — Medication SCH: at 09:56

## 2021-06-08 RX ADMIN — PANTOPRAZOLE SODIUM SCH MLS/HR: 40 INJECTION, POWDER, FOR SOLUTION INTRAVENOUS at 02:26

## 2021-06-08 RX ADMIN — THIAMINE HYDROCHLORIDE SCH MLS/HR: 100 INJECTION, SOLUTION INTRAMUSCULAR; INTRAVENOUS at 14:57

## 2021-06-08 RX ADMIN — LORAZEPAM PRN MLS/MIN: 2 INJECTION INTRAMUSCULAR; INTRAVENOUS at 09:16

## 2021-06-08 RX ADMIN — LEVALBUTEROL SCH: 1.25 SOLUTION, CONCENTRATE RESPIRATORY (INHALATION) at 14:19

## 2021-06-08 RX ADMIN — SODIUM CHLORIDE, SODIUM ACETATE ANHYDROUS, SODIUM GLUCONATE, POTASSIUM CHLORIDE, AND MAGNESIUM CHLORIDE SCH MLS/HR: 526; 222; 502; 37; 30 INJECTION, SOLUTION INTRAVENOUS at 10:55

## 2021-06-08 RX ADMIN — IPRATROPIUM BROMIDE SCH MG: 0.5 SOLUTION RESPIRATORY (INHALATION) at 00:47

## 2021-06-08 RX ADMIN — LORAZEPAM PRN MLS/MIN: 2 INJECTION INTRAMUSCULAR; INTRAVENOUS at 07:14

## 2021-06-08 RX ADMIN — PANTOPRAZOLE SODIUM SCH MLS/HR: 40 INJECTION, POWDER, FOR SOLUTION INTRAVENOUS at 08:49

## 2021-06-08 RX ADMIN — GABAPENTIN SCH: 100 CAPSULE ORAL at 09:56

## 2021-06-08 RX ADMIN — SODIUM CHLORIDE, SODIUM ACETATE ANHYDROUS, SODIUM GLUCONATE, POTASSIUM CHLORIDE, AND MAGNESIUM CHLORIDE SCH MLS/HR: 526; 222; 502; 37; 30 INJECTION, SOLUTION INTRAVENOUS at 22:16

## 2021-06-08 RX ADMIN — Medication SCH: at 01:24

## 2021-06-08 RX ADMIN — IPRATROPIUM BROMIDE SCH: 0.5 SOLUTION RESPIRATORY (INHALATION) at 07:58

## 2021-06-08 RX ADMIN — PANTOPRAZOLE SODIUM SCH MLS/MIN: 40 INJECTION, POWDER, FOR SOLUTION INTRAVENOUS at 22:17

## 2021-06-08 NOTE — XRAY REPORT
KUB



HISTORY: NG tube placement.



COMPARISON: Chest and abdominal series 8/13/2019. Abdomen and pelvis CT 6/11/2021.



FINDINGS: Nasogastric tube terminates in the proximal stomach. Prior cholecystectomy. The visualized 
bowel gas pattern is unremarkable.  No renal calculi. No ureteral calculi. No pneumoperitoneum or pne
umatosis.



IMPRESSION:  

Nasogastric tube terminates in the proximal stomach.





: Negative or not required by law.









Electronically signed by:  Jose Bo M.D.

6/8/2021 2:03 PM

## 2021-06-08 NOTE — CRITICAL CARE PROGRESS NOTE
Date of Service


June 8, 2021


 





Assessment & Plan


(1) Severe anemia: 


      Reason Critically Ill: 60-year-old male with acute blood loss anemia with 

hemoglobin to from multiple potential sources with primary source bladder 

hemorrhage with hematuria, and alcohol withdrawal.  Patient receiving 4 units 

RBCs and being admitted to ICU at this time.





Neuro -


Alcohol withdrawal/encephalopathylikely alcohol withdrawal as patient is known 

alcoholic with current use.  EtOH negative on admission


   -History of seizures and noncompliant with medications, continue with seizure

precaution


   -Thiamine, folic acid, multivitamin


   -CT head negative for acute intracranial finding


   -Mildly elevated BUN, LFTs mildly elevated as well.  Ammonia elevated at 36


                    -Phenobarbital ordered for withdrawal prophylaxis


                              -Place NG tube for lactulose administration





Cardiac -


Normal sinus rhythm and hemodynamically stable on monitor.  Continuous 

monitoring on telemetry


Troponin negative





Respiratory -


Patient placed on BiPAP in the emergency department however there is no s

ignificant hypoxia or hypercapnia on ABG.  


History COPD?  Not on outpatient meds.  Does have history of tobacco abuse


Transition to nasal cannula in the ICU and maintaining oxygen saturation.  

Continuous monitoring on pulse ox


Nebs as needed


Chest x-ray without acute cardiopulmonary findings


CTA chest negative for PE





GI - 


CirrhosisCT abdomen pelvis with cirrhosis with recanalized periumbilical vein 

indicating portal hypertension


   -Patient is post cholecystectomy


   -LFTs mildly elevated, will trend for now


   -Ammonia elevated


   


RENAL/LYTES -


AKIinitial creatinine 2.22, improving now 1.7


   -Trend BMPs and replete as indicated


   -Continue gentle fluid resuscitation and blood transfusions as indicated


   -Avoid nephrotoxins and renally adjust medication


   -Monitor





 - 


Hematuriasuspected as primary source of acute blood loss anemia


   -CT abdomen pelvis showing moderate amount of hyperdense material within the 

dependent aspect of the bladder favoring hemorrhage.  Underlying mass considered

less likely but cannot be excluded per CT report.


   -History of metastatic prostate cancer, status post surgery, status post 

chemotherapy/Lupron therapy


   -Urology consulted and aware, plan to optimize patient with transfusions 

overnight prior to intervention





ENDO - 


No history of diabetes or thyroid disease, ICU hyperglycemic protocol





HEME -


Severe anemia


Acute blood loss anemiapatient with initial hemoglobin of 2.4 on arrival to ER,

now improved to 8.3 following 4 unit RBC transfusion


   -CT abdomen and pelvisno acute traumatic findings within the abdomen or 

pelvis.  Hyperdense material within the dependent aspect of the bladder favoring

hemorrhage.


      -Given patient's hematuria would suspect this is primary source of 

bleeding and urology is aware


   -Patient also with heme positive stool.  No melena passed since arrival to 

ICU.  Unfortunately patient encephalopathic and cannot obtain clear history.


      -PPI transitioned to twice daily dosing


      -GI consulted, will follow recommendations


   -No coagulopathy at this time.  We will hold on FFP/cryo at this time





ID -


Given no evidence of overt gastrointestinal bleeding will discontinue 

ceftriaxone





Orthoacute fracture of the left humeral head and neck.  Fracture essentially 

nondisplaced placed.  Fracture through greater tuberosity minimally displaced


   -Sling of left upper extremity as patient can tolerate reviewed orthopedic 

notes





LINES/IV ACCESS - 


Peripheral IVs





DVT PROPHYLAXIS -


SCDs chemical prophylaxis contraindicated due to acute blood loss anemia


(2) Alcohol withdrawal: 


(3) Metabolic acidosis: 


(4) Gross hematuria: 


(5) Closed fracture of left proximal humerus: 


(6) Encephalopathy: 


(7) Prostate cancer: 


(8) LARA (acute kidney injury):


Admission and Anticipated Discharge Date


Admission Date: June 7, 2021








Supervising Physician


Co-Signing Physician Notes


Patient was discussed on multidisciplinary rounds as well as with urology


I have personally spent 40 minutes of critical care time in the direct 

management of this patient.  This is a life/limb threatening event.  This 

includes time spent evaluating patient, direct bedside care, chart review, 

placing orders, interpretation of diagnostic studies, discussion with 

consultants, patient, and/or family members regarding treatment decisions, as 

well as other required patient management activities.


This time is exclusive of all separately billable procedures, and teaching time 

and separate from and in addition to any other critical care service time.





Subjective


No overnight events Alas was replaced, patient disoriented history of alcohol 

abuse





Review of Systems








Review of Systems:   Unobtainable due to cognitive status  








Physical Exam








Physical Exam:   General: Arousable.  Disoriented to person and place. 


     


Skin:  Warm, multiple areas of ecchymoses, 


   


Head:  Atraumatic 


   


Ears, nose, mouth and throat: airway patent 


   


Cardiovascular:  Normal peripheral perfusion 


   


Respiratory:  no respiratory distress 


   


Gastrointestinal:  Non distended 


   


Musculoskeletal:  No deformity 











Results & Data


Results & Data ([facility])


Vital Signs (Past 12 Hours)


                                   Vital Signs











  Temp Pulse Pulse Resp BP BP Pulse Ox


 


 06/08/21 08:16  36.4 C L      


 


 06/08/21 08:00   81     


 


 06/08/21 07:57    66  20    98


 


 06/08/21 06:30   91 H   14    81 L


 


 06/08/21 06:24   71   26 H  137/88   95


 


 06/08/21 06:15   74   17    90


 


 06/08/21 06:09   72   16  128/81   92


 


 06/08/21 06:00   73   16    95


 


 06/08/21 05:55   75   18  140/95   93


 


 06/08/21 05:45   79   15    90


 


 06/08/21 05:39   82   16  125/75   91


 


 06/08/21 05:30   74   14    96


 


 06/08/21 05:24   76   15  132/82   95


 


 06/08/21 05:15   74   16    94


 


 06/08/21 05:09   74   16  129/78   95


 


 06/08/21 05:07   76   15  119/86   94


 


 06/08/21 05:00   79   16    89 L


 


 06/08/21 04:54   82   22  133/87   95


 


 06/08/21 04:45   74   16    100


 


 06/08/21 04:39   83   23  136/92   97


 


 06/08/21 04:30   79   16    100


 


 06/08/21 04:26   82   24  140/85   100


 


 06/08/21 04:15   75   16    100


 


 06/08/21 04:09   81   16  121/73   100


 


 06/08/21 04:00   76   15    100


 


 06/08/21 03:59   77   20    100


 


 06/08/21 03:54   78   16  135/88   100


 


 06/08/21 03:45   76   19    100


 


 06/08/21 03:40   96 H   19  149/93 H   100


 


 06/08/21 03:30   96 H   24    96


 


 06/08/21 03:24   87   21  154/113 H   99


 


 06/08/21 03:15   87   24    97


 


 06/08/21 03:09   86   26 H  145/92 H   100


 


 06/08/21 03:00   91 H   18    93


 


 06/08/21 02:55   94 H   17  162/98 H   100


 


 06/08/21 02:45   91 H   29 H    98


 


 06/08/21 02:30   80   16    100


 


 06/08/21 02:24   83   16  138/90   100


 


 06/08/21 02:15   88   19    100


 


 06/08/21 02:09   79   18  137/87   100


 


 06/08/21 02:00   91 H   14    100


 


 06/08/21 01:55   95 H   16  126/93   97


 


 06/08/21 01:45   83   16    100


 


 06/08/21 01:40   82   18  140/90   100


 


 06/08/21 01:30   87   18    100


 


 06/08/21 01:24   86   17  127/87   100


 


 06/08/21 01:16   86   17    100


 


 06/08/21 01:09   87   17  134/87   100


 


 06/08/21 01:03   87   19  140/94   100


 


 06/08/21 01:01   92 H   20   


 


 06/08/21 00:47   96 H  96 H  26 H   


 


 06/08/21 00:46   89   25 H   


 


 06/08/21 00:39   90   17  141/91 H  


 


 06/08/21 00:31   89   21   


 


 06/08/21 00:28  36.4 C L  90   20  127/78   93


 


 06/08/21 00:24   89   19  127/78  


 


 06/08/21 00:16   99 H   15   


 


 06/08/21 00:09   99 H   17  154/100 H  


 


 06/08/21 00:08  36.4 C L  101 H   26 H  133/94   93


 


 06/08/21 00:01   99 H   22   


 


 06/08/21 00:00   100 H     


 


 06/07/21 23:55   96 H   21  133/94  


 


 06/07/21 23:47   98 H   20  145/104 H  


 


 06/07/21 23:46   99 H   17   


 


 06/07/21 23:31   99 H   25 H   


 


 06/07/21 23:16   99 H   24   


 


 06/07/21 23:02   97 H   22    64 L


 


 06/07/21 23:00   96 H   27 H  141/94 H   64 L


 


 06/07/21 22:56        97


 


 06/07/21 22:54   102 H   27 H  140/100   62 L


 


 06/07/21 22:52   105 H   32 H    97


 


 06/07/21 22:51   105 H   26 H   


 


 06/07/21 22:45  36.4 C L   89  17   137/62 


 


 06/07/21 22:32  36.4 C L  101 H   26 H  133/94   94


 


 06/07/21 22:30   91 H   40 H  127/89   100


 


 06/07/21 22:17  36.4 C L  96 H   23  141/94 H   94


 


 06/07/21 22:15   92 H   20  120/72   100


 


 06/07/21 22:01  36.6 C      


 


 06/07/21 22:00   100 H   24  140/95   100


 


 06/07/21 21:46   101 H   21  138/80   98


 


 06/07/21 21:37        97


 


 06/07/21 21:36  36.9 C      


 


 06/07/21 21:30   93 H   21  119/79   100


 


 06/07/21 21:16   102 H   22  141/73 H   100


 


 06/07/21 21:06  36.6 C      











Laboratory Results





                                        











  06/08/21 06/08/21 06/08/21 Range/Units





  11:38 09:14 09:14 


 


WBC     


 


RBC     


 


Hgb     


 


Hct     


 


MCV     


 


MCH     


 


MCHC     


 


RDW Std Deviation     


 


RDW Coeff of Nell     


 


Plt Count     


 


MPV     


 


Immature Gran % (Auto)     


 


Neut % (Auto)     


 


Lymph % (Auto)     


 


Mono % (Auto)     


 


Eos % (Auto)     


 


Baso % (Auto)     


 


Reticulocyte % (Auto)     (0.5-2.0)  %


 


Neut # (Auto)     


 


Lymph # (Auto)     


 


Mono # (Auto)     


 


Eos # (Auto)     


 


Baso # (Auto)     


 


Reticulocyte #     (0.02-0.10)  10^6/uL


 


Immature Gran # (Auto)     


 


Absolute Nucleated RBC     


 


Nucleated RBC % (auto)     


 


Neutrophils % (Manual)     


 


Band Neutrophils %     


 


Lymphocytes % (Manual)     


 


Prolymphocyte %     


 


Reactive Lymphs % (Man)     


 


Monocytes % (Manual)     


 


Eosinophils % (Manual)     


 


Basophils % (Manual)     


 


Metamyelocytes % (Man)     


 


Myelocytes % (Man)     


 


Promyelocytes % (Man)     


 


Blast Cells % (Manual)     


 


Plasma Cell % (Manual)     


 


Other Cells %     


 


Nucleated RBC %     


 


Neutrophils # (Manual)     


 


Band Neutrophils #     


 


Total Absolute Neuts     


 


Lymphocytes # (Manual)     


 


Prolymphocyte #     


 


Reactive Lymphs #     


 


Total Abs Lymphocytes     


 


Monocytes # (Manual)     


 


Eosinophils # (Manual)     


 


Basophils # (Manual)     


 


Metamyelocytes # (Man)     


 


Myelocytes # (Manual)     


 


Promyelocytes # (Man)     


 


Blast Cells # (Man)     


 


Plasma Cell # (Manual)     


 


Other Cells #     


 


Nucleated RBCs # (Man)     


 


Hypersegmented Neuts     


 


Hyposegmented Neuts     


 


Hypogranular Neuts     


 


Large Granular Lymphs     


 


# Lrg Granular Lymphs     


 


Hairy Cells     


 


Smudge Cells     


 


Toxic Granulation     


 


Toxic Vacuolation     


 


Dohle Bodies     


 


Fadi Rods     


 


Platelet Estimate     


 


Hypogranular Platelets     


 


Clumped Platelets     


 


Giant Platelets     


 


Platelet Satelliting     


 


RBC Morphology     


 


Polychromasia     


 


Hypochromasia     


 


Poikilocytosis     


 


Basophilic Stippling     


 


Anisocytosis     


 


Microcytosis     


 


Macrocytosis     


 


Spherocytes     


 


Pappenheimer Bodies     


 


Sickle Cells     


 


Target Cells     


 


Tear Drop Cells     


 


Ovalocytes     


 


Stomatocytes     


 


RicJolly Bodies     


 


Echinocytes     


 


Acanthocytes (Spur)     


 


Rouleaux     


 


RBC Agglutinates     


 


Schistocytes     


 


RBC Morph Comment     


 


Peripher Smr Path Cons     


 


ESR     (0-20)  mm/hr


 


Sezary Cell     


 


PT     


 


INR     


 


APTT     


 


PTT Ratio     


 


Fibrinogen     (184-400)  mg/dl


 


Fibrin Degrad Products    10-40 H  (<10)  mcg/ml


 


ABG pH     (7.35-7.45)  


 


ABG pCO2     (35-46)  mmHg


 


ABG pO2     (80-95)  mmHg


 


ABG HCO3     (19-24)  mmol/L


 


ABG O2 Saturation     (90-95)  %


 


ABG Base Excess     (-9-1.8)  mEq/L


 


Chai Test     (Pos)  


 


VBG pH     


 


VBG pCO2     


 


VBG pO2     


 


VBG HCO3     


 


VBG O2 Saturation     


 


VBG Base Excess     


 


Barometric Pressure     


 


Oxygen Given     


 


Sodium     (136-145)  mmol/L


 


Potassium     (3.5-5.1)  mmol/L


 


Chloride     ()  mmol/L


 


Carbon Dioxide     (21-32)  mmol/L


 


Anion Gap     (3-11)  


 


BUN     (7-18)  mg/dl


 


Creatinine     (0.6-1.4)  mg/dl


 


Est Cr Clr Drug Dosing     ml/min


 


Est GFR ( Amer)     ml/min


 


Est GFR (Non-Af Amer)     ml/min


 


BUN/Creatinine Ratio     (10-20)  


 


Glucose     (70-99)  mg/dl


 


POC Glucose  152 H    (70-99)  mg/dl


 


Estimat Average Glucose     mg/dl


 


Hemoglobin A1c     (4.5-5.6)  %


 


Osmolality     (280-300)  mOsm/kg


 


Lactate     (0.4-2.0)  mmol/L


 


Calcium     (8.5-10.1)  mg/dl


 


Ionized Calcium     (1.12-1.32)  mmol/L


 


Phosphorus     (2.5-4.9)  mg/dl


 


Magnesium     (1.8-2.4)  mg/dl


 


Iron     ()  mcg/dl


 


TIBC     (250-450)  mcg/dl


 


Transferrin     (200-360)  mg/dl


 


Ferritin     (8-388)  ng/ml


 


Total Bilirubin     (0.2-1)  mg/dl


 


Direct Bilirubin     (0-0.2)  mg/dl


 


AST     (15-37)  U/L


 


ALT     (12-78)  U/L


 


Alkaline Phosphatase     ()  U/L


 


Ammonia     (11-32)  umol/L


 


Lactate Dehydrogenase   289 H   ()  U/L


 


Total Creatine Kinase     ()  U/L


 


Troponin I     (0-0.045)  ng/ml


 


Total Protein     (6.4-8.2)  gm/dl


 


Albumin     (3.4-5.0)  gm/dl


 


Globulin     (2.5-4.0)  gm/dl


 


Albumin/Globulin Ratio     (0.9-2)  


 


Lipase     ()  U/L


 


Vitamin B12     (193-986)  pg/ml


 


Folate     (>5.38)  ng/ml


 


Beta-Hydroxybutyric Acd     (0.2-2.81)  mg/dl


 


Procalcitonin     (0-0.5)  ng/ml


 


TSH     (0.300-4.500)  uIu/ml


 


Specimen Hemolysis     


 


Urine Color     


 


Urine Appearance     (Clear)  


 


Urine pH     (4.5-7.5)  


 


Ur Specific Gravity     (1.000-1.030)  


 


Urine Protein     (Negative)  


 


Urine Glucose (UA)     (Negative)  


 


Urine Ketones     (Negative)  


 


Urine Blood     (Negative)  


 


Urine Nitrite     (Negative)  


 


Urine Bilirubin     (Negative)  


 


Urine Urobilinogen     (Negative)  


 


Ur Leukocyte Esterase     (Negative)  


 


Urine RBC     (0-4)  /hpf


 


Urine WBC     (0-5)  /hpf


 


Ur Epithelial Cells     (0-5)  /lpf


 


Urine Bacteria     (Negative)  


 


Nasal Screen MRSA (PCR)     (Negative)  


 


Phenytoin     (10-20)  mcg/ml


 


Ethyl Alcohol mg/dL     (0-3)  mg/dl


 


COVID-19 Eval Order     


 


SARS-CoV-2 (PCR)     (Negative)  


 


Blood Type     


 


Blood Type Recheck     


 


Antibody Screen     


 


Crossmatch     














  06/08/21 06/08/21 06/08/21 Range/Units





  09:14 09:14 04:43 


 


WBC     


 


RBC     


 


Hgb   8.4 L   


 


Hct   25.6 L   


 


MCV     


 


MCH     


 


MCHC     


 


RDW Std Deviation     


 


RDW Coeff of Nell     


 


Plt Count     


 


MPV     


 


Immature Gran % (Auto)     


 


Neut % (Auto)     


 


Lymph % (Auto)     


 


Mono % (Auto)     


 


Eos % (Auto)     


 


Baso % (Auto)     


 


Reticulocyte % (Auto)     (0.5-2.0)  %


 


Neut # (Auto)     


 


Lymph # (Auto)     


 


Mono # (Auto)     


 


Eos # (Auto)     


 


Baso # (Auto)     


 


Reticulocyte #     (0.02-0.10)  10^6/uL


 


Immature Gran # (Auto)     


 


Absolute Nucleated RBC     


 


Nucleated RBC % (auto)     


 


Neutrophils % (Manual)     


 


Band Neutrophils %     


 


Lymphocytes % (Manual)     


 


Prolymphocyte %     


 


Reactive Lymphs % (Man)     


 


Monocytes % (Manual)     


 


Eosinophils % (Manual)     


 


Basophils % (Manual)     


 


Metamyelocytes % (Man)     


 


Myelocytes % (Man)     


 


Promyelocytes % (Man)     


 


Blast Cells % (Manual)     


 


Plasma Cell % (Manual)     


 


Other Cells %     


 


Nucleated RBC %     


 


Neutrophils # (Manual)     


 


Band Neutrophils #     


 


Total Absolute Neuts     


 


Lymphocytes # (Manual)     


 


Prolymphocyte #     


 


Reactive Lymphs #     


 


Total Abs Lymphocytes     


 


Monocytes # (Manual)     


 


Eosinophils # (Manual)     


 


Basophils # (Manual)     


 


Metamyelocytes # (Man)     


 


Myelocytes # (Manual)     


 


Promyelocytes # (Man)     


 


Blast Cells # (Man)     


 


Plasma Cell # (Manual)     


 


Other Cells #     


 


Nucleated RBCs # (Man)     


 


Hypersegmented Neuts     


 


Hyposegmented Neuts     


 


Hypogranular Neuts     


 


Large Granular Lymphs     


 


# Lrg Granular Lymphs     


 


Hairy Cells     


 


Smudge Cells     


 


Toxic Granulation     


 


Toxic Vacuolation     


 


Dohle Bodies     


 


Fadi Rods     


 


Platelet Estimate     


 


Hypogranular Platelets     


 


Clumped Platelets     


 


Giant Platelets     


 


Platelet Satelliting     


 


RBC Morphology     


 


Polychromasia     


 


Hypochromasia     


 


Poikilocytosis     


 


Basophilic Stippling     


 


Anisocytosis     


 


Microcytosis     


 


Macrocytosis     


 


Spherocytes     


 


Pappenheimer Bodies     


 


Sickle Cells     


 


Target Cells     


 


Tear Drop Cells     


 


Ovalocytes     


 


Stomatocytes     


 


Cason-Jolly Bodies     


 


Echinocytes     


 


Acanthocytes (Spur)     


 


Rouleaux     


 


RBC Agglutinates     


 


Schistocytes     


 


RBC Morph Comment     


 


Peripher Smr Path Cons     


 


ESR  12    (0-20)  mm/hr


 


Sezary Cell     


 


PT     


 


INR     


 


APTT     


 


PTT Ratio     


 


Fibrinogen     (184-400)  mg/dl


 


Fibrin Degrad Products     (<10)  mcg/ml


 


ABG pH     (7.35-7.45)  


 


ABG pCO2     (35-46)  mmHg


 


ABG pO2     (80-95)  mmHg


 


ABG HCO3     (19-24)  mmol/L


 


ABG O2 Saturation     (90-95)  %


 


ABG Base Excess     (-9-1.8)  mEq/L


 


Chai Test     (Pos)  


 


VBG pH     


 


VBG pCO2     


 


VBG pO2     


 


VBG HCO3     


 


VBG O2 Saturation     


 


VBG Base Excess     


 


Barometric Pressure     


 


Oxygen Given     


 


Sodium     (136-145)  mmol/L


 


Potassium     (3.5-5.1)  mmol/L


 


Chloride     ()  mmol/L


 


Carbon Dioxide     (21-32)  mmol/L


 


Anion Gap     (3-11)  


 


BUN     (7-18)  mg/dl


 


Creatinine     (0.6-1.4)  mg/dl


 


Est Cr Clr Drug Dosing     ml/min


 


Est GFR ( Amer)     ml/min


 


Est GFR (Non-Af Amer)     ml/min


 


BUN/Creatinine Ratio     (10-20)  


 


Glucose     (70-99)  mg/dl


 


POC Glucose     (70-99)  mg/dl


 


Estimat Average Glucose     mg/dl


 


Hemoglobin A1c     (4.5-5.6)  %


 


Osmolality     (280-300)  mOsm/kg


 


Lactate    2.2 H*  (0.4-2.0)  mmol/L


 


Calcium     (8.5-10.1)  mg/dl


 


Ionized Calcium     (1.12-1.32)  mmol/L


 


Phosphorus     (2.5-4.9)  mg/dl


 


Magnesium     (1.8-2.4)  mg/dl


 


Iron     ()  mcg/dl


 


TIBC     (250-450)  mcg/dl


 


Transferrin     (200-360)  mg/dl


 


Ferritin     (8-388)  ng/ml


 


Total Bilirubin     (0.2-1)  mg/dl


 


Direct Bilirubin     (0-0.2)  mg/dl


 


AST     (15-37)  U/L


 


ALT     (12-78)  U/L


 


Alkaline Phosphatase     ()  U/L


 


Ammonia     (11-32)  umol/L


 


Lactate Dehydrogenase     ()  U/L


 


Total Creatine Kinase     ()  U/L


 


Troponin I     (0-0.045)  ng/ml


 


Total Protein     (6.4-8.2)  gm/dl


 


Albumin     (3.4-5.0)  gm/dl


 


Globulin     (2.5-4.0)  gm/dl


 


Albumin/Globulin Ratio     (0.9-2)  


 


Lipase     ()  U/L


 


Vitamin B12     (193-986)  pg/ml


 


Folate     (>5.38)  ng/ml


 


Beta-Hydroxybutyric Acd     (0.2-2.81)  mg/dl


 


Procalcitonin     (0-0.5)  ng/ml


 


TSH     (0.300-4.500)  uIu/ml


 


Specimen Hemolysis     


 


Urine Color     


 


Urine Appearance     (Clear)  


 


Urine pH     (4.5-7.5)  


 


Ur Specific Gravity     (1.000-1.030)  


 


Urine Protein     (Negative)  


 


Urine Glucose (UA)     (Negative)  


 


Urine Ketones     (Negative)  


 


Urine Blood     (Negative)  


 


Urine Nitrite     (Negative)  


 


Urine Bilirubin     (Negative)  


 


Urine Urobilinogen     (Negative)  


 


Ur Leukocyte Esterase     (Negative)  


 


Urine RBC     (0-4)  /hpf


 


Urine WBC     (0-5)  /hpf


 


Ur Epithelial Cells     (0-5)  /lpf


 


Urine Bacteria     (Negative)  


 


Nasal Screen MRSA (PCR)     (Negative)  


 


Phenytoin     (10-20)  mcg/ml


 


Ethyl Alcohol mg/dL     (0-3)  mg/dl


 


COVID-19 Eval Order     


 


SARS-CoV-2 (PCR)     (Negative)  


 


Blood Type     


 


Blood Type Recheck     


 


Antibody Screen     


 


Crossmatch     














  06/08/21 06/08/21 06/08/21 Range/Units





  04:43 03:28 03:28 


 


WBC     


 


RBC     


 


Hgb     


 


Hct     


 


MCV     


 


MCH     


 


MCHC     


 


RDW Std Deviation     


 


RDW Coeff of Nell     


 


Plt Count     


 


MPV     


 


Immature Gran % (Auto)     


 


Neut % (Auto)     


 


Lymph % (Auto)     


 


Mono % (Auto)     


 


Eos % (Auto)     


 


Baso % (Auto)     


 


Reticulocyte % (Auto)     (0.5-2.0)  %


 


Neut # (Auto)     


 


Lymph # (Auto)     


 


Mono # (Auto)     


 


Eos # (Auto)     


 


Baso # (Auto)     


 


Reticulocyte #     (0.02-0.10)  10^6/uL


 


Immature Gran # (Auto)     


 


Absolute Nucleated RBC     


 


Nucleated RBC % (auto)     


 


Neutrophils % (Manual)     


 


Band Neutrophils %     


 


Lymphocytes % (Manual)     


 


Prolymphocyte %     


 


Reactive Lymphs % (Man)     


 


Monocytes % (Manual)     


 


Eosinophils % (Manual)     


 


Basophils % (Manual)     


 


Metamyelocytes % (Man)     


 


Myelocytes % (Man)     


 


Promyelocytes % (Man)     


 


Blast Cells % (Manual)     


 


Plasma Cell % (Manual)     


 


Other Cells %     


 


Nucleated RBC %     


 


Neutrophils # (Manual)     


 


Band Neutrophils #     


 


Total Absolute Neuts     


 


Lymphocytes # (Manual)     


 


Prolymphocyte #     


 


Reactive Lymphs #     


 


Total Abs Lymphocytes     


 


Monocytes # (Manual)     


 


Eosinophils # (Manual)     


 


Basophils # (Manual)     


 


Metamyelocytes # (Man)     


 


Myelocytes # (Manual)     


 


Promyelocytes # (Man)     


 


Blast Cells # (Man)     


 


Plasma Cell # (Manual)     


 


Other Cells #     


 


Nucleated RBCs # (Man)     


 


Hypersegmented Neuts     


 


Hyposegmented Neuts     


 


Hypogranular Neuts     


 


Large Granular Lymphs     


 


# Lrg Granular Lymphs     


 


Hairy Cells     


 


Smudge Cells     


 


Toxic Granulation     


 


Toxic Vacuolation     


 


Dohle Bodies     


 


Fadi Rods     


 


Platelet Estimate     


 


Hypogranular Platelets     


 


Clumped Platelets     


 


Giant Platelets     


 


Platelet Satelliting     


 


RBC Morphology     


 


Polychromasia     


 


Hypochromasia     


 


Poikilocytosis     


 


Basophilic Stippling     


 


Anisocytosis     


 


Microcytosis     


 


Macrocytosis     


 


Spherocytes     


 


Pappenheimer Bodies     


 


Sickle Cells     


 


Target Cells     


 


Tear Drop Cells     


 


Ovalocytes     


 


Stomatocytes     


 


Cason-Jolly Bodies     


 


Echinocytes     


 


Acanthocytes (Spur)     


 


Rouleaux     


 


RBC Agglutinates     


 


Schistocytes     


 


RBC Morph Comment     


 


Peripher Smr Path Cons     


 


ESR     (0-20)  mm/hr


 


Sezary Cell     


 


PT     


 


INR     


 


APTT     


 


PTT Ratio     


 


Fibrinogen     (184-400)  mg/dl


 


Fibrin Degrad Products     (<10)  mcg/ml


 


ABG pH     (7.35-7.45)  


 


ABG pCO2     (35-46)  mmHg


 


ABG pO2     (80-95)  mmHg


 


ABG HCO3     (19-24)  mmol/L


 


ABG O2 Saturation     (90-95)  %


 


ABG Base Excess     (-9-1.8)  mEq/L


 


Chai Test     (Pos)  


 


VBG pH     


 


VBG pCO2     


 


VBG pO2     


 


VBG HCO3     


 


VBG O2 Saturation     


 


VBG Base Excess     


 


Barometric Pressure     


 


Oxygen Given     


 


Sodium     (136-145)  mmol/L


 


Potassium     (3.5-5.1)  mmol/L


 


Chloride     ()  mmol/L


 


Carbon Dioxide     (21-32)  mmol/L


 


Anion Gap     (3-11)  


 


BUN     (7-18)  mg/dl


 


Creatinine     (0.6-1.4)  mg/dl


 


Est Cr Clr Drug Dosing     ml/min


 


Est GFR ( Amer)     ml/min


 


Est GFR (Non-Af Amer)     ml/min


 


BUN/Creatinine Ratio     (10-20)  


 


Glucose     (70-99)  mg/dl


 


POC Glucose     (70-99)  mg/dl


 


Estimat Average Glucose     mg/dl


 


Hemoglobin A1c     (4.5-5.6)  %


 


Osmolality     (280-300)  mOsm/kg


 


Lactate    1.6  (0.4-2.0)  mmol/L


 


Calcium     (8.5-10.1)  mg/dl


 


Ionized Calcium   1.15   (1.12-1.32)  mmol/L


 


Phosphorus     (2.5-4.9)  mg/dl


 


Magnesium     (1.8-2.4)  mg/dl


 


Iron     ()  mcg/dl


 


TIBC     (250-450)  mcg/dl


 


Transferrin     (200-360)  mg/dl


 


Ferritin     (8-388)  ng/ml


 


Total Bilirubin     (0.2-1)  mg/dl


 


Direct Bilirubin     (0-0.2)  mg/dl


 


AST     (15-37)  U/L


 


ALT     (12-78)  U/L


 


Alkaline Phosphatase     ()  U/L


 


Ammonia     (11-32)  umol/L


 


Lactate Dehydrogenase     ()  U/L


 


Total Creatine Kinase     ()  U/L


 


Troponin I     (0-0.045)  ng/ml


 


Total Protein     (6.4-8.2)  gm/dl


 


Albumin     (3.4-5.0)  gm/dl


 


Globulin     (2.5-4.0)  gm/dl


 


Albumin/Globulin Ratio     (0.9-2)  


 


Lipase     ()  U/L


 


Vitamin B12  815    (193-986)  pg/ml


 


Folate  > 20.00    (>5.38)  ng/ml


 


Beta-Hydroxybutyric Acd     (0.2-2.81)  mg/dl


 


Procalcitonin     (0-0.5)  ng/ml


 


TSH     (0.300-4.500)  uIu/ml


 


Specimen Hemolysis     


 


Urine Color     


 


Urine Appearance     (Clear)  


 


Urine pH     (4.5-7.5)  


 


Ur Specific Gravity     (1.000-1.030)  


 


Urine Protein     (Negative)  


 


Urine Glucose (UA)     (Negative)  


 


Urine Ketones     (Negative)  


 


Urine Blood     (Negative)  


 


Urine Nitrite     (Negative)  


 


Urine Bilirubin     (Negative)  


 


Urine Urobilinogen     (Negative)  


 


Ur Leukocyte Esterase     (Negative)  


 


Urine RBC     (0-4)  /hpf


 


Urine WBC     (0-5)  /hpf


 


Ur Epithelial Cells     (0-5)  /lpf


 


Urine Bacteria     (Negative)  


 


Nasal Screen MRSA (PCR)     (Negative)  


 


Phenytoin     (10-20)  mcg/ml


 


Ethyl Alcohol mg/dL     (0-3)  mg/dl


 


COVID-19 Eval Order     


 


SARS-CoV-2 (PCR)     (Negative)  


 


Blood Type     


 


Blood Type Recheck     


 


Antibody Screen     


 


Crossmatch     














  06/08/21 06/08/21 06/08/21 Range/Units





  03:28 03:28 03:28 


 


WBC    10.97 H  


 


RBC    2.87 L  


 


Hgb    8.4 L D  


 


Hct    24.9 L  


 


MCV    86.8  D  


 


MCH    29.3  


 


MCHC    33.7  


 


RDW Std Deviation    49.0 H  


 


RDW Coeff of Nell    16.1 H  


 


Plt Count    151  


 


MPV    9.1  


 


Immature Gran % (Auto)    0.5  


 


Neut % (Auto)    91.6  


 


Lymph % (Auto)    3.8  


 


Mono % (Auto)    4.0  


 


Eos % (Auto)    0.1  


 


Baso % (Auto)    0.0  


 


Reticulocyte % (Auto)    2.8 H  (0.5-2.0)  %


 


Neut # (Auto)    10.04 H  


 


Lymph # (Auto)    0.42 L  


 


Mono # (Auto)    0.44  


 


Eos # (Auto)    0.01  


 


Baso # (Auto)    0.00  


 


Reticulocyte #    0.08  (0.02-0.10)  10^6/uL


 


Immature Gran # (Auto)    0.06 H  


 


Absolute Nucleated RBC     


 


Nucleated RBC % (auto)     


 


Neutrophils % (Manual)     


 


Band Neutrophils %     


 


Lymphocytes % (Manual)     


 


Prolymphocyte %     


 


Reactive Lymphs % (Man)     


 


Monocytes % (Manual)     


 


Eosinophils % (Manual)     


 


Basophils % (Manual)     


 


Metamyelocytes % (Man)     


 


Myelocytes % (Man)     


 


Promyelocytes % (Man)     


 


Blast Cells % (Manual)     


 


Plasma Cell % (Manual)     


 


Other Cells %     


 


Nucleated RBC %     


 


Neutrophils # (Manual)     


 


Band Neutrophils #     


 


Total Absolute Neuts     


 


Lymphocytes # (Manual)     


 


Prolymphocyte #     


 


Reactive Lymphs #     


 


Total Abs Lymphocytes     


 


Monocytes # (Manual)     


 


Eosinophils # (Manual)     


 


Basophils # (Manual)     


 


Metamyelocytes # (Man)     


 


Myelocytes # (Manual)     


 


Promyelocytes # (Man)     


 


Blast Cells # (Man)     


 


Plasma Cell # (Manual)     


 


Other Cells #     


 


Nucleated RBCs # (Man)     


 


Hypersegmented Neuts     


 


Hyposegmented Neuts     


 


Hypogranular Neuts     


 


Large Granular Lymphs     


 


# Lrg Granular Lymphs     


 


Hairy Cells     


 


Smudge Cells     


 


Toxic Granulation     


 


Toxic Vacuolation     


 


Dohle Bodies     


 


Fadi Rods     


 


Platelet Estimate     


 


Hypogranular Platelets     


 


Clumped Platelets     


 


Giant Platelets     


 


Platelet Satelliting     


 


RBC Morphology     


 


Polychromasia     


 


Hypochromasia     


 


Poikilocytosis     


 


Basophilic Stippling     


 


Anisocytosis     


 


Microcytosis     


 


Macrocytosis     


 


Spherocytes     


 


Pappenheimer Bodies     


 


Sickle Cells     


 


Target Cells     


 


Tear Drop Cells     


 


Ovalocytes     


 


Stomatocytes     


 


Cason-Jolly Bodies     


 


Echinocytes     


 


Acanthocytes (Spur)     


 


Rouleaux     


 


RBC Agglutinates     


 


Schistocytes     


 


RBC Morph Comment     


 


Peripher Smr Path Cons     


 


ESR     (0-20)  mm/hr


 


Sezary Cell     


 


PT     


 


INR     


 


APTT     


 


PTT Ratio     


 


Fibrinogen     (184-400)  mg/dl


 


Fibrin Degrad Products     (<10)  mcg/ml


 


ABG pH     (7.35-7.45)  


 


ABG pCO2     (35-46)  mmHg


 


ABG pO2     (80-95)  mmHg


 


ABG HCO3     (19-24)  mmol/L


 


ABG O2 Saturation     (90-95)  %


 


ABG Base Excess     (-9-1.8)  mEq/L


 


Chai Test     (Pos)  


 


VBG pH     


 


VBG pCO2     


 


VBG pO2     


 


VBG HCO3     


 


VBG O2 Saturation     


 


VBG Base Excess     


 


Barometric Pressure     


 


Oxygen Given     


 


Sodium   144   (136-145)  mmol/L


 


Potassium   3.9   (3.5-5.1)  mmol/L


 


Chloride   117 H   ()  mmol/L


 


Carbon Dioxide   17 L   (21-32)  mmol/L


 


Anion Gap   10.0   (3-11)  


 


BUN   28 H   (7-18)  mg/dl


 


Creatinine   1.67 H   (0.6-1.4)  mg/dl


 


Est Cr Clr Drug Dosing   42.1   ml/min


 


Est GFR ( Amer)   50.1   ml/min


 


Est GFR (Non-Af Amer)   43.2   ml/min


 


BUN/Creatinine Ratio   16.7   (10-20)  


 


Glucose   151 H   (70-99)  mg/dl


 


POC Glucose     (70-99)  mg/dl


 


Estimat Average Glucose  100    mg/dl


 


Hemoglobin A1c  5.1    (4.5-5.6)  %


 


Osmolality     (280-300)  mOsm/kg


 


Lactate     (0.4-2.0)  mmol/L


 


Calcium   7.2 L   (8.5-10.1)  mg/dl


 


Ionized Calcium     (1.12-1.32)  mmol/L


 


Phosphorus     (2.5-4.9)  mg/dl


 


Magnesium   2.0   (1.8-2.4)  mg/dl


 


Iron   223 H   ()  mcg/dl


 


TIBC   236 L   (250-450)  mcg/dl


 


Transferrin   178 L   (200-360)  mg/dl


 


Ferritin   53.5   (8-388)  ng/ml


 


Total Bilirubin   4.7 H D   (0.2-1)  mg/dl


 


Direct Bilirubin     (0-0.2)  mg/dl


 


AST   62 H   (15-37)  U/L


 


ALT   37   (12-78)  U/L


 


Alkaline Phosphatase   132 H   ()  U/L


 


Ammonia     (11-32)  umol/L


 


Lactate Dehydrogenase     ()  U/L


 


Total Creatine Kinase     ()  U/L


 


Troponin I     (0-0.045)  ng/ml


 


Total Protein   6.5   (6.4-8.2)  gm/dl


 


Albumin   2.4 L   (3.4-5.0)  gm/dl


 


Globulin   4.1 H   (2.5-4.0)  gm/dl


 


Albumin/Globulin Ratio   0.6 L   (0.9-2)  


 


Lipase     ()  U/L


 


Vitamin B12     (193-986)  pg/ml


 


Folate     (>5.38)  ng/ml


 


Beta-Hydroxybutyric Acd     (0.2-2.81)  mg/dl


 


Procalcitonin     (0-0.5)  ng/ml


 


TSH     (0.300-4.500)  uIu/ml


 


Specimen Hemolysis     


 


Urine Color     


 


Urine Appearance     (Clear)  


 


Urine pH     (4.5-7.5)  


 


Ur Specific Gravity     (1.000-1.030)  


 


Urine Protein     (Negative)  


 


Urine Glucose (UA)     (Negative)  


 


Urine Ketones     (Negative)  


 


Urine Blood     (Negative)  


 


Urine Nitrite     (Negative)  


 


Urine Bilirubin     (Negative)  


 


Urine Urobilinogen     (Negative)  


 


Ur Leukocyte Esterase     (Negative)  


 


Urine RBC     (0-4)  /hpf


 


Urine WBC     (0-5)  /hpf


 


Ur Epithelial Cells     (0-5)  /lpf


 


Urine Bacteria     (Negative)  


 


Nasal Screen MRSA (PCR)     (Negative)  


 


Phenytoin     (10-20)  mcg/ml


 


Ethyl Alcohol mg/dL     (0-3)  mg/dl


 


COVID-19 Eval Order     


 


SARS-CoV-2 (PCR)     (Negative)  


 


Blood Type     


 


Blood Type Recheck     


 


Antibody Screen     


 


Crossmatch     














  06/08/21 06/08/21 06/08/21 Range/Units





  00:03 00:03 00:03 


 


WBC     


 


RBC     


 


Hgb     


 


Hct     


 


MCV     


 


MCH     


 


MCHC     


 


RDW Std Deviation     


 


RDW Coeff of Nell     


 


Plt Count     


 


MPV     


 


Immature Gran % (Auto)     


 


Neut % (Auto)     


 


Lymph % (Auto)     


 


Mono % (Auto)     


 


Eos % (Auto)     


 


Baso % (Auto)     


 


Reticulocyte % (Auto)     (0.5-2.0)  %


 


Neut # (Auto)     


 


Lymph # (Auto)     


 


Mono # (Auto)     


 


Eos # (Auto)     


 


Baso # (Auto)     


 


Reticulocyte #     (0.02-0.10)  10^6/uL


 


Immature Gran # (Auto)     


 


Absolute Nucleated RBC     


 


Nucleated RBC % (auto)     


 


Neutrophils % (Manual)     


 


Band Neutrophils %     


 


Lymphocytes % (Manual)     


 


Prolymphocyte %     


 


Reactive Lymphs % (Man)     


 


Monocytes % (Manual)     


 


Eosinophils % (Manual)     


 


Basophils % (Manual)     


 


Metamyelocytes % (Man)     


 


Myelocytes % (Man)     


 


Promyelocytes % (Man)     


 


Blast Cells % (Manual)     


 


Plasma Cell % (Manual)     


 


Other Cells %     


 


Nucleated RBC %     


 


Neutrophils # (Manual)     


 


Band Neutrophils #     


 


Total Absolute Neuts     


 


Lymphocytes # (Manual)     


 


Prolymphocyte #     


 


Reactive Lymphs #     


 


Total Abs Lymphocytes     


 


Monocytes # (Manual)     


 


Eosinophils # (Manual)     


 


Basophils # (Manual)     


 


Metamyelocytes # (Man)     


 


Myelocytes # (Manual)     


 


Promyelocytes # (Man)     


 


Blast Cells # (Man)     


 


Plasma Cell # (Manual)     


 


Other Cells #     


 


Nucleated RBCs # (Man)     


 


Hypersegmented Neuts     


 


Hyposegmented Neuts     


 


Hypogranular Neuts     


 


Large Granular Lymphs     


 


# Lrg Granular Lymphs     


 


Hairy Cells     


 


Smudge Cells     


 


Toxic Granulation     


 


Toxic Vacuolation     


 


Dohle Bodies     


 


Fadi Rods     


 


Platelet Estimate     


 


Hypogranular Platelets     


 


Clumped Platelets     


 


Giant Platelets     


 


Platelet Satelliting     


 


RBC Morphology     


 


Polychromasia     


 


Hypochromasia     


 


Poikilocytosis     


 


Basophilic Stippling     


 


Anisocytosis     


 


Microcytosis     


 


Macrocytosis     


 


Spherocytes     


 


Pappenheimer Bodies     


 


Sickle Cells     


 


Target Cells     


 


Tear Drop Cells     


 


Ovalocytes     


 


Stomatocytes     


 


Cason-Jolly Bodies     


 


Echinocytes     


 


Acanthocytes (Spur)     


 


Rouleaux     


 


RBC Agglutinates     


 


Schistocytes     


 


RBC Morph Comment     


 


Peripher Smr Path Cons     


 


ESR     (0-20)  mm/hr


 


Sezary Cell     


 


PT     


 


INR     


 


APTT     


 


PTT Ratio     


 


Fibrinogen  246    (184-400)  mg/dl


 


Fibrin Degrad Products     (<10)  mcg/ml


 


ABG pH     (7.35-7.45)  


 


ABG pCO2     (35-46)  mmHg


 


ABG pO2     (80-95)  mmHg


 


ABG HCO3     (19-24)  mmol/L


 


ABG O2 Saturation     (90-95)  %


 


ABG Base Excess     (-9-1.8)  mEq/L


 


Chai Test     (Pos)  


 


VBG pH     


 


VBG pCO2     


 


VBG pO2     


 


VBG HCO3     


 


VBG O2 Saturation     


 


VBG Base Excess     


 


Barometric Pressure     


 


Oxygen Given     


 


Sodium   145   (136-145)  mmol/L


 


Potassium   3.9   (3.5-5.1)  mmol/L


 


Chloride   116 H   ()  mmol/L


 


Carbon Dioxide   18 L   (21-32)  mmol/L


 


Anion Gap   11.0   (3-11)  


 


BUN   29 H   (7-18)  mg/dl


 


Creatinine   1.79 H D   (0.6-1.4)  mg/dl


 


Est Cr Clr Drug Dosing   39.3   ml/min


 


Est GFR ( Amer)   46.0   ml/min


 


Est GFR (Non-Af Amer)   39.7   ml/min


 


BUN/Creatinine Ratio   16.0   (10-20)  


 


Glucose   149 H   (70-99)  mg/dl


 


POC Glucose     (70-99)  mg/dl


 


Estimat Average Glucose     mg/dl


 


Hemoglobin A1c     (4.5-5.6)  %


 


Osmolality     (280-300)  mOsm/kg


 


Lactate    2.2 H*  (0.4-2.0)  mmol/L


 


Calcium   6.6 L   (8.5-10.1)  mg/dl


 


Ionized Calcium     (1.12-1.32)  mmol/L


 


Phosphorus     (2.5-4.9)  mg/dl


 


Magnesium     (1.8-2.4)  mg/dl


 


Iron     ()  mcg/dl


 


TIBC     (250-450)  mcg/dl


 


Transferrin     (200-360)  mg/dl


 


Ferritin     (8-388)  ng/ml


 


Total Bilirubin     (0.2-1)  mg/dl


 


Direct Bilirubin     (0-0.2)  mg/dl


 


AST     (15-37)  U/L


 


ALT     (12-78)  U/L


 


Alkaline Phosphatase     ()  U/L


 


Ammonia     (11-32)  umol/L


 


Lactate Dehydrogenase     ()  U/L


 


Total Creatine Kinase     ()  U/L


 


Troponin I     (0-0.045)  ng/ml


 


Total Protein     (6.4-8.2)  gm/dl


 


Albumin     (3.4-5.0)  gm/dl


 


Globulin     (2.5-4.0)  gm/dl


 


Albumin/Globulin Ratio     (0.9-2)  


 


Lipase     ()  U/L


 


Vitamin B12     (193-986)  pg/ml


 


Folate     (>5.38)  ng/ml


 


Beta-Hydroxybutyric Acd     (0.2-2.81)  mg/dl


 


Procalcitonin     (0-0.5)  ng/ml


 


TSH   2.080   (0.300-4.500)  uIu/ml


 


Specimen Hemolysis     


 


Urine Color     


 


Urine Appearance     (Clear)  


 


Urine pH     (4.5-7.5)  


 


Ur Specific Gravity     (1.000-1.030)  


 


Urine Protein     (Negative)  


 


Urine Glucose (UA)     (Negative)  


 


Urine Ketones     (Negative)  


 


Urine Blood     (Negative)  


 


Urine Nitrite     (Negative)  


 


Urine Bilirubin     (Negative)  


 


Urine Urobilinogen     (Negative)  


 


Ur Leukocyte Esterase     (Negative)  


 


Urine RBC     (0-4)  /hpf


 


Urine WBC     (0-5)  /hpf


 


Ur Epithelial Cells     (0-5)  /lpf


 


Urine Bacteria     (Negative)  


 


Nasal Screen MRSA (PCR)     (Negative)  


 


Phenytoin     (10-20)  mcg/ml


 


Ethyl Alcohol mg/dL     (0-3)  mg/dl


 


COVID-19 Eval Order     


 


SARS-CoV-2 (PCR)     (Negative)  


 


Blood Type     


 


Blood Type Recheck     


 


Antibody Screen     


 


Crossmatch     














  06/07/21 06/07/21 06/07/21 Range/Units





  Unknown 23:21 23:05 


 


WBC     


 


RBC     


 


Hgb     


 


Hct     


 


MCV     


 


MCH     


 


MCHC     


 


RDW Std Deviation     


 


RDW Coeff of Nell     


 


Plt Count     


 


MPV     


 


Immature Gran % (Auto)     


 


Neut % (Auto)     


 


Lymph % (Auto)     


 


Mono % (Auto)     


 


Eos % (Auto)     


 


Baso % (Auto)     


 


Reticulocyte % (Auto)     (0.5-2.0)  %


 


Neut # (Auto)     


 


Lymph # (Auto)     


 


Mono # (Auto)     


 


Eos # (Auto)     


 


Baso # (Auto)     


 


Reticulocyte #     (0.02-0.10)  10^6/uL


 


Immature Gran # (Auto)     


 


Absolute Nucleated RBC     


 


Nucleated RBC % (auto)     


 


Neutrophils % (Manual)     


 


Band Neutrophils %     


 


Lymphocytes % (Manual)     


 


Prolymphocyte %     


 


Reactive Lymphs % (Man)     


 


Monocytes % (Manual)     


 


Eosinophils % (Manual)     


 


Basophils % (Manual)     


 


Metamyelocytes % (Man)     


 


Myelocytes % (Man)     


 


Promyelocytes % (Man)     


 


Blast Cells % (Manual)     


 


Plasma Cell % (Manual)     


 


Other Cells %     


 


Nucleated RBC %     


 


Neutrophils # (Manual)     


 


Band Neutrophils #     


 


Total Absolute Neuts     


 


Lymphocytes # (Manual)     


 


Prolymphocyte #     


 


Reactive Lymphs #     


 


Total Abs Lymphocytes     


 


Monocytes # (Manual)     


 


Eosinophils # (Manual)     


 


Basophils # (Manual)     


 


Metamyelocytes # (Man)     


 


Myelocytes # (Manual)     


 


Promyelocytes # (Man)     


 


Blast Cells # (Man)     


 


Plasma Cell # (Manual)     


 


Other Cells #     


 


Nucleated RBCs # (Man)     


 


Hypersegmented Neuts     


 


Hyposegmented Neuts     


 


Hypogranular Neuts     


 


Large Granular Lymphs     


 


# Lrg Granular Lymphs     


 


Hairy Cells     


 


Smudge Cells     


 


Toxic Granulation     


 


Toxic Vacuolation     


 


Dohle Bodies     


 


Fadi Rods     


 


Platelet Estimate     


 


Hypogranular Platelets     


 


Clumped Platelets     


 


Giant Platelets     


 


Platelet Satelliting     


 


RBC Morphology     


 


Polychromasia     


 


Hypochromasia     


 


Poikilocytosis     


 


Basophilic Stippling     


 


Anisocytosis     


 


Microcytosis     


 


Macrocytosis     


 


Spherocytes     


 


Pappenheimer Bodies     


 


Sickle Cells     


 


Target Cells     


 


Tear Drop Cells     


 


Ovalocytes     


 


Stomatocytes     


 


Cason-Jolly Bodies     


 


Echinocytes     


 


Acanthocytes (Spur)     


 


Rouleaux     


 


RBC Agglutinates     


 


Schistocytes     


 


RBC Morph Comment     


 


Peripher Smr Path Cons     


 


ESR     (0-20)  mm/hr


 


Sezary Cell     


 


PT     


 


INR     


 


APTT     


 


PTT Ratio     


 


Fibrinogen     (184-400)  mg/dl


 


Fibrin Degrad Products     (<10)  mcg/ml


 


ABG pH    7.44  (7.35-7.45)  


 


ABG pCO2    24 L  (35-46)  mmHg


 


ABG pO2    159 H  (80-95)  mmHg


 


ABG HCO3    16 L  (19-24)  mmol/L


 


ABG O2 Saturation    99.3 H  (90-95)  %


 


ABG Base Excess    -7.5  (-9-1.8)  mEq/L


 


Chai Test    POS  (Pos)  


 


VBG pH     


 


VBG pCO2     


 


VBG pO2     


 


VBG HCO3     


 


VBG O2 Saturation     


 


VBG Base Excess     


 


Barometric Pressure    733.8  


 


Oxygen Given    30% FIO2  


 


Sodium     (136-145)  mmol/L


 


Potassium     (3.5-5.1)  mmol/L


 


Chloride     ()  mmol/L


 


Carbon Dioxide     (21-32)  mmol/L


 


Anion Gap     (3-11)  


 


BUN     (7-18)  mg/dl


 


Creatinine     (0.6-1.4)  mg/dl


 


Est Cr Clr Drug Dosing     ml/min


 


Est GFR ( Amer)     ml/min


 


Est GFR (Non-Af Amer)     ml/min


 


BUN/Creatinine Ratio     (10-20)  


 


Glucose     (70-99)  mg/dl


 


POC Glucose     (70-99)  mg/dl


 


Estimat Average Glucose     mg/dl


 


Hemoglobin A1c     (4.5-5.6)  %


 


Osmolality     (280-300)  mOsm/kg


 


Lactate     (0.4-2.0)  mmol/L


 


Calcium     (8.5-10.1)  mg/dl


 


Ionized Calcium     (1.12-1.32)  mmol/L


 


Phosphorus     (2.5-4.9)  mg/dl


 


Magnesium     (1.8-2.4)  mg/dl


 


Iron     ()  mcg/dl


 


TIBC     (250-450)  mcg/dl


 


Transferrin     (200-360)  mg/dl


 


Ferritin     (8-388)  ng/ml


 


Total Bilirubin     (0.2-1)  mg/dl


 


Direct Bilirubin     (0-0.2)  mg/dl


 


AST     (15-37)  U/L


 


ALT     (12-78)  U/L


 


Alkaline Phosphatase     ()  U/L


 


Ammonia   36.0 H   (11-32)  umol/L


 


Lactate Dehydrogenase     ()  U/L


 


Total Creatine Kinase     ()  U/L


 


Troponin I     (0-0.045)  ng/ml


 


Total Protein     (6.4-8.2)  gm/dl


 


Albumin     (3.4-5.0)  gm/dl


 


Globulin     (2.5-4.0)  gm/dl


 


Albumin/Globulin Ratio     (0.9-2)  


 


Lipase     ()  U/L


 


Vitamin B12     (193-986)  pg/ml


 


Folate     (>5.38)  ng/ml


 


Beta-Hydroxybutyric Acd  9.30 H    (0.2-2.81)  mg/dl


 


Procalcitonin     (0-0.5)  ng/ml


 


TSH     (0.300-4.500)  uIu/ml


 


Specimen Hemolysis     


 


Urine Color     


 


Urine Appearance     (Clear)  


 


Urine pH     (4.5-7.5)  


 


Ur Specific Gravity     (1.000-1.030)  


 


Urine Protein     (Negative)  


 


Urine Glucose (UA)     (Negative)  


 


Urine Ketones     (Negative)  


 


Urine Blood     (Negative)  


 


Urine Nitrite     (Negative)  


 


Urine Bilirubin     (Negative)  


 


Urine Urobilinogen     (Negative)  


 


Ur Leukocyte Esterase     (Negative)  


 


Urine RBC     (0-4)  /hpf


 


Urine WBC     (0-5)  /hpf


 


Ur Epithelial Cells     (0-5)  /lpf


 


Urine Bacteria     (Negative)  


 


Nasal Screen MRSA (PCR)     (Negative)  


 


Phenytoin     (10-20)  mcg/ml


 


Ethyl Alcohol mg/dL     (0-3)  mg/dl


 


COVID-19 Eval Order     


 


SARS-CoV-2 (PCR)     (Negative)  


 


Blood Type     


 


Blood Type Recheck     


 


Antibody Screen     


 


Crossmatch     














  06/07/21 06/07/21 06/07/21 Range/Units





  22:55 18:25 17:58 


 


WBC   7.32   


 


RBC   0.81 L   


 


Hgb   2.4 L*   


 


Hct   8.2 L*   


 


MCV   101.2 H   


 


MCH   29.6   


 


MCHC   29.3 L   


 


RDW Std Deviation     


 


RDW Coeff of Nell     


 


Plt Count   205   


 


MPV     


 


Immature Gran % (Auto)   0.3   


 


Neut % (Auto)   75.3   


 


Lymph % (Auto)   12.6   


 


Mono % (Auto)   11.6   


 


Eos % (Auto)   0.1   


 


Baso % (Auto)   0.1   


 


Reticulocyte % (Auto)     (0.5-2.0)  %


 


Neut # (Auto)   5.51   


 


Lymph # (Auto)   0.92 L   


 


Mono # (Auto)   0.85 H   


 


Eos # (Auto)   0.01   


 


Baso # (Auto)   0.01   


 


Reticulocyte #     (0.02-0.10)  10^6/uL


 


Immature Gran # (Auto)   0.02   


 


Absolute Nucleated RBC     


 


Nucleated RBC % (auto)     


 


Neutrophils % (Manual)     


 


Band Neutrophils %     


 


Lymphocytes % (Manual)     


 


Prolymphocyte %     


 


Reactive Lymphs % (Man)     


 


Monocytes % (Manual)     


 


Eosinophils % (Manual)     


 


Basophils % (Manual)     


 


Metamyelocytes % (Man)     


 


Myelocytes % (Man)     


 


Promyelocytes % (Man)     


 


Blast Cells % (Manual)     


 


Plasma Cell % (Manual)     


 


Other Cells %     


 


Nucleated RBC %     


 


Neutrophils # (Manual)     


 


Band Neutrophils #     


 


Total Absolute Neuts     


 


Lymphocytes # (Manual)     


 


Prolymphocyte #     


 


Reactive Lymphs #     


 


Total Abs Lymphocytes     


 


Monocytes # (Manual)     


 


Eosinophils # (Manual)     


 


Basophils # (Manual)     


 


Metamyelocytes # (Man)     


 


Myelocytes # (Manual)     


 


Promyelocytes # (Man)     


 


Blast Cells # (Man)     


 


Plasma Cell # (Manual)     


 


Other Cells #     


 


Nucleated RBCs # (Man)     


 


Hypersegmented Neuts     


 


Hyposegmented Neuts     


 


Hypogranular Neuts     


 


Large Granular Lymphs     


 


# Lrg Granular Lymphs     


 


Hairy Cells     


 


Smudge Cells     


 


Toxic Granulation     


 


Toxic Vacuolation     


 


Dohle Bodies     


 


Fadi Rods     


 


Platelet Estimate     


 


Hypogranular Platelets     


 


Clumped Platelets     


 


Giant Platelets     


 


Platelet Satelliting     


 


RBC Morphology     


 


Polychromasia     


 


Hypochromasia   Present   


 


Poikilocytosis     


 


Basophilic Stippling     


 


Anisocytosis     


 


Microcytosis     


 


Macrocytosis     


 


Spherocytes     


 


Pappenheimer Bodies     


 


Sickle Cells     


 


Target Cells     


 


Tear Drop Cells     


 


Ovalocytes     


 


Stomatocytes     


 


Cason-Jolly Bodies     


 


Echinocytes     


 


Acanthocytes (Spur)     


 


Rouleaux     


 


RBC Agglutinates     


 


Schistocytes     


 


RBC Morph Comment     


 


Peripher Smr Path Cons     


 


ESR     (0-20)  mm/hr


 


Sezary Cell     


 


PT     


 


INR     


 


APTT     


 


PTT Ratio     


 


Fibrinogen     (184-400)  mg/dl


 


Fibrin Degrad Products     (<10)  mcg/ml


 


ABG pH     (7.35-7.45)  


 


ABG pCO2     (35-46)  mmHg


 


ABG pO2     (80-95)  mmHg


 


ABG HCO3     (19-24)  mmol/L


 


ABG O2 Saturation     (90-95)  %


 


ABG Base Excess     (-9-1.8)  mEq/L


 


Chai Test     (Pos)  


 


VBG pH     


 


VBG pCO2     


 


VBG pO2     


 


VBG HCO3     


 


VBG O2 Saturation     


 


VBG Base Excess     


 


Barometric Pressure     


 


Oxygen Given     


 


Sodium     (136-145)  mmol/L


 


Potassium     (3.5-5.1)  mmol/L


 


Chloride     ()  mmol/L


 


Carbon Dioxide     (21-32)  mmol/L


 


Anion Gap     (3-11)  


 


BUN     (7-18)  mg/dl


 


Creatinine     (0.6-1.4)  mg/dl


 


Est Cr Clr Drug Dosing     ml/min


 


Est GFR ( Amer)     ml/min


 


Est GFR (Non-Af Amer)     ml/min


 


BUN/Creatinine Ratio     (10-20)  


 


Glucose     (70-99)  mg/dl


 


POC Glucose     (70-99)  mg/dl


 


Estimat Average Glucose     mg/dl


 


Hemoglobin A1c     (4.5-5.6)  %


 


Osmolality     (280-300)  mOsm/kg


 


Lactate     (0.4-2.0)  mmol/L


 


Calcium     (8.5-10.1)  mg/dl


 


Ionized Calcium     (1.12-1.32)  mmol/L


 


Phosphorus     (2.5-4.9)  mg/dl


 


Magnesium     (1.8-2.4)  mg/dl


 


Iron     ()  mcg/dl


 


TIBC     (250-450)  mcg/dl


 


Transferrin     (200-360)  mg/dl


 


Ferritin     (8-388)  ng/ml


 


Total Bilirubin     (0.2-1)  mg/dl


 


Direct Bilirubin     (0-0.2)  mg/dl


 


AST     (15-37)  U/L


 


ALT     (12-78)  U/L


 


Alkaline Phosphatase     ()  U/L


 


Ammonia     (11-32)  umol/L


 


Lactate Dehydrogenase     ()  U/L


 


Total Creatine Kinase     ()  U/L


 


Troponin I     (0-0.045)  ng/ml


 


Total Protein     (6.4-8.2)  gm/dl


 


Albumin     (3.4-5.0)  gm/dl


 


Globulin     (2.5-4.0)  gm/dl


 


Albumin/Globulin Ratio     (0.9-2)  


 


Lipase     ()  U/L


 


Vitamin B12     (193-986)  pg/ml


 


Folate     (>5.38)  ng/ml


 


Beta-Hydroxybutyric Acd     (0.2-2.81)  mg/dl


 


Procalcitonin    0.39  (0-0.5)  ng/ml


 


TSH     (0.300-4.500)  uIu/ml


 


Specimen Hemolysis     


 


Urine Color     


 


Urine Appearance     (Clear)  


 


Urine pH     (4.5-7.5)  


 


Ur Specific Gravity     (1.000-1.030)  


 


Urine Protein     (Negative)  


 


Urine Glucose (UA)     (Negative)  


 


Urine Ketones     (Negative)  


 


Urine Blood     (Negative)  


 


Urine Nitrite     (Negative)  


 


Urine Bilirubin     (Negative)  


 


Urine Urobilinogen     (Negative)  


 


Ur Leukocyte Esterase     (Negative)  


 


Urine RBC     (0-4)  /hpf


 


Urine WBC     (0-5)  /hpf


 


Ur Epithelial Cells     (0-5)  /lpf


 


Urine Bacteria     (Negative)  


 


Nasal Screen MRSA (PCR)  Negative    (Negative)  


 


Phenytoin     (10-20)  mcg/ml


 


Ethyl Alcohol mg/dL     (0-3)  mg/dl


 


COVID-19 Eval Order     


 


SARS-CoV-2 (PCR)     (Negative)  


 


Blood Type     


 


Blood Type Recheck     


 


Antibody Screen     


 


Crossmatch     














  06/07/21 06/07/21 06/07/21 Range/Units





  17:55 17:55 17:55 


 


WBC     


 


RBC     


 


Hgb     


 


Hct     


 


MCV     


 


MCH     


 


MCHC     


 


RDW Std Deviation     


 


RDW Coeff of Nell     


 


Plt Count     


 


MPV     


 


Immature Gran % (Auto)     


 


Neut % (Auto)     


 


Lymph % (Auto)     


 


Mono % (Auto)     


 


Eos % (Auto)     


 


Baso % (Auto)     


 


Reticulocyte % (Auto)     (0.5-2.0)  %


 


Neut # (Auto)     


 


Lymph # (Auto)     


 


Mono # (Auto)     


 


Eos # (Auto)     


 


Baso # (Auto)     


 


Reticulocyte #     (0.02-0.10)  10^6/uL


 


Immature Gran # (Auto)     


 


Absolute Nucleated RBC     


 


Nucleated RBC % (auto)     


 


Neutrophils % (Manual)     


 


Band Neutrophils %     


 


Lymphocytes % (Manual)     


 


Prolymphocyte %     


 


Reactive Lymphs % (Man)     


 


Monocytes % (Manual)     


 


Eosinophils % (Manual)     


 


Basophils % (Manual)     


 


Metamyelocytes % (Man)     


 


Myelocytes % (Man)     


 


Promyelocytes % (Man)     


 


Blast Cells % (Manual)     


 


Plasma Cell % (Manual)     


 


Other Cells %     


 


Nucleated RBC %     


 


Neutrophils # (Manual)     


 


Band Neutrophils #     


 


Total Absolute Neuts     


 


Lymphocytes # (Manual)     


 


Prolymphocyte #     


 


Reactive Lymphs #     


 


Total Abs Lymphocytes     


 


Monocytes # (Manual)     


 


Eosinophils # (Manual)     


 


Basophils # (Manual)     


 


Metamyelocytes # (Man)     


 


Myelocytes # (Manual)     


 


Promyelocytes # (Man)     


 


Blast Cells # (Man)     


 


Plasma Cell # (Manual)     


 


Other Cells #     


 


Nucleated RBCs # (Man)     


 


Hypersegmented Neuts     


 


Hyposegmented Neuts     


 


Hypogranular Neuts     


 


Large Granular Lymphs     


 


# Lrg Granular Lymphs     


 


Hairy Cells     


 


Smudge Cells     


 


Toxic Granulation     


 


Toxic Vacuolation     


 


Dohle Bodies     


 


Fadi Rods     


 


Platelet Estimate     


 


Hypogranular Platelets     


 


Clumped Platelets     


 


Giant Platelets     


 


Platelet Satelliting     


 


RBC Morphology     


 


Polychromasia     


 


Hypochromasia     


 


Poikilocytosis     


 


Basophilic Stippling     


 


Anisocytosis     


 


Microcytosis     


 


Macrocytosis     


 


Spherocytes     


 


Pappenheimer Bodies     


 


Sickle Cells     


 


Target Cells     


 


Tear Drop Cells     


 


Ovalocytes     


 


Stomatocytes     


 


Cason-Jolly Bodies     


 


Echinocytes     


 


Acanthocytes (Spur)     


 


Rouleaux     


 


RBC Agglutinates     


 


Schistocytes     


 


RBC Morph Comment     


 


Peripher Smr Path Cons     


 


ESR     (0-20)  mm/hr


 


Sezary Cell     


 


PT     


 


INR     


 


APTT     


 


PTT Ratio     


 


Fibrinogen     (184-400)  mg/dl


 


Fibrin Degrad Products     (<10)  mcg/ml


 


ABG pH     (7.35-7.45)  


 


ABG pCO2     (35-46)  mmHg


 


ABG pO2     (80-95)  mmHg


 


ABG HCO3     (19-24)  mmol/L


 


ABG O2 Saturation     (90-95)  %


 


ABG Base Excess     (-9-1.8)  mEq/L


 


Chai Test     (Pos)  


 


VBG pH   7.44 H   


 


VBG pCO2   29 L   


 


VBG pO2   24   


 


VBG HCO3   19   


 


VBG O2 Saturation   63.6   


 


VBG Base Excess   -4.5   


 


Barometric Pressure   733.2   


 


Oxygen Given     


 


Sodium     (136-145)  mmol/L


 


Potassium     (3.5-5.1)  mmol/L


 


Chloride     ()  mmol/L


 


Carbon Dioxide     (21-32)  mmol/L


 


Anion Gap     (3-11)  


 


BUN     (7-18)  mg/dl


 


Creatinine     (0.6-1.4)  mg/dl


 


Est Cr Clr Drug Dosing     ml/min


 


Est GFR ( Amer)     ml/min


 


Est GFR (Non-Af Amer)     ml/min


 


BUN/Creatinine Ratio     (10-20)  


 


Glucose     (70-99)  mg/dl


 


POC Glucose     (70-99)  mg/dl


 


Estimat Average Glucose     mg/dl


 


Hemoglobin A1c     (4.5-5.6)  %


 


Osmolality     (280-300)  mOsm/kg


 


Lactate  3.8 H*    (0.4-2.0)  mmol/L


 


Calcium     (8.5-10.1)  mg/dl


 


Ionized Calcium     (1.12-1.32)  mmol/L


 


Phosphorus     (2.5-4.9)  mg/dl


 


Magnesium     (1.8-2.4)  mg/dl


 


Iron     ()  mcg/dl


 


TIBC     (250-450)  mcg/dl


 


Transferrin     (200-360)  mg/dl


 


Ferritin     (8-388)  ng/ml


 


Total Bilirubin     (0.2-1)  mg/dl


 


Direct Bilirubin     (0-0.2)  mg/dl


 


AST     (15-37)  U/L


 


ALT     (12-78)  U/L


 


Alkaline Phosphatase     ()  U/L


 


Ammonia     (11-32)  umol/L


 


Lactate Dehydrogenase     ()  U/L


 


Total Creatine Kinase     ()  U/L


 


Troponin I     (0-0.045)  ng/ml


 


Total Protein     (6.4-8.2)  gm/dl


 


Albumin     (3.4-5.0)  gm/dl


 


Globulin     (2.5-4.0)  gm/dl


 


Albumin/Globulin Ratio     (0.9-2)  


 


Lipase     ()  U/L


 


Vitamin B12     (193-986)  pg/ml


 


Folate     (>5.38)  ng/ml


 


Beta-Hydroxybutyric Acd     (0.2-2.81)  mg/dl


 


Procalcitonin     (0-0.5)  ng/ml


 


TSH     (0.300-4.500)  uIu/ml


 


Specimen Hemolysis     


 


Urine Color     


 


Urine Appearance     (Clear)  


 


Urine pH     (4.5-7.5)  


 


Ur Specific Gravity     (1.000-1.030)  


 


Urine Protein     (Negative)  


 


Urine Glucose (UA)     (Negative)  


 


Urine Ketones     (Negative)  


 


Urine Blood     (Negative)  


 


Urine Nitrite     (Negative)  


 


Urine Bilirubin     (Negative)  


 


Urine Urobilinogen     (Negative)  


 


Ur Leukocyte Esterase     (Negative)  


 


Urine RBC     (0-4)  /hpf


 


Urine WBC     (0-5)  /hpf


 


Ur Epithelial Cells     (0-5)  /lpf


 


Urine Bacteria     (Negative)  


 


Nasal Screen MRSA (PCR)     (Negative)  


 


Phenytoin     (10-20)  mcg/ml


 


Ethyl Alcohol mg/dL     (0-3)  mg/dl


 


COVID-19 Eval Order     


 


SARS-CoV-2 (PCR)     (Negative)  


 


Blood Type     


 


Blood Type Recheck    A Positive  


 


Antibody Screen     


 


Crossmatch     














  06/07/21 06/07/21 06/07/21 Range/Units





  17:55 17:55 17:42 


 


WBC   8.53   


 


RBC   0.84 L   


 


Hgb   2.4 L*   


 


Hct   8.4 L*   


 


MCV   100.0   


 


MCH   28.6   


 


MCHC   28.6 L   


 


RDW Std Deviation   59.4 H   


 


RDW Coeff of Nell   16.9 H   


 


Plt Count   231   


 


MPV   9.3   


 


Immature Gran % (Auto)   0.4   


 


Neut % (Auto)   72.6   


 


Lymph % (Auto)   13.5   


 


Mono % (Auto)   12.8   


 


Eos % (Auto)   0.5   


 


Baso % (Auto)   0.2   


 


Reticulocyte % (Auto)     (0.5-2.0)  %


 


Neut # (Auto)   6.20   


 


Lymph # (Auto)   1.15 L   


 


Mono # (Auto)   1.09 H   


 


Eos # (Auto)   0.04   


 


Baso # (Auto)   0.02   


 


Reticulocyte #     (0.02-0.10)  10^6/uL


 


Immature Gran # (Auto)   0.03 H   


 


Absolute Nucleated RBC     


 


Nucleated RBC % (auto)     


 


Neutrophils % (Manual)     


 


Band Neutrophils %     


 


Lymphocytes % (Manual)     


 


Prolymphocyte %     


 


Reactive Lymphs % (Man)     


 


Monocytes % (Manual)     


 


Eosinophils % (Manual)     


 


Basophils % (Manual)     


 


Metamyelocytes % (Man)     


 


Myelocytes % (Man)     


 


Promyelocytes % (Man)     


 


Blast Cells % (Manual)     


 


Plasma Cell % (Manual)     


 


Other Cells %     


 


Nucleated RBC %     


 


Neutrophils # (Manual)     


 


Band Neutrophils #     


 


Total Absolute Neuts     


 


Lymphocytes # (Manual)     


 


Prolymphocyte #     


 


Reactive Lymphs #     


 


Total Abs Lymphocytes     


 


Monocytes # (Manual)     


 


Eosinophils # (Manual)     


 


Basophils # (Manual)     


 


Metamyelocytes # (Man)     


 


Myelocytes # (Manual)     


 


Promyelocytes # (Man)     


 


Blast Cells # (Man)     


 


Plasma Cell # (Manual)     


 


Other Cells #     


 


Nucleated RBCs # (Man)     


 


Hypersegmented Neuts     


 


Hyposegmented Neuts     


 


Hypogranular Neuts     


 


Large Granular Lymphs     


 


# Lrg Granular Lymphs     


 


Hairy Cells     


 


Smudge Cells     


 


Toxic Granulation     


 


Toxic Vacuolation     


 


Dohle Bodies     


 


Fadi Rods     


 


Platelet Estimate     


 


Hypogranular Platelets     


 


Clumped Platelets     


 


Giant Platelets     


 


Platelet Satelliting     


 


RBC Morphology     


 


Polychromasia     


 


Hypochromasia   Present   


 


Poikilocytosis     


 


Basophilic Stippling     


 


Anisocytosis     


 


Microcytosis     


 


Macrocytosis     


 


Spherocytes     


 


Pappenheimer Bodies     


 


Sickle Cells     


 


Target Cells     


 


Tear Drop Cells     


 


Ovalocytes     


 


Stomatocytes     


 


Cason-Jolly Bodies     


 


Echinocytes     


 


Acanthocytes (Spur)     


 


Rouleaux     


 


RBC Agglutinates     


 


Schistocytes     


 


RBC Morph Comment     


 


Peripher Smr Path Cons      


 


ESR     (0-20)  mm/hr


 


Sezary Cell     


 


PT  12.8 H    


 


INR  1.3 H    


 


APTT  Cancelled    


 


PTT Ratio  Cancelled    


 


Fibrinogen     (184-400)  mg/dl


 


Fibrin Degrad Products     (<10)  mcg/ml


 


ABG pH     (7.35-7.45)  


 


ABG pCO2     (35-46)  mmHg


 


ABG pO2     (80-95)  mmHg


 


ABG HCO3     (19-24)  mmol/L


 


ABG O2 Saturation     (90-95)  %


 


ABG Base Excess     (-9-1.8)  mEq/L


 


Chai Test     (Pos)  


 


VBG pH     


 


VBG pCO2     


 


VBG pO2     


 


VBG HCO3     


 


VBG O2 Saturation     


 


VBG Base Excess     


 


Barometric Pressure     


 


Oxygen Given     


 


Sodium     (136-145)  mmol/L


 


Potassium     (3.5-5.1)  mmol/L


 


Chloride     ()  mmol/L


 


Carbon Dioxide     (21-32)  mmol/L


 


Anion Gap     (3-11)  


 


BUN     (7-18)  mg/dl


 


Creatinine     (0.6-1.4)  mg/dl


 


Est Cr Clr Drug Dosing     ml/min


 


Est GFR ( Amer)     ml/min


 


Est GFR (Non-Af Amer)     ml/min


 


BUN/Creatinine Ratio     (10-20)  


 


Glucose     (70-99)  mg/dl


 


POC Glucose     (70-99)  mg/dl


 


Estimat Average Glucose     mg/dl


 


Hemoglobin A1c     (4.5-5.6)  %


 


Osmolality    323 H  (280-300)  mOsm/kg


 


Lactate     (0.4-2.0)  mmol/L


 


Calcium     (8.5-10.1)  mg/dl


 


Ionized Calcium     (1.12-1.32)  mmol/L


 


Phosphorus     (2.5-4.9)  mg/dl


 


Magnesium     (1.8-2.4)  mg/dl


 


Iron     ()  mcg/dl


 


TIBC     (250-450)  mcg/dl


 


Transferrin     (200-360)  mg/dl


 


Ferritin     (8-388)  ng/ml


 


Total Bilirubin     (0.2-1)  mg/dl


 


Direct Bilirubin     (0-0.2)  mg/dl


 


AST     (15-37)  U/L


 


ALT     (12-78)  U/L


 


Alkaline Phosphatase     ()  U/L


 


Ammonia     (11-32)  umol/L


 


Lactate Dehydrogenase     ()  U/L


 


Total Creatine Kinase     ()  U/L


 


Troponin I     (0-0.045)  ng/ml


 


Total Protein     (6.4-8.2)  gm/dl


 


Albumin     (3.4-5.0)  gm/dl


 


Globulin     (2.5-4.0)  gm/dl


 


Albumin/Globulin Ratio     (0.9-2)  


 


Lipase     ()  U/L


 


Vitamin B12     (193-986)  pg/ml


 


Folate     (>5.38)  ng/ml


 


Beta-Hydroxybutyric Acd     (0.2-2.81)  mg/dl


 


Procalcitonin     (0-0.5)  ng/ml


 


TSH     (0.300-4.500)  uIu/ml


 


Specimen Hemolysis     


 


Urine Color     


 


Urine Appearance     (Clear)  


 


Urine pH     (4.5-7.5)  


 


Ur Specific Gravity     (1.000-1.030)  


 


Urine Protein     (Negative)  


 


Urine Glucose (UA)     (Negative)  


 


Urine Ketones     (Negative)  


 


Urine Blood     (Negative)  


 


Urine Nitrite     (Negative)  


 


Urine Bilirubin     (Negative)  


 


Urine Urobilinogen     (Negative)  


 


Ur Leukocyte Esterase     (Negative)  


 


Urine RBC     (0-4)  /hpf


 


Urine WBC     (0-5)  /hpf


 


Ur Epithelial Cells     (0-5)  /lpf


 


Urine Bacteria     (Negative)  


 


Nasal Screen MRSA (PCR)     (Negative)  


 


Phenytoin     (10-20)  mcg/ml


 


Ethyl Alcohol mg/dL     (0-3)  mg/dl


 


COVID-19 Eval Order     


 


SARS-CoV-2 (PCR)     (Negative)  


 


Blood Type     


 


Blood Type Recheck     


 


Antibody Screen     


 


Crossmatch     














  06/07/21 06/07/21 06/07/21 Range/Units





  16:45 16:45 16:41 


 


WBC     


 


RBC     


 


Hgb     


 


Hct     


 


MCV     


 


MCH     


 


MCHC     


 


RDW Std Deviation     


 


RDW Coeff of Nell     


 


Plt Count     


 


MPV     


 


Immature Gran % (Auto)     


 


Neut % (Auto)     


 


Lymph % (Auto)     


 


Mono % (Auto)     


 


Eos % (Auto)     


 


Baso % (Auto)     


 


Reticulocyte % (Auto)     (0.5-2.0)  %


 


Neut # (Auto)     


 


Lymph # (Auto)     


 


Mono # (Auto)     


 


Eos # (Auto)     


 


Baso # (Auto)     


 


Reticulocyte #     (0.02-0.10)  10^6/uL


 


Immature Gran # (Auto)     


 


Absolute Nucleated RBC     


 


Nucleated RBC % (auto)     


 


Neutrophils % (Manual)     


 


Band Neutrophils %     


 


Lymphocytes % (Manual)     


 


Prolymphocyte %     


 


Reactive Lymphs % (Man)     


 


Monocytes % (Manual)     


 


Eosinophils % (Manual)     


 


Basophils % (Manual)     


 


Metamyelocytes % (Man)     


 


Myelocytes % (Man)     


 


Promyelocytes % (Man)     


 


Blast Cells % (Manual)     


 


Plasma Cell % (Manual)     


 


Other Cells %     


 


Nucleated RBC %     


 


Neutrophils # (Manual)     


 


Band Neutrophils #     


 


Total Absolute Neuts     


 


Lymphocytes # (Manual)     


 


Prolymphocyte #     


 


Reactive Lymphs #     


 


Total Abs Lymphocytes     


 


Monocytes # (Manual)     


 


Eosinophils # (Manual)     


 


Basophils # (Manual)     


 


Metamyelocytes # (Man)     


 


Myelocytes # (Manual)     


 


Promyelocytes # (Man)     


 


Blast Cells # (Man)     


 


Plasma Cell # (Manual)     


 


Other Cells #     


 


Nucleated RBCs # (Man)     


 


Hypersegmented Neuts     


 


Hyposegmented Neuts     


 


Hypogranular Neuts     


 


Large Granular Lymphs     


 


# Lrg Granular Lymphs     


 


Hairy Cells     


 


Smudge Cells     


 


Toxic Granulation     


 


Toxic Vacuolation     


 


Dohle Bodies     


 


Fadi Rods     


 


Platelet Estimate     


 


Hypogranular Platelets     


 


Clumped Platelets     


 


Giant Platelets     


 


Platelet Satelliting     


 


RBC Morphology     


 


Polychromasia     


 


Hypochromasia     


 


Poikilocytosis     


 


Basophilic Stippling     


 


Anisocytosis     


 


Microcytosis     


 


Macrocytosis     


 


Spherocytes     


 


Pappenheimer Bodies     


 


Sickle Cells     


 


Target Cells     


 


Tear Drop Cells     


 


Ovalocytes     


 


Stomatocytes     


 


Cason-Jolly Bodies     


 


Echinocytes     


 


Acanthocytes (Spur)     


 


Rouleaux     


 


RBC Agglutinates     


 


Schistocytes     


 


RBC Morph Comment     


 


Peripher Smr Path Cons     


 


ESR     (0-20)  mm/hr


 


Sezary Cell     


 


PT    Cancelled  


 


INR    Cancelled  


 


APTT    Cancelled  


 


PTT Ratio    Cancelled  


 


Fibrinogen     (184-400)  mg/dl


 


Fibrin Degrad Products     (<10)  mcg/ml


 


ABG pH     (7.35-7.45)  


 


ABG pCO2     (35-46)  mmHg


 


ABG pO2     (80-95)  mmHg


 


ABG HCO3     (19-24)  mmol/L


 


ABG O2 Saturation     (90-95)  %


 


ABG Base Excess     (-9-1.8)  mEq/L


 


Chai Test     (Pos)  


 


VBG pH     


 


VBG pCO2     


 


VBG pO2     


 


VBG HCO3     


 


VBG O2 Saturation     


 


VBG Base Excess     


 


Barometric Pressure     


 


Oxygen Given     


 


Sodium     (136-145)  mmol/L


 


Potassium     (3.5-5.1)  mmol/L


 


Chloride     ()  mmol/L


 


Carbon Dioxide     (21-32)  mmol/L


 


Anion Gap     (3-11)  


 


BUN     (7-18)  mg/dl


 


Creatinine     (0.6-1.4)  mg/dl


 


Est Cr Clr Drug Dosing     ml/min


 


Est GFR ( Amer)     ml/min


 


Est GFR (Non-Af Amer)     ml/min


 


BUN/Creatinine Ratio     (10-20)  


 


Glucose     (70-99)  mg/dl


 


POC Glucose     (70-99)  mg/dl


 


Estimat Average Glucose     mg/dl


 


Hemoglobin A1c     (4.5-5.6)  %


 


Osmolality     (280-300)  mOsm/kg


 


Lactate     (0.4-2.0)  mmol/L


 


Calcium     (8.5-10.1)  mg/dl


 


Ionized Calcium     (1.12-1.32)  mmol/L


 


Phosphorus     (2.5-4.9)  mg/dl


 


Magnesium     (1.8-2.4)  mg/dl


 


Iron     ()  mcg/dl


 


TIBC     (250-450)  mcg/dl


 


Transferrin     (200-360)  mg/dl


 


Ferritin     (8-388)  ng/ml


 


Total Bilirubin     (0.2-1)  mg/dl


 


Direct Bilirubin     (0-0.2)  mg/dl


 


AST     (15-37)  U/L


 


ALT     (12-78)  U/L


 


Alkaline Phosphatase     ()  U/L


 


Ammonia     (11-32)  umol/L


 


Lactate Dehydrogenase     ()  U/L


 


Total Creatine Kinase     ()  U/L


 


Troponin I     (0-0.045)  ng/ml


 


Total Protein     (6.4-8.2)  gm/dl


 


Albumin     (3.4-5.0)  gm/dl


 


Globulin     (2.5-4.0)  gm/dl


 


Albumin/Globulin Ratio     (0.9-2)  


 


Lipase     ()  U/L


 


Vitamin B12     (193-986)  pg/ml


 


Folate     (>5.38)  ng/ml


 


Beta-Hydroxybutyric Acd     (0.2-2.81)  mg/dl


 


Procalcitonin     (0-0.5)  ng/ml


 


TSH     (0.300-4.500)  uIu/ml


 


Specimen Hemolysis     


 


Urine Color     


 


Urine Appearance     (Clear)  


 


Urine pH     (4.5-7.5)  


 


Ur Specific Gravity     (1.000-1.030)  


 


Urine Protein     (Negative)  


 


Urine Glucose (UA)     (Negative)  


 


Urine Ketones     (Negative)  


 


Urine Blood     (Negative)  


 


Urine Nitrite     (Negative)  


 


Urine Bilirubin     (Negative)  


 


Urine Urobilinogen     (Negative)  


 


Ur Leukocyte Esterase     (Negative)  


 


Urine RBC     (0-4)  /hpf


 


Urine WBC     (0-5)  /hpf


 


Ur Epithelial Cells     (0-5)  /lpf


 


Urine Bacteria     (Negative)  


 


Nasal Screen MRSA (PCR)     (Negative)  


 


Phenytoin     (10-20)  mcg/ml


 


Ethyl Alcohol mg/dL     (0-3)  mg/dl


 


COVID-19 Eval Order   Covid19 at [facility]   


 


SARS-CoV-2 (PCR)  NEGATIVE    (Negative)  


 


Blood Type     


 


Blood Type Recheck     


 


Antibody Screen     


 


Crossmatch     














  06/07/21 06/07/21 06/07/21 Range/Units





  16:41 16:41 16:41 


 


WBC  Cancelled    


 


RBC  Cancelled    


 


Hgb  Cancelled    


 


Hct  Cancelled    


 


MCV  Cancelled    


 


MCH  Cancelled    


 


MCHC  Cancelled    


 


RDW Std Deviation  Cancelled    


 


RDW Coeff of Nell  Cancelled    


 


Plt Count  Cancelled    


 


MPV  Cancelled    


 


Immature Gran % (Auto)  Cancelled    


 


Neut % (Auto)  Cancelled    


 


Lymph % (Auto)  Cancelled    


 


Mono % (Auto)  Cancelled    


 


Eos % (Auto)  Cancelled    


 


Baso % (Auto)  Cancelled    


 


Reticulocyte % (Auto)     (0.5-2.0)  %


 


Neut # (Auto)  Cancelled    


 


Lymph # (Auto)  Cancelled    


 


Mono # (Auto)  Cancelled    


 


Eos # (Auto)  Cancelled    


 


Baso # (Auto)  Cancelled    


 


Reticulocyte #     (0.02-0.10)  10^6/uL


 


Immature Gran # (Auto)  Cancelled    


 


Absolute Nucleated RBC  Cancelled    


 


Nucleated RBC % (auto)  Cancelled    


 


Neutrophils % (Manual)  Cancelled    


 


Band Neutrophils %  Cancelled    


 


Lymphocytes % (Manual)  Cancelled    


 


Prolymphocyte %  Cancelled    


 


Reactive Lymphs % (Man)  Cancelled    


 


Monocytes % (Manual)  Cancelled    


 


Eosinophils % (Manual)  Cancelled    


 


Basophils % (Manual)  Cancelled    


 


Metamyelocytes % (Man)  Cancelled    


 


Myelocytes % (Man)  Cancelled    


 


Promyelocytes % (Man)  Cancelled    


 


Blast Cells % (Manual)  Cancelled    


 


Plasma Cell % (Manual)  Cancelled    


 


Other Cells %  Cancelled    


 


Nucleated RBC %  Cancelled    


 


Neutrophils # (Manual)  Cancelled    


 


Band Neutrophils #  Cancelled    


 


Total Absolute Neuts  Cancelled    


 


Lymphocytes # (Manual)  Cancelled    


 


Prolymphocyte #  Cancelled    


 


Reactive Lymphs #  Cancelled    


 


Total Abs Lymphocytes  Cancelled    


 


Monocytes # (Manual)  Cancelled    


 


Eosinophils # (Manual)  Cancelled    


 


Basophils # (Manual)  Cancelled    


 


Metamyelocytes # (Man)  Cancelled    


 


Myelocytes # (Manual)  Cancelled    


 


Promyelocytes # (Man)  Cancelled    


 


Blast Cells # (Man)  Cancelled    


 


Plasma Cell # (Manual)  Cancelled    


 


Other Cells #  Cancelled    


 


Nucleated RBCs # (Man)  Cancelled    


 


Hypersegmented Neuts  Cancelled    


 


Hyposegmented Neuts  Cancelled    


 


Hypogranular Neuts  Cancelled    


 


Large Granular Lymphs  Cancelled    


 


# Lrg Granular Lymphs  Cancelled    


 


Hairy Cells  Cancelled    


 


Smudge Cells  Cancelled    


 


Toxic Granulation  Cancelled    


 


Toxic Vacuolation  Cancelled    


 


Dohle Bodies  Cancelled    


 


Fadi Rods  Cancelled    


 


Platelet Estimate  Cancelled    


 


Hypogranular Platelets  Cancelled    


 


Clumped Platelets  Cancelled    


 


Giant Platelets  Cancelled    


 


Platelet Satelliting  Cancelled    


 


RBC Morphology  Cancelled    


 


Polychromasia  Cancelled    


 


Hypochromasia  Cancelled    


 


Poikilocytosis  Cancelled    


 


Basophilic Stippling  Cancelled    


 


Anisocytosis  Cancelled    


 


Microcytosis  Cancelled    


 


Macrocytosis  Cancelled    


 


Spherocytes  Cancelled    


 


Pappenheimer Bodies  Cancelled    


 


Sickle Cells  Cancelled    


 


Target Cells  Cancelled    


 


Tear Drop Cells  Cancelled    


 


Ovalocytes  Cancelled    


 


Stomatocytes  Cancelled    


 


Cason-Jolly Bodies  Cancelled    


 


Echinocytes  Cancelled    


 


Acanthocytes (Spur)  Cancelled    


 


Rouleaux  Cancelled    


 


RBC Agglutinates  Cancelled    


 


Schistocytes  Cancelled    


 


RBC Morph Comment  Cancelled    


 


Peripher Smr Path Cons     


 


ESR     (0-20)  mm/hr


 


Sezary Cell  Cancelled    


 


PT     


 


INR     


 


APTT     


 


PTT Ratio     


 


Fibrinogen     (184-400)  mg/dl


 


Fibrin Degrad Products     (<10)  mcg/ml


 


ABG pH     (7.35-7.45)  


 


ABG pCO2     (35-46)  mmHg


 


ABG pO2     (80-95)  mmHg


 


ABG HCO3     (19-24)  mmol/L


 


ABG O2 Saturation     (90-95)  %


 


ABG Base Excess     (-9-1.8)  mEq/L


 


Chai Test     (Pos)  


 


VBG pH    Cancelled  


 


VBG pCO2    Cancelled  


 


VBG pO2    Cancelled  


 


VBG HCO3    Cancelled  


 


VBG O2 Saturation    Cancelled  


 


VBG Base Excess    Cancelled  


 


Barometric Pressure    Cancelled  


 


Oxygen Given     


 


Sodium     (136-145)  mmol/L


 


Potassium     (3.5-5.1)  mmol/L


 


Chloride     ()  mmol/L


 


Carbon Dioxide     (21-32)  mmol/L


 


Anion Gap     (3-11)  


 


BUN     (7-18)  mg/dl


 


Creatinine     (0.6-1.4)  mg/dl


 


Est Cr Clr Drug Dosing     ml/min


 


Est GFR ( Amer)     ml/min


 


Est GFR (Non-Af Amer)     ml/min


 


BUN/Creatinine Ratio     (10-20)  


 


Glucose     (70-99)  mg/dl


 


POC Glucose     (70-99)  mg/dl


 


Estimat Average Glucose     mg/dl


 


Hemoglobin A1c     (4.5-5.6)  %


 


Osmolality     (280-300)  mOsm/kg


 


Lactate     (0.4-2.0)  mmol/L


 


Calcium     (8.5-10.1)  mg/dl


 


Ionized Calcium     (1.12-1.32)  mmol/L


 


Phosphorus     (2.5-4.9)  mg/dl


 


Magnesium     (1.8-2.4)  mg/dl


 


Iron     ()  mcg/dl


 


TIBC     (250-450)  mcg/dl


 


Transferrin     (200-360)  mg/dl


 


Ferritin     (8-388)  ng/ml


 


Total Bilirubin     (0.2-1)  mg/dl


 


Direct Bilirubin     (0-0.2)  mg/dl


 


AST     (15-37)  U/L


 


ALT     (12-78)  U/L


 


Alkaline Phosphatase     ()  U/L


 


Ammonia     (11-32)  umol/L


 


Lactate Dehydrogenase     ()  U/L


 


Total Creatine Kinase     ()  U/L


 


Troponin I     (0-0.045)  ng/ml


 


Total Protein     (6.4-8.2)  gm/dl


 


Albumin     (3.4-5.0)  gm/dl


 


Globulin     (2.5-4.0)  gm/dl


 


Albumin/Globulin Ratio     (0.9-2)  


 


Lipase     ()  U/L


 


Vitamin B12     (193-986)  pg/ml


 


Folate     (>5.38)  ng/ml


 


Beta-Hydroxybutyric Acd     (0.2-2.81)  mg/dl


 


Procalcitonin     (0-0.5)  ng/ml


 


TSH     (0.300-4.500)  uIu/ml


 


Specimen Hemolysis     


 


Urine Color     


 


Urine Appearance     (Clear)  


 


Urine pH     (4.5-7.5)  


 


Ur Specific Gravity     (1.000-1.030)  


 


Urine Protein     (Negative)  


 


Urine Glucose (UA)     (Negative)  


 


Urine Ketones     (Negative)  


 


Urine Blood     (Negative)  


 


Urine Nitrite     (Negative)  


 


Urine Bilirubin     (Negative)  


 


Urine Urobilinogen     (Negative)  


 


Ur Leukocyte Esterase     (Negative)  


 


Urine RBC     (0-4)  /hpf


 


Urine WBC     (0-5)  /hpf


 


Ur Epithelial Cells     (0-5)  /lpf


 


Urine Bacteria     (Negative)  


 


Nasal Screen MRSA (PCR)     (Negative)  


 


Phenytoin     (10-20)  mcg/ml


 


Ethyl Alcohol mg/dL   < 3.0   (0-3)  mg/dl


 


COVID-19 Eval Order     


 


SARS-CoV-2 (PCR)     (Negative)  


 


Blood Type     


 


Blood Type Recheck     


 


Antibody Screen     


 


Crossmatch     














  06/07/21 06/07/21 06/07/21 Range/Units





  16:41 15:53 15:53 


 


WBC     


 


RBC     


 


Hgb     


 


Hct     


 


MCV     


 


MCH     


 


MCHC     


 


RDW Std Deviation     


 


RDW Coeff of Nell     


 


Plt Count     


 


MPV     


 


Immature Gran % (Auto)     


 


Neut % (Auto)     


 


Lymph % (Auto)     


 


Mono % (Auto)     


 


Eos % (Auto)     


 


Baso % (Auto)     


 


Reticulocyte % (Auto)     (0.5-2.0)  %


 


Neut # (Auto)     


 


Lymph # (Auto)     


 


Mono # (Auto)     


 


Eos # (Auto)     


 


Baso # (Auto)     


 


Reticulocyte #     (0.02-0.10)  10^6/uL


 


Immature Gran # (Auto)     


 


Absolute Nucleated RBC     


 


Nucleated RBC % (auto)     


 


Neutrophils % (Manual)     


 


Band Neutrophils %     


 


Lymphocytes % (Manual)     


 


Prolymphocyte %     


 


Reactive Lymphs % (Man)     


 


Monocytes % (Manual)     


 


Eosinophils % (Manual)     


 


Basophils % (Manual)     


 


Metamyelocytes % (Man)     


 


Myelocytes % (Man)     


 


Promyelocytes % (Man)     


 


Blast Cells % (Manual)     


 


Plasma Cell % (Manual)     


 


Other Cells %     


 


Nucleated RBC %     


 


Neutrophils # (Manual)     


 


Band Neutrophils #     


 


Total Absolute Neuts     


 


Lymphocytes # (Manual)     


 


Prolymphocyte #     


 


Reactive Lymphs #     


 


Total Abs Lymphocytes     


 


Monocytes # (Manual)     


 


Eosinophils # (Manual)     


 


Basophils # (Manual)     


 


Metamyelocytes # (Man)     


 


Myelocytes # (Manual)     


 


Promyelocytes # (Man)     


 


Blast Cells # (Man)     


 


Plasma Cell # (Manual)     


 


Other Cells #     


 


Nucleated RBCs # (Man)     


 


Hypersegmented Neuts     


 


Hyposegmented Neuts     


 


Hypogranular Neuts     


 


Large Granular Lymphs     


 


# Lrg Granular Lymphs     


 


Hairy Cells     


 


Smudge Cells     


 


Toxic Granulation     


 


Toxic Vacuolation     


 


Dohle Bodies     


 


Fadi Rods     


 


Platelet Estimate     


 


Hypogranular Platelets     


 


Clumped Platelets     


 


Giant Platelets     


 


Platelet Satelliting     


 


RBC Morphology     


 


Polychromasia     


 


Hypochromasia     


 


Poikilocytosis     


 


Basophilic Stippling     


 


Anisocytosis     


 


Microcytosis     


 


Macrocytosis     


 


Spherocytes     


 


Pappenheimer Bodies     


 


Sickle Cells     


 


Target Cells     


 


Tear Drop Cells     


 


Ovalocytes     


 


Stomatocytes     


 


Cason-Jolly Bodies     


 


Echinocytes     


 


Acanthocytes (Spur)     


 


Rouleaux     


 


RBC Agglutinates     


 


Schistocytes     


 


RBC Morph Comment     


 


Peripher Smr Path Cons     


 


ESR     (0-20)  mm/hr


 


Sezary Cell     


 


PT     


 


INR     


 


APTT     


 


PTT Ratio     


 


Fibrinogen     (184-400)  mg/dl


 


Fibrin Degrad Products     (<10)  mcg/ml


 


ABG pH     (7.35-7.45)  


 


ABG pCO2     (35-46)  mmHg


 


ABG pO2     (80-95)  mmHg


 


ABG HCO3     (19-24)  mmol/L


 


ABG O2 Saturation     (90-95)  %


 


ABG Base Excess     (-9-1.8)  mEq/L


 


Chai Test     (Pos)  


 


VBG pH     


 


VBG pCO2     


 


VBG pO2     


 


VBG HCO3     


 


VBG O2 Saturation     


 


VBG Base Excess     


 


Barometric Pressure     


 


Oxygen Given     


 


Sodium     (136-145)  mmol/L


 


Potassium     (3.5-5.1)  mmol/L


 


Chloride     ()  mmol/L


 


Carbon Dioxide     (21-32)  mmol/L


 


Anion Gap     (3-11)  


 


BUN     (7-18)  mg/dl


 


Creatinine     (0.6-1.4)  mg/dl


 


Est Cr Clr Drug Dosing     ml/min


 


Est GFR ( Amer)     ml/min


 


Est GFR (Non-Af Amer)     ml/min


 


BUN/Creatinine Ratio     (10-20)  


 


Glucose     (70-99)  mg/dl


 


POC Glucose     (70-99)  mg/dl


 


Estimat Average Glucose     mg/dl


 


Hemoglobin A1c     (4.5-5.6)  %


 


Osmolality     (280-300)  mOsm/kg


 


Lactate     (0.4-2.0)  mmol/L


 


Calcium     (8.5-10.1)  mg/dl


 


Ionized Calcium     (1.12-1.32)  mmol/L


 


Phosphorus     (2.5-4.9)  mg/dl


 


Magnesium     (1.8-2.4)  mg/dl


 


Iron     ()  mcg/dl


 


TIBC     (250-450)  mcg/dl


 


Transferrin     (200-360)  mg/dl


 


Ferritin     (8-388)  ng/ml


 


Total Bilirubin     (0.2-1)  mg/dl


 


Direct Bilirubin     (0-0.2)  mg/dl


 


AST     (15-37)  U/L


 


ALT     (12-78)  U/L


 


Alkaline Phosphatase     ()  U/L


 


Ammonia     (11-32)  umol/L


 


Lactate Dehydrogenase     ()  U/L


 


Total Creatine Kinase     ()  U/L


 


Troponin I     (0-0.045)  ng/ml


 


Total Protein     (6.4-8.2)  gm/dl


 


Albumin     (3.4-5.0)  gm/dl


 


Globulin     (2.5-4.0)  gm/dl


 


Albumin/Globulin Ratio     (0.9-2)  


 


Lipase     ()  U/L


 


Vitamin B12     (193-986)  pg/ml


 


Folate     (>5.38)  ng/ml


 


Beta-Hydroxybutyric Acd     (0.2-2.81)  mg/dl


 


Procalcitonin     (0-0.5)  ng/ml


 


TSH     (0.300-4.500)  uIu/ml


 


Specimen Hemolysis     


 


Urine Color   Red   


 


Urine Appearance   Clear   (Clear)  


 


Urine pH   6.5   (4.5-7.5)  


 


Ur Specific Gravity   1.025   (1.000-1.030)  


 


Urine Protein   3+ H   (Negative)  


 


Urine Glucose (UA)   Negative   (Negative)  


 


Urine Ketones   Negative   (Negative)  


 


Urine Blood   3+ H   (Negative)  


 


Urine Nitrite   Negative   (Negative)  


 


Urine Bilirubin   2+ H   (Negative)  


 


Urine Urobilinogen   Negative   (Negative)  


 


Ur Leukocyte Esterase   Negative   (Negative)  


 


Urine RBC   >30 H   (0-4)  /hpf


 


Urine WBC   0-5   (0-5)  /hpf


 


Ur Epithelial Cells   0-5   (0-5)  /lpf


 


Urine Bacteria   Negative   (Negative)  


 


Nasal Screen MRSA (PCR)     (Negative)  


 


Phenytoin    < 0.4 L  (10-20)  mcg/ml


 


Ethyl Alcohol mg/dL     (0-3)  mg/dl


 


COVID-19 Eval Order     


 


SARS-CoV-2 (PCR)     (Negative)  


 


Blood Type  A Positive    


 


Blood Type Recheck     


 


Antibody Screen  NEGATIVE    


 


Crossmatch  See Detail    














  06/07/21 06/07/21 06/07/21 Range/Units





  15:53 15:53 15:53 


 


WBC  Cancelled    


 


RBC  Cancelled    


 


Hgb  Cancelled    


 


Hct  Cancelled    


 


MCV  Cancelled    


 


MCH  Cancelled    


 


MCHC  Cancelled    


 


RDW Std Deviation  Cancelled    


 


RDW Coeff of Nell  Cancelled    


 


Plt Count  Cancelled    


 


MPV  Cancelled    


 


Immature Gran % (Auto)  Cancelled    


 


Neut % (Auto)  Cancelled    


 


Lymph % (Auto)  Cancelled    


 


Mono % (Auto)  Cancelled    


 


Eos % (Auto)  Cancelled    


 


Baso % (Auto)  Cancelled    


 


Reticulocyte % (Auto)     (0.5-2.0)  %


 


Neut # (Auto)  Cancelled    


 


Lymph # (Auto)  Cancelled    


 


Mono # (Auto)  Cancelled    


 


Eos # (Auto)  Cancelled    


 


Baso # (Auto)  Cancelled    


 


Reticulocyte #     (0.02-0.10)  10^6/uL


 


Immature Gran # (Auto)  Cancelled    


 


Absolute Nucleated RBC  Cancelled    


 


Nucleated RBC % (auto)  Cancelled    


 


Neutrophils % (Manual)  Cancelled    


 


Band Neutrophils %  Cancelled    


 


Lymphocytes % (Manual)  Cancelled    


 


Prolymphocyte %  Cancelled    


 


Reactive Lymphs % (Man)  Cancelled    


 


Monocytes % (Manual)  Cancelled    


 


Eosinophils % (Manual)  Cancelled    


 


Basophils % (Manual)  Cancelled    


 


Metamyelocytes % (Man)  Cancelled    


 


Myelocytes % (Man)  Cancelled    


 


Promyelocytes % (Man)  Cancelled    


 


Blast Cells % (Manual)  Cancelled    


 


Plasma Cell % (Manual)  Cancelled    


 


Other Cells %  Cancelled    


 


Nucleated RBC %  Cancelled    


 


Neutrophils # (Manual)  Cancelled    


 


Band Neutrophils #  Cancelled    


 


Total Absolute Neuts  Cancelled    


 


Lymphocytes # (Manual)  Cancelled    


 


Prolymphocyte #  Cancelled    


 


Reactive Lymphs #  Cancelled    


 


Total Abs Lymphocytes  Cancelled    


 


Monocytes # (Manual)  Cancelled    


 


Eosinophils # (Manual)  Cancelled    


 


Basophils # (Manual)  Cancelled    


 


Metamyelocytes # (Man)  Cancelled    


 


Myelocytes # (Manual)  Cancelled    


 


Promyelocytes # (Man)  Cancelled    


 


Blast Cells # (Man)  Cancelled    


 


Plasma Cell # (Manual)  Cancelled    


 


Other Cells #  Cancelled    


 


Nucleated RBCs # (Man)  Cancelled    


 


Hypersegmented Neuts  Cancelled    


 


Hyposegmented Neuts  Cancelled    


 


Hypogranular Neuts  Cancelled    


 


Large Granular Lymphs  Cancelled    


 


# Lrg Granular Lymphs  Cancelled    


 


Hairy Cells  Cancelled    


 


Smudge Cells  Cancelled    


 


Toxic Granulation  Cancelled    


 


Toxic Vacuolation  Cancelled    


 


Dohle Bodies  Cancelled    


 


Fadi Rods  Cancelled    


 


Platelet Estimate  Cancelled    


 


Hypogranular Platelets  Cancelled    


 


Clumped Platelets  Cancelled    


 


Giant Platelets  Cancelled    


 


Platelet Satelliting  Cancelled    


 


RBC Morphology  Cancelled    


 


Polychromasia  Cancelled    


 


Hypochromasia  Cancelled    


 


Poikilocytosis  Cancelled    


 


Basophilic Stippling  Cancelled    


 


Anisocytosis  Cancelled    


 


Microcytosis  Cancelled    


 


Macrocytosis  Cancelled    


 


Spherocytes  Cancelled    


 


Pappenheimer Bodies  Cancelled    


 


Sickle Cells  Cancelled    


 


Target Cells  Cancelled    


 


Tear Drop Cells  Cancelled    


 


Ovalocytes  Cancelled    


 


Stomatocytes  Cancelled    


 


Cason-Jolly Bodies  Cancelled    


 


Echinocytes  Cancelled    


 


Acanthocytes (Spur)  Cancelled    


 


Rouleaux  Cancelled    


 


RBC Agglutinates  Cancelled    


 


Schistocytes  Cancelled    


 


RBC Morph Comment  Cancelled    


 


Peripher Smr Path Cons     


 


ESR     (0-20)  mm/hr


 


Sezary Cell  Cancelled    


 


PT    Cancelled  


 


INR    Cancelled  


 


APTT    Cancelled  


 


PTT Ratio    Cancelled  


 


Fibrinogen     (184-400)  mg/dl


 


Fibrin Degrad Products     (<10)  mcg/ml


 


ABG pH     (7.35-7.45)  


 


ABG pCO2     (35-46)  mmHg


 


ABG pO2     (80-95)  mmHg


 


ABG HCO3     (19-24)  mmol/L


 


ABG O2 Saturation     (90-95)  %


 


ABG Base Excess     (-9-1.8)  mEq/L


 


Chai Test     (Pos)  


 


VBG pH     


 


VBG pCO2     


 


VBG pO2     


 


VBG HCO3     


 


VBG O2 Saturation     


 


VBG Base Excess     


 


Barometric Pressure     


 


Oxygen Given     


 


Sodium   150 H   (136-145)  mmol/L


 


Potassium   4.0   (3.5-5.1)  mmol/L


 


Chloride   120 H   ()  mmol/L


 


Carbon Dioxide   15 L   (21-32)  mmol/L


 


Anion Gap   15.0 H   (3-11)  


 


BUN   40 H   (7-18)  mg/dl


 


Creatinine   2.22 H   (0.6-1.4)  mg/dl


 


Est Cr Clr Drug Dosing   31.7   ml/min


 


Est GFR ( Amer)   35.5   ml/min


 


Est GFR (Non-Af Amer)   30.6   ml/min


 


BUN/Creatinine Ratio   18.0   (10-20)  


 


Glucose   119 H   (70-99)  mg/dl


 


POC Glucose     (70-99)  mg/dl


 


Estimat Average Glucose     mg/dl


 


Hemoglobin A1c     (4.5-5.6)  %


 


Osmolality     (280-300)  mOsm/kg


 


Lactate     (0.4-2.0)  mmol/L


 


Calcium   7.7 L   (8.5-10.1)  mg/dl


 


Ionized Calcium     (1.12-1.32)  mmol/L


 


Phosphorus   4.3   (2.5-4.9)  mg/dl


 


Magnesium   1.7 L   (1.8-2.4)  mg/dl


 


Iron     ()  mcg/dl


 


TIBC     (250-450)  mcg/dl


 


Transferrin     (200-360)  mg/dl


 


Ferritin     (8-388)  ng/ml


 


Total Bilirubin   1.4 H   (0.2-1)  mg/dl


 


Direct Bilirubin      (0-0.2)  mg/dl


 


AST   69 H   (15-37)  U/L


 


ALT   38   (12-78)  U/L


 


Alkaline Phosphatase   133 H   ()  U/L


 


Ammonia     (11-32)  umol/L


 


Lactate Dehydrogenase     ()  U/L


 


Total Creatine Kinase   258   ()  U/L


 


Troponin I   < 0.015   (0-0.045)  ng/ml


 


Total Protein   6.5   (6.4-8.2)  gm/dl


 


Albumin   2.4 L   (3.4-5.0)  gm/dl


 


Globulin   4.1 H   (2.5-4.0)  gm/dl


 


Albumin/Globulin Ratio   0.6 L   (0.9-2)  


 


Lipase   204   ()  U/L


 


Vitamin B12     (193-986)  pg/ml


 


Folate     (>5.38)  ng/ml


 


Beta-Hydroxybutyric Acd     (0.2-2.81)  mg/dl


 


Procalcitonin     (0-0.5)  ng/ml


 


TSH     (0.300-4.500)  uIu/ml


 


Specimen Hemolysis      


 


Urine Color     


 


Urine Appearance     (Clear)  


 


Urine pH     (4.5-7.5)  


 


Ur Specific Gravity     (1.000-1.030)  


 


Urine Protein     (Negative)  


 


Urine Glucose (UA)     (Negative)  


 


Urine Ketones     (Negative)  


 


Urine Blood     (Negative)  


 


Urine Nitrite     (Negative)  


 


Urine Bilirubin     (Negative)  


 


Urine Urobilinogen     (Negative)  


 


Ur Leukocyte Esterase     (Negative)  


 


Urine RBC     (0-4)  /hpf


 


Urine WBC     (0-5)  /hpf


 


Ur Epithelial Cells     (0-5)  /lpf


 


Urine Bacteria     (Negative)  


 


Nasal Screen MRSA (PCR)     (Negative)  


 


Phenytoin     (10-20)  mcg/ml


 


Ethyl Alcohol mg/dL     (0-3)  mg/dl


 


COVID-19 Eval Order     


 


SARS-CoV-2 (PCR)     (Negative)  


 


Blood Type     


 


Blood Type Recheck     


 


Antibody Screen     


 


Crossmatch     














Coding


Level of Care Code


Critical Care 1st 30-74 mins





Diagnoses


Severe anemia  D64.9


Alcohol withdrawal  F10.231


      Complication of substance-induced condition: with delirium


Metabolic acidosis  E87.2


Gross hematuria  R31.0


Closed fracture of left proximal humerus  S42.202A


Encephalopathy  G93.40


Prostate cancer  C61


LARA (acute kidney injury)  N17.9





(1) Alcohol withdrawal


  Complication of substance-induced condition: with delirium  Qualified Code(s):

F10.231 - Alcohol dependence with withdrawal delirium

## 2021-06-08 NOTE — HOSPITALIST PROGRESS NOTE
Date of Service


June 8, 2021


 





Assessment & Plan


(1) Encephalopathy: 


      Acute metabolic encephalopathy


Multifactorial:


Alcohol withdrawal,Hyponatremia, ARF secondary to illness,Hypoxemic respiratory 

failure secondary to COPD exacerbation


Requiring mild sedation to suppress withdrawal symptoms


Remains hemodynamically stable


Has been on alcohol withdrawal protocol





Acute blood loss anemia


Hemoglobin is noted to be 2.4 on admission


Secondary to multiple sources of bleeding : 


UGI B in a newly diagnosed cirrhotic patient based on CT


 bleed (history chronic hematuria, metastatic prostate cancer status post 

surgery status post chemotherapy/Lupron therapy)


IV PPI, Ceftriaxone for SBP prophylaxis in a cirrhotic patient with UGIB


Received 4 unit of packed red cell transfusion and the hemoglobin went up to 8.4

and remains stable


Appreciate GI input and recommendation for possible EGD


Has been on prophylactic antibiotic for possible SBP with ceftriaxone








Traumatic left shoulder ecchymosis with fracture of the left humerus


Secondary to fall


Contributing low hemoglobin


Ortho has been consulted








Ronni hematuria


Status post urinary catheter


Appreciate urology input and recommendation





Hypertension, stable 








Seizure disorder (medication noncompliance) 


Steroid-induced hyperglycemia rule out DM


Ongoing tobacco abuse








Possible COPD


Has been getting intravenous Solu-Medrol











Check hemoglobin A1c-5.1 as the patient is nondiabetic





Nicotine patch as needed





DVT prophylaxis.  SCDs RE left shoulder/GI/ bleed causing severe anemia





Full code








Attempted to contact patient's cousin (Mr. Denzel Ferrer, [phone]) 

over the phone to obtain additional history and provide update on patient 

situation and plan of care.  No answer.





Patient ex-girlfriend/alternative contact (Ms. Jamia Rebollar, [phone]) we will 

try to reach patient cussing a.m.























Admission and Anticipated Discharge Date


Admission Date: June 7, 2021








Subjective


06/08/2021


The patient was seen and examined in ICU


Remains obtunded without any acute distress


Continue to have hematuria but no bowel movement yet and no hematemesis 





Review of Systems








Review of Systems:   Unobtainable due to cognitive status  








Physical Exam








Physical Exam:   Lying in bed comfortably


 


Constitutional:   + ill appearing and average body habitus  


 


Eyes:   Closed


 


ENMT:   external ear and nose normal, oropharynx normal  


 


Neck:   trachea midline, no thyromegaly  


 


Respiratory:   + respiratory distress (Minimal distress at rest)    

Auscultation: + diminished lung sounds and + crackles (Minimal bibasilar 

crackles)  


 


Cardiovascular:   Rate/Rhythm: regular rate, regular rhythm and + tachycardic   

Heart Sounds: no murmur    Extremities: no edema  


 


Gastrointestinal (Abdomen):   Inspection/Auscultation: normal bowel sounds; 

abdomen not distended    Percussion/Palpation: abdomen soft; abdomen nontender  


 


Musculoskeletal:   No acute arthritis in any joint


 


Neurologic:   Alert and awake. Pleasantly confused


 


Lymphatic:   no cervical or axillary lymphadenopathy  








Results & Data


Results & Data ([facility])


Vital Signs (Past 12 Hours)


                                   Vital Signs











  Temp Pulse Pulse Resp BP Pulse Ox


 


 06/08/21 12:27  36.5 C     


 


 06/08/21 12:09   72   17  114/75  100


 


 06/08/21 11:39   76   22  139/84  100


 


 06/08/21 11:09   75   19  130/85  99


 


 06/08/21 10:09   78   22  138/85  100


 


 06/08/21 09:09   75   19  145/94 H  99


 


 06/08/21 08:16  36.4 C L     


 


 06/08/21 08:10   69   18  135/98  98


 


 06/08/21 08:00   81    


 


 06/08/21 07:57    66  20   98


 


 06/08/21 07:09   88   19  138/92  100


 


 06/08/21 06:30   91 H   14   81 L


 


 06/08/21 06:24   71   26 H  137/88  95


 


 06/08/21 06:15   74   17   90


 


 06/08/21 06:09   72   16  128/81  92


 


 06/08/21 06:00   73   16   95


 


 06/08/21 05:55   75   18  140/95  93


 


 06/08/21 05:45   79   15   90


 


 06/08/21 05:39   82   16  125/75  91


 


 06/08/21 05:30   74   14   96


 


 06/08/21 05:24   76   15  132/82  95


 


 06/08/21 05:15   74   16   94


 


 06/08/21 05:09   74   16  129/78  95


 


 06/08/21 05:07   76   15  119/86  94


 


 06/08/21 05:00   79   16   89 L


 


 06/08/21 04:54   82   22  133/87  95


 


 06/08/21 04:45   74   16   100


 


 06/08/21 04:39   83   23  136/92  97


 


 06/08/21 04:30   79   16   100


 


 06/08/21 04:26   82   24  140/85  100


 


 06/08/21 04:15   75   16   100


 


 06/08/21 04:09   81   16  121/73  100


 


 06/08/21 04:00   76   15   100


 


 06/08/21 03:59   77   20   100


 


 06/08/21 03:54   78   16  135/88  100


 


 06/08/21 03:45   76   19   100


 


 06/08/21 03:40   96 H   19  149/93 H  100


 


 06/08/21 03:30   96 H   24   96


 


 06/08/21 03:24   87   21  154/113 H  99


 


 06/08/21 03:15   87   24   97


 


 06/08/21 03:09   86   26 H  145/92 H  100


 


 06/08/21 03:00   91 H   18   93


 


 06/08/21 02:55   94 H   17  162/98 H  100


 


 06/08/21 02:45   91 H   29 H   98


 


 06/08/21 02:30   80   16   100


 


 06/08/21 02:24   83   16  138/90  100


 


 06/08/21 02:15   88   19   100


 


 06/08/21 02:09   79   18  137/87  100


 


 06/08/21 02:00   91 H   14   100


 


 06/08/21 01:55   95 H   16  126/93  97


 


 06/08/21 01:45   83   16   100


 


 06/08/21 01:40   82   18  140/90  100


 


 06/08/21 01:30   87   18   100


 


 06/08/21 01:24   86   17  127/87  100


 


 06/08/21 01:16   86   17   100


 


 06/08/21 01:09   87   17  134/87  100


 


 06/08/21 01:03   87   19  140/94  100











Laboratory Results





                                    Short CBC











  06/07/21 06/07/21 06/07/21 Range/Units





  15:53 16:41 17:55 


 


WBC  Cancelled  Cancelled  8.53  


 


Hgb  Cancelled  Cancelled  2.4 L*  


 


Hct  Cancelled  Cancelled  8.4 L*  


 


Plt Count  Cancelled  Cancelled  231  














  06/07/21 06/08/21 06/08/21 Range/Units





  18:25 03:28 09:14 


 


WBC  7.32  10.97 H   


 


Hgb  2.4 L*  8.4 L D  8.4 L  


 


Hct  8.2 L*  24.9 L  25.6 L  


 


Plt Count  205  151   








                                       BMP











  06/07/21 06/08/21 06/08/21





  15:53 00:03 03:28


 


Sodium  150 H  145  144


 


Potassium  4.0  3.9  3.9


 


Chloride  120 H  116 H  117 H


 


Carbon Dioxide  15 L  18 L  17 L


 


BUN  40 H  29 H  28 H


 


Creatinine  2.22 H  1.79 H D  1.67 H


 


Glucose  119 H  149 H  151 H


 


Calcium  7.7 L  6.6 L  7.2 L








                                 Cardiac Enzymes











  06/07/21 Range/Units





  15:53 


 


Total Creatine Kinase  258  ()  U/L


 


Troponin I  < 0.015  (0-0.045)  ng/ml








                                 Liver Function











  06/07/21 06/08/21 Range/Units





  15:53 03:28 


 


Total Bilirubin  1.4 H  4.7 H D  (0.2-1)  mg/dl


 


Direct Bilirubin     (0-0.2)  mg/dl


 


AST  69 H  62 H  (15-37)  U/L


 


ALT  38  37  (12-78)  U/L


 


Alkaline Phosphatase  133 H  132 H  ()  U/L


 


Albumin  2.4 L  2.4 L  (3.4-5.0)  gm/dl








                                      Urine











  06/07/21 Range/Units





  15:53 


 


Urine Color  Red  


 


Urine Appearance  Clear  (Clear)  


 


Urine pH  6.5  (4.5-7.5)  


 


Ur Specific Gravity  1.025  (1.000-1.030)  


 


Urine Protein  3+ H  (Negative)  


 


Urine Glucose (UA)  Negative  (Negative)  











Medications Administered





                          Current Inpatient Medications





Fluticasone Propionate (Fluticasone Propionate Na Spr 16 Gm Btl)  2 sprays NA 

DAILY ANDRE


   Stop: 07/08/21 08:59


   Last Admin: 06/08/21 09:56 Dose:  Not Given


   Documented by: 


Gabapentin (Gabapentin 600 Mg Tab)  600 mg PO Q24H ANDRE


Gabapentin (Gabapentin 400 Mg Cap)  400 mg PO Q24H ANDRE


Gabapentin (Gabapentin 100 Mg Cap)  200 mg PO Q24H ANDRE


Methylprednisolone 40 mg/ (Syringe)  0.64 mls @ 1.5 mls/min IV DAILY ANDRE


   Stop: 07/08/21 08:59


   Last Admin: 06/08/21 09:32 Dose:  1.5 mls/min


   Documented by: 


Lorazepam (Ativan)  1 mg in 2 mls @ 2 mls/min IV UD PRN; Protocol


   PRN Reason: EtOH Withdrawl AWSS Score 6,7


   Stop: 07/07/21 23:02


Lorazepam (Ativan)  2 mg in 4 mls @ 4 mls/min IV UD PRN; Protocol


   PRN Reason: EtOH Withdrawl AWSS Score 8,9


   Stop: 07/07/21 23:02


   Last Admin: 06/08/21 09:16 Dose:  4 mls/min


   Documented by: 


Lorazepam (Ativan)  3 mg in 6 mls @ 4 mls/min IV ONCE PRN; Protocol


   PRN Reason: EtOH Withdrawl AWSS Score >=10


   Stop: 07/07/21 23:02


   Last Admin: 06/08/21 04:29 Dose:  4 mls/min


   Documented by: 


Acetaminophen ([facility])  65 mls @ 200 mls/hr IV Q8H PRN; Protocol


   PRN Reason: pain/fever


   Stop: 06/10/21 23:02


Promethazine HCl 12.5 mg/ (Sodium Chloride)  50.5 mls @ 202 mls/hr IV Q6H PRN


   PRN Reason: Nausea And Vomiting


   Stop: 07/07/21 23:02


Ceftriaxone Sodium 1,000 mg/ (Dextrose)  50 mls @ 100 mls/hr IV Q24H [facility]; 

Protocol


   Stop: 06/18/21 00:59


   Last Infusion: 06/08/21 02:09 Dose:  Infused


   Documented by: 


Pantoprazole Sodium 40 mg/ (Syringe)  10 mls @ 5 mls/min IV BID [facility]


   Stop: 07/08/21 20:59


Thiamine HCl 500 mg/ Syringe  14 mls @ 2 mls/min IV TID [facility]


   Stop: 06/11/21 13:59


Folic Acid 1 mg/ Syringe  10 mls @ 5 mls/min IV QAM [facility]


   Stop: 07/08/21 10:44


   Last Admin: 06/08/21 10:55 Dose:  5 mls/min


   Documented by: 


Parenteral Electrolytes (Normosol-R)  1,000 mls @ 80 mls/hr IV .[id number] [facility]


   Stop: 07/08/21 10:44


   Last Admin: 06/08/21 10:55 Dose:  80 mls/hr


   Documented by: 


Ipratropium Bromide (Ipratropium Bromide Neb Soln 0.02% 2.5 Ml Vial)  0.5 mg INH

Q4R PRN


   PRN Reason: Shortness Of Breath Or Wheezing


   Stop: 07/07/21 21:14


Ipratropium Bromide (Ipratropium Bromide Neb Soln 0.02% 2.5 Ml Vial)  0.5 mg INH

Q6R ANDRE


   Stop: 07/08/21 00:59


   Last Admin: 06/08/21 07:58 Dose:  Not Given


   Documented by: 


Levalbuterol HCl (Levalbuterol 1.25mg/0.5ml Neb)  1.25 mg INH Q4R PRN


   PRN Reason: Shortness Of Breath Or Wheezing


   Stop: 07/07/21 21:14


Levalbuterol HCl (Levalbuterol 1.25mg/0.5ml Neb)  1.25 mg INH Q6R ANDRE


   Stop: 07/08/21 00:59


   Last Admin: 06/08/21 07:58 Dose:  Not Given


   Documented by: 


Miscellaneous ([facility] Protocol For Hyperglycemia)  1 ea N/A PRN PRN; Protocol


   PRN Reason: Hyperglycemia Protocol


   Stop: 06/09/21 23:02


Multivitamins (Multivitamin Tab)  1 tab PO QAM ANDRE


   Stop: 07/08/21 08:59


   Last Admin: 06/08/21 09:56 Dose:  Not Given


   Documented by: 


Phenobarbital (Phenobarbital 32.4 Mg Tab)  64.8 mg PO BID [facility]


   Stop: 06/09/21 21:01


Phenobarbital (Phenobarbital 32.4 Mg Tab)  32.4 mg PO BID [facility]


   Stop: 06/11/21 09:01


Phenobarbital Sodium (Phenobarbital Sodium 130 Mg/Ml Vial)  64 mg IV Q6 PRN


   PRN Reason: Undecided


   Stop: 07/08/21 10:32


Phenobarbital Sodium (Phenobarbital Sodium 65 Mg/Ml Vial)  120 mg IM Q3H [facility]


   Stop: 06/08/21 17:01

## 2021-06-08 NOTE — ORTHOPEDIC CONSULTATION
Date of Consultation


June 8, 2021


 





Assessment & Plan


(1) Closed fracture of left proximal humerus: 


      Left proximal humerus fracture minimal to conservative treatment, maintain

sling immobilization, nonweightbearing left upper extremity, ice to the proximal

humerus, questionable sclerosis of the humeral shaft on x-ray, when medically 

stable would be appropriate to obtain advanced imaging of the left humerus such 

as a CT or MRI.  Patient will need follow-up in 2 weeks in the office for repeat

x-rays, [phone].








Thank you for the consultation.





History of Present Illness


Reason for Consultation: Left proximal humerus fracture


Attending Physician: Silvano Carmona MD








History of Present Illness


The patient is a 60-year-old male with past medical history noted below who 

presented to [facility] secondary to being found down.  Has 

history of EtOH abuse.  Was admitted for encephalopathy and severe anemia, x-

rays obtained in the emergency department demonstrated nondisplaced fracture of 

the left proximal humerus.  Patient is confused and does not answer questions, 

majority of the HPI was obtained from the chart.


Allergies














Allergy/AdvReac Type Severity Reaction Status Date / Time


 


lisinopril Allergy Severe ANGIOEDEMA Verified 06/07/21 17:09


 


oxybutynin [From Ditropan] AdvReac Unknown fever & Verified 06/07/21 17:09





   perhaps  





   seizures,  





   was  





   detoxing  





   @time  











Home Medications


                                        











 Medication  Instructions  Recorded  Confirmed  Type


 


folic acid 1 mg PO QAM 04/02/19 06/07/21 History


 


furosemide 20 mg PO QAM 06/02/19 06/07/21 History


 


fluticasone propionate 2 spray INTRANASAL DAILY 04/30/21 06/07/21 History


 


trazodone 300 mg PO HS 04/30/21 06/07/21 History














Patient History


Medical History (Reviewed 06/08/21 @ 12:42 by Aamir Martinez DO)





Altered mental status


Confusion


Depression


Elevated troponin


History of torn meniscus of left knee


History of torn meniscus of right knee


Prostate cancer (06/02/14)


   "Rising PSA


   Status post ultrasound-guided biopsies 06/02/2014


   Biopsy stage T2c Nano 4+5 with perineural invasion


   Status post robotic prostatectomy 08/25/2014


   Pathologic stage [id number] Nano 4+4 Tertiary 5


   Post prostatectomy rising PSA


   Status post completion of radiation therapy 05/19/2015 received 7040 cGy"


   


   *** On 05/26/15 16:14 Jeanine Horowitz wrote ***


   "Rising PSA


   Status post ultrasound-guided biopsies 06/02/2014


   Biopsy stage T2c Sunol 4+5 with perineural invasion


   Status post robotic prostatectomy 08/25/2014


   Pathologic stage [id number] Sunol 4+4


   Post prostatectomy rising PSA


   Status post completion of radiation therapy 05/19/2015 received 7040 cGy"


Psychiatric exam requested by authority


Right knee sprain


UTI (urinary tract infection)








Surgical History (Reviewed 06/08/21 @ 12:42 by Aamir Martinez DO)





H/O knee surgery


H/O prostate biopsy





Family History (Reviewed 06/08/21 @ 12:42 by Aamir Martinez DO)


Other   No pertinent family history











Social History (Reviewed 06/08/21 @ 12:42 by Aamir Martinez DO)


Smoking Status:  Unknown if ever smoked 


Tobacco Type:  Cigarettes 


Preferred Language:  English 


Communication Ability:  Impaired 


 Required:  No 


Current Living Situation Comment:  unknown 


Feels Safe at Home:  Yes 











Review of Systems








Review of Systems:   Unobtainable due to cognitive status  


 


Constitutional:   as per Subjective / HPI  








Physical Exam








Physical Exam:   Left upper extremity physical exam limited secondary to 

patient's cognitive status, +2 radial pulse, compartments soft and compressible,

skin overlying left shoulder is clean dry and intact, + ecchymoses.


 


Constitutional:   WD/WN, vitals as above  








Results & Data ([facility])


Vital Signs (Past 12 Hours)


                                   Vital Signs











  Temp Pulse Pulse Resp BP Pulse Ox


 


 06/08/21 12:27  36.5 C     


 


 06/08/21 12:09   72   17  114/75  100


 


 06/08/21 11:39   76   22  139/84  100


 


 06/08/21 11:09   75   19  130/85  99


 


 06/08/21 10:09   78   22  138/85  100


 


 06/08/21 09:09   75   19  145/94 H  99


 


 06/08/21 08:16  36.4 C L     


 


 06/08/21 08:10   69   18  135/98  98


 


 06/08/21 08:00   81    


 


 06/08/21 07:57    66  20   98


 


 06/08/21 07:09   88   19  138/92  100


 


 06/08/21 06:30   91 H   14   81 L


 


 06/08/21 06:24   71   26 H  137/88  95


 


 06/08/21 06:15   74   17   90


 


 06/08/21 06:09   72   16  128/81  92


 


 06/08/21 06:00   73   16   95


 


 06/08/21 05:55   75   18  140/95  93


 


 06/08/21 05:45   79   15   90


 


 06/08/21 05:39   82   16  125/75  91


 


 06/08/21 05:30   74   14   96


 


 06/08/21 05:24   76   15  132/82  95


 


 06/08/21 05:15   74   16   94


 


 06/08/21 05:09   74   16  129/78  95


 


 06/08/21 05:07   76   15  119/86  94


 


 06/08/21 05:00   79   16   89 L


 


 06/08/21 04:54   82   22  133/87  95


 


 06/08/21 04:45   74   16   100


 


 06/08/21 04:39   83   23  136/92  97


 


 06/08/21 04:30   79   16   100


 


 06/08/21 04:26   82   24  140/85  100


 


 06/08/21 04:15   75   16   100


 


 06/08/21 04:09   81   16  121/73  100


 


 06/08/21 04:00   76   15   100


 


 06/08/21 03:59   77   20   100


 


 06/08/21 03:54   78   16  135/88  100


 


 06/08/21 03:45   76   19   100


 


 06/08/21 03:40   96 H   19  149/93 H  100


 


 06/08/21 03:30   96 H   24   96


 


 06/08/21 03:24   87   21  154/113 H  99


 


 06/08/21 03:15   87   24   97


 


 06/08/21 03:09   86   26 H  145/92 H  100


 


 06/08/21 03:00   91 H   18   93


 


 06/08/21 02:55   94 H   17  162/98 H  100


 


 06/08/21 02:45   91 H   29 H   98


 


 06/08/21 02:30   80   16   100


 


 06/08/21 02:24   83   16  138/90  100


 


 06/08/21 02:15   88   19   100


 


 06/08/21 02:09   79   18  137/87  100


 


 06/08/21 02:00   91 H   14   100


 


 06/08/21 01:55   95 H   16  126/93  97


 


 06/08/21 01:45   83   16   100


 


 06/08/21 01:40   82   18  140/90  100


 


 06/08/21 01:30   87   18   100


 


 06/08/21 01:24   86   17  127/87  100


 


 06/08/21 01:16   86   17   100


 


 06/08/21 01:09   87   17  134/87  100


 


 06/08/21 01:03   87   19  140/94  100


 


 06/08/21 01:01   92 H   20  


 


 06/08/21 00:47   96 H  96 H  26 H  


 


 06/08/21 00:46   89   25 H  











Diagnostic Findings


XR shoulder LT min 2V routine





CLINICAL HISTORY: Left shoulder pain following fall.





COMPARISON: Chest radiograph April 30, 2021.





FINDINGS:  Alignment of the left acromioclavicular and glenohumeral joints is 

seen in anatomic. Note is made of an acute comminuted fracture within the left 

humeral head and neck. Fracture through the greater tuberosity is minimally 

displaced. There may be increased sclerosis of the diaphysis of the left 

humerus. 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 

There is mild osteoarthritis of the left glenohumeral joint. Several left orbit 

fractures are incidentally noted.





IMPRESSION:





1. Acute fracture of the left humeral head and neck. Fracture essentially 

nondisplaced. Fracture through greater tuberosity minimally displaced.





2. Possible increased sclerosis of the diaphysis of the left humerus. This is 

probably artifactual however skeletal lesions could appear similar.

## 2021-06-08 NOTE — UROLOGY CONSULTATION
Date of Consultation


June 8, 2021


 





Assessment & Plan


(1) Gross hematuria: 


      Patient has been admitted to the hospital on the hospitalist service.  He 

is currently in the intensive care unit.  Guarding patient's hematuria we 

recommend proceeding as follows for the present time:


Maintain the patient's Mendez catheter.  It does appear to be adequately 

draining urine at this time.  He does not have any evidence of bladder or 

abdominal distention indicating obstruction.


Recommend following serial labs and transfusing as needed to keep the patient's

hemoglobin and hematocrit adequate levels.


Patient has numerous other medical issues including possible alcohol withdrawal

as well as an upper GI bleed.  Long as his Mendez catheter continues to drain 

urine adequately no emergent urologic intervention will be undertaken until his 

other underlying medical problems are addressed and stabilized.





Supervising Physician


Co-Signing Physician Notes


agree with above. limited UoP in tubing - RN states the mendez was last emptied 

greater than 1hr prior to my evaluation.





RN then flushed the catheter with 60cc of NS - and there was a return of 600cc 

of urine and blood.





we will continue to observe for now, but if he doesnt clear over the next 24-48 

hours, we may have to consider cysto and clot evac pending other stability





History of Present Illness


Reason for Consultation: Hematuria


Attending Physician: Jarrod Loving MD








History of Present Illness


This is a 60-year-old male with multiple medical problems including history of 

seizure disorder, metastatic prostate cancer, hypertension, COPD, and chronic 

hematuria.  Patient was admitted to [facility] through the 

emergency department after suffering a fall in his apartment.  Patient was found

by EMS.  This reportedly found an empty bottle of alcohol near the patient corina

ent was found to be confused, weak, with apparent dyspnea.  At the time of my 

interview the patient was unable to provide any meaningful history due to his 

clinical status.





In the emergency department patient did have labs and imaging which were 

independently reviewed by myself.  On arrival CBC revealed white blood cell 

count of 7.3.  His hemoglobin and hematocrit were 2.4 and 8.2 respectively.  His

platelet count was noted be 205,000.  INR was noted to be 1.3.  A chemistry 

profile showed a sodium of 150 and a potassium of 4.0.  His BUN and creatinine 

were 40 and 2.2.  Lactic acid was noted to be 3.8.  Urinalysis showed 3+ blood 

and was not indicative of infection.  A Covid test was performed and was noted 

be negative.  Patient did have a CT scan of his abdomen that showed hyperdense 

material within the bladder which was clinically felt to represent hemorrhage 

although an underlying mass was not able to be excluded.  Patient was noted to 

have underlying cirrhosis.  CT scan of the chest was negative for pulmonary 

emboli.  No acute rib fractures were noted.  A CT scan of the head showed no 

acute intracranial findings.  Chest x-ray showed no evidence of pneumonia or 

CHF.  Shoulder x-ray demonstrated an acute fracture of the left humeral head and

neck.





Patient has since been admitted to the ICU where he was transfused 4 units of 

packed red blood cells.Repeat hemoglobin and hematocrit showed appropriate rise 

of his hemoglobin and hematocrit 8.4 and 24.9.  Patient is also had a Mendez 

catheter inserted.  This is draining bloody urine.  I discussed with the ICU 

nurse who notes that the Mendez catheter is draining adequately and does not show

any signs of obstruction.  Nurse also notes that the patient has appears 

confused and altered since admission.





At the time of my interview the patient was resting comfortably in bed.  He did 

not appear to be in any significant distress.


Allergies














Allergy/AdvReac Type Severity Reaction Status Date / Time


 


lisinopril Allergy Severe ANGIOEDEMA Verified 06/07/21 17:09


 


oxybutynin [From Ditropan] AdvReac Unknown fever & Verified 06/07/21 17:09





   perhaps  





   seizures,  





   was  





   detoxing  





   @time  











Home Medications


                                        











 Medication  Instructions  Recorded  Confirmed  Type


 


folic acid 1 mg PO QAM 04/02/19 06/07/21 History


 


furosemide 20 mg PO QAM 06/02/19 06/07/21 History


 


fluticasone propionate 2 spray INTRANASAL DAILY 04/30/21 06/07/21 History


 


trazodone 300 mg PO HS 04/30/21 06/07/21 History














Patient History


Medical History (Reviewed 06/08/21 @ 12:42 by Aamir Martinze DO)





Altered mental status


Confusion


Depression


Elevated troponin


History of torn meniscus of left knee


History of torn meniscus of right knee


Prostate cancer (06/02/14)


   "Rising PSA


   Status post ultrasound-guided biopsies 06/02/2014


   Biopsy stage T2c Nano 4+5 with perineural invasion


   Status post robotic prostatectomy 08/25/2014


   Pathologic stage [id number] Nano 4+4 Tertiary 5


   Post prostatectomy rising PSA


   Status post completion of radiation therapy 05/19/2015 received 7040 cGy"


   


   *** On 05/26/15 16:14 Jeanine Horowitz wrote ***


   "Rising PSA


   Status post ultrasound-guided biopsies 06/02/2014


   Biopsy stage T2c Nano 4+5 with perineural invasion


   Status post robotic prostatectomy 08/25/2014


   Pathologic stage [id number] [place] 4+4


   Post prostatectomy rising PSA


   Status post completion of radiation therapy 05/19/2015 received 7040 cGy"


Psychiatric exam requested by authority


Right knee sprain


UTI (urinary tract infection)








Surgical History (Reviewed 06/08/21 @ 12:42 by Aamir Martinez DO)





H/O knee surgery


H/O prostate biopsy





Family History (Reviewed 06/08/21 @ 12:42 by Aamir Martinez DO)


Other   No pertinent family history











Social History (Reviewed 06/08/21 @ 12:42 by Aamir Martinez DO)


Smoking Status:  Unknown if ever smoked 


Tobacco Type:  Cigarettes 


Preferred Language:  English 


Communication Ability:  Impaired 


 Required:  No 


Current Living Situation Comment:  unknown 


Feels Safe at Home:  Yes 


Assistive Devices:  Oxygen - Continuous 











Review of Systems








Review of Systems:   Full review of systems was attempted but due to patient's 

clinical status was unable to be accomplished.


 


Genitourinary:   + hematuria  








Physical Exam








Constitutional:   no acute distress  


 


Eyes:   no conjunctival abnormality  


 


ENMT:   Ears: no external ear abnormality  


 


Neck:   trachea midline  


 


Respiratory:   Breath sounds are present bilaterally.  The patient was not using

accessory muscles to aid in respiration.


 


Cardiovascular:   Rate/Rhythm: regular rate and regular rhythm  


 


Gastrointestinal (Abdomen):   Patient's abdomen is soft.  It is nondistended.  

Palpation did not appear to cause any painful response.


 


Neurologic:   Due to the patient's clinical status a full neurologic exam could 

not be completed.


 


Genitourinary:    Mendez catheter is in place draining bloody urine.








Results & Data ([facility])


Vital Signs (Past 12 Hours)


                                   Vital Signs











  Temp Pulse Pulse Resp BP BP Pulse Ox


 


 06/08/21 04:45   74   16    100


 


 06/08/21 04:39   83   23  136/92   97


 


 06/08/21 04:30   79   16    100


 


 06/08/21 04:26   82   24  140/85   100


 


 06/08/21 04:15   75   16    100


 


 06/08/21 04:09   81   16  121/73   100


 


 06/08/21 04:00   76   15    100


 


 06/08/21 03:59   77   20    100


 


 06/08/21 03:54   78   16  135/88   100


 


 06/08/21 03:45   76   19    100


 


 06/08/21 03:40   96 H   19  149/93 H   100


 


 06/08/21 03:30   96 H   24    96


 


 06/08/21 03:24   87   21  154/113 H   99


 


 06/08/21 03:15   87   24    97


 


 06/08/21 03:09   86   26 H  145/92 H   100


 


 06/08/21 03:00   91 H   18    93


 


 06/08/21 02:55   94 H   17  162/98 H   100


 


 06/08/21 02:45   91 H   29 H    98


 


 06/08/21 02:30   80   16    100


 


 06/08/21 02:24   83   16  138/90   100


 


 06/08/21 02:15   88   19    100


 


 06/08/21 02:09   79   18  137/87   100


 


 06/08/21 02:00   91 H   14    100


 


 06/08/21 01:55   95 H   16  126/93   97


 


 06/08/21 01:45   83   16    100


 


 06/08/21 01:40   82   18  140/90   100


 


 06/08/21 01:30   87   18    100


 


 06/08/21 01:24   86   17  127/87   100


 


 06/08/21 01:16   86   17    100


 


 06/08/21 01:09   87   17  134/87   100


 


 06/08/21 01:03   87   19  140/94   100


 


 06/08/21 01:01   92 H   20   


 


 06/08/21 00:47   96 H  96 H  26 H   


 


 06/08/21 00:46   89   25 H   


 


 06/08/21 00:39   90   17  141/91 H  


 


 06/08/21 00:31   89   21   


 


 06/08/21 00:28  36.4 C L  90   20  127/78   93


 


 06/08/21 00:24   89   19  127/78  


 


 06/08/21 00:16   99 H   15   


 


 06/08/21 00:09   99 H   17  154/100 H  


 


 06/08/21 00:08  36.4 C L  101 H   26 H  133/94   93


 


 06/08/21 00:01   99 H   22   


 


 06/07/21 23:55   96 H   21  133/94  


 


 06/07/21 23:47   98 H   20  145/104 H  


 


 06/07/21 23:46   99 H   17   


 


 06/07/21 23:31   99 H   25 H   


 


 06/07/21 23:16   99 H   24   


 


 06/07/21 23:02   97 H   22    64 L


 


 06/07/21 23:00   96 H   27 H  141/94 H   64 L


 


 06/07/21 22:56        97


 


 06/07/21 22:54   102 H   27 H  140/100   62 L


 


 06/07/21 22:52   105 H   32 H    97


 


 06/07/21 22:51   105 H   26 H   


 


 06/07/21 22:45  36.4 C L   89  17   137/62 


 


 06/07/21 22:32  36.4 C L  101 H   26 H  133/94   94


 


 06/07/21 22:30   91 H   40 H  127/89   100


 


 06/07/21 22:17  36.4 C L  96 H   23  141/94 H   94


 


 06/07/21 22:15   92 H   20  120/72   100


 


 06/07/21 22:01  36.6 C      


 


 06/07/21 22:00   100 H   24  140/95   100


 


 06/07/21 21:46   101 H   21  138/80   98


 


 06/07/21 21:37        97


 


 06/07/21 21:36  36.9 C      


 


 06/07/21 21:30   93 H   21  119/79   100


 


 06/07/21 21:16   102 H   22  141/73 H   100


 


 06/07/21 21:06  36.6 C      


 


 06/07/21 21:00   116 H   22  114/99   91


 


 06/07/21 20:51  36.8 C      


 


 06/07/21 20:47   120 H   17  117/94   97


 


 06/07/21 20:36  36.5 C      


 


 06/07/21 20:31   121 H   30 H  153/81 H   98


 


 06/07/21 20:16   123 H   27 H  144/101 H   91


 


 06/07/21 20:13  36.7 C      


 


 06/07/21 20:01   120 H   28 H  145/82 H   100


 


 06/07/21 19:53    119 H  24    100


 


 06/07/21 19:48   119 H   32 H    98


 


 06/07/21 19:46   127 H   22  105/70   92


 


 06/07/21 19:43  36.5 C      


 


 06/07/21 19:28  36.5 C  128 H   28 H  109/70   90


 


 06/07/21 19:19    131 H  24   109/70  92


 


 06/07/21 19:11  36.6 C  136 H   24  120/50 L   100


 


 06/07/21 18:08    123 H  23    97














PG Care Time/CCT


Total # of Minutes Spent


Total Time Spent with Patient: Total time spent is greater than 50% in 

coordination of care (as documented) at patient's floor/unit and/or counseling 

patient:








Coding


Level of Care Code


33248 Inpt Consult Level 3





Diagnoses


Gross hematuria  R31.0

## 2021-06-08 NOTE — GASTROINTESTINAL CONSULTATION
Date of Consultation


June 8, 2021


 





Assessment & Plan


(1) Severe anemia: 


(2) Gross hematuria: 


(3) Metabolic acidosis: 


(4) Acute renal failure: 


(5) Encephalopathy: 


      Pt is a 59 y/o male w several medical hx including HTN, COPD, seizure 

disorder, chronic hematuria 2/2 cystitis, metastatic prostate ca s/p surgery, 

chemo/Lupron, chronic anemia who was brought in after found at home w blood over

floor, severely anemic s/p 4U PRBC transfusion. He has gross hematuria, FOBT was

positive in ED but no signs of melena or rectal bleeding 





- Monitor blood ct and transfuse prn


- Can change PPI gtt to PPI IV BID


- Continue antibx coverage; f/u blood cx result


- Defer endoscopic eval unless having marleen s/s of GI bleeding 


- Recommend GI f/u upon DC for cirrhosis management


- Urology following 





Supervising Physician


Co-Signing Physician Notes


I performed a history and physical examination of the patient today, including 

specifically on physical exam - soft abdomen.  I have discussed the patient's 

management with the advanced practitioner.  Please refer to the nurse 

practitioner's note for the documented findings and plan of care.


No evidence of overt GI bleeding.


ICU to manage his alcoholic related metabolic derangement.


Needs scope as OP.


Recall GI if needed.





History of Present Illness


Reason for Consultation: GI bleed


Requesting Physician: Dr. Silvano Carmona 


Attending Physician: Dr. Max Glaser 








History of Present Illness


Pt is a 59 y/o male, brought to ED yesterday after found on home floor w blood 

all over and empty bottles of vodka. He is currently obtunded, unable to provide

history. Chart reviewed. Hx of HTN, COPD, seizure disorder, chronic hematuria 

2/2 cystitis, metastatic prostate ca s/p surgery, chemo/Lupron, anemia. 





On admission, noted to be severely anemic w H/H 2/8, s/p 4U PRBC transfusion 

overnight. H/H 8/24 now. INR 1.3. BUN/Cr 28/1.6. Lactic acid and ammonia level 

up. Blood cx pending. Several fractures noted on ribs, L humerus. No PE, head CT

w/o acute changes. CT abd/pelvis w/ signs of suspected hemorrhage in bladder, 

underlying mass cannot be excluded. There is evidence of cirrhosis w indication 

fo portal HTN. FOBT was positive but he isn't passing melena or had any rectal 

bleeding





Noted bloody urine in Alas bag. Overnight no BMs. 





Colonoscopy 2011 normal. 





Allergies














Allergy/AdvReac Type Severity Reaction Status Date / Time


 


lisinopril Allergy Severe ANGIOEDEMA Verified 06/07/21 17:09


 


oxybutynin [From Ditropan] AdvReac Unknown fever & Verified 06/07/21 17:09





   perhaps  





   seizures,  





   was  





   detoxing  





   @time  











Home Medications


                                        











 Medication  Instructions  Recorded  Confirmed  Type


 


folic acid 1 mg PO QAM 04/02/19 06/07/21 History


 


furosemide 20 mg PO QAM 06/02/19 06/07/21 History


 


fluticasone propionate 2 spray INTRANASAL DAILY 04/30/21 06/07/21 History


 


trazodone 300 mg PO HS 04/30/21 06/07/21 History














Patient History


Medical History (Reviewed 06/08/21 @ 09:16 by TEE Jordan)





Altered mental status


Confusion


Depression


Elevated troponin


History of torn meniscus of left knee


History of torn meniscus of right knee


Prostate cancer (06/02/14)


   "Rising PSA


   Status post ultrasound-guided biopsies 06/02/2014


   Biopsy stage T2c Nano 4+5 with perineural invasion


   Status post robotic prostatectomy 08/25/2014


   Pathologic stage [id number] Nano 4+4 Tertiary 5


   Post prostatectomy rising PSA


   Status post completion of radiation therapy 05/19/2015 received 7040 cGy"


   


   *** On 05/26/15 16:14 Jeanine Horowitz wrote ***


   "Rising PSA


   Status post ultrasound-guided biopsies 06/02/2014


   Biopsy stage T2c Nano 4+5 with perineural invasion


   Status post robotic prostatectomy 08/25/2014


   Pathologic stage [id number] Nano 4+4


   Post prostatectomy rising PSA


   Status post completion of radiation therapy 05/19/2015 received 7040 cGy"


Psychiatric exam requested by authority


Right knee sprain


UTI (urinary tract infection)








Surgical History (Reviewed 06/08/21 @ 09:16 by TEE Jordan)





H/O knee surgery


H/O prostate biopsy





Family History (Reviewed 06/08/21 @ 09:16 by TEE Jordan)


Other   No pertinent family history











Social History (Reviewed 06/08/21 @ 09:16 by TEE Jordan)


Smoking Status:  Unknown if ever smoked 


Tobacco Type:  Cigarettes 


Preferred Language:  English 


Communication Ability:  Impaired 


 Required:  No 


Current Living Situation Comment:  unknown 


Feels Safe at Home:  Yes 


Assistive Devices:  Oxygen - Continuous 











Review of Systems








Review of Systems:   Unobtainable due to reduced consciousness  








Physical Exam








Constitutional:   + ill appearing, + thin and comfortable  


 


Eyes:   PERRL, conjunctivae normal, anicteric sclerae  


 


ENMT:   external ear and nose normal, oropharynx normal  


 


Respiratory:   Auscultation: + diminished lung sounds and + rhonchi  


 


Cardiovascular:   RRR, no murmur, no edema  


 


Gastrointestinal (Abdomen):   normal bowel sounds, soft, nontender, no 

hepatosplenomegaly  


 


Skin:   no rashes, warm and dry    + jaundice  


 


Psychiatric:   Obtunded 


 


Lymphatic:   no lymphedema  








Results & Data ([facility])


Vital Signs (Past 12 Hours)


                                   Vital Signs











  Temp Pulse Pulse Resp BP BP Pulse Ox


 


 06/08/21 08:16  36.4 C L      


 


 06/08/21 08:00   81     


 


 06/08/21 07:57    66  20    98


 


 06/08/21 06:30   91 H   14    81 L


 


 06/08/21 06:24   71   26 H  137/88   95


 


 06/08/21 06:15   74   17    90


 


 06/08/21 06:09   72   16  128/81   92


 


 06/08/21 06:00   73   16    95


 


 06/08/21 05:55   75   18  140/95   93


 


 06/08/21 05:45   79   15    90


 


 06/08/21 05:39   82   16  125/75   91


 


 06/08/21 05:30   74   14    96


 


 06/08/21 05:24   76   15  132/82   95


 


 06/08/21 05:15   74   16    94


 


 06/08/21 05:09   74   16  129/78   95


 


 06/08/21 05:07   76   15  119/86   94


 


 06/08/21 05:00   79   16    89 L


 


 06/08/21 04:54   82   22  133/87   95


 


 06/08/21 04:45   74   16    100


 


 06/08/21 04:39   83   23  136/92   97


 


 06/08/21 04:30   79   16    100


 


 06/08/21 04:26   82   24  140/85   100


 


 06/08/21 04:15   75   16    100


 


 06/08/21 04:09   81   16  121/73   100


 


 06/08/21 04:00   76   15    100


 


 06/08/21 03:59   77   20    100


 


 06/08/21 03:54   78   16  135/88   100


 


 06/08/21 03:45   76   19    100


 


 06/08/21 03:40   96 H   19  149/93 H   100


 


 06/08/21 03:30   96 H   24    96


 


 06/08/21 03:24   87   21  154/113 H   99


 


 06/08/21 03:15   87   24    97


 


 06/08/21 03:09   86   26 H  145/92 H   100


 


 06/08/21 03:00   91 H   18    93


 


 06/08/21 02:55   94 H   17  162/98 H   100


 


 06/08/21 02:45   91 H   29 H    98


 


 06/08/21 02:30   80   16    100


 


 06/08/21 02:24   83   16  138/90   100


 


 06/08/21 02:15   88   19    100


 


 06/08/21 02:09   79   18  137/87   100


 


 06/08/21 02:00   91 H   14    100


 


 06/08/21 01:55   95 H   16  126/93   97


 


 06/08/21 01:45   83   16    100


 


 06/08/21 01:40   82   18  140/90   100


 


 06/08/21 01:30   87   18    100


 


 06/08/21 01:24   86   17  127/87   100


 


 06/08/21 01:16   86   17    100


 


 06/08/21 01:09   87   17  134/87   100


 


 06/08/21 01:03   87   19  140/94   100


 


 06/08/21 01:01   92 H   20   


 


 06/08/21 00:47   96 H  96 H  26 H   


 


 06/08/21 00:46   89   25 H   


 


 06/08/21 00:39   90   17  141/91 H  


 


 06/08/21 00:31   89   21   


 


 06/08/21 00:28  36.4 C L  90   20  127/78   93


 


 06/08/21 00:24   89   19  127/78  


 


 06/08/21 00:16   99 H   15   


 


 06/08/21 00:09   99 H   17  154/100 H  


 


 06/08/21 00:08  36.4 C L  101 H   26 H  133/94   93


 


 06/08/21 00:01   99 H   22   


 


 06/08/21 00:00   100 H     


 


 06/07/21 23:55   96 H   21  133/94  


 


 06/07/21 23:47   98 H   20  145/104 H  


 


 06/07/21 23:46   99 H   17   


 


 06/07/21 23:31   99 H   25 H   


 


 06/07/21 23:16   99 H   24   


 


 06/07/21 23:02   97 H   22    64 L


 


 06/07/21 23:00   96 H   27 H  141/94 H   64 L


 


 06/07/21 22:56        97


 


 06/07/21 22:54   102 H   27 H  140/100   62 L


 


 06/07/21 22:52   105 H   32 H    97


 


 06/07/21 22:51   105 H   26 H   


 


 06/07/21 22:45  36.4 C L   89  17   137/62 


 


 06/07/21 22:32  36.4 C L  101 H   26 H  133/94   94


 


 06/07/21 22:30   91 H   40 H  127/89   100


 


 06/07/21 22:17  36.4 C L  96 H   23  141/94 H   94


 


 06/07/21 22:15   92 H   20  120/72   100


 


 06/07/21 22:01  36.6 C      


 


 06/07/21 22:00   100 H   24  140/95   100


 


 06/07/21 21:46   101 H   21  138/80   98


 


 06/07/21 21:37        97


 


 06/07/21 21:36  36.9 C      


 


 06/07/21 21:30   93 H   21  119/79   100


 


 06/07/21 21:16   102 H   22  141/73 H   100











Laboratory Results





                         Laboratory Results - last 24 hr











  06/07/21 06/07/21 06/07/21





  15:53 15:53 15:53


 


WBC    Cancelled


 


RBC    Cancelled


 


Hgb    Cancelled


 


Hct    Cancelled


 


MCV    Cancelled


 


MCH    Cancelled


 


MCHC    Cancelled


 


RDW Std Deviation    Cancelled


 


RDW Coeff of Nell    Cancelled


 


Plt Count    Cancelled


 


MPV    Cancelled


 


Immature Gran % (Auto)    Cancelled


 


Neut % (Auto)    Cancelled


 


Lymph % (Auto)    Cancelled


 


Mono % (Auto)    Cancelled


 


Eos % (Auto)    Cancelled


 


Baso % (Auto)    Cancelled


 


Reticulocyte % (Auto)   


 


Neut # (Auto)    Cancelled


 


Lymph # (Auto)    Cancelled


 


Mono # (Auto)    Cancelled


 


Eos # (Auto)    Cancelled


 


Baso # (Auto)    Cancelled


 


Reticulocyte #   


 


Immature Gran # (Auto)    Cancelled


 


Absolute Nucleated RBC    Cancelled


 


Nucleated RBC % (auto)    Cancelled


 


Neutrophils % (Manual)    Cancelled


 


Band Neutrophils %    Cancelled


 


Lymphocytes % (Manual)    Cancelled


 


Prolymphocyte %    Cancelled


 


Reactive Lymphs % (Man)    Cancelled


 


Monocytes % (Manual)    Cancelled


 


Eosinophils % (Manual)    Cancelled


 


Basophils % (Manual)    Cancelled


 


Metamyelocytes % (Man)    Cancelled


 


Myelocytes % (Man)    Cancelled


 


Promyelocytes % (Man)    Cancelled


 


Blast Cells % (Manual)    Cancelled


 


Plasma Cell % (Manual)    Cancelled


 


Other Cells %    Cancelled


 


Nucleated RBC %    Cancelled


 


Neutrophils # (Manual)    Cancelled


 


Band Neutrophils #    Cancelled


 


Total Absolute Neuts    Cancelled


 


Lymphocytes # (Manual)    Cancelled


 


Prolymphocyte #    Cancelled


 


Reactive Lymphs #    Cancelled


 


Total Abs Lymphocytes    Cancelled


 


Monocytes # (Manual)    Cancelled


 


Eosinophils # (Manual)    Cancelled


 


Basophils # (Manual)    Cancelled


 


Metamyelocytes # (Man)    Cancelled


 


Myelocytes # (Manual)    Cancelled


 


Promyelocytes # (Man)    Cancelled


 


Blast Cells # (Man)    Cancelled


 


Plasma Cell # (Manual)    Cancelled


 


Other Cells #    Cancelled


 


Nucleated RBCs # (Man)    Cancelled


 


Hypersegmented Neuts    Cancelled


 


Hyposegmented Neuts    Cancelled


 


Hypogranular Neuts    Cancelled


 


Large Granular Lymphs    Cancelled


 


# Lrg Granular Lymphs    Cancelled


 


Hairy Cells    Cancelled


 


Smudge Cells    Cancelled


 


Toxic Granulation    Cancelled


 


Toxic Vacuolation    Cancelled


 


Dohle Bodies    Cancelled


 


Fadi Rods    Cancelled


 


Platelet Estimate    Cancelled


 


Hypogranular Platelets    Cancelled


 


Clumped Platelets    Cancelled


 


Giant Platelets    Cancelled


 


Platelet Satelliting    Cancelled


 


RBC Morphology    Cancelled


 


Polychromasia    Cancelled


 


Hypochromasia    Cancelled


 


Poikilocytosis    Cancelled


 


Basophilic Stippling    Cancelled


 


Anisocytosis    Cancelled


 


Microcytosis    Cancelled


 


Macrocytosis    Cancelled


 


Spherocytes    Cancelled


 


Pappenheimer Bodies    Cancelled


 


Sickle Cells    Cancelled


 


Target Cells    Cancelled


 


Tear Drop Cells    Cancelled


 


Ovalocytes    Cancelled


 


Stomatocytes    Cancelled


 


Cason-Jolly Bodies    Cancelled


 


Echinocytes    Cancelled


 


Acanthocytes (Spur)    Cancelled


 


Rouleaux    Cancelled


 


RBC Agglutinates    Cancelled


 


Schistocytes    Cancelled


 


RBC Morph Comment    Cancelled


 


Peripher Smr Path Cons   


 


ESR   


 


Sezary Cell    Cancelled


 


PT  Cancelled  


 


INR  Cancelled  


 


APTT  Cancelled  


 


PTT Ratio  Cancelled  


 


Fibrinogen   


 


Fibrin Degrad Products   


 


ABG pH   


 


ABG pCO2   


 


ABG pO2   


 


ABG HCO3   


 


ABG O2 Saturation   


 


ABG Base Excess   


 


Chai Test   


 


VBG pH   


 


VBG pCO2   


 


VBG pO2   


 


VBG HCO3   


 


VBG O2 Saturation   


 


VBG Base Excess   


 


Barometric Pressure   


 


Oxygen Given   


 


Sodium   150 H 


 


Potassium   4.0 


 


Chloride   120 H 


 


Carbon Dioxide   15 L 


 


Anion Gap   15.0 H 


 


BUN   40 H 


 


Creatinine   2.22 H 


 


Est Cr Clr Drug Dosing   31.7 


 


Est GFR ( Amer)   35.5 


 


Est GFR (Non-Af Amer)   30.6 


 


BUN/Creatinine Ratio   18.0 


 


Glucose   119 H 


 


Estimat Average Glucose   


 


Hemoglobin A1c   


 


Osmolality   


 


Lactate   


 


Calcium   7.7 L 


 


Ionized Calcium   


 


Phosphorus   4.3 


 


Magnesium   1.7 L 


 


Iron   


 


TIBC   


 


Transferrin   


 


Ferritin   


 


Total Bilirubin   1.4 H 


 


Direct Bilirubin    


 


AST   69 H 


 


ALT   38 


 


Alkaline Phosphatase   133 H 


 


Ammonia   


 


Lactate Dehydrogenase   


 


Total Creatine Kinase   258 


 


Troponin I   < 0.015 


 


Total Protein   6.5 


 


Albumin   2.4 L 


 


Globulin   4.1 H 


 


Albumin/Globulin Ratio   0.6 L 


 


Lipase   204 


 


Vitamin B12   


 


Folate   


 


Beta-Hydroxybutyric Acd   


 


Procalcitonin   


 


TSH   


 


Specimen Hemolysis    


 


Urine Color   


 


Urine Appearance   


 


Urine pH   


 


Ur Specific Gravity   


 


Urine Protein   


 


Urine Glucose (UA)   


 


Urine Ketones   


 


Urine Blood   


 


Urine Nitrite   


 


Urine Bilirubin   


 


Urine Urobilinogen   


 


Ur Leukocyte Esterase   


 


Urine RBC   


 


Urine WBC   


 


Ur Epithelial Cells   


 


Urine Bacteria   


 


Nasal Screen MRSA (PCR)   


 


Phenytoin   


 


Ethyl Alcohol mg/dL   


 


COVID-19 Eval Order   


 


SARS-CoV-2 (PCR)   


 


Blood Type   


 


Blood Type Recheck   


 


Antibody Screen   


 


Crossmatch   














  06/07/21 06/07/21 06/07/21





  15:53 15:53 16:41


 


WBC   


 


RBC   


 


Hgb   


 


Hct   


 


MCV   


 


MCH   


 


MCHC   


 


RDW Std Deviation   


 


RDW Coeff of Nell   


 


Plt Count   


 


MPV   


 


Immature Gran % (Auto)   


 


Neut % (Auto)   


 


Lymph % (Auto)   


 


Mono % (Auto)   


 


Eos % (Auto)   


 


Baso % (Auto)   


 


Reticulocyte % (Auto)   


 


Neut # (Auto)   


 


Lymph # (Auto)   


 


Mono # (Auto)   


 


Eos # (Auto)   


 


Baso # (Auto)   


 


Reticulocyte #   


 


Immature Gran # (Auto)   


 


Absolute Nucleated RBC   


 


Nucleated RBC % (auto)   


 


Neutrophils % (Manual)   


 


Band Neutrophils %   


 


Lymphocytes % (Manual)   


 


Prolymphocyte %   


 


Reactive Lymphs % (Man)   


 


Monocytes % (Manual)   


 


Eosinophils % (Manual)   


 


Basophils % (Manual)   


 


Metamyelocytes % (Man)   


 


Myelocytes % (Man)   


 


Promyelocytes % (Man)   


 


Blast Cells % (Manual)   


 


Plasma Cell % (Manual)   


 


Other Cells %   


 


Nucleated RBC %   


 


Neutrophils # (Manual)   


 


Band Neutrophils #   


 


Total Absolute Neuts   


 


Lymphocytes # (Manual)   


 


Prolymphocyte #   


 


Reactive Lymphs #   


 


Total Abs Lymphocytes   


 


Monocytes # (Manual)   


 


Eosinophils # (Manual)   


 


Basophils # (Manual)   


 


Metamyelocytes # (Man)   


 


Myelocytes # (Manual)   


 


Promyelocytes # (Man)   


 


Blast Cells # (Man)   


 


Plasma Cell # (Manual)   


 


Other Cells #   


 


Nucleated RBCs # (Man)   


 


Hypersegmented Neuts   


 


Hyposegmented Neuts   


 


Hypogranular Neuts   


 


Large Granular Lymphs   


 


# Lrg Granular Lymphs   


 


Hairy Cells   


 


Smudge Cells   


 


Toxic Granulation   


 


Toxic Vacuolation   


 


Dohle Bodies   


 


Fadi Rods   


 


Platelet Estimate   


 


Hypogranular Platelets   


 


Clumped Platelets   


 


Giant Platelets   


 


Platelet Satelliting   


 


RBC Morphology   


 


Polychromasia   


 


Hypochromasia   


 


Poikilocytosis   


 


Basophilic Stippling   


 


Anisocytosis   


 


Microcytosis   


 


Macrocytosis   


 


Spherocytes   


 


Pappenheimer Bodies   


 


Sickle Cells   


 


Target Cells   


 


Tear Drop Cells   


 


Ovalocytes   


 


Stomatocytes   


 


Casno-Jolly Bodies   


 


Echinocytes   


 


Acanthocytes (Spur)   


 


Rouleaux   


 


RBC Agglutinates   


 


Schistocytes   


 


RBC Morph Comment   


 


Peripher Smr Path Cons   


 


ESR   


 


Sezary Cell   


 


PT   


 


INR   


 


APTT   


 


PTT Ratio   


 


Fibrinogen   


 


Fibrin Degrad Products   


 


ABG pH   


 


ABG pCO2   


 


ABG pO2   


 


ABG HCO3   


 


ABG O2 Saturation   


 


ABG Base Excess   


 


Chai Test   


 


VBG pH   


 


VBG pCO2   


 


VBG pO2   


 


VBG HCO3   


 


VBG O2 Saturation   


 


VBG Base Excess   


 


Barometric Pressure   


 


Oxygen Given   


 


Sodium   


 


Potassium   


 


Chloride   


 


Carbon Dioxide   


 


Anion Gap   


 


BUN   


 


Creatinine   


 


Est Cr Clr Drug Dosing   


 


Est GFR ( Amer)   


 


Est GFR (Non-Af Amer)   


 


BUN/Creatinine Ratio   


 


Glucose   


 


Estimat Average Glucose   


 


Hemoglobin A1c   


 


Osmolality   


 


Lactate   


 


Calcium   


 


Ionized Calcium   


 


Phosphorus   


 


Magnesium   


 


Iron   


 


TIBC   


 


Transferrin   


 


Ferritin   


 


Total Bilirubin   


 


Direct Bilirubin   


 


AST   


 


ALT   


 


Alkaline Phosphatase   


 


Ammonia   


 


Lactate Dehydrogenase   


 


Total Creatine Kinase   


 


Troponin I   


 


Total Protein   


 


Albumin   


 


Globulin   


 


Albumin/Globulin Ratio   


 


Lipase   


 


Vitamin B12   


 


Folate   


 


Beta-Hydroxybutyric Acd   


 


Procalcitonin   


 


TSH   


 


Specimen Hemolysis   


 


Urine Color   Red 


 


Urine Appearance   Clear 


 


Urine pH   6.5 


 


Ur Specific Gravity   1.025 


 


Urine Protein   3+ H 


 


Urine Glucose (UA)   Negative 


 


Urine Ketones   Negative 


 


Urine Blood   3+ H 


 


Urine Nitrite   Negative 


 


Urine Bilirubin   2+ H 


 


Urine Urobilinogen   Negative 


 


Ur Leukocyte Esterase   Negative 


 


Urine RBC   >30 H 


 


Urine WBC   0-5 


 


Ur Epithelial Cells   0-5 


 


Urine Bacteria   Negative 


 


Nasal Screen MRSA (PCR)   


 


Phenytoin  < 0.4 L  


 


Ethyl Alcohol mg/dL   


 


COVID-19 Eval Order   


 


SARS-CoV-2 (PCR)   


 


Blood Type    A Positive


 


Blood Type Recheck   


 


Antibody Screen    NEGATIVE


 


Crossmatch    See Detail














  06/07/21 06/07/21 06/07/21





  16:41 16:41 16:41


 


WBC    Cancelled


 


RBC    Cancelled


 


Hgb    Cancelled


 


Hct    Cancelled


 


MCV    Cancelled


 


MCH    Cancelled


 


MCHC    Cancelled


 


RDW Std Deviation    Cancelled


 


RDW Coeff of Nell    Cancelled


 


Plt Count    Cancelled


 


MPV    Cancelled


 


Immature Gran % (Auto)    Cancelled


 


Neut % (Auto)    Cancelled


 


Lymph % (Auto)    Cancelled


 


Mono % (Auto)    Cancelled


 


Eos % (Auto)    Cancelled


 


Baso % (Auto)    Cancelled


 


Reticulocyte % (Auto)   


 


Neut # (Auto)    Cancelled


 


Lymph # (Auto)    Cancelled


 


Mono # (Auto)    Cancelled


 


Eos # (Auto)    Cancelled


 


Baso # (Auto)    Cancelled


 


Reticulocyte #   


 


Immature Gran # (Auto)    Cancelled


 


Absolute Nucleated RBC    Cancelled


 


Nucleated RBC % (auto)    Cancelled


 


Neutrophils % (Manual)    Cancelled


 


Band Neutrophils %    Cancelled


 


Lymphocytes % (Manual)    Cancelled


 


Prolymphocyte %    Cancelled


 


Reactive Lymphs % (Man)    Cancelled


 


Monocytes % (Manual)    Cancelled


 


Eosinophils % (Manual)    Cancelled


 


Basophils % (Manual)    Cancelled


 


Metamyelocytes % (Man)    Cancelled


 


Myelocytes % (Man)    Cancelled


 


Promyelocytes % (Man)    Cancelled


 


Blast Cells % (Manual)    Cancelled


 


Plasma Cell % (Manual)    Cancelled


 


Other Cells %    Cancelled


 


Nucleated RBC %    Cancelled


 


Neutrophils # (Manual)    Cancelled


 


Band Neutrophils #    Cancelled


 


Total Absolute Neuts    Cancelled


 


Lymphocytes # (Manual)    Cancelled


 


Prolymphocyte #    Cancelled


 


Reactive Lymphs #    Cancelled


 


Total Abs Lymphocytes    Cancelled


 


Monocytes # (Manual)    Cancelled


 


Eosinophils # (Manual)    Cancelled


 


Basophils # (Manual)    Cancelled


 


Metamyelocytes # (Man)    Cancelled


 


Myelocytes # (Manual)    Cancelled


 


Promyelocytes # (Man)    Cancelled


 


Blast Cells # (Man)    Cancelled


 


Plasma Cell # (Manual)    Cancelled


 


Other Cells #    Cancelled


 


Nucleated RBCs # (Man)    Cancelled


 


Hypersegmented Neuts    Cancelled


 


Hyposegmented Neuts    Cancelled


 


Hypogranular Neuts    Cancelled


 


Large Granular Lymphs    Cancelled


 


# Lrg Granular Lymphs    Cancelled


 


Hairy Cells    Cancelled


 


Smudge Cells    Cancelled


 


Toxic Granulation    Cancelled


 


Toxic Vacuolation    Cancelled


 


Dohle Bodies    Cancelled


 


Fadi Rods    Cancelled


 


Platelet Estimate    Cancelled


 


Hypogranular Platelets    Cancelled


 


Clumped Platelets    Cancelled


 


Giant Platelets    Cancelled


 


Platelet Satelliting    Cancelled


 


RBC Morphology    Cancelled


 


Polychromasia    Cancelled


 


Hypochromasia    Cancelled


 


Poikilocytosis    Cancelled


 


Basophilic Stippling    Cancelled


 


Anisocytosis    Cancelled


 


Microcytosis    Cancelled


 


Macrocytosis    Cancelled


 


Spherocytes    Cancelled


 


Pappenheimer Bodies    Cancelled


 


Sickle Cells    Cancelled


 


Target Cells    Cancelled


 


Tear Drop Cells    Cancelled


 


Ovalocytes    Cancelled


 


Stomatocytes    Cancelled


 


Cason-Jolly Bodies    Cancelled


 


Echinocytes    Cancelled


 


Acanthocytes (Spur)    Cancelled


 


Rouleaux    Cancelled


 


RBC Agglutinates    Cancelled


 


Schistocytes    Cancelled


 


RBC Morph Comment    Cancelled


 


Peripher Smr Path Cons   


 


ESR   


 


Sezary Cell    Cancelled


 


PT   


 


INR   


 


APTT   


 


PTT Ratio   


 


Fibrinogen   


 


Fibrin Degrad Products   


 


ABG pH   


 


ABG pCO2   


 


ABG pO2   


 


ABG HCO3   


 


ABG O2 Saturation   


 


ABG Base Excess   


 


Chai Test   


 


VBG pH  Cancelled  


 


VBG pCO2  Cancelled  


 


VBG pO2  Cancelled  


 


VBG HCO3  Cancelled  


 


VBG O2 Saturation  Cancelled  


 


VBG Base Excess  Cancelled  


 


Barometric Pressure  Cancelled  


 


Oxygen Given   


 


Sodium   


 


Potassium   


 


Chloride   


 


Carbon Dioxide   


 


Anion Gap   


 


BUN   


 


Creatinine   


 


Est Cr Clr Drug Dosing   


 


Est GFR ( Amer)   


 


Est GFR (Non-Af Amer)   


 


BUN/Creatinine Ratio   


 


Glucose   


 


Estimat Average Glucose   


 


Hemoglobin A1c   


 


Osmolality   


 


Lactate   


 


Calcium   


 


Ionized Calcium   


 


Phosphorus   


 


Magnesium   


 


Iron   


 


TIBC   


 


Transferrin   


 


Ferritin   


 


Total Bilirubin   


 


Direct Bilirubin   


 


AST   


 


ALT   


 


Alkaline Phosphatase   


 


Ammonia   


 


Lactate Dehydrogenase   


 


Total Creatine Kinase   


 


Troponin I   


 


Total Protein   


 


Albumin   


 


Globulin   


 


Albumin/Globulin Ratio   


 


Lipase   


 


Vitamin B12   


 


Folate   


 


Beta-Hydroxybutyric Acd   


 


Procalcitonin   


 


TSH   


 


Specimen Hemolysis   


 


Urine Color   


 


Urine Appearance   


 


Urine pH   


 


Ur Specific Gravity   


 


Urine Protein   


 


Urine Glucose (UA)   


 


Urine Ketones   


 


Urine Blood   


 


Urine Nitrite   


 


Urine Bilirubin   


 


Urine Urobilinogen   


 


Ur Leukocyte Esterase   


 


Urine RBC   


 


Urine WBC   


 


Ur Epithelial Cells   


 


Urine Bacteria   


 


Nasal Screen MRSA (PCR)   


 


Phenytoin   


 


Ethyl Alcohol mg/dL   < 3.0 


 


COVID-19 Eval Order   


 


SARS-CoV-2 (PCR)   


 


Blood Type   


 


Blood Type Recheck   


 


Antibody Screen   


 


Crossmatch   














  06/07/21 06/07/21 06/07/21





  16:41 16:45 16:45


 


WBC   


 


RBC   


 


Hgb   


 


Hct   


 


MCV   


 


MCH   


 


MCHC   


 


RDW Std Deviation   


 


RDW Coeff of Nell   


 


Plt Count   


 


MPV   


 


Immature Gran % (Auto)   


 


Neut % (Auto)   


 


Lymph % (Auto)   


 


Mono % (Auto)   


 


Eos % (Auto)   


 


Baso % (Auto)   


 


Reticulocyte % (Auto)   


 


Neut # (Auto)   


 


Lymph # (Auto)   


 


Mono # (Auto)   


 


Eos # (Auto)   


 


Baso # (Auto)   


 


Reticulocyte #   


 


Immature Gran # (Auto)   


 


Absolute Nucleated RBC   


 


Nucleated RBC % (auto)   


 


Neutrophils % (Manual)   


 


Band Neutrophils %   


 


Lymphocytes % (Manual)   


 


Prolymphocyte %   


 


Reactive Lymphs % (Man)   


 


Monocytes % (Manual)   


 


Eosinophils % (Manual)   


 


Basophils % (Manual)   


 


Metamyelocytes % (Man)   


 


Myelocytes % (Man)   


 


Promyelocytes % (Man)   


 


Blast Cells % (Manual)   


 


Plasma Cell % (Manual)   


 


Other Cells %   


 


Nucleated RBC %   


 


Neutrophils # (Manual)   


 


Band Neutrophils #   


 


Total Absolute Neuts   


 


Lymphocytes # (Manual)   


 


Prolymphocyte #   


 


Reactive Lymphs #   


 


Total Abs Lymphocytes   


 


Monocytes # (Manual)   


 


Eosinophils # (Manual)   


 


Basophils # (Manual)   


 


Metamyelocytes # (Man)   


 


Myelocytes # (Manual)   


 


Promyelocytes # (Man)   


 


Blast Cells # (Man)   


 


Plasma Cell # (Manual)   


 


Other Cells #   


 


Nucleated RBCs # (Man)   


 


Hypersegmented Neuts   


 


Hyposegmented Neuts   


 


Hypogranular Neuts   


 


Large Granular Lymphs   


 


# Lrg Granular Lymphs   


 


Hairy Cells   


 


Smudge Cells   


 


Toxic Granulation   


 


Toxic Vacuolation   


 


Dohle Bodies   


 


Fadi Rods   


 


Platelet Estimate   


 


Hypogranular Platelets   


 


Clumped Platelets   


 


Giant Platelets   


 


Platelet Satelliting   


 


RBC Morphology   


 


Polychromasia   


 


Hypochromasia   


 


Poikilocytosis   


 


Basophilic Stippling   


 


Anisocytosis   


 


Microcytosis   


 


Macrocytosis   


 


Spherocytes   


 


Pappenheimer Bodies   


 


Sickle Cells   


 


Target Cells   


 


Tear Drop Cells   


 


Ovalocytes   


 


Stomatocytes   


 


Cason-Jolly Bodies   


 


Echinocytes   


 


Acanthocytes (Spur)   


 


Rouleaux   


 


RBC Agglutinates   


 


Schistocytes   


 


RBC Morph Comment   


 


Peripher Smr Path Cons   


 


ESR   


 


Sezary Cell   


 


PT  Cancelled  


 


INR  Cancelled  


 


APTT  Cancelled  


 


PTT Ratio  Cancelled  


 


Fibrinogen   


 


Fibrin Degrad Products   


 


ABG pH   


 


ABG pCO2   


 


ABG pO2   


 


ABG HCO3   


 


ABG O2 Saturation   


 


ABG Base Excess   


 


Chai Test   


 


VBG pH   


 


VBG pCO2   


 


VBG pO2   


 


VBG HCO3   


 


VBG O2 Saturation   


 


VBG Base Excess   


 


Barometric Pressure   


 


Oxygen Given   


 


Sodium   


 


Potassium   


 


Chloride   


 


Carbon Dioxide   


 


Anion Gap   


 


BUN   


 


Creatinine   


 


Est Cr Clr Drug Dosing   


 


Est GFR ( Amer)   


 


Est GFR (Non-Af Amer)   


 


BUN/Creatinine Ratio   


 


Glucose   


 


Estimat Average Glucose   


 


Hemoglobin A1c   


 


Osmolality   


 


Lactate   


 


Calcium   


 


Ionized Calcium   


 


Phosphorus   


 


Magnesium   


 


Iron   


 


TIBC   


 


Transferrin   


 


Ferritin   


 


Total Bilirubin   


 


Direct Bilirubin   


 


AST   


 


ALT   


 


Alkaline Phosphatase   


 


Ammonia   


 


Lactate Dehydrogenase   


 


Total Creatine Kinase   


 


Troponin I   


 


Total Protein   


 


Albumin   


 


Globulin   


 


Albumin/Globulin Ratio   


 


Lipase   


 


Vitamin B12   


 


Folate   


 


Beta-Hydroxybutyric Acd   


 


Procalcitonin   


 


TSH   


 


Specimen Hemolysis   


 


Urine Color   


 


Urine Appearance   


 


Urine pH   


 


Ur Specific Gravity   


 


Urine Protein   


 


Urine Glucose (UA)   


 


Urine Ketones   


 


Urine Blood   


 


Urine Nitrite   


 


Urine Bilirubin   


 


Urine Urobilinogen   


 


Ur Leukocyte Esterase   


 


Urine RBC   


 


Urine WBC   


 


Ur Epithelial Cells   


 


Urine Bacteria   


 


Nasal Screen MRSA (PCR)   


 


Phenytoin   


 


Ethyl Alcohol mg/dL   


 


COVID-19 Eval Order   Covid19 at [facility] 


 


SARS-CoV-2 (PCR)    NEGATIVE


 


Blood Type   


 


Blood Type Recheck   


 


Antibody Screen   


 


Crossmatch   














  06/07/21 06/07/21 06/07/21





  17:42 17:55 17:55


 


WBC   8.53 


 


RBC   0.84 L 


 


Hgb   2.4 L* 


 


Hct   8.4 L* 


 


MCV   100.0 


 


MCH   28.6 


 


MCHC   28.6 L 


 


RDW Std Deviation   59.4 H 


 


RDW Coeff of Nell   16.9 H 


 


Plt Count   231 


 


MPV   9.3 


 


Immature Gran % (Auto)   0.4 


 


Neut % (Auto)   72.6 


 


Lymph % (Auto)   13.5 


 


Mono % (Auto)   12.8 


 


Eos % (Auto)   0.5 


 


Baso % (Auto)   0.2 


 


Reticulocyte % (Auto)   


 


Neut # (Auto)   6.20 


 


Lymph # (Auto)   1.15 L 


 


Mono # (Auto)   1.09 H 


 


Eos # (Auto)   0.04 


 


Baso # (Auto)   0.02 


 


Reticulocyte #   


 


Immature Gran # (Auto)   0.03 H 


 


Absolute Nucleated RBC   


 


Nucleated RBC % (auto)   


 


Neutrophils % (Manual)   


 


Band Neutrophils %   


 


Lymphocytes % (Manual)   


 


Prolymphocyte %   


 


Reactive Lymphs % (Man)   


 


Monocytes % (Manual)   


 


Eosinophils % (Manual)   


 


Basophils % (Manual)   


 


Metamyelocytes % (Man)   


 


Myelocytes % (Man)   


 


Promyelocytes % (Man)   


 


Blast Cells % (Manual)   


 


Plasma Cell % (Manual)   


 


Other Cells %   


 


Nucleated RBC %   


 


Neutrophils # (Manual)   


 


Band Neutrophils #   


 


Total Absolute Neuts   


 


Lymphocytes # (Manual)   


 


Prolymphocyte #   


 


Reactive Lymphs #   


 


Total Abs Lymphocytes   


 


Monocytes # (Manual)   


 


Eosinophils # (Manual)   


 


Basophils # (Manual)   


 


Metamyelocytes # (Man)   


 


Myelocytes # (Manual)   


 


Promyelocytes # (Man)   


 


Blast Cells # (Man)   


 


Plasma Cell # (Manual)   


 


Other Cells #   


 


Nucleated RBCs # (Man)   


 


Hypersegmented Neuts   


 


Hyposegmented Neuts   


 


Hypogranular Neuts   


 


Large Granular Lymphs   


 


# Lrg Granular Lymphs   


 


Hairy Cells   


 


Smudge Cells   


 


Toxic Granulation   


 


Toxic Vacuolation   


 


Dohle Bodies   


 


Fadi Rods   


 


Platelet Estimate   


 


Hypogranular Platelets   


 


Clumped Platelets   


 


Giant Platelets   


 


Platelet Satelliting   


 


RBC Morphology   


 


Polychromasia   


 


Hypochromasia   Present 


 


Poikilocytosis   


 


Basophilic Stippling   


 


Anisocytosis   


 


Microcytosis   


 


Macrocytosis   


 


Spherocytes   


 


Pappenheimer Bodies   


 


Sickle Cells   


 


Target Cells   


 


Tear Drop Cells   


 


Ovalocytes   


 


Stomatocytes   


 


Cason-Jolly Bodies   


 


Echinocytes   


 


Acanthocytes (Spur)   


 


Rouleaux   


 


RBC Agglutinates   


 


Schistocytes   


 


RBC Morph Comment   


 


Peripher Smr Path Cons    


 


ESR   


 


Sezary Cell   


 


PT    12.8 H


 


INR    1.3 H


 


APTT    Cancelled


 


PTT Ratio    Cancelled


 


Fibrinogen   


 


Fibrin Degrad Products   


 


ABG pH   


 


ABG pCO2   


 


ABG pO2   


 


ABG HCO3   


 


ABG O2 Saturation   


 


ABG Base Excess   


 


Chai Test   


 


VBG pH   


 


VBG pCO2   


 


VBG pO2   


 


VBG HCO3   


 


VBG O2 Saturation   


 


VBG Base Excess   


 


Barometric Pressure   


 


Oxygen Given   


 


Sodium   


 


Potassium   


 


Chloride   


 


Carbon Dioxide   


 


Anion Gap   


 


BUN   


 


Creatinine   


 


Est Cr Clr Drug Dosing   


 


Est GFR ( Amer)   


 


Est GFR (Non-Af Amer)   


 


BUN/Creatinine Ratio   


 


Glucose   


 


Estimat Average Glucose   


 


Hemoglobin A1c   


 


Osmolality  323 H  


 


Lactate   


 


Calcium   


 


Ionized Calcium   


 


Phosphorus   


 


Magnesium   


 


Iron   


 


TIBC   


 


Transferrin   


 


Ferritin   


 


Total Bilirubin   


 


Direct Bilirubin   


 


AST   


 


ALT   


 


Alkaline Phosphatase   


 


Ammonia   


 


Lactate Dehydrogenase   


 


Total Creatine Kinase   


 


Troponin I   


 


Total Protein   


 


Albumin   


 


Globulin   


 


Albumin/Globulin Ratio   


 


Lipase   


 


Vitamin B12   


 


Folate   


 


Beta-Hydroxybutyric Acd   


 


Procalcitonin   


 


TSH   


 


Specimen Hemolysis   


 


Urine Color   


 


Urine Appearance   


 


Urine pH   


 


Ur Specific Gravity   


 


Urine Protein   


 


Urine Glucose (UA)   


 


Urine Ketones   


 


Urine Blood   


 


Urine Nitrite   


 


Urine Bilirubin   


 


Urine Urobilinogen   


 


Ur Leukocyte Esterase   


 


Urine RBC   


 


Urine WBC   


 


Ur Epithelial Cells   


 


Urine Bacteria   


 


Nasal Screen MRSA (PCR)   


 


Phenytoin   


 


Ethyl Alcohol mg/dL   


 


COVID-19 Eval Order   


 


SARS-CoV-2 (PCR)   


 


Blood Type   


 


Blood Type Recheck   


 


Antibody Screen   


 


Crossmatch   














  06/07/21 06/07/21 06/07/21





  17:55 17:55 17:55


 


WBC   


 


RBC   


 


Hgb   


 


Hct   


 


MCV   


 


MCH   


 


MCHC   


 


RDW Std Deviation   


 


RDW Coeff of Nell   


 


Plt Count   


 


MPV   


 


Immature Gran % (Auto)   


 


Neut % (Auto)   


 


Lymph % (Auto)   


 


Mono % (Auto)   


 


Eos % (Auto)   


 


Baso % (Auto)   


 


Reticulocyte % (Auto)   


 


Neut # (Auto)   


 


Lymph # (Auto)   


 


Mono # (Auto)   


 


Eos # (Auto)   


 


Baso # (Auto)   


 


Reticulocyte #   


 


Immature Gran # (Auto)   


 


Absolute Nucleated RBC   


 


Nucleated RBC % (auto)   


 


Neutrophils % (Manual)   


 


Band Neutrophils %   


 


Lymphocytes % (Manual)   


 


Prolymphocyte %   


 


Reactive Lymphs % (Man)   


 


Monocytes % (Manual)   


 


Eosinophils % (Manual)   


 


Basophils % (Manual)   


 


Metamyelocytes % (Man)   


 


Myelocytes % (Man)   


 


Promyelocytes % (Man)   


 


Blast Cells % (Manual)   


 


Plasma Cell % (Manual)   


 


Other Cells %   


 


Nucleated RBC %   


 


Neutrophils # (Manual)   


 


Band Neutrophils #   


 


Total Absolute Neuts   


 


Lymphocytes # (Manual)   


 


Prolymphocyte #   


 


Reactive Lymphs #   


 


Total Abs Lymphocytes   


 


Monocytes # (Manual)   


 


Eosinophils # (Manual)   


 


Basophils # (Manual)   


 


Metamyelocytes # (Man)   


 


Myelocytes # (Manual)   


 


Promyelocytes # (Man)   


 


Blast Cells # (Man)   


 


Plasma Cell # (Manual)   


 


Other Cells #   


 


Nucleated RBCs # (Man)   


 


Hypersegmented Neuts   


 


Hyposegmented Neuts   


 


Hypogranular Neuts   


 


Large Granular Lymphs   


 


# Lrg Granular Lymphs   


 


Hairy Cells   


 


Smudge Cells   


 


Toxic Granulation   


 


Toxic Vacuolation   


 


Dohle Bodies   


 


Fadi Rods   


 


Platelet Estimate   


 


Hypogranular Platelets   


 


Clumped Platelets   


 


Giant Platelets   


 


Platelet Satelliting   


 


RBC Morphology   


 


Polychromasia   


 


Hypochromasia   


 


Poikilocytosis   


 


Basophilic Stippling   


 


Anisocytosis   


 


Microcytosis   


 


Macrocytosis   


 


Spherocytes   


 


Pappenheimer Bodies   


 


Sickle Cells   


 


Target Cells   


 


Tear Drop Cells   


 


Ovalocytes   


 


Stomatocytes   


 


Cason-Jolly Bodies   


 


Echinocytes   


 


Acanthocytes (Spur)   


 


Rouleaux   


 


RBC Agglutinates   


 


Schistocytes   


 


RBC Morph Comment   


 


Peripher Smr Path Cons   


 


ESR   


 


Sezary Cell   


 


PT   


 


INR   


 


APTT   


 


PTT Ratio   


 


Fibrinogen   


 


Fibrin Degrad Products   


 


ABG pH   


 


ABG pCO2   


 


ABG pO2   


 


ABG HCO3   


 


ABG O2 Saturation   


 


ABG Base Excess   


 


Chai Test   


 


VBG pH   7.44 H 


 


VBG pCO2   29 L 


 


VBG pO2   24 


 


VBG HCO3   19 


 


VBG O2 Saturation   63.6 


 


VBG Base Excess   -4.5 


 


Barometric Pressure   733.2 


 


Oxygen Given   


 


Sodium   


 


Potassium   


 


Chloride   


 


Carbon Dioxide   


 


Anion Gap   


 


BUN   


 


Creatinine   


 


Est Cr Clr Drug Dosing   


 


Est GFR ( Amer)   


 


Est GFR (Non-Af Amer)   


 


BUN/Creatinine Ratio   


 


Glucose   


 


Estimat Average Glucose   


 


Hemoglobin A1c   


 


Osmolality   


 


Lactate    3.8 H*


 


Calcium   


 


Ionized Calcium   


 


Phosphorus   


 


Magnesium   


 


Iron   


 


TIBC   


 


Transferrin   


 


Ferritin   


 


Total Bilirubin   


 


Direct Bilirubin   


 


AST   


 


ALT   


 


Alkaline Phosphatase   


 


Ammonia   


 


Lactate Dehydrogenase   


 


Total Creatine Kinase   


 


Troponin I   


 


Total Protein   


 


Albumin   


 


Globulin   


 


Albumin/Globulin Ratio   


 


Lipase   


 


Vitamin B12   


 


Folate   


 


Beta-Hydroxybutyric Acd   


 


Procalcitonin   


 


TSH   


 


Specimen Hemolysis   


 


Urine Color   


 


Urine Appearance   


 


Urine pH   


 


Ur Specific Gravity   


 


Urine Protein   


 


Urine Glucose (UA)   


 


Urine Ketones   


 


Urine Blood   


 


Urine Nitrite   


 


Urine Bilirubin   


 


Urine Urobilinogen   


 


Ur Leukocyte Esterase   


 


Urine RBC   


 


Urine WBC   


 


Ur Epithelial Cells   


 


Urine Bacteria   


 


Nasal Screen MRSA (PCR)   


 


Phenytoin   


 


Ethyl Alcohol mg/dL   


 


COVID-19 Eval Order   


 


SARS-CoV-2 (PCR)   


 


Blood Type   


 


Blood Type Recheck  A Positive  


 


Antibody Screen   


 


Crossmatch   














  06/07/21 06/07/21 06/07/21





  17:58 18:25 22:55


 


WBC   7.32 


 


RBC   0.81 L 


 


Hgb   2.4 L* 


 


Hct   8.2 L* 


 


MCV   101.2 H 


 


MCH   29.6 


 


MCHC   29.3 L 


 


RDW Std Deviation   


 


RDW Coeff of Nell   


 


Plt Count   205 


 


MPV   


 


Immature Gran % (Auto)   0.3 


 


Neut % (Auto)   75.3 


 


Lymph % (Auto)   12.6 


 


Mono % (Auto)   11.6 


 


Eos % (Auto)   0.1 


 


Baso % (Auto)   0.1 


 


Reticulocyte % (Auto)   


 


Neut # (Auto)   5.51 


 


Lymph # (Auto)   0.92 L 


 


Mono # (Auto)   0.85 H 


 


Eos # (Auto)   0.01 


 


Baso # (Auto)   0.01 


 


Reticulocyte #   


 


Immature Gran # (Auto)   0.02 


 


Absolute Nucleated RBC   


 


Nucleated RBC % (auto)   


 


Neutrophils % (Manual)   


 


Band Neutrophils %   


 


Lymphocytes % (Manual)   


 


Prolymphocyte %   


 


Reactive Lymphs % (Man)   


 


Monocytes % (Manual)   


 


Eosinophils % (Manual)   


 


Basophils % (Manual)   


 


Metamyelocytes % (Man)   


 


Myelocytes % (Man)   


 


Promyelocytes % (Man)   


 


Blast Cells % (Manual)   


 


Plasma Cell % (Manual)   


 


Other Cells %   


 


Nucleated RBC %   


 


Neutrophils # (Manual)   


 


Band Neutrophils #   


 


Total Absolute Neuts   


 


Lymphocytes # (Manual)   


 


Prolymphocyte #   


 


Reactive Lymphs #   


 


Total Abs Lymphocytes   


 


Monocytes # (Manual)   


 


Eosinophils # (Manual)   


 


Basophils # (Manual)   


 


Metamyelocytes # (Man)   


 


Myelocytes # (Manual)   


 


Promyelocytes # (Man)   


 


Blast Cells # (Man)   


 


Plasma Cell # (Manual)   


 


Other Cells #   


 


Nucleated RBCs # (Man)   


 


Hypersegmented Neuts   


 


Hyposegmented Neuts   


 


Hypogranular Neuts   


 


Large Granular Lymphs   


 


# Lrg Granular Lymphs   


 


Hairy Cells   


 


Smudge Cells   


 


Toxic Granulation   


 


Toxic Vacuolation   


 


Dohle Bodies   


 


Fadi Rods   


 


Platelet Estimate   


 


Hypogranular Platelets   


 


Clumped Platelets   


 


Giant Platelets   


 


Platelet Satelliting   


 


RBC Morphology   


 


Polychromasia   


 


Hypochromasia   Present 


 


Poikilocytosis   


 


Basophilic Stippling   


 


Anisocytosis   


 


Microcytosis   


 


Macrocytosis   


 


Spherocytes   


 


Pappenheimer Bodies   


 


Sickle Cells   


 


Target Cells   


 


Tear Drop Cells   


 


Ovalocytes   


 


Stomatocytes   


 


Cason-Jolly Bodies   


 


Echinocytes   


 


Acanthocytes (Spur)   


 


Rouleaux   


 


RBC Agglutinates   


 


Schistocytes   


 


RBC Morph Comment   


 


Peripher Smr Path Cons   


 


ESR   


 


Sezary Cell   


 


PT   


 


INR   


 


APTT   


 


PTT Ratio   


 


Fibrinogen   


 


Fibrin Degrad Products   


 


ABG pH   


 


ABG pCO2   


 


ABG pO2   


 


ABG HCO3   


 


ABG O2 Saturation   


 


ABG Base Excess   


 


Chai Test   


 


VBG pH   


 


VBG pCO2   


 


VBG pO2   


 


VBG HCO3   


 


VBG O2 Saturation   


 


VBG Base Excess   


 


Barometric Pressure   


 


Oxygen Given   


 


Sodium   


 


Potassium   


 


Chloride   


 


Carbon Dioxide   


 


Anion Gap   


 


BUN   


 


Creatinine   


 


Est Cr Clr Drug Dosing   


 


Est GFR ( Amer)   


 


Est GFR (Non-Af Amer)   


 


BUN/Creatinine Ratio   


 


Glucose   


 


Estimat Average Glucose   


 


Hemoglobin A1c   


 


Osmolality   


 


Lactate   


 


Calcium   


 


Ionized Calcium   


 


Phosphorus   


 


Magnesium   


 


Iron   


 


TIBC   


 


Transferrin   


 


Ferritin   


 


Total Bilirubin   


 


Direct Bilirubin   


 


AST   


 


ALT   


 


Alkaline Phosphatase   


 


Ammonia   


 


Lactate Dehydrogenase   


 


Total Creatine Kinase   


 


Troponin I   


 


Total Protein   


 


Albumin   


 


Globulin   


 


Albumin/Globulin Ratio   


 


Lipase   


 


Vitamin B12   


 


Folate   


 


Beta-Hydroxybutyric Acd   


 


Procalcitonin  0.39  


 


TSH   


 


Specimen Hemolysis   


 


Urine Color   


 


Urine Appearance   


 


Urine pH   


 


Ur Specific Gravity   


 


Urine Protein   


 


Urine Glucose (UA)   


 


Urine Ketones   


 


Urine Blood   


 


Urine Nitrite   


 


Urine Bilirubin   


 


Urine Urobilinogen   


 


Ur Leukocyte Esterase   


 


Urine RBC   


 


Urine WBC   


 


Ur Epithelial Cells   


 


Urine Bacteria   


 


Nasal Screen MRSA (PCR)    Negative


 


Phenytoin   


 


Ethyl Alcohol mg/dL   


 


COVID-19 Eval Order   


 


SARS-CoV-2 (PCR)   


 


Blood Type   


 


Blood Type Recheck   


 


Antibody Screen   


 


Crossmatch   














  06/07/21 06/07/21 06/07/21





  23:05 23:21 Unknown


 


WBC   


 


RBC   


 


Hgb   


 


Hct   


 


MCV   


 


MCH   


 


MCHC   


 


RDW Std Deviation   


 


RDW Coeff of Nell   


 


Plt Count   


 


MPV   


 


Immature Gran % (Auto)   


 


Neut % (Auto)   


 


Lymph % (Auto)   


 


Mono % (Auto)   


 


Eos % (Auto)   


 


Baso % (Auto)   


 


Reticulocyte % (Auto)   


 


Neut # (Auto)   


 


Lymph # (Auto)   


 


Mono # (Auto)   


 


Eos # (Auto)   


 


Baso # (Auto)   


 


Reticulocyte #   


 


Immature Gran # (Auto)   


 


Absolute Nucleated RBC   


 


Nucleated RBC % (auto)   


 


Neutrophils % (Manual)   


 


Band Neutrophils %   


 


Lymphocytes % (Manual)   


 


Prolymphocyte %   


 


Reactive Lymphs % (Man)   


 


Monocytes % (Manual)   


 


Eosinophils % (Manual)   


 


Basophils % (Manual)   


 


Metamyelocytes % (Man)   


 


Myelocytes % (Man)   


 


Promyelocytes % (Man)   


 


Blast Cells % (Manual)   


 


Plasma Cell % (Manual)   


 


Other Cells %   


 


Nucleated RBC %   


 


Neutrophils # (Manual)   


 


Band Neutrophils #   


 


Total Absolute Neuts   


 


Lymphocytes # (Manual)   


 


Prolymphocyte #   


 


Reactive Lymphs #   


 


Total Abs Lymphocytes   


 


Monocytes # (Manual)   


 


Eosinophils # (Manual)   


 


Basophils # (Manual)   


 


Metamyelocytes # (Man)   


 


Myelocytes # (Manual)   


 


Promyelocytes # (Man)   


 


Blast Cells # (Man)   


 


Plasma Cell # (Manual)   


 


Other Cells #   


 


Nucleated RBCs # (Man)   


 


Hypersegmented Neuts   


 


Hyposegmented Neuts   


 


Hypogranular Neuts   


 


Large Granular Lymphs   


 


# Lrg Granular Lymphs   


 


Hairy Cells   


 


Smudge Cells   


 


Toxic Granulation   


 


Toxic Vacuolation   


 


Dohle Bodies   


 


Fadi Rods   


 


Platelet Estimate   


 


Hypogranular Platelets   


 


Clumped Platelets   


 


Giant Platelets   


 


Platelet Satelliting   


 


RBC Morphology   


 


Polychromasia   


 


Hypochromasia   


 


Poikilocytosis   


 


Basophilic Stippling   


 


Anisocytosis   


 


Microcytosis   


 


Macrocytosis   


 


Spherocytes   


 


Pappenheimer Bodies   


 


Sickle Cells   


 


Target Cells   


 


Tear Drop Cells   


 


Ovalocytes   


 


Stomatocytes   


 


Cason-Jolly Bodies   


 


Echinocytes   


 


Acanthocytes (Spur)   


 


Rouleaux   


 


RBC Agglutinates   


 


Schistocytes   


 


RBC Morph Comment   


 


Peripher Smr Path Cons   


 


ESR   


 


Sezary Cell   


 


PT   


 


INR   


 


APTT   


 


PTT Ratio   


 


Fibrinogen   


 


Fibrin Degrad Products   


 


ABG pH  7.44  


 


ABG pCO2  24 L  


 


ABG pO2  159 H  


 


ABG HCO3  16 L  


 


ABG O2 Saturation  99.3 H  


 


ABG Base Excess  -7.5  


 


Chai Test  POS  


 


VBG pH   


 


VBG pCO2   


 


VBG pO2   


 


VBG HCO3   


 


VBG O2 Saturation   


 


VBG Base Excess   


 


Barometric Pressure  733.8  


 


Oxygen Given  30% FIO2  


 


Sodium   


 


Potassium   


 


Chloride   


 


Carbon Dioxide   


 


Anion Gap   


 


BUN   


 


Creatinine   


 


Est Cr Clr Drug Dosing   


 


Est GFR ( Amer)   


 


Est GFR (Non-Af Amer)   


 


BUN/Creatinine Ratio   


 


Glucose   


 


Estimat Average Glucose   


 


Hemoglobin A1c   


 


Osmolality   


 


Lactate   


 


Calcium   


 


Ionized Calcium   


 


Phosphorus   


 


Magnesium   


 


Iron   


 


TIBC   


 


Transferrin   


 


Ferritin   


 


Total Bilirubin   


 


Direct Bilirubin   


 


AST   


 


ALT   


 


Alkaline Phosphatase   


 


Ammonia   36.0 H 


 


Lactate Dehydrogenase   


 


Total Creatine Kinase   


 


Troponin I   


 


Total Protein   


 


Albumin   


 


Globulin   


 


Albumin/Globulin Ratio   


 


Lipase   


 


Vitamin B12   


 


Folate   


 


Beta-Hydroxybutyric Acd    9.30 H


 


Procalcitonin   


 


TSH   


 


Specimen Hemolysis   


 


Urine Color   


 


Urine Appearance   


 


Urine pH   


 


Ur Specific Gravity   


 


Urine Protein   


 


Urine Glucose (UA)   


 


Urine Ketones   


 


Urine Blood   


 


Urine Nitrite   


 


Urine Bilirubin   


 


Urine Urobilinogen   


 


Ur Leukocyte Esterase   


 


Urine RBC   


 


Urine WBC   


 


Ur Epithelial Cells   


 


Urine Bacteria   


 


Nasal Screen MRSA (PCR)   


 


Phenytoin   


 


Ethyl Alcohol mg/dL   


 


COVID-19 Eval Order   


 


SARS-CoV-2 (PCR)   


 


Blood Type   


 


Blood Type Recheck   


 


Antibody Screen   


 


Crossmatch   














  06/08/21 06/08/21 06/08/21





  00:03 00:03 00:03


 


WBC   


 


RBC   


 


Hgb   


 


Hct   


 


MCV   


 


MCH   


 


MCHC   


 


RDW Std Deviation   


 


RDW Coeff of Nell   


 


Plt Count   


 


MPV   


 


Immature Gran % (Auto)   


 


Neut % (Auto)   


 


Lymph % (Auto)   


 


Mono % (Auto)   


 


Eos % (Auto)   


 


Baso % (Auto)   


 


Reticulocyte % (Auto)   


 


Neut # (Auto)   


 


Lymph # (Auto)   


 


Mono # (Auto)   


 


Eos # (Auto)   


 


Baso # (Auto)   


 


Reticulocyte #   


 


Immature Gran # (Auto)   


 


Absolute Nucleated RBC   


 


Nucleated RBC % (auto)   


 


Neutrophils % (Manual)   


 


Band Neutrophils %   


 


Lymphocytes % (Manual)   


 


Prolymphocyte %   


 


Reactive Lymphs % (Man)   


 


Monocytes % (Manual)   


 


Eosinophils % (Manual)   


 


Basophils % (Manual)   


 


Metamyelocytes % (Man)   


 


Myelocytes % (Man)   


 


Promyelocytes % (Man)   


 


Blast Cells % (Manual)   


 


Plasma Cell % (Manual)   


 


Other Cells %   


 


Nucleated RBC %   


 


Neutrophils # (Manual)   


 


Band Neutrophils #   


 


Total Absolute Neuts   


 


Lymphocytes # (Manual)   


 


Prolymphocyte #   


 


Reactive Lymphs #   


 


Total Abs Lymphocytes   


 


Monocytes # (Manual)   


 


Eosinophils # (Manual)   


 


Basophils # (Manual)   


 


Metamyelocytes # (Man)   


 


Myelocytes # (Manual)   


 


Promyelocytes # (Man)   


 


Blast Cells # (Man)   


 


Plasma Cell # (Manual)   


 


Other Cells #   


 


Nucleated RBCs # (Man)   


 


Hypersegmented Neuts   


 


Hyposegmented Neuts   


 


Hypogranular Neuts   


 


Large Granular Lymphs   


 


# Lrg Granular Lymphs   


 


Hairy Cells   


 


Smudge Cells   


 


Toxic Granulation   


 


Toxic Vacuolation   


 


Dohle Bodies   


 


Fadi Rods   


 


Platelet Estimate   


 


Hypogranular Platelets   


 


Clumped Platelets   


 


Giant Platelets   


 


Platelet Satelliting   


 


RBC Morphology   


 


Polychromasia   


 


Hypochromasia   


 


Poikilocytosis   


 


Basophilic Stippling   


 


Anisocytosis   


 


Microcytosis   


 


Macrocytosis   


 


Spherocytes   


 


Pappenheimer Bodies   


 


Sickle Cells   


 


Target Cells   


 


Tear Drop Cells   


 


Ovalocytes   


 


Stomatocytes   


 


Cason-Jolly Bodies   


 


Echinocytes   


 


Acanthocytes (Spur)   


 


Rouleaux   


 


RBC Agglutinates   


 


Schistocytes   


 


RBC Morph Comment   


 


Peripher Smr Path Cons   


 


ESR   


 


Sezary Cell   


 


PT   


 


INR   


 


APTT   


 


PTT Ratio   


 


Fibrinogen    246


 


Fibrin Degrad Products   


 


ABG pH   


 


ABG pCO2   


 


ABG pO2   


 


ABG HCO3   


 


ABG O2 Saturation   


 


ABG Base Excess   


 


Chai Test   


 


VBG pH   


 


VBG pCO2   


 


VBG pO2   


 


VBG HCO3   


 


VBG O2 Saturation   


 


VBG Base Excess   


 


Barometric Pressure   


 


Oxygen Given   


 


Sodium   145 


 


Potassium   3.9 


 


Chloride   116 H 


 


Carbon Dioxide   18 L 


 


Anion Gap   11.0 


 


BUN   29 H 


 


Creatinine   1.79 H D 


 


Est Cr Clr Drug Dosing   39.3 


 


Est GFR ( Amer)   46.0 


 


Est GFR (Non-Af Amer)   39.7 


 


BUN/Creatinine Ratio   16.0 


 


Glucose   149 H 


 


Estimat Average Glucose   


 


Hemoglobin A1c   


 


Osmolality   


 


Lactate  2.2 H*  


 


Calcium   6.6 L 


 


Ionized Calcium   


 


Phosphorus   


 


Magnesium   


 


Iron   


 


TIBC   


 


Transferrin   


 


Ferritin   


 


Total Bilirubin   


 


Direct Bilirubin   


 


AST   


 


ALT   


 


Alkaline Phosphatase   


 


Ammonia   


 


Lactate Dehydrogenase   


 


Total Creatine Kinase   


 


Troponin I   


 


Total Protein   


 


Albumin   


 


Globulin   


 


Albumin/Globulin Ratio   


 


Lipase   


 


Vitamin B12   


 


Folate   


 


Beta-Hydroxybutyric Acd   


 


Procalcitonin   


 


TSH   2.080 


 


Specimen Hemolysis   


 


Urine Color   


 


Urine Appearance   


 


Urine pH   


 


Ur Specific Gravity   


 


Urine Protein   


 


Urine Glucose (UA)   


 


Urine Ketones   


 


Urine Blood   


 


Urine Nitrite   


 


Urine Bilirubin   


 


Urine Urobilinogen   


 


Ur Leukocyte Esterase   


 


Urine RBC   


 


Urine WBC   


 


Ur Epithelial Cells   


 


Urine Bacteria   


 


Nasal Screen MRSA (PCR)   


 


Phenytoin   


 


Ethyl Alcohol mg/dL   


 


COVID-19 Eval Order   


 


SARS-CoV-2 (PCR)   


 


Blood Type   


 


Blood Type Recheck   


 


Antibody Screen   


 


Crossmatch   














  06/08/21 06/08/21 06/08/21





  03:28 03:28 03:28


 


WBC  10.97 H  


 


RBC  2.87 L  


 


Hgb  8.4 L D  


 


Hct  24.9 L  


 


MCV  86.8  D  


 


MCH  29.3  


 


MCHC  33.7  


 


RDW Std Deviation  49.0 H  


 


RDW Coeff of Nell  16.1 H  


 


Plt Count  151  


 


MPV  9.1  


 


Immature Gran % (Auto)  0.5  


 


Neut % (Auto)  91.6  


 


Lymph % (Auto)  3.8  


 


Mono % (Auto)  4.0  


 


Eos % (Auto)  0.1  


 


Baso % (Auto)  0.0  


 


Reticulocyte % (Auto)  2.8 H  


 


Neut # (Auto)  10.04 H  


 


Lymph # (Auto)  0.42 L  


 


Mono # (Auto)  0.44  


 


Eos # (Auto)  0.01  


 


Baso # (Auto)  0.00  


 


Reticulocyte #  0.08  


 


Immature Gran # (Auto)  0.06 H  


 


Absolute Nucleated RBC   


 


Nucleated RBC % (auto)   


 


Neutrophils % (Manual)   


 


Band Neutrophils %   


 


Lymphocytes % (Manual)   


 


Prolymphocyte %   


 


Reactive Lymphs % (Man)   


 


Monocytes % (Manual)   


 


Eosinophils % (Manual)   


 


Basophils % (Manual)   


 


Metamyelocytes % (Man)   


 


Myelocytes % (Man)   


 


Promyelocytes % (Man)   


 


Blast Cells % (Manual)   


 


Plasma Cell % (Manual)   


 


Other Cells %   


 


Nucleated RBC %   


 


Neutrophils # (Manual)   


 


Band Neutrophils #   


 


Total Absolute Neuts   


 


Lymphocytes # (Manual)   


 


Prolymphocyte #   


 


Reactive Lymphs #   


 


Total Abs Lymphocytes   


 


Monocytes # (Manual)   


 


Eosinophils # (Manual)   


 


Basophils # (Manual)   


 


Metamyelocytes # (Man)   


 


Myelocytes # (Manual)   


 


Promyelocytes # (Man)   


 


Blast Cells # (Man)   


 


Plasma Cell # (Manual)   


 


Other Cells #   


 


Nucleated RBCs # (Man)   


 


Hypersegmented Neuts   


 


Hyposegmented Neuts   


 


Hypogranular Neuts   


 


Large Granular Lymphs   


 


# Lrg Granular Lymphs   


 


Hairy Cells   


 


Smudge Cells   


 


Toxic Granulation   


 


Toxic Vacuolation   


 


Dohle Bodies   


 


Fadi Rods   


 


Platelet Estimate   


 


Hypogranular Platelets   


 


Clumped Platelets   


 


Giant Platelets   


 


Platelet Satelliting   


 


RBC Morphology   


 


Polychromasia   


 


Hypochromasia   


 


Poikilocytosis   


 


Basophilic Stippling   


 


Anisocytosis   


 


Microcytosis   


 


Macrocytosis   


 


Spherocytes   


 


Pappenheimer Bodies   


 


Sickle Cells   


 


Target Cells   


 


Tear Drop Cells   


 


Ovalocytes   


 


Stomatocytes   


 


Cason-Jolly Bodies   


 


Echinocytes   


 


Acanthocytes (Spur)   


 


Rouleaux   


 


RBC Agglutinates   


 


Schistocytes   


 


RBC Morph Comment   


 


Peripher Smr Path Cons   


 


ESR   


 


Sezary Cell   


 


PT   


 


INR   


 


APTT   


 


PTT Ratio   


 


Fibrinogen   


 


Fibrin Degrad Products   


 


ABG pH   


 


ABG pCO2   


 


ABG pO2   


 


ABG HCO3   


 


ABG O2 Saturation   


 


ABG Base Excess   


 


Chai Test   


 


VBG pH   


 


VBG pCO2   


 


VBG pO2   


 


VBG HCO3   


 


VBG O2 Saturation   


 


VBG Base Excess   


 


Barometric Pressure   


 


Oxygen Given   


 


Sodium   144 


 


Potassium   3.9 


 


Chloride   117 H 


 


Carbon Dioxide   17 L 


 


Anion Gap   10.0 


 


BUN   28 H 


 


Creatinine   1.67 H 


 


Est Cr Clr Drug Dosing   42.1 


 


Est GFR ( Amer)   50.1 


 


Est GFR (Non-Af Amer)   43.2 


 


BUN/Creatinine Ratio   16.7 


 


Glucose   151 H 


 


Estimat Average Glucose    100


 


Hemoglobin A1c    5.1


 


Osmolality   


 


Lactate   


 


Calcium   7.2 L 


 


Ionized Calcium   


 


Phosphorus   


 


Magnesium   2.0 


 


Iron   223 H 


 


TIBC   236 L 


 


Transferrin   178 L 


 


Ferritin   53.5 


 


Total Bilirubin   4.7 H D 


 


Direct Bilirubin   


 


AST   62 H 


 


ALT   37 


 


Alkaline Phosphatase   132 H 


 


Ammonia   


 


Lactate Dehydrogenase   


 


Total Creatine Kinase   


 


Troponin I   


 


Total Protein   6.5 


 


Albumin   2.4 L 


 


Globulin   4.1 H 


 


Albumin/Globulin Ratio   0.6 L 


 


Lipase   


 


Vitamin B12   


 


Folate   


 


Beta-Hydroxybutyric Acd   


 


Procalcitonin   


 


TSH   


 


Specimen Hemolysis   


 


Urine Color   


 


Urine Appearance   


 


Urine pH   


 


Ur Specific Gravity   


 


Urine Protein   


 


Urine Glucose (UA)   


 


Urine Ketones   


 


Urine Blood   


 


Urine Nitrite   


 


Urine Bilirubin   


 


Urine Urobilinogen   


 


Ur Leukocyte Esterase   


 


Urine RBC   


 


Urine WBC   


 


Ur Epithelial Cells   


 


Urine Bacteria   


 


Nasal Screen MRSA (PCR)   


 


Phenytoin   


 


Ethyl Alcohol mg/dL   


 


COVID-19 Eval Order   


 


SARS-CoV-2 (PCR)   


 


Blood Type   


 


Blood Type Recheck   


 


Antibody Screen   


 


Crossmatch   














  06/08/21 06/08/21 06/08/21





  03:28 03:28 04:43


 


WBC   


 


RBC   


 


Hgb   


 


Hct   


 


MCV   


 


MCH   


 


MCHC   


 


RDW Std Deviation   


 


RDW Coeff of Nell   


 


Plt Count   


 


MPV   


 


Immature Gran % (Auto)   


 


Neut % (Auto)   


 


Lymph % (Auto)   


 


Mono % (Auto)   


 


Eos % (Auto)   


 


Baso % (Auto)   


 


Reticulocyte % (Auto)   


 


Neut # (Auto)   


 


Lymph # (Auto)   


 


Mono # (Auto)   


 


Eos # (Auto)   


 


Baso # (Auto)   


 


Reticulocyte #   


 


Immature Gran # (Auto)   


 


Absolute Nucleated RBC   


 


Nucleated RBC % (auto)   


 


Neutrophils % (Manual)   


 


Band Neutrophils %   


 


Lymphocytes % (Manual)   


 


Prolymphocyte %   


 


Reactive Lymphs % (Man)   


 


Monocytes % (Manual)   


 


Eosinophils % (Manual)   


 


Basophils % (Manual)   


 


Metamyelocytes % (Man)   


 


Myelocytes % (Man)   


 


Promyelocytes % (Man)   


 


Blast Cells % (Manual)   


 


Plasma Cell % (Manual)   


 


Other Cells %   


 


Nucleated RBC %   


 


Neutrophils # (Manual)   


 


Band Neutrophils #   


 


Total Absolute Neuts   


 


Lymphocytes # (Manual)   


 


Prolymphocyte #   


 


Reactive Lymphs #   


 


Total Abs Lymphocytes   


 


Monocytes # (Manual)   


 


Eosinophils # (Manual)   


 


Basophils # (Manual)   


 


Metamyelocytes # (Man)   


 


Myelocytes # (Manual)   


 


Promyelocytes # (Man)   


 


Blast Cells # (Man)   


 


Plasma Cell # (Manual)   


 


Other Cells #   


 


Nucleated RBCs # (Man)   


 


Hypersegmented Neuts   


 


Hyposegmented Neuts   


 


Hypogranular Neuts   


 


Large Granular Lymphs   


 


# Lrg Granular Lymphs   


 


Hairy Cells   


 


Smudge Cells   


 


Toxic Granulation   


 


Toxic Vacuolation   


 


Dohle Bodies   


 


Fadi Rods   


 


Platelet Estimate   


 


Hypogranular Platelets   


 


Clumped Platelets   


 


Giant Platelets   


 


Platelet Satelliting   


 


RBC Morphology   


 


Polychromasia   


 


Hypochromasia   


 


Poikilocytosis   


 


Basophilic Stippling   


 


Anisocytosis   


 


Microcytosis   


 


Macrocytosis   


 


Spherocytes   


 


Pappenheimer Bodies   


 


Sickle Cells   


 


Target Cells   


 


Tear Drop Cells   


 


Ovalocytes   


 


Stomatocytes   


 


Cason-Jolly Bodies   


 


Echinocytes   


 


Acanthocytes (Spur)   


 


Rouleaux   


 


RBC Agglutinates   


 


Schistocytes   


 


RBC Morph Comment   


 


Peripher Smr Path Cons   


 


ESR   


 


Sezary Cell   


 


PT   


 


INR   


 


APTT   


 


PTT Ratio   


 


Fibrinogen   


 


Fibrin Degrad Products   


 


ABG pH   


 


ABG pCO2   


 


ABG pO2   


 


ABG HCO3   


 


ABG O2 Saturation   


 


ABG Base Excess   


 


Chai Test   


 


VBG pH   


 


VBG pCO2   


 


VBG pO2   


 


VBG HCO3   


 


VBG O2 Saturation   


 


VBG Base Excess   


 


Barometric Pressure   


 


Oxygen Given   


 


Sodium   


 


Potassium   


 


Chloride   


 


Carbon Dioxide   


 


Anion Gap   


 


BUN   


 


Creatinine   


 


Est Cr Clr Drug Dosing   


 


Est GFR ( Amer)   


 


Est GFR (Non-Af Amer)   


 


BUN/Creatinine Ratio   


 


Glucose   


 


Estimat Average Glucose   


 


Hemoglobin A1c   


 


Osmolality   


 


Lactate  1.6  


 


Calcium   


 


Ionized Calcium   1.15 


 


Phosphorus   


 


Magnesium   


 


Iron   


 


TIBC   


 


Transferrin   


 


Ferritin   


 


Total Bilirubin   


 


Direct Bilirubin   


 


AST   


 


ALT   


 


Alkaline Phosphatase   


 


Ammonia   


 


Lactate Dehydrogenase   


 


Total Creatine Kinase   


 


Troponin I   


 


Total Protein   


 


Albumin   


 


Globulin   


 


Albumin/Globulin Ratio   


 


Lipase   


 


Vitamin B12    815


 


Folate    > 20.00


 


Beta-Hydroxybutyric Acd   


 


Procalcitonin   


 


TSH   


 


Specimen Hemolysis   


 


Urine Color   


 


Urine Appearance   


 


Urine pH   


 


Ur Specific Gravity   


 


Urine Protein   


 


Urine Glucose (UA)   


 


Urine Ketones   


 


Urine Blood   


 


Urine Nitrite   


 


Urine Bilirubin   


 


Urine Urobilinogen   


 


Ur Leukocyte Esterase   


 


Urine RBC   


 


Urine WBC   


 


Ur Epithelial Cells   


 


Urine Bacteria   


 


Nasal Screen MRSA (PCR)   


 


Phenytoin   


 


Ethyl Alcohol mg/dL   


 


COVID-19 Eval Order   


 


SARS-CoV-2 (PCR)   


 


Blood Type   


 


Blood Type Recheck   


 


Antibody Screen   


 


Crossmatch   














  06/08/21 06/08/21 06/08/21





  04:43 09:14 09:14


 


WBC   


 


RBC   


 


Hgb   8.4 L 


 


Hct   25.6 L 


 


MCV   


 


MCH   


 


MCHC   


 


RDW Std Deviation   


 


RDW Coeff of Nell   


 


Plt Count   


 


MPV   


 


Immature Gran % (Auto)   


 


Neut % (Auto)   


 


Lymph % (Auto)   


 


Mono % (Auto)   


 


Eos % (Auto)   


 


Baso % (Auto)   


 


Reticulocyte % (Auto)   


 


Neut # (Auto)   


 


Lymph # (Auto)   


 


Mono # (Auto)   


 


Eos # (Auto)   


 


Baso # (Auto)   


 


Reticulocyte #   


 


Immature Gran # (Auto)   


 


Absolute Nucleated RBC   


 


Nucleated RBC % (auto)   


 


Neutrophils % (Manual)   


 


Band Neutrophils %   


 


Lymphocytes % (Manual)   


 


Prolymphocyte %   


 


Reactive Lymphs % (Man)   


 


Monocytes % (Manual)   


 


Eosinophils % (Manual)   


 


Basophils % (Manual)   


 


Metamyelocytes % (Man)   


 


Myelocytes % (Man)   


 


Promyelocytes % (Man)   


 


Blast Cells % (Manual)   


 


Plasma Cell % (Manual)   


 


Other Cells %   


 


Nucleated RBC %   


 


Neutrophils # (Manual)   


 


Band Neutrophils #   


 


Total Absolute Neuts   


 


Lymphocytes # (Manual)   


 


Prolymphocyte #   


 


Reactive Lymphs #   


 


Total Abs Lymphocytes   


 


Monocytes # (Manual)   


 


Eosinophils # (Manual)   


 


Basophils # (Manual)   


 


Metamyelocytes # (Man)   


 


Myelocytes # (Manual)   


 


Promyelocytes # (Man)   


 


Blast Cells # (Man)   


 


Plasma Cell # (Manual)   


 


Other Cells #   


 


Nucleated RBCs # (Man)   


 


Hypersegmented Neuts   


 


Hyposegmented Neuts   


 


Hypogranular Neuts   


 


Large Granular Lymphs   


 


# Lrg Granular Lymphs   


 


Hairy Cells   


 


Smudge Cells   


 


Toxic Granulation   


 


Toxic Vacuolation   


 


Dohle Bodies   


 


Fadi Rods   


 


Platelet Estimate   


 


Hypogranular Platelets   


 


Clumped Platelets   


 


Giant Platelets   


 


Platelet Satelliting   


 


RBC Morphology   


 


Polychromasia   


 


Hypochromasia   


 


Poikilocytosis   


 


Basophilic Stippling   


 


Anisocytosis   


 


Microcytosis   


 


Macrocytosis   


 


Spherocytes   


 


Pappenheimer Bodies   


 


Sickle Cells   


 


Target Cells   


 


Tear Drop Cells   


 


Ovalocytes   


 


Stomatocytes   


 


Cason-Jolly Bodies   


 


Echinocytes   


 


Acanthocytes (Spur)   


 


Rouleaux   


 


RBC Agglutinates   


 


Schistocytes   


 


RBC Morph Comment   


 


Peripher Smr Path Cons   


 


ESR    12


 


Sezary Cell   


 


PT   


 


INR   


 


APTT   


 


PTT Ratio   


 


Fibrinogen   


 


Fibrin Degrad Products   


 


ABG pH   


 


ABG pCO2   


 


ABG pO2   


 


ABG HCO3   


 


ABG O2 Saturation   


 


ABG Base Excess   


 


Chai Test   


 


VBG pH   


 


VBG pCO2   


 


VBG pO2   


 


VBG HCO3   


 


VBG O2 Saturation   


 


VBG Base Excess   


 


Barometric Pressure   


 


Oxygen Given   


 


Sodium   


 


Potassium   


 


Chloride   


 


Carbon Dioxide   


 


Anion Gap   


 


BUN   


 


Creatinine   


 


Est Cr Clr Drug Dosing   


 


Est GFR ( Amer)   


 


Est GFR (Non-Af Amer)   


 


BUN/Creatinine Ratio   


 


Glucose   


 


Estimat Average Glucose   


 


Hemoglobin A1c   


 


Osmolality   


 


Lactate  2.2 H*  


 


Calcium   


 


Ionized Calcium   


 


Phosphorus   


 


Magnesium   


 


Iron   


 


TIBC   


 


Transferrin   


 


Ferritin   


 


Total Bilirubin   


 


Direct Bilirubin   


 


AST   


 


ALT   


 


Alkaline Phosphatase   


 


Ammonia   


 


Lactate Dehydrogenase   


 


Total Creatine Kinase   


 


Troponin I   


 


Total Protein   


 


Albumin   


 


Globulin   


 


Albumin/Globulin Ratio   


 


Lipase   


 


Vitamin B12   


 


Folate   


 


Beta-Hydroxybutyric Acd   


 


Procalcitonin   


 


TSH   


 


Specimen Hemolysis   


 


Urine Color   


 


Urine Appearance   


 


Urine pH   


 


Ur Specific Gravity   


 


Urine Protein   


 


Urine Glucose (UA)   


 


Urine Ketones   


 


Urine Blood   


 


Urine Nitrite   


 


Urine Bilirubin   


 


Urine Urobilinogen   


 


Ur Leukocyte Esterase   


 


Urine RBC   


 


Urine WBC   


 


Ur Epithelial Cells   


 


Urine Bacteria   


 


Nasal Screen MRSA (PCR)   


 


Phenytoin   


 


Ethyl Alcohol mg/dL   


 


COVID-19 Eval Order   


 


SARS-CoV-2 (PCR)   


 


Blood Type   


 


Blood Type Recheck   


 


Antibody Screen   


 


Crossmatch   














  06/08/21 06/08/21





  09:14 09:14


 


WBC  


 


RBC  


 


Hgb  


 


Hct  


 


MCV  


 


MCH  


 


MCHC  


 


RDW Std Deviation  


 


RDW Coeff of Nell  


 


Plt Count  


 


MPV  


 


Immature Gran % (Auto)  


 


Neut % (Auto)  


 


Lymph % (Auto)  


 


Mono % (Auto)  


 


Eos % (Auto)  


 


Baso % (Auto)  


 


Reticulocyte % (Auto)  


 


Neut # (Auto)  


 


Lymph # (Auto)  


 


Mono # (Auto)  


 


Eos # (Auto)  


 


Baso # (Auto)  


 


Reticulocyte #  


 


Immature Gran # (Auto)  


 


Absolute Nucleated RBC  


 


Nucleated RBC % (auto)  


 


Neutrophils % (Manual)  


 


Band Neutrophils %  


 


Lymphocytes % (Manual)  


 


Prolymphocyte %  


 


Reactive Lymphs % (Man)  


 


Monocytes % (Manual)  


 


Eosinophils % (Manual)  


 


Basophils % (Manual)  


 


Metamyelocytes % (Man)  


 


Myelocytes % (Man)  


 


Promyelocytes % (Man)  


 


Blast Cells % (Manual)  


 


Plasma Cell % (Manual)  


 


Other Cells %  


 


Nucleated RBC %  


 


Neutrophils # (Manual)  


 


Band Neutrophils #  


 


Total Absolute Neuts  


 


Lymphocytes # (Manual)  


 


Prolymphocyte #  


 


Reactive Lymphs #  


 


Total Abs Lymphocytes  


 


Monocytes # (Manual)  


 


Eosinophils # (Manual)  


 


Basophils # (Manual)  


 


Metamyelocytes # (Man)  


 


Myelocytes # (Manual)  


 


Promyelocytes # (Man)  


 


Blast Cells # (Man)  


 


Plasma Cell # (Manual)  


 


Other Cells #  


 


Nucleated RBCs # (Man)  


 


Hypersegmented Neuts  


 


Hyposegmented Neuts  


 


Hypogranular Neuts  


 


Large Granular Lymphs  


 


# Lrg Granular Lymphs  


 


Hairy Cells  


 


Smudge Cells  


 


Toxic Granulation  


 


Toxic Vacuolation  


 


Dohle Bodies  


 


Fadi Rods  


 


Platelet Estimate  


 


Hypogranular Platelets  


 


Clumped Platelets  


 


Giant Platelets  


 


Platelet Satelliting  


 


RBC Morphology  


 


Polychromasia  


 


Hypochromasia  


 


Poikilocytosis  


 


Basophilic Stippling  


 


Anisocytosis  


 


Microcytosis  


 


Macrocytosis  


 


Spherocytes  


 


Pappenheimer Bodies  


 


Sickle Cells  


 


Target Cells  


 


Tear Drop Cells  


 


Ovalocytes  


 


Stomatocytes  


 


Cason-Jolly Bodies  


 


Echinocytes  


 


Acanthocytes (Spur)  


 


Rouleaux  


 


RBC Agglutinates  


 


Schistocytes  


 


RBC Morph Comment  


 


Peripher Smr Path Cons  


 


ESR  


 


Sezary Cell  


 


PT  


 


INR  


 


APTT  


 


PTT Ratio  


 


Fibrinogen  


 


Fibrin Degrad Products  10-40 H 


 


ABG pH  


 


ABG pCO2  


 


ABG pO2  


 


ABG HCO3  


 


ABG O2 Saturation  


 


ABG Base Excess  


 


Chai Test  


 


VBG pH  


 


VBG pCO2  


 


VBG pO2  


 


VBG HCO3  


 


VBG O2 Saturation  


 


VBG Base Excess  


 


Barometric Pressure  


 


Oxygen Given  


 


Sodium  


 


Potassium  


 


Chloride  


 


Carbon Dioxide  


 


Anion Gap  


 


BUN  


 


Creatinine  


 


Est Cr Clr Drug Dosing  


 


Est GFR ( Amer)  


 


Est GFR (Non-Af Amer)  


 


BUN/Creatinine Ratio  


 


Glucose  


 


Estimat Average Glucose  


 


Hemoglobin A1c  


 


Osmolality  


 


Lactate  


 


Calcium  


 


Ionized Calcium  


 


Phosphorus  


 


Magnesium  


 


Iron  


 


TIBC  


 


Transferrin  


 


Ferritin  


 


Total Bilirubin  


 


Direct Bilirubin  


 


AST  


 


ALT  


 


Alkaline Phosphatase  


 


Ammonia  


 


Lactate Dehydrogenase   289 H


 


Total Creatine Kinase  


 


Troponin I  


 


Total Protein  


 


Albumin  


 


Globulin  


 


Albumin/Globulin Ratio  


 


Lipase  


 


Vitamin B12  


 


Folate  


 


Beta-Hydroxybutyric Acd  


 


Procalcitonin  


 


TSH  


 


Specimen Hemolysis  


 


Urine Color  


 


Urine Appearance  


 


Urine pH  


 


Ur Specific Gravity  


 


Urine Protein  


 


Urine Glucose (UA)  


 


Urine Ketones  


 


Urine Blood  


 


Urine Nitrite  


 


Urine Bilirubin  


 


Urine Urobilinogen  


 


Ur Leukocyte Esterase  


 


Urine RBC  


 


Urine WBC  


 


Ur Epithelial Cells  


 


Urine Bacteria  


 


Nasal Screen MRSA (PCR)  


 


Phenytoin  


 


Ethyl Alcohol mg/dL  


 


COVID-19 Eval Order  


 


SARS-CoV-2 (PCR)  


 


Blood Type  


 


Blood Type Recheck  


 


Antibody Screen  


 


Crossmatch

## 2021-06-09 LAB
ALBUMIN SERPL-MCNC: 2.7 GM/DL (ref 3.4–5)
ALP SERPL-CCNC: 129 U/L (ref 45–117)
ALT SERPL-CCNC: 42 U/L (ref 12–78)
ANION GAP SERPL CALC-SCNC: 8 MMOL/L (ref 3–11)
BILIRUB SERPL-MCNC: 3 MG/DL (ref 0.2–1)
BUN SERPL-MCNC: 22 MG/DL (ref 7–18)
CALCIUM SERPL-MCNC: 7.7 MG/DL (ref 8.5–10.1)
CHLORIDE SERPL-SCNC: 113 MMOL/L (ref 98–107)
CO2 SERPL-SCNC: 23 MMOL/L (ref 21–32)
CREAT CL PREDICTED SERPL C-G-VRATE: 48.9 ML/MIN
DEPRECATED RDW RBC: 52.8 FL (ref 36.4–46.3)
GLUCOSE SERPL-MCNC: 115 MG/DL (ref 70–99)
HCT VFR BLD AUTO: 28 % (ref 42–52)
HGB BLD-MCNC: 9.4 G/DL (ref 14–18)
IMM GRANULOCYTES # BLD: 0.05 K/UL (ref 0–0.02)
IMM GRANULOCYTES NFR BLD AUTO: 0.5 %
MAGNESIUM SERPL-MCNC: 1.8 MG/DL (ref 1.8–2.4)
MCH RBC QN AUTO: 29.2 PG (ref 25–34)
MCV RBC: 87 FL (ref 80–100)
PLATELET # BLD AUTO: 130 K/UL (ref 130–400)
POTASSIUM SERPL-SCNC: 3.2 MMOL/L (ref 3.5–5.1)
PROT SERPL-MCNC: 7.2 GM/DL (ref 6.4–8.2)
RBC # BLD AUTO: 3.22 M/UL (ref 4.7–6.1)
SODIUM SERPL-SCNC: 144 MMOL/L (ref 136–145)
WBC # BLD AUTO: 10.28 K/UL (ref 4.8–10.8)

## 2021-06-09 RX ADMIN — Medication SCH TAB: at 08:27

## 2021-06-09 RX ADMIN — LACTULOSE SCH GM: 10 SOLUTION ORAL at 21:21

## 2021-06-09 RX ADMIN — PANTOPRAZOLE SODIUM SCH MLS/MIN: 40 INJECTION, POWDER, FOR SOLUTION INTRAVENOUS at 08:26

## 2021-06-09 RX ADMIN — MAGNESIUM SULFATE IN DEXTROSE SCH MLS/HR: 10 INJECTION, SOLUTION INTRAVENOUS at 06:08

## 2021-06-09 RX ADMIN — CEFTRIAXONE SODIUM SCH MLS/HR: 1 INJECTION, POWDER, FOR SOLUTION INTRAVENOUS at 00:10

## 2021-06-09 RX ADMIN — FOLIC ACID SCH MLS/MIN: 5 INJECTION, SOLUTION INTRAMUSCULAR; INTRAVENOUS; SUBCUTANEOUS at 08:27

## 2021-06-09 RX ADMIN — THIAMINE HYDROCHLORIDE SCH MLS/HR: 100 INJECTION, SOLUTION INTRAMUSCULAR; INTRAVENOUS at 14:47

## 2021-06-09 RX ADMIN — PANTOPRAZOLE SODIUM SCH MLS/MIN: 40 INJECTION, POWDER, FOR SOLUTION INTRAVENOUS at 21:21

## 2021-06-09 RX ADMIN — FLUTICASONE PROPIONATE SCH: 50 SPRAY, METERED NASAL at 08:27

## 2021-06-09 RX ADMIN — THIAMINE HYDROCHLORIDE SCH MLS/HR: 100 INJECTION, SOLUTION INTRAMUSCULAR; INTRAVENOUS at 08:29

## 2021-06-09 RX ADMIN — THIAMINE HYDROCHLORIDE SCH MLS/HR: 100 INJECTION, SOLUTION INTRAMUSCULAR; INTRAVENOUS at 21:24

## 2021-06-09 RX ADMIN — MAGNESIUM SULFATE IN DEXTROSE SCH MLS/HR: 10 INJECTION, SOLUTION INTRAVENOUS at 08:26

## 2021-06-09 RX ADMIN — LACTULOSE SCH GM: 10 SOLUTION ORAL at 08:26

## 2021-06-09 RX ADMIN — Medication SCH ML: at 12:34

## 2021-06-09 NOTE — UROLOGY PROGRESS NOTE
Date of Service


June 9, 2021


 





Assessment & Plan


(1) Gross hematuria: 


      Met prostate ca w/ likely radiation cystitis


- acutely ill - etoh related


- fortunately, urine is clearing appropriately


- continue supportive care for now


- hgb stable


- no immediate plan for cysto as long as he remains stable and is clearing


Admission and Anticipated Discharge Date


Admission Date: June 7, 2021








Subjective


still relatively non-reactive 


in restraints and mittens


labs stable


urine relatively clear this AM 





Physical Exam








Physical Exam:   urine clear


in restraints


sleeping/not interactive








Results & Data (MN)


Vital Signs (Past 12 Hours)


                                   Vital Signs











  Temp Pulse Resp BP Pulse Ox


 


 06/09/21 06:24   105 H  22  152/92 H  99


 


 06/09/21 05:24   103 H  19  152/84 H  99


 


 06/09/21 04:51  36.7 C    


 


 06/09/21 04:24   102 H  20  155/92 H 


 


 06/09/21 03:24   110 H  18  137/87 


 


 06/09/21 03:00   108 H  24  


 


 06/09/21 02:24   99 H  18  135/82 


 


 06/09/21 01:24   97 H  18  131/94 


 


 06/09/21 00:24   102 H  19  134/79 


 


 06/09/21 00:00   114 H   


 


 06/08/21 23:37   111 H  20  139/95  85 L


 


 06/08/21 23:01   107 H  21   100


 


 06/08/21 22:25   98 H  21  132/74  99


 


 06/08/21 21:24   94 H  18  142/79 H  98














PG Care Time/CCT


Total # of Minutes Spent


Total Time Spent with Patient: Total time spent is greater than 50% in 

coordination of care (as documented) at patient's floor/unit and/or counseling 

patient:








Coding


Level of Care Code


12861 Subseq Hosp Care Lvl 2





Diagnoses


Gross hematuria  R31.0

## 2021-06-09 NOTE — CRITICAL CARE PROGRESS NOTE
Date of Service


June 9, 2021


 





Assessment & Plan


(1) Severe anemia: 


      Reason Critically Ill: 60-year-old male with acute blood loss anemia with 

hemoglobin to from multiple potential sources with primary source bladder 

hemorrhage with hematuria, and alcohol withdrawal.  Patient receiving 4 units 

RBCs and being admitted to ICU at this time.





Neuro -


Alcohol withdrawal/encephalopathymild improvement


   -History of seizures and noncompliant with medications, continue with seizure

precaution


   -Thiamine, folic acid, multivitamin


   -CT head negative for acute intracranial finding


   -Mildly elevated BUN, LFTs mildly elevated as well.  Ammonia elevated at 36


                    -Phenobarbital ordered for withdrawal prophylaxis


                              -Place NG tube for lactulose administration





Cardiac -


Normal sinus rhythm and hemodynamically stable on monitor.  Continuous 

monitoring on telemetry


Troponin negative





Respiratory -





Chest x-ray without acute cardiopulmonary findings


CTA chest negative for PE





GI - 


CirrhosisCT abdomen pelvis with cirrhosis with recanalized periumbilical vein 

indicating portal hypertension


   -Patient is post cholecystectomy


   -LFTs mildly elevated, will trend for now


   -Ammonia elevated


   


RENAL/LYTES -


AKIinitial creatinine 2.22, improving now 1.45


   -Trend BMPs and replete as indicated


   -Continue gentle fluid resuscitation and blood transfusions as indicated


   -Avoid nephrotoxins and renally adjust medication


   -Discontinue Normosol and start enteral feeding through NG tube





 - 


Hematuriasuspected as primary source of acute blood loss anemia


   -CT abdomen pelvis showing moderate amount of hyperdense material within the 

dependent aspect of the bladder favoring hemorrhage.  Underlying mass considered

less likely but cannot be excluded per CT report.


   -History of metastatic prostate cancer, status post surgery, status post 

chemotherapy/Lupron therapy


   -Urology consulted and aware, plan to optimize patient with transfusions 

overnight prior to intervention





ENDO - 


No history of diabetes or thyroid disease, ICU hyperglycemic protocol





HEME -


Severe anemia: Improved


Acute blood loss anemiapatient with initial hemoglobin of 2.4 on arrival to ER,

now improved to 8.3 following 4 unit RBC transfusion


   -CT abdomen and pelvisno acute traumatic findings within the abdomen or 

pelvis.  Hyperdense material within the dependent aspect of the bladder favoring

hemorrhage.


      -Given patient's hematuria would suspect this is primary source of bleed

ing and urology is aware


   -Patient also with heme positive stool.  No melena passed since arrival to 

ICU.  Unfortunately patient encephalopathic and cannot obtain clear history.


      -PPI transitioned to twice daily dosing


      -GI consulted, will follow recommendations


   -No coagulopathy at this time.  We will hold on FFP/cryo at this time





ID -


Discontinued ceftriaxone as there is no evidence of cirrhotic bleeding





Orthoacute fracture of the left humeral head and neck.  Fracture essentially 

nondisplaced placed.  Fracture through greater tuberosity minimally displaced


   -Sling of left upper extremity as patient can tolerate reviewed orthopedic 

notes





LINES/IV ACCESS - 


Peripheral IVs





DVT PROPHYLAXIS -


SCDs chemical prophylaxis contraindicated due to acute blood loss anemia


(2) Alcohol withdrawal: 


(3) Metabolic acidosis: 


(4) Gross hematuria: 


(5) Closed fracture of left proximal humerus: 


(6) Encephalopathy: 


(7) Prostate cancer: 


(8) LARA (acute kidney injury):


Admission and Anticipated Discharge Date


Admission Date: June 7, 2021








Supervising Physician


Co-Signing Physician Notes


Patient was discussed in multidisciplinary rounds





Subjective


No overnight events mild improvement in mental status 





Review of Systems








Review of Systems:   Unobtainable due to cognitive status  








Physical Exam








Physical Exam:   General: Arousable.  Disoriented to person and place. 


     


Skin:  Warm, multiple areas of ecchymoses, 


   


Head:  Atraumatic 


   


Ears, nose, mouth and throat: airway patent 


   


Cardiovascular:  Normal peripheral perfusion 


   


Respiratory:  no respiratory distress 


   


Gastrointestinal:  Non distended 


   


Musculoskeletal:  No deformity 











Results & Data


Results & Data ([facility])


Vital Signs (Past 12 Hours)


                                   Vital Signs











  Temp Pulse Resp BP Pulse Ox


 


 06/09/21 08:00   101 H   


 


 06/09/21 06:24   105 H  22  152/92 H  99


 


 06/09/21 05:24   103 H  19  152/84 H  99


 


 06/09/21 04:51  36.7 C    


 


 06/09/21 04:24   102 H  20  155/92 H 


 


 06/09/21 03:24   110 H  18  137/87 


 


 06/09/21 03:00   108 H  24  


 


 06/09/21 02:24   99 H  18  135/82 


 


 06/09/21 01:24   97 H  18  131/94 


 


 06/09/21 00:24   102 H  19  134/79 


 


 06/09/21 00:00   114 H   


 


 06/08/21 23:37   111 H  20  139/95  85 L


 


 06/08/21 23:01   107 H  21   100


 


 06/08/21 22:25   98 H  21  132/74  99














Coding


Level of Care Code


06303 Subseq Hosp Care Lvl 3





Diagnoses


Severe anemia  D64.9


Alcohol withdrawal  F10.231


      Complication of substance-induced condition: with delirium


Metabolic acidosis  E87.2


Gross hematuria  R31.0


Closed fracture of left proximal humerus  S42.202A


Encephalopathy  G93.40


Prostate cancer  C61


LARA (acute kidney injury)  N17.9





(1) Alcohol withdrawal


  Complication of substance-induced condition: with delirium  Qualified Code(s):

F10.231 - Alcohol dependence with withdrawal delirium

## 2021-06-09 NOTE — HOSPITALIST PROGRESS NOTE
Date of Service


June 9, 2021


 





Assessment & Plan


(1) Encephalopathy: 


      Acute metabolic encephalopathy


Multifactorial:


Alcohol withdrawal,Hyponatremia, ARF secondary to illness,Hypoxemic respiratory 

failure secondary to COPD exacerbation


Requiring mild sedation to suppress withdrawal symptoms


Remains hemodynamically stable


Has been on alcohol withdrawal protocol


Clinically a little bit improved


Mental status is improving but remains very confused





Acute blood loss anemia


Hemoglobin is noted to be 2.4 on admission


Secondary to multiple sources of bleeding : 


UGI B in a newly diagnosed cirrhotic patient based on CT


 bleed (history chronic hematuria, metastatic prostate cancer status post 

surgery status post chemotherapy/Lupron therapy)


IV PPI, Ceftriaxone for SBP prophylaxis in a cirrhotic patient with UGIB


Received 4 unit of packed red cell transfusion and the hemoglobin went up to 8.4

and remains stable


Appreciate GI input and recommendation for possible EGD


Has been on prophylactic antibiotic for possible SBP with ceftriaxone


No signs and or symptoms of infection


Hemoglobin remains stable at more than 9








Traumatic left shoulder ecchymosis with fracture of the left humerus


Secondary to fall


Contributing low hemoglobin


Ortho has been consulted-appreciate input and recommendation








Ronni hematuria


Status post urinary catheter


Appreciate urology input and recommendation


Hematuria has been improving





Hypertension, stable 


Blood pressure remains on the upper side


Will monitor








Seizure disorder (medication noncompliance) 


Has been getting phenobarbital as needed


Steroid-induced hyperglycemia rule out DM


Ongoing tobacco abuse








Possible COPD


Has been getting intravenous Solu-Medrol











Check hemoglobin A1c-5.1 as the patient is nondiabetic





Nicotine patch as needed





DVT prophylaxis.  SCDs RE left shoulder/GI/ bleed causing severe anemia





Full code








Attempted to contact patient's cousin (Mr. Denzel Ferrer, [phone]) 

over the phone to obtain additional history and provide update on patient 

situation and plan of care.  No answer.





Patient ex-girlfriend/alternative contact (Ms. Jamia Rebollar, [phone]) we will 

try to reach patient cussing a.m.























Admission and Anticipated Discharge Date


Admission Date: June 7, 2021








Subjective


06/08/2021


The patient was seen and examined in ICU


Remains obtunded without any acute distress


Continue to have hematuria but no bowel movement yet and no hematemesis





6/9/2021


The patient was seen and examined in ICU


He remains alert and awake but very confused


No apparent distress  





Review of Systems








Review of Systems:   Unobtainable due to cognitive status  








Physical Exam








Physical Exam:   Lying in bed comfortably


 


Constitutional:   + ill appearing and average body habitus  


 


Eyes:   PERRL, conjunctivae normal, anicteric sclerae  


 


ENMT:   external ear and nose normal, oropharynx normal  


 


Neck:   trachea midline, no thyromegaly  


 


Respiratory:   + respiratory distress (Minimal distress at rest)    

Auscultation: + diminished lung sounds and + crackles (Minimal bibasilar 

crackles)  


 


Cardiovascular:   Rate/Rhythm: regular rate, regular rhythm and + tachycardic   

Heart Sounds: no murmur    Extremities: no edema  


 


Gastrointestinal (Abdomen):   Inspection/Auscultation: normal bowel sounds; 

abdomen not distended    Percussion/Palpation: abdomen soft; abdomen nontender  


 


Musculoskeletal:   No acute arthritis in any joint


 


Neurologic:   Alert and awake.  Very confused, moving all the limbs.


 


Psychiatric:   Insight: + poor insight  


 


Lymphatic:   no cervical or axillary lymphadenopathy  








Results & Data


Results & Data ([facility])


Vital Signs (Past 12 Hours)


                                   Vital Signs











  Temp Pulse Resp BP Pulse Ox


 


 06/09/21 08:00   101 H   


 


 06/09/21 06:24   105 H  22  152/92 H  99


 


 06/09/21 05:24   103 H  19  152/84 H  99


 


 06/09/21 04:51  36.7 C    


 


 06/09/21 04:24   102 H  20  155/92 H 


 


 06/09/21 03:24   110 H  18  137/87 


 


 06/09/21 03:00   108 H  24  


 


 06/09/21 02:24   99 H  18  135/82 


 


 06/09/21 01:24   97 H  18  131/94 











Laboratory Results





                                    Short CBC











  06/08/21 06/08/21 06/09/21 Range/Units





  14:03 20:20 04:57 


 


WBC    10.28  (4.8-10.8)  K/uL


 


Hgb  8.6 L  8.9 L  9.4 L  (14.0-18.0)  g/dL


 


Hct  25.2 L  26.5 L  28.0 L  (42-52)  %


 


Plt Count    130  (130-400)  K/uL








                                       BMP











  06/09/21





  04:57


 


Sodium  144


 


Potassium  3.2 L D


 


Chloride  113 H


 


Carbon Dioxide  23


 


BUN  22 H


 


Creatinine  1.45 H


 


Glucose  115 H


 


Calcium  7.7 L








                                 Liver Function











  06/09/21 Range/Units





  04:57 


 


Total Bilirubin  3.0 H  (0.2-1)  mg/dl


 


Direct Bilirubin  1.2 H  (0-0.2)  mg/dl


 


AST  63 H  (15-37)  U/L


 


ALT  42  (12-78)  U/L


 


Alkaline Phosphatase  129 H  ()  U/L


 


Albumin  2.7 L  (3.4-5.0)  gm/dl











Medications Administered





                          Current Inpatient Medications





Enteral Nutritional Formula (Impact Liqd 1.0 Matheus 1,000 Ml Bag)  1,000 ml NG UD 

[facility]; Protocol


   Stop: 07/09/21 11:29


   Last Admin: 06/09/21 12:34 Dose:  1,000 ml


   Documented by: 


Fluticasone Propionate (Fluticasone Propionate Na Spr 16 Gm Btl)  2 sprays NA 

DAILY [facility]


   Stop: 07/08/21 08:59


   Last Admin: 06/09/21 08:27 Dose:  Not Given


   Documented by: 


Gabapentin (Gabapentin 600 Mg Tab)  600 mg PO Q24H [facility]


Gabapentin (Gabapentin 400 Mg Cap)  400 mg PO Q24H [facility]


Gabapentin (Gabapentin 100 Mg Cap)  200 mg PO Q24H [facility]


Methylprednisolone 40 mg/ (Syringe)  0.64 mls @ 1.5 mls/min IV DAILY [facility]


   Stop: 07/08/21 08:59


   Last Admin: 06/09/21 08:26 Dose:  1.5 mls/min


   Documented by: 


Lorazepam (Ativan)  1 mg in 2 mls @ 2 mls/min IV UD PRN; Protocol


   PRN Reason: EtOH Withdrawl AWSS Score 6,7


   Stop: 07/07/21 23:02


Lorazepam (Ativan)  2 mg in 4 mls @ 4 mls/min IV UD PRN; Protocol


   PRN Reason: EtOH Withdrawl AWSS Score 8,9


   Stop: 07/07/21 23:02


   Last Admin: 06/08/21 09:16 Dose:  4 mls/min


   Documented by: 


Lorazepam (Ativan)  3 mg in 6 mls @ 4 mls/min IV ONCE PRN; Protocol


   PRN Reason: EtOH Withdrawl AWSS Score >=10


   Stop: 07/07/21 23:02


   Last Admin: 06/08/21 04:29 Dose:  4 mls/min


   Documented by: 


Acetaminophen (Ofirmev)  65 mls @ 200 mls/hr IV Q8H PRN; Protocol


   PRN Reason: pain/fever


   Stop: 06/10/21 23:02


Promethazine HCl 12.5 mg/ (Sodium Chloride)  50.5 mls @ 202 mls/hr IV Q6H PRN


   PRN Reason: Nausea And Vomiting


   Stop: 07/07/21 23:02


Ceftriaxone Sodium 1,000 mg/ (Dextrose)  50 mls @ 100 mls/hr IV Q24H [facility]; 

Protocol


   Stop: 06/18/21 00:59


   Last Infusion: 06/09/21 00:40 Dose:  Infused


   Documented by: 


Pantoprazole Sodium 40 mg/ (Syringe)  10 mls @ 5 mls/min IV BID [facility]


   Stop: 07/08/21 20:59


   Last Admin: 06/09/21 08:26 Dose:  5 mls/min


   Documented by: 


Folic Acid 1 mg/ Syringe  10 mls @ 5 mls/min IV QAM [facility]


   Stop: 07/08/21 10:44


   Last Admin: 06/09/21 08:27 Dose:  5 mls/min


   Documented by: 


Thiamine HCl 500 mg/ Sodium (Chloride)  105 mls @ 210 mls/hr IV TID [facility]


   Stop: 06/11/21 09:29


   Last Infusion: 06/09/21 09:00 Dose:  Infused


   Documented by: 


Thiamine HCl 200 mg/ Sodium (Chloride)  52 mls @ 210 mls/hr IV DAILY [facility]


   Stop: 07/12/21 08:59


Ipratropium Bromide (Ipratropium Bromide Neb Soln 0.02% 2.5 Ml Vial)  0.5 mg INH

Q4R PRN


   PRN Reason: Shortness Of Breath Or Wheezing


   Stop: 07/07/21 21:14


Lactulose (Lactulose Syrup 30 Gm/45 Ml Udp)  30 gm PO BID [facility]


   Stop: 07/08/21 20:59


   Last Admin: 06/09/21 08:26 Dose:  30 gm


   Documented by: 


Levalbuterol HCl (Levalbuterol 1.25mg/0.5ml Neb)  1.25 mg INH Q4R PRN


   PRN Reason: Shortness Of Breath Or Wheezing


   Stop: 07/07/21 21:14


Miscellaneous (Icu Protocol For Hyperglycemia)  1 ea N/A PRN PRN; Protocol


   PRN Reason: Hyperglycemia Protocol


   Stop: 06/09/21 23:02


Multivitamins (Multivitamin Tab)  1 tab PO QAM [facility]


   Stop: 07/08/21 08:59


   Last Admin: 06/09/21 08:27 Dose:  1 tab


   Documented by: 


Phenobarbital (Phenobarbital 32.4 Mg Tab)  64.8 mg PO BID [facility]


   Stop: 06/09/21 21:01


   Last Admin: 06/09/21 08:28 Dose:  64.8 mg


   Documented by: 


Phenobarbital (Phenobarbital 32.4 Mg Tab)  32.4 mg PO BID [facility]


   Stop: 06/11/21 09:01


Phenobarbital Sodium (Phenobarbital Sodium 130 Mg/Ml Vial)  64 mg IV Q6 PRN


   PRN Reason: Undecided


   Stop: 07/08/21 10:32


   Last Admin: 06/09/21 10:49 Dose:  64 mg


   Documented by: 


Sterile Water (Tube Feeding Water Flush)  100 ml NG Q4 [facility]


   Stop: 07/09/21 11:59


   Last Admin: 06/09/21 12:34 Dose:  100 ml


   Documented by:

## 2021-06-09 NOTE — XRAY REPORT
KUB



CLINICAL HISTORY: Nasogastric tube placement.



COMPARISON STUDY:  KUB June 8, 2021.



FINDINGS: The tip of the nasogastric tube is within the distal body of the stomach. There are cholecy
stectomy clips. 



IMPRESSION:  Tip of nasogastric tube within the distal body of the stomach. 







: Negative or not required by law.









Electronically signed by:  Watson Francis M.D.

6/9/2021 8:40 AM

## 2021-06-10 LAB
ANION GAP SERPL CALC-SCNC: 7 MMOL/L (ref 3–11)
ANION GAP SERPL CALC-SCNC: 8 MMOL/L (ref 3–11)
BUN SERPL-MCNC: 17 MG/DL (ref 7–18)
BUN SERPL-MCNC: 18 MG/DL (ref 7–18)
CALCIUM SERPL-MCNC: 7.6 MG/DL (ref 8.5–10.1)
CALCIUM SERPL-MCNC: 7.6 MG/DL (ref 8.5–10.1)
CHLORIDE SERPL-SCNC: 111 MMOL/L (ref 98–107)
CHLORIDE SERPL-SCNC: 111 MMOL/L (ref 98–107)
CO2 SERPL-SCNC: 23 MMOL/L (ref 21–32)
CO2 SERPL-SCNC: 25 MMOL/L (ref 21–32)
CREAT CL PREDICTED SERPL C-G-VRATE: 60.6 ML/MIN
CREAT CL PREDICTED SERPL C-G-VRATE: 68.7 ML/MIN
DEPRECATED RDW RBC: 53.3 FL (ref 36.4–46.3)
GLUCOSE SERPL-MCNC: 87 MG/DL (ref 70–99)
GLUCOSE SERPL-MCNC: 90 MG/DL (ref 70–99)
HCT VFR BLD AUTO: 28.2 % (ref 42–52)
HGB BLD-MCNC: 9.4 G/DL (ref 14–18)
IMM GRANULOCYTES # BLD: 0.02 K/UL (ref 0–0.02)
IMM GRANULOCYTES NFR BLD AUTO: 0.2 %
MAGNESIUM SERPL-MCNC: 1.8 MG/DL (ref 1.8–2.4)
MAGNESIUM SERPL-MCNC: 2.2 MG/DL (ref 1.8–2.4)
MCH RBC QN AUTO: 29.2 PG (ref 25–34)
MCV RBC: 87.6 FL (ref 80–100)
PLATELET # BLD AUTO: 121 K/UL (ref 130–400)
POTASSIUM SERPL-SCNC: 2.7 MMOL/L (ref 3.5–5.1)
POTASSIUM SERPL-SCNC: 3.3 MMOL/L (ref 3.5–5.1)
RBC # BLD AUTO: 3.22 M/UL (ref 4.7–6.1)
SODIUM SERPL-SCNC: 142 MMOL/L (ref 136–145)
SODIUM SERPL-SCNC: 143 MMOL/L (ref 136–145)
WBC # BLD AUTO: 10.32 K/UL (ref 4.8–10.8)

## 2021-06-10 RX ADMIN — DEXTROSE AND SODIUM CHLORIDE SCH MLS/HR: 5; 900 INJECTION, SOLUTION INTRAVENOUS at 23:40

## 2021-06-10 RX ADMIN — PANTOPRAZOLE SODIUM SCH MLS/MIN: 40 INJECTION, POWDER, FOR SOLUTION INTRAVENOUS at 08:04

## 2021-06-10 RX ADMIN — MAGNESIUM SULFATE IN DEXTROSE SCH MLS/HR: 10 INJECTION, SOLUTION INTRAVENOUS at 10:40

## 2021-06-10 RX ADMIN — CEFTRIAXONE SODIUM SCH MLS/HR: 1 INJECTION, POWDER, FOR SOLUTION INTRAVENOUS at 01:28

## 2021-06-10 RX ADMIN — MAGNESIUM SULFATE IN DEXTROSE SCH MLS/HR: 10 INJECTION, SOLUTION INTRAVENOUS at 07:50

## 2021-06-10 RX ADMIN — FOLIC ACID SCH MLS/MIN: 5 INJECTION, SOLUTION INTRAMUSCULAR; INTRAVENOUS; SUBCUTANEOUS at 08:04

## 2021-06-10 RX ADMIN — THIAMINE HYDROCHLORIDE SCH MLS/HR: 100 INJECTION, SOLUTION INTRAMUSCULAR; INTRAVENOUS at 08:04

## 2021-06-10 RX ADMIN — Medication SCH TAB: at 08:05

## 2021-06-10 RX ADMIN — THIAMINE HYDROCHLORIDE SCH MLS/HR: 100 INJECTION, SOLUTION INTRAMUSCULAR; INTRAVENOUS at 23:05

## 2021-06-10 RX ADMIN — THIAMINE HYDROCHLORIDE SCH MLS/HR: 100 INJECTION, SOLUTION INTRAMUSCULAR; INTRAVENOUS at 13:20

## 2021-06-10 RX ADMIN — PANTOPRAZOLE SODIUM SCH MLS/MIN: 40 INJECTION, POWDER, FOR SOLUTION INTRAVENOUS at 23:04

## 2021-06-10 RX ADMIN — LACTULOSE SCH: 10 SOLUTION ORAL at 09:53

## 2021-06-10 RX ADMIN — Medication SCH ML: at 19:53

## 2021-06-10 RX ADMIN — FLUTICASONE PROPIONATE SCH: 50 SPRAY, METERED NASAL at 09:40

## 2021-06-10 NOTE — HOSPITALIST PROGRESS NOTE
Date of Service


Kalpana 10, 2021


 





Assessment & Plan


(1) Encephalopathy: 


      Acute metabolic encephalopathy


Multifactorial:


Alcohol withdrawal,Hyponatremia, ARF secondary to illness,Hypoxemic respiratory 

failure secondary to COPD exacerbation


Requiring mild sedation to suppress withdrawal symptoms


Remains hemodynamically stable


Has been on alcohol withdrawal protocol


Clinically a little bit improved


Mental status is improving but remains very confused


Remains very confused otherwise alert and awake


Hemodynamically stable


Will be transferred to telemetry unit





Acute blood loss anemia


Hemoglobin is noted to be 2.4 on admission


Secondary to multiple sources of bleeding : 


UGI B in a newly diagnosed cirrhotic patient based on CT


 bleed (history chronic hematuria, metastatic prostate cancer status post 

surgery status post chemotherapy/Lupron therapy)


IV PPI, Ceftriaxone for SBP prophylaxis in a cirrhotic patient with UGIB


Received 4 unit of packed red cell transfusion and the hemoglobin went up to 8.4

and remains stable


Appreciate GI input and recommendation for possible EGD


Has been on prophylactic antibiotic for possible SBP with ceftriaxone


No signs and or symptoms of infection


Hemoglobin remains stable at more than 9


No more GI bleed








Traumatic left shoulder ecchymosis with fracture of the left humerus


Secondary to fall


Contributing low hemoglobin


Ortho has been consulted-appreciate input and recommendation








Ronni hematuria


Status post urinary catheter


Appreciate urology input and recommendation


Hematuria has been improving





Hypertension, stable 


Blood pressure remains stable








Seizure disorder (medication noncompliance) 


Has been getting phenobarbital as needed


Steroid-induced hyperglycemia rule out DM


Ongoing tobacco abuse








Possible COPD


Has been getting intravenous Solu-Medrol











Check hemoglobin A1c-5.1 as the patient is nondiabetic-5.1





Nicotine patch as needed





DVT prophylaxis.  SCDs RE left shoulder/GI/ bleed causing severe anemia





Full code








Attempted to contact patient's cousin (Mr. Denzel Ferrer, [phone]) 

over the phone to obtain additional history and provide update on patient 

situation and plan of care.  No answer.





Patient ex-girlfriend/alternative contact (Ms. Jamia Rebollar, [phone]) we will 

try to reach patient cussing a.m.





Will be transferred to telemetry unit for continuation of care























Admission and Anticipated Discharge Date


Admission Date: June 7, 2021








Subjective


06/08/2021


The patient was seen and examined in ICU


Remains obtunded without any acute distress


Continue to have hematuria but no bowel movement yet and no hematemesis





6/9/2021


The patient was seen and examined in ICU


He remains alert and awake but very confused


No apparent distress 





6/10/2021


The patient was seen and examined in ICU


He is alert and awake but very confused


Remains hemodynamically stable 





Review of Systems








Review of Systems:   Could not be reliably obtained








Physical Exam








Physical Exam:   Lying in bed with minimal restlessness


 


Constitutional:   + ill appearing and average body habitus  


 


Eyes:   PERRL, conjunctivae normal, anicteric sclerae  


 


ENMT:   external ear and nose normal, oropharynx normal  


 


Neck:   trachea midline, no thyromegaly  


 


Respiratory:   + respiratory distress (Minimal distress at rest)    

Auscultation: + diminished lung sounds and + crackles (Minimal bibasilar 

crackles)  


 


Cardiovascular:   Rate/Rhythm: regular rate, regular rhythm and + tachycardic   

Heart Sounds: no murmur    Extremities: no edema  


 


Gastrointestinal (Abdomen):   Inspection/Auscultation: normal bowel sounds; 

abdomen not distended    Percussion/Palpation: abdomen soft; abdomen nontender  


 


Musculoskeletal:   No acute arthritis in any joint


 


Skin:   Has generalized bruising and ecchymosis


 


Neurologic:   Alert and awake.  Garbled speech, very confused


 


Psychiatric:   Insight: + poor insight  


 


Lymphatic:   no cervical or axillary lymphadenopathy  








Results & Data


Results & Data ([facility])


Vital Signs (Past 12 Hours)


                                   Vital Signs











  Temp Pulse Resp BP Pulse Ox


 


 06/10/21 08:24   101 H  18  132/79  100


 


 06/10/21 08:00  36.9 C    


 


 06/10/21 07:25   120 H  21  161/102 H  100


 


 06/10/21 06:25   100 H  12  89/67 L 


 


 06/10/21 05:26   110 H  17  140/106 H 


 


 06/10/21 04:24   92 H  14  128/81 


 


 06/10/21 03:24   93 H  13  125/92 


 


 06/10/21 02:24   96 H  16  125/88  100


 


 06/10/21 01:28  36.8 C  79  14  92/49 L  97


 


 06/10/21 00:24   89  16  155/97 H  100











Laboratory Results





                                    Short CBC











  06/10/21 Range/Units





  04:18 


 


WBC  10.32  (4.8-10.8)  K/uL


 


Hgb  9.4 L  (14.0-18.0)  g/dL


 


Hct  28.2 L  (42-52)  %


 


Plt Count  121 L  (130-400)  K/uL








                                       BMP











  06/10/21





  04:18


 


Sodium  143


 


Potassium  2.7 L D


 


Chloride  111 H


 


Carbon Dioxide  25


 


BUN  18


 


Creatinine  1.14  D


 


Glucose  87


 


Calcium  7.6 L











Medications Administered





                          Current Inpatient Medications





Enteral Nutritional Formula (Impact Liqd 1.0 Matheus 1,000 Ml Bag)  1,000 ml NG UD 

[facility]; Protocol


   Stop: 07/09/21 11:29


   Last Admin: 06/09/21 12:34 Dose:  1,000 ml


   Documented by: 


Fluticasone Propionate (Fluticasone Propionate Na Spr 16 Gm Btl)  2 sprays NA 

DAILY [facility]


   Stop: 07/08/21 08:59


   Last Admin: 06/10/21 09:40 Dose:  Not Given


   Documented by: 


Gabapentin (Gabapentin 600 Mg Tab)  600 mg PO Q24H [facility]


Gabapentin (Gabapentin 400 Mg Cap)  400 mg PO Q24H [facility]


Gabapentin (Gabapentin 100 Mg Cap)  200 mg PO Q24H [facility]


Lorazepam (Ativan)  1 mg in 2 mls @ 2 mls/min IV UD PRN; Protocol


   PRN Reason: EtOH Withdrawl AWSS Score 6,7


   Stop: 07/07/21 23:02


Lorazepam (Ativan)  2 mg in 4 mls @ 4 mls/min IV UD PRN; Protocol


   PRN Reason: EtOH Withdrawl AWSS Score 8,9


   Stop: 07/07/21 23:02


   Last Admin: 06/08/21 09:16 Dose:  4 mls/min


   Documented by: 


Lorazepam (Ativan)  3 mg in 6 mls @ 4 mls/min IV ONCE PRN; Protocol


   PRN Reason: EtOH Withdrawl AWSS Score >=10


   Stop: 07/07/21 23:02


   Last Admin: 06/08/21 04:29 Dose:  4 mls/min


   Documented by: 


Acetaminophen (Ofirmev)  65 mls @ 200 mls/hr IV Q8H PRN; Protocol


   PRN Reason: pain/fever


   Stop: 06/10/21 23:02


Promethazine HCl 12.5 mg/ (Sodium Chloride)  50.5 mls @ 202 mls/hr IV Q6H PRN


   PRN Reason: Nausea And Vomiting


   Stop: 07/07/21 23:02


Pantoprazole Sodium 40 mg/ (Syringe)  10 mls @ 5 mls/min IV BID [facility]


   Stop: 07/08/21 20:59


   Last Admin: 06/10/21 08:04 Dose:  5 mls/min


   Documented by: 


Folic Acid 1 mg/ Syringe  10 mls @ 5 mls/min IV QAM [facility]


   Stop: 07/08/21 10:44


   Last Admin: 06/10/21 08:04 Dose:  5 mls/min


   Documented by: 


Thiamine HCl 500 mg/ Sodium (Chloride)  105 mls @ 210 mls/hr IV TID [facility]


   Stop: 06/11/21 09:29


   Last Infusion: 06/10/21 08:52 Dose:  Infused


   Documented by: 


Thiamine HCl 200 mg/ Sodium (Chloride)  52 mls @ 210 mls/hr IV DAILY [facility]


   Stop: 07/12/21 08:59


Potassium Phosphate 30 mmol/ (Sodium Chloride)  510 mls @ 88 mls/hr IV ONE ONE


   Stop: 06/10/21 13:02


   Last Admin: 06/10/21 07:49 Dose:  88 mls/hr


   Documented by: 


Ipratropium Bromide (Ipratropium Bromide Neb Soln 0.02% 2.5 Ml Vial)  0.5 mg INH

Q4R PRN


   PRN Reason: Shortness Of Breath Or Wheezing


   Stop: 07/07/21 21:14


Levalbuterol HCl (Levalbuterol 1.25mg/0.5ml Neb)  1.25 mg INH Q4R PRN


   PRN Reason: Shortness Of Breath Or Wheezing


   Stop: 07/07/21 21:14


Multivitamins (Multivitamin Tab)  1 tab PO QAM [facility]


   Stop: 07/08/21 08:59


   Last Admin: 06/10/21 08:05 Dose:  1 tab


   Documented by: 


Phenobarbital (Phenobarbital 32.4 Mg Tab)  32.4 mg PO BID [facility]


   Stop: 06/11/21 09:01


   Last Admin: 06/10/21 08:29 Dose:  32.4 mg


   Documented by: 


Phenobarbital Sodium (Phenobarbital Sodium 130 Mg/Ml Vial)  64 mg IV Q6 PRN


   PRN Reason: Undecided


   Stop: 07/08/21 10:32


   Last Admin: 06/09/21 20:58 Dose:  64 mg


   Documented by: 


Sterile Water (Tube Feeding Water Flush)  100 ml NG Q4 [facility]


   Stop: 07/09/21 11:59


   Last Admin: 06/10/21 07:50 Dose:  100 ml


   Documented by:

## 2021-06-10 NOTE — CRITICAL CARE PROGRESS NOTE
Date of Service


Kalpana 10, 2021


 





Assessment & Plan


(1) Severe anemia: 


      Reason Critically Ill: 60-year-old male with acute blood loss anemia with 

hemoglobin to from multiple potential sources with primary source bladder 

hemorrhage with hematuria, and alcohol withdrawal.  Patient receiving 4 units 

RBCs and being admitted to ICU at this time.





Neuro -


Alcohol withdrawal/encephalopathycontinued mild improvement


   -History of seizures and noncompliant with medications, continue with seizure

precaution


   -Thiamine, folic acid, multivitamin


   -CT head negative for acute intracranial finding


   -Mildly elevated BUN, LFTs mildly elevated as well.  Ammonia elevated at 36


                    -Phenobarbital ordered for withdrawal prophylaxis


                              -Place NG tube for lactulose administration





Cardiac -


Normal sinus rhythm and hemodynamically stable on monitor.  Continuous teo

toring on telemetry


Troponin negative





Respiratory -





Chest x-ray without acute cardiopulmonary findings


CTA chest negative for PE





GI - 


CirrhosisCT abdomen pelvis with cirrhosis with recanalized periumbilical vein 

indicating portal hypertension


   -Patient is post cholecystectomy


   -LFTs mildly elevated, will trend for now


   -Ammonia elevated


   


RENAL/LYTES -


AKIinitial creatinine 2.22, improving now 1.45


   -Trend BMPs and replete as indicated


   -Continue gentle fluid resuscitation and blood transfusions as indicated


   -Avoid nephrotoxins and renally adjust medication


   -Discontinue Normosol and start enteral feeding through NG tube





 - 


Hematuriasuspected as primary source of acute blood loss anemia


   -CT abdomen pelvis showing moderate amount of hyperdense material within the 

dependent aspect of the bladder favoring hemorrhage.  Underlying mass considered

less likely but cannot be excluded per CT report.


   -History of metastatic prostate cancer, status post surgery, status post 

chemotherapy/Lupron therapy


   -Urology consulted and aware, plan to optimize patient with transfusions 

overnight prior to intervention





ENDO - 


No history of diabetes or thyroid disease, ICU hyperglycemic protocol





HEME -


Severe anemia: Improved


Acute blood loss anemiapatient with initial hemoglobin of 2.4 on arrival to ER,

now improved to 8.3 following 4 unit RBC transfusion


   -CT abdomen and pelvisno acute traumatic findings within the abdomen or 

pelvis.  Hyperdense material within the dependent aspect of the bladder favoring

hemorrhage.


      -Given patient's hematuria would suspect this is primary source of 

bleeding and urology is aware


   -Patient also with heme positive stool.  No melena passed since arrival to 

ICU.  Unfortunately patient encephalopathic and cannot obtain clear history.


      -PPI transitioned to twice daily dosing


      -GI consulted, will follow recommendations


   -No coagulopathy at this time.  We will hold on FFP/cryo at this time





ID -


Discontinued ceftriaxone as there is no evidence of cirrhotic bleeding





Orthoacute fracture of the left humeral head and neck.  Fracture essentially 

nondisplaced placed.  Fracture through greater tuberosity minimally displaced


   -Sling of left upper extremity as patient can tolerate reviewed orthopedic 

notes





LINES/IV ACCESS - 


Peripheral IVs





DVT PROPHYLAXIS -


SCDs chemical prophylaxis contraindicated due to acute blood loss anemia


(2) Alcohol withdrawal: 


(3) Metabolic acidosis: 


(4) Gross hematuria: 


(5) Closed fracture of left proximal humerus: 


(6) Encephalopathy: 


(7) Prostate cancer: 


(8) LARA (acute kidney injury):


Admission and Anticipated Discharge Date


Admission Date: June 7, 2021








Supervising Physician


Co-Signing Physician Notes


Patient was discussed in multidisciplinary rounds


Critical care issues have largely resolved continue waiting on mental status to 

improve from alcohol withdraw discontinuing lactulose as he is having multiple 

bowel movements and rebleeding potassium stable for downgrade out of ICU





Subjective


No overnight events





Review of Systems








Review of Systems:   Unobtainable due to cognitive status  








Physical Exam


2





Physical Exam:   General: Arousable.  Disoriented to person and place. 


     


Skin:  Warm, multiple areas of ecchymoses, 


   


Head:  Atraumatic 


   


Ears, nose, mouth and throat: airway patent 


   


Cardiovascular:  Normal peripheral perfusion 


   


Respiratory:  no respiratory distress 


   


Gastrointestinal:  Non distended 


   


Musculoskeletal:  No deformity 











Results & Data


Results & Data ([facility])


Vital Signs (Past 12 Hours)


                                   Vital Signs











  Temp Pulse Resp BP Pulse Ox


 


 06/10/21 06:25   100 H  12  89/67 L 


 


 06/10/21 05:26   110 H  17  140/106 H 


 


 06/10/21 04:24   92 H  14  128/81 


 


 06/10/21 03:24   93 H  13  125/92 


 


 06/10/21 02:24   96 H  16  125/88  100


 


 06/10/21 01:28  36.8 C  79  14  92/49 L  97


 


 06/10/21 00:24   89  16  155/97 H  100


 


 06/10/21 00:00   97 H   


 


 06/09/21 23:24   111 H  18  137/108 H  100


 


 06/09/21 22:24   108 H  20  162/114 H  100


 


 06/09/21 21:24   96 H  18  161/92 H  100











Laboratory Results





                                        











  06/10/21 06/10/21 06/09/21 Range/Units





  04:18 04:18 17:50 


 


WBC   10.32   (4.8-10.8)  K/uL


 


RBC   3.22 L   (4.7-6.1)  M/uL


 


Hgb   9.4 L   (14.0-18.0)  g/dL


 


Hct   28.2 L   (42-52)  %


 


MCV   87.6   ()  fL


 


MCH   29.2   (25-34)  pg


 


MCHC   33.3   (32-36)  g/dL


 


RDW Std Deviation   53.3 H   (36.4-46.3)  fL


 


RDW Coeff of Nell   17.1 H   (11.5-14.5)  %


 


Plt Count   121 L   (130-400)  K/uL


 


MPV   8.9   (7.4-10.4)  fL


 


Immature Gran % (Auto)   0.2   %


 


Neut % (Auto)   70.3   %


 


Lymph % (Auto)   14.8   %


 


Mono % (Auto)   14.4   %


 


Eos % (Auto)   0.3   %


 


Baso % (Auto)   0.0   %


 


Neut # (Auto)   7.25 H   (1.4-6.5)  K/uL


 


Lymph # (Auto)   1.53   (1.2-3.4)  K/uL


 


Mono # (Auto)   1.49 H   (0.11-0.59)  K/uL


 


Eos # (Auto)   0.03   (0-0.5)  K/uL


 


Baso # (Auto)   0.00   (0-0.2)  K/uL


 


Immature Gran # (Auto)   0.02   (0.00-0.02)  K/uL


 


Absolute Nucleated RBC   0.02 H   (0-0)  K/uL


 


Nucleated RBC % (auto)   0.2   %


 


Sodium  143    (136-145)  mmol/L


 


Potassium  2.7 L D    (3.5-5.1)  mmol/L


 


Chloride  111 H    ()  mmol/L


 


Carbon Dioxide  25    (21-32)  mmol/L


 


Anion Gap  7.0    (3-11)  


 


BUN  18    (7-18)  mg/dl


 


Creatinine  1.14  D    (0.6-1.4)  mg/dl


 


Est Cr Clr Drug Dosing  60.6    ml/min


 


Est GFR ( Amer)  79.4    ml/min


 


Est GFR (Non-Af Amer)  68.5    ml/min


 


BUN/Creatinine Ratio  15.4    (10-20)  


 


Glucose  87    (70-99)  mg/dl


 


POC Glucose    138 H  (70-99)  mg/dl


 


Calcium  7.6 L    (8.5-10.1)  mg/dl


 


Phosphorus  2.2 L    (2.5-4.9)  mg/dl


 


Magnesium  1.8    (1.8-2.4)  mg/dl














  06/09/21 Range/Units





  12:36 


 


WBC   (4.8-10.8)  K/uL


 


RBC   (4.7-6.1)  M/uL


 


Hgb   (14.0-18.0)  g/dL


 


Hct   (42-52)  %


 


MCV   ()  fL


 


MCH   (25-34)  pg


 


MCHC   (32-36)  g/dL


 


RDW Std Deviation   (36.4-46.3)  fL


 


RDW Coeff of Nell   (11.5-14.5)  %


 


Plt Count   (130-400)  K/uL


 


MPV   (7.4-10.4)  fL


 


Immature Gran % (Auto)   %


 


Neut % (Auto)   %


 


Lymph % (Auto)   %


 


Mono % (Auto)   %


 


Eos % (Auto)   %


 


Baso % (Auto)   %


 


Neut # (Auto)   (1.4-6.5)  K/uL


 


Lymph # (Auto)   (1.2-3.4)  K/uL


 


Mono # (Auto)   (0.11-0.59)  K/uL


 


Eos # (Auto)   (0-0.5)  K/uL


 


Baso # (Auto)   (0-0.2)  K/uL


 


Immature Gran # (Auto)   (0.00-0.02)  K/uL


 


Absolute Nucleated RBC   (0-0)  K/uL


 


Nucleated RBC % (auto)   %


 


Sodium   (136-145)  mmol/L


 


Potassium   (3.5-5.1)  mmol/L


 


Chloride   ()  mmol/L


 


Carbon Dioxide   (21-32)  mmol/L


 


Anion Gap   (3-11)  


 


BUN   (7-18)  mg/dl


 


Creatinine   (0.6-1.4)  mg/dl


 


Est Cr Clr Drug Dosing   ml/min


 


Est GFR ( Amer)   ml/min


 


Est GFR (Non-Af Amer)   ml/min


 


BUN/Creatinine Ratio   (10-20)  


 


Glucose   (70-99)  mg/dl


 


POC Glucose  118 H  (70-99)  mg/dl


 


Calcium   (8.5-10.1)  mg/dl


 


Phosphorus   (2.5-4.9)  mg/dl


 


Magnesium   (1.8-2.4)  mg/dl














Coding


Level of Care Code


92880 Subseq Hosp Care Lvl 3





Diagnoses


Severe anemia  D64.9


Alcohol withdrawal  F10.231


      Complication of substance-induced condition: with delirium


Metabolic acidosis  E87.2


Gross hematuria  R31.0


Closed fracture of left proximal humerus  S42.202A


Encephalopathy  G93.40


Prostate cancer  C61


LARA (acute kidney injury)  N17.9





(1) Alcohol withdrawal


  Complication of substance-induced condition: with delirium  Qualified Code(s):

F10.231 - Alcohol dependence with withdrawal delirium

## 2021-06-10 NOTE — UROLOGY PROGRESS NOTE
Date of Service


Kalpana 10, 2021


 





Assessment & Plan


(1) Gross hematuria: 


      60 year-old male patient, with multiple comorbidities, admitted with 

encephalopathy and severe anemia. 





-Plan of care reviewed with Dr. Bonilla, on call urologist. 


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 

cystitis. 


-He remains acutely ill - ETOH related. 


-Labs reviewed - white count, creatinine and hemoglobin stable. 


-Mendez catheter intact with dark red urine, continue mendez at this time.


-No immediate plan for cystoscopy as long as he remains hemodynamically stable. 


-Will continue to follow while inpatient. 





Admission and Anticipated Discharge Date


Admission Date: June 7, 2021








Subjective


Patient examined at bedside.


He does respond to verbal stimuli however remains confused. 


Nursing notes patient was pulling at mendez catheter, limited output this AM.


His nurse did hand irrigate with 30 cc with 300 cc urine return.


Urine dark red in color. 


He is receiving slow tube feeds. 





Chart review:


Afebrile


Wbc 10.32


Hgb 9.4 (previously 9.4)


Creatinine 1.14 





Review of Systems








Review of Systems:   Unobtainable due to cognitive status  








Physical Exam








Constitutional:   comfortable; no acute distress    Arousable. Chronically ill 

appearing. 


 


Respiratory:   normal respiratory effort; no respiratory distress  


 


Cardiovascular:   Extremities: no edema  


 


Gastrointestinal (Abdomen):   Inspection/Auscultation: abdomen not distended    

Percussion/Palpation: abdomen soft; abdomen nontender  


 


Skin:   Scattered ecchymosis 


 


Genitourinary:   Mendez catheter intact with dark red urine. 








Results & Data ([facility])


Vital Signs (Past 12 Hours)


                                   Vital Signs











  Temp Pulse Resp BP Pulse Ox


 


 06/10/21 08:24   101 H  18  132/79  100


 


 06/10/21 08:00  36.9 C    


 


 06/10/21 07:25   120 H  21  161/102 H  100


 


 06/10/21 06:25   100 H  12  89/67 L 


 


 06/10/21 05:26   110 H  17  140/106 H 


 


 06/10/21 04:24   92 H  14  128/81 


 


 06/10/21 03:24   93 H  13  125/92 


 


 06/10/21 02:24   96 H  16  125/88  100


 


 06/10/21 01:28  36.8 C  79  14  92/49 L  97


 


 06/10/21 00:24   89  16  155/97 H  100


 


 06/10/21 00:00   97 H   


 


 06/09/21 23:24   111 H  18  137/108 H  100














PG Care Time/CCT


Total # of Minutes Spent


Total Time Spent with Patient: Total time spent is greater than 50% in 

coordination of care (as documented) at patient's floor/unit and/or counseling 

patient:








Coding


Level of Care Code


60799 Subseq Hosp Care Lvl 2





Diagnoses


Gross hematuria  R31.0

## 2021-06-11 LAB
ANION GAP SERPL CALC-SCNC: 8 MMOL/L (ref 3–11)
BUN SERPL-MCNC: 13 MG/DL (ref 7–18)
CALCIUM SERPL-MCNC: 7.5 MG/DL (ref 8.5–10.1)
CHLORIDE SERPL-SCNC: 112 MMOL/L (ref 98–107)
CO2 SERPL-SCNC: 20 MMOL/L (ref 21–32)
CREAT CL PREDICTED SERPL C-G-VRATE: 78.5 ML/MIN
DEPRECATED RDW RBC: 53.3 FL (ref 36.4–46.3)
GLUCOSE SERPL-MCNC: 101 MG/DL (ref 70–99)
HCT VFR BLD AUTO: 25 % (ref 42–52)
HGB BLD-MCNC: 8 G/DL (ref 14–18)
IMM GRANULOCYTES # BLD: 0.05 K/UL (ref 0–0.02)
IMM GRANULOCYTES NFR BLD AUTO: 0.4 %
MAGNESIUM SERPL-MCNC: 1.6 MG/DL (ref 1.8–2.4)
MCH RBC QN AUTO: 28.8 PG (ref 25–34)
MCV RBC: 89.9 FL (ref 80–100)
PLATELET # BLD AUTO: 110 K/UL (ref 130–400)
POTASSIUM SERPL-SCNC: 4.1 MMOL/L (ref 3.5–5.1)
RBC # BLD AUTO: 2.78 M/UL (ref 4.7–6.1)
SODIUM SERPL-SCNC: 140 MMOL/L (ref 136–145)
WBC # BLD AUTO: 12.02 K/UL (ref 4.8–10.8)

## 2021-06-11 RX ADMIN — DEXTROSE AND SODIUM CHLORIDE SCH MLS/HR: 5; 900 INJECTION, SOLUTION INTRAVENOUS at 11:36

## 2021-06-11 RX ADMIN — PANTOPRAZOLE SODIUM SCH MLS/MIN: 40 INJECTION, POWDER, FOR SOLUTION INTRAVENOUS at 09:09

## 2021-06-11 RX ADMIN — NICOTINE SCH MG: 21 PATCH, EXTENDED RELEASE TRANSDERMAL at 20:17

## 2021-06-11 RX ADMIN — THIAMINE HYDROCHLORIDE SCH MLS/HR: 100 INJECTION, SOLUTION INTRAMUSCULAR; INTRAVENOUS at 09:09

## 2021-06-11 RX ADMIN — FOLIC ACID SCH MLS/MIN: 5 INJECTION, SOLUTION INTRAMUSCULAR; INTRAVENOUS; SUBCUTANEOUS at 09:10

## 2021-06-11 RX ADMIN — PANTOPRAZOLE SODIUM SCH MLS/MIN: 40 INJECTION, POWDER, FOR SOLUTION INTRAVENOUS at 20:17

## 2021-06-11 RX ADMIN — DEXTROSE AND SODIUM CHLORIDE SCH MLS/HR: 5; 900 INJECTION, SOLUTION INTRAVENOUS at 23:28

## 2021-06-11 RX ADMIN — Medication SCH TAB: at 09:10

## 2021-06-11 RX ADMIN — FLUTICASONE PROPIONATE SCH: 50 SPRAY, METERED NASAL at 09:10

## 2021-06-11 NOTE — XRAY REPORT
XR chest 1V portable



HISTORY:  60 years-old Male aspiration? Acute shortness of breath with possible aspiration



COMPARISON: Chest radiograph and CTA chest 6/17/2021



TECHNIQUE: Portable AP view of the chest



FINDINGS: 

Cardiac mediastinal and hilar silhouettes are within normal limits. Calcified plaque of the thoracic 
aorta. No pneumothorax, pleural effusion, airspace consolidation or overt pulmonary edema. Mild emphy
sema. Ill-defined opacities of the right midlung likely secondary to summation density with overlying
 skin folds. Degenerative changes of the shoulders and spine.



IMPRESSION: No acute process. Ill-defined opacities of the right midlung are likely secondary to summ
ation density/artifact. 





: Negative or not required by law.



The above report was generated using voice recognition software. It may contain grammatical, syntax o
r spelling errors.









Electronically signed by:  Jacky Monroe M.D.

6/11/2021 6:54 AM

## 2021-06-11 NOTE — UROLOGY PROGRESS NOTE
Date of Service


June 11, 2021


 





Assessment & Plan


(1) Gross hematuria: 


      60 year-old male patient, with multiple comorbidities, admitted with 

encephalopathy and severe anemia. 





-Patient admitted acutely ill - ETOH related. 


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 

cystitis. 


-He is afebrile. 


-Has slowly been clinically progressing.  


-Labs pending this AM. 


-Mendez catheter intact with yellow/pink urine, continue mendez at this time.


-No immediate plan for cystoscopy presuming he remains hemodynamically stable. 


-Will continue to follow while inpatient. 





Admission and Anticipated Discharge Date


Admission Date: June 7, 2021








Subjective


Patient examined at bedside.


He does appear more awake today, although remains confused. 


Did pull out NG tube last evening. 


Mendez catheter intact and draining, yellow/pink urine. 


Has required PRN hand irrigation by nursing staff with some removal of clots. 


Denies fevers or chills.





Chart review:


Afebrile


Labs 6/10 - 


   Wbc 10.32


   Hgb 9.4 (previously 9.4)


   Creatinine 1.14


Labs ordered this AM, not drawn before time of exam.


 





Review of Systems








Constitutional:   as per Subjective / HPI; no fever and no chills  


 


Genitourinary:   + as per Subjective / HPI  








Physical Exam








Constitutional:   comfortable; no acute distress    Chronically ill appearing. 

Disoriented. 


 


Respiratory:   normal respiratory effort; no respiratory distress  


 


Cardiovascular:   Extremities: no edema  


 


Gastrointestinal (Abdomen):   Inspection/Auscultation: abdomen not distended    

Percussion/Palpation: abdomen soft; abdomen nontender  


 


Skin:   Scattered ecchymosis 


 


Psychiatric:   Orientation: oriented to person and cooperative; + not oriented 

to place and + not oriented to time    Awakes to verbal stimuli. 


 


Genitourinary:   Mendez catheter intact draining pink/yellow urine. 








Results & Data ([facility])


Vital Signs (Past 12 Hours)


                                   Vital Signs











  Temp Pulse Resp BP BP Pulse Ox


 


 06/11/21 07:59  36.5 C  97 H  18   95/62 L  96


 


 06/11/21 04:30  36.6 C  107 H  24  149/64 H   99


 


 06/10/21 23:12  36.5 C  85  16  127/76   100














PG Care Time/CCT


Total # of Minutes Spent


Total Time Spent with Patient: Total time spent is greater than 50% in 

coordination of care (as documented) at patient's floor/unit and/or counseling 

patient:








Coding


Level of Care Code


60160 Subseq Hosp Care Lvl 2





Diagnoses


Gross hematuria  R31.0

## 2021-06-11 NOTE — HOSPITALIST PROGRESS NOTE
Date of Service


June 11, 2021


 





Assessment & Plan


(1) Encephalopathy: 


      Acute metabolic encephalopathy


Multifactorial:


Alcohol withdrawal,Hyponatremia, ARF secondary to illness,Hypoxemic respiratory 

failure secondary to COPD exacerbation


Requiring mild sedation to suppress withdrawal symptoms


Remains hemodynamically stable


Has been on alcohol withdrawal protocol


Clinically a little bit improved


Mental status is improving but remains very confused


Remains very confused otherwise alert and awake


Hemodynamically stable


Clinically much better with the improvement of confusion and restlessness


Has not been requiring any anxiolytics


Does not have any significant withdrawal symptoms


We will get PT and OT evaluation








Acute blood loss anemia


Hemoglobin is noted to be 2.4 on admission


Secondary to multiple sources of bleeding : 


UGI B in a newly diagnosed cirrhotic patient based on CT


 bleed (history chronic hematuria, metastatic prostate cancer status post 

surgery status post chemotherapy/Lupron therapy)


IV PPI, Ceftriaxone for SBP prophylaxis in a cirrhotic patient with UGIB


Received 4 unit of packed red cell transfusion and the hemoglobin went up to 8.4

and remains stable


Appreciate GI input and recommendation for possible EGD


Has been on prophylactic antibiotic for possible SBP with ceftriaxone


No signs and or symptoms of infection


Hemoglobin remains stable at more than 9


No more GI bleed-hemoglobin remains stable








Traumatic left shoulder ecchymosis with fracture of the left humerus


Secondary to fall


Contributing low hemoglobin


Ortho has been consulted-appreciate input and recommendation








Ronni hematuria


Status post urinary catheter


Appreciate urology input and recommendation


Hematuria has been improving





Nutrition


Pulled out his NG tube this morning


Is more alert awake


Started on clears and has been tolerating well


We will advance diet as tolerated





Hypertension, stable 


Blood pressure remains stable








Seizure disorder (medication noncompliance) 


Has been getting phenobarbital as needed


Steroid-induced hyperglycemia rule out DM


Ongoing tobacco abuse








Possible COPD


Has been getting intravenous Solu-Medrol











Check hemoglobin A1c-5.1 as the patient is nondiabetic-5.1





Nicotine patch as needed





DVT prophylaxis.  SCDs RE left shoulder/GI/ bleed causing severe anemia





Full code








Attempted to contact patient's cousin (Mr. Denzel Ferrer, [phone]) 

over the phone to obtain additional history and provide update on patient 

situation and plan of care.  No answer.





Patient ex-girlfriend/alternative contact (Ms. Jamia Rebollar, [phone]) we will 

try to reach patient cussing a.m.





We will continue current care























Admission and Anticipated Discharge Date


Admission Date: June 7, 2021








Subjective


06/08/2021


The patient was seen and examined in ICU


Remains obtunded without any acute distress


Continue to have hematuria but no bowel movement yet and no hematemesis





6/9/2021


The patient was seen and examined in ICU


He remains alert and awake but very confused


No apparent distress 





6/10/2021


The patient was seen and examined in ICU


He is alert and awake but very confused


Remains hemodynamically stable





6/11/2021


The patient was seen and examined in telemetry unit


He remains lethargic and pleasantly confused


Denies any acute symptoms


Pulled out his NG tube and started with clears since this morning 





Review of Systems








Review of Systems:   Unobtainable due to cognitive status  








Physical Exam








Physical Exam:   Lying in bed with profound weakness and tiredness but no acute 

distress


 


Constitutional:   + ill appearing and average body habitus  


 


Eyes:   PERRL, conjunctivae normal, anicteric sclerae  


 


ENMT:   external ear and nose normal, oropharynx normal  


 


Neck:   trachea midline, no thyromegaly  


 


Respiratory:   + respiratory distress (Minimal distress at rest)    

Auscultation: + diminished lung sounds and + crackles (Minimal bibasilar 

crackles)  


 


Cardiovascular:   Rate/Rhythm: regular rate, regular rhythm and + tachycardic   

Heart Sounds: no murmur    Extremities: no edema  


 


Gastrointestinal (Abdomen):   Inspection/Auscultation: normal bowel sounds; 

abdomen not distended    Percussion/Palpation: abdomen soft; abdomen nontender  


 


Musculoskeletal:   Denies any acute arthritis in any joint


 


Neurologic:   Alert and awake.  Pleasantly confused, remains weak and lethargic


 


Psychiatric:   Insight: + poor insight  


 


Lymphatic:   no cervical or axillary lymphadenopathy  








Results & Data


Results & Data ([facility])


Vital Signs (Past 12 Hours)


                                   Vital Signs











  Temp Pulse Pulse Resp BP BP Pulse Ox


 


 06/11/21 12:13  37.2 C   89  18   114/71  98


 


 06/11/21 08:00   83     


 


 06/11/21 07:59  36.5 C   97 H  18   95/62 L  96


 


 06/11/21 04:30  36.6 C   107 H  24  149/64 H   99











Laboratory Results





                                    Short CBC











  06/11/21 Range/Units





  09:36 


 


WBC  12.02 H  (4.8-10.8)  K/uL


 


Hgb  8.0 L  (14.0-18.0)  g/dL


 


Hct  25.0 L  (42-52)  %


 


Plt Count  110 L  (130-400)  K/uL








                                       BMP











  06/10/21 06/11/21





  15:45 09:36


 


Sodium  142  140


 


Potassium  3.3 L D  4.1  D


 


Chloride  111 H  112 H


 


Carbon Dioxide  23  20 L


 


BUN  17  13


 


Creatinine  0.99  0.91


 


Glucose  90  101 H


 


Calcium  7.6 L  7.5 L











Medications Administered





                          Current Inpatient Medications





Fluticasone Propionate (Fluticasone Propionate Na Spr 16 Gm Btl)  2 sprays NA 

DAILY [facility]


   Stop: 07/08/21 08:59


   Last Admin: 06/11/21 09:10 Dose:  Not Given


   Documented by: 


Promethazine HCl 12.5 mg/ (Sodium Chloride)  50.5 mls @ 202 mls/hr IV Q6H PRN


   PRN Reason: Nausea And Vomiting


   Stop: 07/07/21 23:02


Pantoprazole Sodium 40 mg/ (Syringe)  10 mls @ 5 mls/min IV BID [facility]


   Stop: 07/08/21 20:59


   Last Admin: 06/11/21 09:09 Dose:  5 mls/min


   Documented by: 


Folic Acid 1 mg/ Syringe  10 mls @ 5 mls/min IV QAM [facility]


   Stop: 07/08/21 10:44


   Last Admin: 06/11/21 09:10 Dose:  5 mls/min


   Documented by: 


Thiamine HCl 200 mg/ Sodium (Chloride)  52 mls @ 210 mls/hr IV DAILY [facility]


   Stop: 07/12/21 08:59


Dextrose/Sodium Chloride (D5w And Nss)  1,000 mls @ 75 mls/hr IV .[id number] [facility]


   Stop: 07/10/21 23:29


   Last Admin: 06/11/21 11:36 Dose:  75 mls/hr


   Documented by: 


Ipratropium Bromide (Ipratropium Bromide Neb Soln 0.02% 2.5 Ml Vial)  0.5 mg INH

Q4R PRN


   PRN Reason: Shortness Of Breath Or Wheezing


   Stop: 07/07/21 21:14


Levalbuterol HCl (Levalbuterol 1.25mg/0.5ml Neb)  1.25 mg INH Q4R PRN


   PRN Reason: Shortness Of Breath Or Wheezing


   Stop: 07/07/21 21:14


Multivitamins (Multivitamin Tab)  1 tab PO QAM [facility]


   Stop: 07/08/21 08:59


   Last Admin: 06/11/21 09:10 Dose:  1 tab


   Documented by: 


Phenobarbital Sodium (Phenobarbital Sodium 130 Mg/Ml Vial)  64 mg IV Q6 PRN


   PRN Reason: Undecided


   Stop: 07/08/21 10:32


   Last Admin: 06/09/21 20:58 Dose:  64 mg


   Documented by:

## 2021-06-12 LAB
ALBUMIN SERPL-MCNC: 2.2 GM/DL (ref 3.4–5)
ALBUMIN/GLOB SERPL: 0.6 {RATIO} (ref 0.9–2)
ALP SERPL-CCNC: 108 U/L (ref 45–117)
ALT SERPL-CCNC: 31 U/L (ref 12–78)
ANION GAP SERPL CALC-SCNC: 9 MMOL/L (ref 3–11)
BILIRUB SERPL-MCNC: 1 MG/DL (ref 0.2–1)
BUN SERPL-MCNC: 9 MG/DL (ref 7–18)
CALCIUM SERPL-MCNC: 7.5 MG/DL (ref 8.5–10.1)
CHLORIDE SERPL-SCNC: 112 MMOL/L (ref 98–107)
CO2 SERPL-SCNC: 19 MMOL/L (ref 21–32)
CREAT CL PREDICTED SERPL C-G-VRATE: 114 ML/MIN
DEPRECATED RDW RBC: 51.5 FL (ref 36.4–46.3)
GLOBULIN SER CALC-MCNC: 3.5 GM/DL (ref 2.5–4)
GLUCOSE SERPL-MCNC: 100 MG/DL (ref 70–99)
HCT VFR BLD AUTO: 23.5 % (ref 42–52)
HGB BLD-MCNC: 7.7 G/DL (ref 14–18)
IMM GRANULOCYTES # BLD: 0.03 K/UL (ref 0–0.02)
IMM GRANULOCYTES NFR BLD AUTO: 0.5 %
MAGNESIUM SERPL-MCNC: 1.4 MG/DL (ref 1.8–2.4)
MCH RBC QN AUTO: 29.2 PG (ref 25–34)
MCV RBC: 89 FL (ref 80–100)
PLATELET # BLD AUTO: 95 K/UL (ref 130–400)
POTASSIUM SERPL-SCNC: 3.7 MMOL/L (ref 3.5–5.1)
PROT SERPL-MCNC: 5.7 GM/DL (ref 6.4–8.2)
RBC # BLD AUTO: 2.64 M/UL (ref 4.7–6.1)
SODIUM SERPL-SCNC: 139 MMOL/L (ref 136–145)
WBC # BLD AUTO: 5.63 K/UL (ref 4.8–10.8)

## 2021-06-12 RX ADMIN — Medication SCH TAB: at 08:21

## 2021-06-12 RX ADMIN — DEXTROSE AND SODIUM CHLORIDE SCH MLS/HR: 5; 900 INJECTION, SOLUTION INTRAVENOUS at 13:25

## 2021-06-12 RX ADMIN — MAGNESIUM SULFATE IN DEXTROSE SCH MLS/HR: 10 INJECTION, SOLUTION INTRAVENOUS at 21:21

## 2021-06-12 RX ADMIN — NICOTINE SCH MG: 21 PATCH, EXTENDED RELEASE TRANSDERMAL at 08:25

## 2021-06-12 RX ADMIN — PANTOPRAZOLE SODIUM SCH MLS/MIN: 40 INJECTION, POWDER, FOR SOLUTION INTRAVENOUS at 10:00

## 2021-06-12 RX ADMIN — FOLIC ACID SCH MLS/MIN: 5 INJECTION, SOLUTION INTRAMUSCULAR; INTRAVENOUS; SUBCUTANEOUS at 08:21

## 2021-06-12 RX ADMIN — FLUTICASONE PROPIONATE SCH SPRAYS: 50 SPRAY, METERED NASAL at 08:21

## 2021-06-12 RX ADMIN — MAGNESIUM SULFATE IN DEXTROSE SCH MLS/HR: 10 INJECTION, SOLUTION INTRAVENOUS at 23:27

## 2021-06-12 NOTE — UROLOGY PROGRESS NOTE
Date of Service


June 12, 2021


 





Assessment & Plan


(1) Gross hematuria: 


      60 year-old male patient, with multiple comorbidities, admitted with 

encephalopathy and severe anemia. 





-Patient admitted acutely ill - ETOH related. 


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 

cystitis. 


-Mendez catheter intact with dark brown urine (old blood), continue mendez at this

time.








Admission and Anticipated Discharge Date


Admission Date: June 7, 2021








Subjective


The patient has a history of gross hematuria.  He was been receiving hand 

irriagation by nursing for management.  He denies having any manul irrigation 

overnight.  No pressure from the catheter - draining without issues. It was just

recently empties - small amount of dark brown urine in the bag.  No CP or SOB 





Review of Systems








Review of Systems:   All systems reviewed & are unremarkable except as noted in 

Subjective  








Physical Exam








Physical Exam:   NAD


slightly confused


nonlabored breathing


soft NT


urine dark brown/red








Results & Data ([facility])


Vital Signs (Past 12 Hours)


                                   Vital Signs











  Temp Pulse Resp BP Pulse Ox


 


 06/12/21 12:15  36.5 C  86  16  115/63  99


 


 06/12/21 03:07  36.7 C  82  20  105/67  100


 


 06/12/21 02:56  36.4 C L  82  20  129/68  100














PG Care Time/CCT


Total # of Minutes Spent


Total Time Spent with Patient: Total time spent is greater than 50% in 

coordination of care (as documented) at patient's floor/unit and/or counseling 

patient:








Coding


Level of Care Code


13304 Subseq Hosp Care Lvl 2


History


Expanded Problem Focused


Exam


Expanded Problem Focused


Medical Decision Making


Low Complexity





Diagnoses


Gross hematuria  R31.0





Time Spent (min)


20

## 2021-06-12 NOTE — HOSPITALIST PROGRESS NOTE
Date of Service


June 12, 2021


 





Assessment & Plan


(1) Encephalopathy: 


      Acute metabolic encephalopathy


Multifactorial:


Alcohol withdrawal,Hyponatremia, ARF secondary to illness,Hypoxemic respiratory 

failure secondary to COPD exacerbation


Requiring mild sedation to suppress withdrawal symptoms


Remains hemodynamically stable


Has been on alcohol withdrawal protocol


Clinically a little bit improved


Mental status is improving but remains very confused


Remains very confused otherwise alert and awake


Hemodynamically stable


Clinically much better with the improvement of confusion and restlessness


Has not been requiring any anxiolytics


Does not have any significant withdrawal symptoms


We will get PT and OT evaluation


Will change IV medications to oral-continue current management








Acute blood loss anemia


Hemoglobin is noted to be 2.4 on admission


Secondary to multiple sources of bleeding : 


UGI B in a newly diagnosed cirrhotic patient based on CT


 bleed (history chronic hematuria, metastatic prostate cancer status post 

surgery status post chemotherapy/Lupron therapy)


IV PPI, Ceftriaxone for SBP prophylaxis in a cirrhotic patient with UGIB


Received 4 unit of packed red cell transfusion and the hemoglobin went up to 8.4

and remains stable


Appreciate GI input and recommendation for possible EGD


Has been on prophylactic antibiotic for possible SBP with ceftriaxone


No signs and or symptoms of infection


Hemoglobin remains stable at more than 9


No more GI bleed-hemoglobin remains stable








Traumatic left shoulder ecchymosis with fracture of the left humerus


Secondary to fall


Contributing low hemoglobin


Ortho has been consulted-appreciate input and recommendation


Minimal pain in the left shoulder








Ronni hematuria


Status post urinary catheter


Appreciate urology input and recommendation


Hematuria has been improving





Nutrition


Pulled out his NG tube this morning


Is more alert awake


Started on clears and has been tolerating well


We will advance diet as tolerated





Hypertension, stable 


Blood pressure remains stable








Seizure disorder (medication noncompliance) 


Has been getting phenobarbital as needed


Steroid-induced hyperglycemia rule out DM


Ongoing tobacco abuse








Possible COPD


Has been getting intravenous Solu-Medrol











Check hemoglobin A1c-5.1 as the patient is nondiabetic-5.1





Nicotine patch as needed





DVT prophylaxis.  SCDs RE left shoulder/GI/ bleed causing severe anemia





Full code








Attempted to contact patient's cousin (Mr. Denzel Ferrer, [phone]) ove

r the phone to obtain additional history and provide update on patient situation

and plan of care.  No answer.





Patient ex-girlfriend/alternative contact (Ms. Jamia Rebollar, [phone]) we will 

try to reach patient cussing a.m.





We will continue current care























Admission and Anticipated Discharge Date


Admission Date: June 7, 2021








Subjective


06/08/2021


The patient was seen and examined in ICU


Remains obtunded without any acute distress


Continue to have hematuria but no bowel movement yet and no hematemesis





6/9/2021


The patient was seen and examined in ICU


He remains alert and awake but very confused


No apparent distress 





6/10/2021


The patient was seen and examined in ICU


He is alert and awake but very confused


Remains hemodynamically stable





6/11/2021


The patient was seen and examined in telemetry unit


He remains lethargic and pleasantly confused


Denies any acute symptoms


Pulled out his NG tube and started with clears since this morning





6/12/2021


The patient was seen and examined in telemetry unit


He remains hemodynamically stable but pleasantly confused


He has been eating and drinking almost normally


He denies any significant symptoms 





Review of Systems








Review of Systems:   Could not be reliably obtained








Physical Exam








Physical Exam:   Lying in bed with profound weakness and tiredness but no acute 

distress


 


Constitutional:   + ill appearing and average body habitus  


 


Eyes:   PERRL, conjunctivae normal, anicteric sclerae  


 


ENMT:   external ear and nose normal, oropharynx normal  


 


Neck:   trachea midline, no thyromegaly  


 


Respiratory:   + respiratory distress (Minimal distress at rest)    

Auscultation: + diminished lung sounds and + crackles (Minimal bibasilar 

crackles)  


 


Cardiovascular:   Rate/Rhythm: regular rate, regular rhythm and + tachycardic   

Heart Sounds: no murmur    Extremities: no edema  


 


Gastrointestinal (Abdomen):   Inspection/Auscultation: normal bowel sounds; 

abdomen not distended    Percussion/Palpation: abdomen soft; abdomen nontender  


 


Musculoskeletal:   No acute arthritis in any joint


 


Neurologic:   Alert and awake.  Pleasantly confused.  Mildly restless at times..

No tremors involving the outstretched hands


 


Psychiatric:   Insight: + poor insight  


 


Lymphatic:   no cervical or axillary lymphadenopathy  








Results & Data


Results & Data ([facility])


Vital Signs (Past 12 Hours)


                                   Vital Signs











  Temp Pulse Resp BP Pulse Ox


 


 06/12/21 12:15  36.5 C  86  16  115/63  99











Laboratory Results





                                    Short CBC











  06/12/21 Range/Units





  08:17 


 


WBC  5.63  (4.8-10.8)  K/uL


 


Hgb  7.7 L  (14.0-18.0)  g/dL


 


Hct  23.5 L  (42-52)  %


 


Plt Count  95 L  (130-400)  K/uL








                                       BMP











  06/12/21





  08:17


 


Sodium  139


 


Potassium  3.7


 


Chloride  112 H


 


Carbon Dioxide  19 L


 


BUN  9


 


Creatinine  0.63


 


Glucose  100 H


 


Calcium  7.5 L








                                 Liver Function











  06/12/21 Range/Units





  08:17 


 


Total Bilirubin  1.0  (0.2-1)  mg/dl


 


AST  32  (15-37)  U/L


 


ALT  31  (12-78)  U/L


 


Alkaline Phosphatase  108  ()  U/L


 


Albumin  2.2 L  (3.4-5.0)  gm/dl











Medications Administered





                          Current Inpatient Medications





Fluticasone Propionate (Fluticasone Propionate Na Spr 16 Gm Btl)  2 sprays NA 

DAILY [facility]


   Stop: 07/08/21 08:59


   Last Admin: 06/12/21 08:21 Dose:  2 sprays


   Documented by: 


Promethazine HCl 12.5 mg/ (Sodium Chloride)  50.5 mls @ 202 mls/hr IV Q6H PRN


   PRN Reason: Nausea And Vomiting


   Stop: 07/07/21 23:02


Pantoprazole Sodium 40 mg/ (Syringe)  10 mls @ 5 mls/min IV BID [facility]


   Stop: 07/08/21 20:59


   Last Admin: 06/12/21 10:00 Dose:  5 mls/min


   Documented by: 


Folic Acid 1 mg/ Syringe  10 mls @ 5 mls/min IV QAM [facility]


   Stop: 07/08/21 10:44


   Last Admin: 06/12/21 08:21 Dose:  5 mls/min


   Documented by: 


Thiamine HCl 200 mg/ Sodium (Chloride)  52 mls @ 210 mls/hr IV DAILY [facility]


   Stop: 07/12/21 08:59


   Last Infusion: 06/12/21 09:01 Dose:  Infused


   Documented by: 


Dextrose/Sodium Chloride (D5w And Nss)  1,000 mls @ 75 mls/hr IV .[id number] [facility]RE


   Stop: 07/10/21 23:29


   Last Admin: 06/12/21 13:25 Dose:  75 mls/hr


   Documented by: 


Lorazepam (Ativan)  1 mg in 2 mls @ 0.5 mls/min IV Q3H PRN


   PRN Reason: Alcohol Withdrawal


   Stop: 07/11/21 18:03


Ipratropium Bromide (Ipratropium Bromide Neb Soln 0.02% 2.5 Ml Vial)  0.5 mg INH

Q4R PRN


   PRN Reason: Shortness Of Breath Or Wheezing


   Stop: 07/07/21 21:14


Levalbuterol HCl (Levalbuterol 1.25mg/0.5ml Neb)  1.25 mg INH Q4R PRN


   PRN Reason: Shortness Of Breath Or Wheezing


   Stop: 07/07/21 21:14


Miscellaneous (Remove Nicoderm Patch)  1 ea N/A DAILY@0859 [facility]


   Stop: 07/12/21 08:58


   Last Admin: 06/12/21 08:26 Dose:  1 ea


   Documented by: 


[facility]it[facility] ([facility]itamin Tab)  [facility]


   Stop: 07/08/21 08:59


   Last Admin: 06/12/21 08:21 Dose:  1 tab


   Documented by: 


Nicotine (Nicotine 21 Mg/24 Hr Tdsy)  21 mg TD QAM [facility]


   Stop: 07/11/21 18:14


   Last Admin: 06/12/21 08:25 Dose:  21 mg


   Documented by: 


Phenobarbital Sodium (Phenobarbital Sodium 130 Mg/Ml Vial)  64 mg IV Q6 PRN


   PRN Reason: Undecided


   Stop: 07/08/21 10:32


   Last Admin: 06/09/21 20:58 Dose:  64 mg


   Documented by:

## 2021-06-13 LAB
ANION GAP SERPL CALC-SCNC: 8 MMOL/L (ref 3–11)
BUN SERPL-MCNC: 7 MG/DL (ref 7–18)
CALCIUM SERPL-MCNC: 7.3 MG/DL (ref 8.5–10.1)
CHLORIDE SERPL-SCNC: 108 MMOL/L (ref 98–107)
CO2 SERPL-SCNC: 21 MMOL/L (ref 21–32)
CREAT CL PREDICTED SERPL C-G-VRATE: 121.5 ML/MIN
DEPRECATED RDW RBC: 52.9 FL (ref 36.4–46.3)
GIANT PLATELETS BLD QL SMEAR: (no result)
GLUCOSE SERPL-MCNC: 94 MG/DL (ref 70–99)
HCT VFR BLD AUTO: 23.9 % (ref 42–52)
HGB BLD-MCNC: 7.7 G/DL (ref 14–18)
IMM GRANULOCYTES # BLD: 0 K/UL (ref 0–0.02)
IMM GRANULOCYTES NFR BLD AUTO: 0 %
MAGNESIUM SERPL-MCNC: 1.7 MG/DL (ref 1.8–2.4)
MCH RBC QN AUTO: 29.7 PG (ref 25–34)
MCV RBC: 92.3 FL (ref 80–100)
PLATELET # BLD AUTO: 105 K/UL (ref 130–400)
POTASSIUM SERPL-SCNC: 3.6 MMOL/L (ref 3.5–5.1)
RBC # BLD AUTO: 2.59 M/UL (ref 4.7–6.1)
SODIUM SERPL-SCNC: 136 MMOL/L (ref 136–145)
WBC # BLD AUTO: 5.16 K/UL (ref 4.8–10.8)

## 2021-06-13 RX ADMIN — Medication SCH TAB: at 07:41

## 2021-06-13 RX ADMIN — FOLIC ACID SCH MG: 1 TABLET ORAL at 07:41

## 2021-06-13 RX ADMIN — DEXTROSE AND SODIUM CHLORIDE SCH MLS/HR: 5; 900 INJECTION, SOLUTION INTRAVENOUS at 22:04

## 2021-06-13 RX ADMIN — FLUTICASONE PROPIONATE SCH SPRAYS: 50 SPRAY, METERED NASAL at 07:40

## 2021-06-13 RX ADMIN — Medication SCH MG: at 07:41

## 2021-06-13 RX ADMIN — DEXTROSE AND SODIUM CHLORIDE SCH MLS/HR: 5; 900 INJECTION, SOLUTION INTRAVENOUS at 05:51

## 2021-06-13 RX ADMIN — NICOTINE SCH MG: 21 PATCH, EXTENDED RELEASE TRANSDERMAL at 07:41

## 2021-06-13 NOTE — HOSPITALIST PROGRESS NOTE
Date of Service


June 13, 2021


 





Assessment & Plan


(1) Encephalopathy: 


      Acute metabolic encephalopathy


Multifactorial:


Alcohol withdrawal,Hyponatremia, ARF secondary to illness,Hypoxemic respiratory 

failure secondary to COPD exacerbation


Requiring mild sedation to suppress withdrawal symptoms


Remains hemodynamically stable


Has been on alcohol withdrawal protocol


Clinically a little bit improved


Mental status is improving but remains very confused


Remains very confused otherwise alert and awake


Hemodynamically stable


Clinically much better with the improvement of confusion and restlessness


Has not been requiring any anxiolytics


Does not have any significant withdrawal symptoms


We will get PT and OT evaluation


Remains stable with weakness and pleasantly confused








Acute blood loss anemia


Hemoglobin is noted to be 2.4 on admission


Secondary to multiple sources of bleeding : 


UGI B in a newly diagnosed cirrhotic patient based on CT


 bleed (history chronic hematuria, metastatic prostate cancer status post 

surgery status post chemotherapy/Lupron therapy)


IV PPI, Ceftriaxone for SBP prophylaxis in a cirrhotic patient with UGIB


Received 4 unit of packed red cell transfusion and the hemoglobin went up to 8.4

and remains stable


Appreciate GI input and recommendation for possible EGD


Has been on prophylactic antibiotic for possible SBP with ceftriaxone


No signs and or symptoms of infection


Hemoglobin remains stable at more than 9


No more GI bleed-hemoglobin remains stable








Traumatic left shoulder ecchymosis with fracture of the left humerus


Secondary to fall


Contributing low hemoglobin


Ortho has been consulted-appreciate input and recommendation


Minimal pain in the left shoulder








Ronni hematuria


Status post urinary catheter


Appreciate urology input and recommendation


Hematuria resolved





Nutrition


Pulled out his NG tube this morning


Is more alert awake


Started on clears and has been tolerating well


We will advance diet as tolerated





Hypertension, stable 


Blood pressure remains stable








Seizure disorder (medication noncompliance) 


Has been getting phenobarbital as needed


Steroid-induced hyperglycemia rule out DM


Ongoing tobacco abuse








Possible COPD


Has been getting intravenous Solu-Medrol











Check hemoglobin A1c-5.1 as the patient is nondiabetic-5.1





Nicotine patch as needed





DVT prophylaxis.  SCDs RE left shoulder/GI/ bleed causing severe anemia





Full code








Attempted to contact patient's cousin (Mr. Denzel Ferrer, [phone]) 

over the phone to obtain additional history and provide update on patient 

situation and plan of care.  No answer.





Patient ex-girlfriend/alternative contact (Ms. Jamia Rebollar, [phone]) we will 

try to reach patient cussing a.m.





We will continue current care


Social service discharge planning























Admission and Anticipated Discharge Date


Admission Date: June 7, 2021








Subjective


06/08/2021


The patient was seen and examined in ICU


Remains obtunded without any acute distress


Continue to have hematuria but no bowel movement yet and no hematemesis





6/9/2021


The patient was seen and examined in ICU


He remains alert and awake but very confused


No apparent distress 





6/10/2021


The patient was seen and examined in ICU


He is alert and awake but very confused


Remains hemodynamically stable





6/11/2021


The patient was seen and examined in telemetry unit


He remains lethargic and pleasantly confused


Denies any acute symptoms


Pulled out his NG tube and started with clears since this morning





6/12/2021


The patient was seen and examined in telemetry unit


He remains hemodynamically stable but pleasantly confused


He has been eating and drinking almost normally


He denies any significant symptoms





6/13/2021


The patient was seen and examined in telemetry unit


He has been much better without any symptoms except ongoing weakness


Seems to be presently confused 





Review of Systems








Review of Systems:   Could not be reliably obtained








Physical Exam








Physical Exam:   Lying in bed with profound weakness and tiredness but no acute 

distress


 


Constitutional:   + ill appearing and average body habitus  


 


Eyes:   PERRL, conjunctivae normal, anicteric sclerae  


 


ENMT:   external ear and nose normal, oropharynx normal  


 


Neck:   trachea midline, no thyromegaly  


 


Respiratory:   + respiratory distress (Minimal distress at rest)    

Auscultation: + diminished lung sounds and + crackles (Minimal bibasilar 

crackles)  


 


Cardiovascular:   Rate/Rhythm: regular rate, regular rhythm and + tachycardic   

Heart Sounds: no murmur    Extremities: no edema  


 


Gastrointestinal (Abdomen):   Inspection/Auscultation: normal bowel sounds; 

abdomen not distended    Percussion/Palpation: abdomen soft; abdomen nontender  


 


Musculoskeletal:   No acute arthritis in any joint


 


Neurologic:   Alert and awake.  Generally weak and pleasantly confused


 


Psychiatric:   Insight: + poor insight  


 


Lymphatic:   no cervical or axillary lymphadenopathy  








Results & Data


Results & Data ([facility])


Vital Signs (Past 12 Hours)


                                   Vital Signs











  Temp Pulse Pulse Resp BP Pulse Ox


 


 06/13/21 11:42  36.6 C   72  20  110/61  96


 


 06/13/21 08:28   72    


 


 06/13/21 08:03  36.8 C   66  20  104/69  96


 


 06/13/21 03:46  36.6 C   68  16  103/65  98











Laboratory Results





                                    Short CBC











  06/13/21 Range/Units





  05:30 


 


WBC  5.16  (4.8-10.8)  K/uL


 


Hgb  7.7 L  (14.0-18.0)  g/dL


 


Hct  23.9 L  (42-52)  %


 


Plt Count  105 L  (130-400)  K/uL








                                       BMP











  06/13/21





  05:30


 


Sodium  136


 


Potassium  3.6


 


Chloride  108 H


 


Carbon Dioxide  21


 


BUN  7


 


Creatinine  0.61


 


Glucose  94


 


Calcium  7.3 L











Medications Administered





                          Current Inpatient Medications





Fluticasone Propionate (Fluticasone Propionate Na Spr 16 Gm Btl)  2 sprays NA 

DAILY [facility]


   Stop: 07/08/21 08:59


   Last Admin: 06/13/21 07:40 Dose:  2 sprays


   Documented by: 


Folic Acid (Folic Acid 1 Mg Tab)  1 mg PO QAM [facility]


   Stop: 07/13/21 08:59


   Last Admin: 06/13/21 07:41 Dose:  1 mg


   Documented by: 


Promethazine HCl 12.5 mg/ (Sodium Chloride)  50.5 mls @ 202 mls/hr IV Q6H PRN


   PRN Reason: Nausea And Vomiting


   Stop: 07/07/21 23:02


Dextrose/Sodium Chloride (D5w And Nss)  1,000 mls @ 75 mls/hr IV .[id number] [facility]


   Stop: 07/10/21 23:29


   Last Admin: 06/13/21 05:51 Dose:  75 mls/hr


   Documented by: 


Lorazepam (Ativan)  1 mg in 2 mls @ 0.5 mls/min IV Q3H PRN


   PRN Reason: Alcohol Withdrawal


   Stop: 07/11/21 18:03


Ipratropium Bromide (Ipratropium Bromide Neb Soln 0.02% 2.5 Ml Vial)  0.5 mg INH

Q4R PRN


   PRN Reason: Shortness Of Breath Or Wheezing


   Stop: 07/07/21 21:14


Levalbuterol HCl (Levalbuterol 1.25mg/0.5ml Neb)  1.25 mg INH Q4R PRN


   PRN Reason: Shortness Of Breath Or Wheezing


   Stop: 07/07/21 21:14


Miscellaneous (Remove Nicoderm Patch)  1 ea N/A DAILY@0859 [facility]


   Stop: 07/12/21 08:58


   Last Admin: 06/13/21 07:40 Dose:  1 ea


   Documented by: 


Multivitamins (Multivitamin Tab)  1 tab PO QAM [facility]


   Stop: 07/08/21 08:59


   Last Admin: 06/13/21 07:41 Dose:  1 tab


   Documented by: 


Nicotine (Nicotine 21 Mg/24 Hr Tdsy)  21 mg TD QAM [facility]


   Stop: 07/11/21 18:14


   Last Admin: 06/13/21 07:41 Dose:  21 mg


   Documented by: 


Oxycodone HCl (Oxycodone Hcl Ir 5 Mg Tab (Immediate Release))  5 mg PO Q6H PRN


   PRN Reason: Pain


   Stop: 06/26/21 21:34


   Last Admin: 06/13/21 04:57 Dose:  5 mg


   Documented by: 


Pantoprazole Sodium (Pantoprazole 40 Mg Tab)  40 mg PO BID [facility]


   Stop: 07/12/21 20:59


   Last Admin: 06/13/21 07:41 Dose:  40 mg


   Documented by: 


Phenobarbital Sodium (Phenobarbital Sodium 130 Mg/Ml Vial)  64 mg IV Q6 PRN


   PRN Reason: Undecided


   Stop: 07/08/21 10:32


   Last Admin: 06/09/21 20:58 Dose:  64 mg


   Documented by: 


Thiamine HCl (Thiamine Hcl 100 Mg Tab)  100 mg PO QAM [facility]


   Stop: 07/13/21 08:59


   Last Admin: 06/13/21 07:41 Dose:  100 mg


   Documented by:

## 2021-06-14 LAB
ANION GAP SERPL CALC-SCNC: 8 MMOL/L (ref 3–11)
BUN SERPL-MCNC: 9 MG/DL (ref 7–18)
CALCIUM SERPL-MCNC: 7.4 MG/DL (ref 8.5–10.1)
CHLORIDE SERPL-SCNC: 106 MMOL/L (ref 98–107)
CO2 SERPL-SCNC: 21 MMOL/L (ref 21–32)
CREAT CL PREDICTED SERPL C-G-VRATE: 104.4 ML/MIN
GLUCOSE SERPL-MCNC: 92 MG/DL (ref 70–99)
MAGNESIUM SERPL-MCNC: 1.4 MG/DL (ref 1.8–2.4)
POTASSIUM SERPL-SCNC: 4 MMOL/L (ref 3.5–5.1)
SODIUM SERPL-SCNC: 135 MMOL/L (ref 136–145)

## 2021-06-14 RX ADMIN — Medication SCH MG: at 08:13

## 2021-06-14 RX ADMIN — DEXTROSE AND SODIUM CHLORIDE SCH: 5; 900 INJECTION, SOLUTION INTRAVENOUS at 14:55

## 2021-06-14 RX ADMIN — FOLIC ACID SCH MG: 1 TABLET ORAL at 08:14

## 2021-06-14 RX ADMIN — Medication SCH TAB: at 08:13

## 2021-06-14 RX ADMIN — FLUTICASONE PROPIONATE SCH SPRAYS: 50 SPRAY, METERED NASAL at 08:14

## 2021-06-14 RX ADMIN — NICOTINE SCH MG: 21 PATCH, EXTENDED RELEASE TRANSDERMAL at 08:13

## 2021-06-14 NOTE — HOSPITALIST PROGRESS NOTE
Date of Service


June 14, 2021


 





Assessment & Plan


(1) Encephalopathy: 


      Acute metabolic encephalopathy


Multifactorial:


Alcohol withdrawal,Hyponatremia, ARF secondary to illness,Hypoxemic respiratory 

failure secondary to COPD exacerbation


Requiring mild sedation to suppress withdrawal symptoms


Remains hemodynamically stable


Has been on alcohol withdrawal protocol


Clinically a little bit improved


Mental status is improving but remains very confused


Remains very confused otherwise alert and awake


Hemodynamically stable


Clinically much better with the improvement of confusion and restlessness


Has not been requiring any anxiolytics


Does not have any significant withdrawal symptoms


We will get PT and OT evaluation-recommended rehab


Awaiting placement








Acute blood loss anemia


Hemoglobin is noted to be 2.4 on admission


Secondary to multiple sources of bleeding : 


UGI B in a newly diagnosed cirrhotic patient based on CT


 bleed (history chronic hematuria, metastatic prostate cancer status post 

surgery status post chemotherapy/Lupron therapy)


IV PPI, Ceftriaxone for SBP prophylaxis in a cirrhotic patient with UGIB


Received 4 unit of packed red cell transfusion and the hemoglobin went up to 8.4

and remains stable


Appreciate GI input and recommendation for possible EGD


Has been on prophylactic antibiotic for possible SBP with ceftriaxone


No signs and or symptoms of infection


Hemoglobin remains stable at more than 9


No more GI bleed-hemoglobin remains stable








Traumatic left shoulder ecchymosis with fracture of the left humerus


Secondary to fall


Contributing low hemoglobin


Ortho has been consulted-appreciate input and recommendation


Minimal pain in the left shoulder


Will have appointment at Ortho as an outpatient








Ronni hematuria


Status post urinary catheter


Appreciate urology input and recommendation


Hematuria resolved





Nutrition


Pulled out his NG tube this morning


Is more alert awake


Started on clears and has been tolerating well


We will advance diet as tolerated-has been tolerating regular diet





Hypertension, stable 


Blood pressure remains stable








Seizure disorder (medication noncompliance) 


Has been getting phenobarbital as needed


Steroid-induced hyperglycemia rule out DM


Ongoing tobacco abuse








Possible COPD


Has been getting intravenous Solu-Medrol











Check hemoglobin A1c-5.1 as the patient is nondiabetic-5.1





Nicotine patch as needed





DVT prophylaxis.  SCDs RE left shoulder/GI/ bleed causing severe anemia





Full code








Attempted to contact patient's cousin (Mr. Denzel Ferrer, [phone]) 

over the phone to obtain additional history and provide update on patient 

situation and plan of care.  No answer.





Patient ex-girlfriend/alternative contact (Ms. Jamia Rebollar, [phone]) we will 

try to reach patient cussing a.m.





We will continue current care


Social service discharge planning-Will need rehab placement























Admission and Anticipated Discharge Date


Admission Date: June 7, 2021








Subjective


06/08/2021


The patient was seen and examined in ICU


Remains obtunded without any acute distress


Continue to have hematuria but no bowel movement yet and no hematemesis





6/9/2021


The patient was seen and examined in ICU


He remains alert and awake but very confused


No apparent distress 





6/10/2021


The patient was seen and examined in ICU


He is alert and awake but very confused


Remains hemodynamically stable





6/11/2021


The patient was seen and examined in telemetry unit


He remains lethargic and pleasantly confused


Denies any acute symptoms


Pulled out his NG tube and started with clears since this morning





6/12/2021


The patient was seen and examined in telemetry unit


He remains hemodynamically stable but pleasantly confused


He has been eating and drinking almost normally


He denies any significant symptoms





6/13/2021


The patient was seen and examined in telemetry unit


He has been much better without any symptoms except ongoing weakness


Seems to be presently confused





6/14/2021


The patient was seen and examined in telemetry unit


He remains stable but weak


Complains of some pain left shoulder


Denies any other significant symptoms





 





Review of Systems








Review of Systems:   All systems reviewed and are unremarkable except as noted 

below


 


Musculoskeletal:   Left shoulder pain


 


Neurologic:   + generalized weakness  








Physical Exam








Physical Exam:   Lying in bed with profound weakness and tiredness but no acute 

distress


 


Constitutional:   + ill appearing and average body habitus  


 


Eyes:   PERRL, conjunctivae normal, anicteric sclerae  


 


ENMT:   external ear and nose normal, oropharynx normal  


 


Neck:   trachea midline, no thyromegaly  


 


Respiratory:   + respiratory distress (Minimal distress at rest)    

Auscultation: + diminished lung sounds and + crackles (Minimal bibasilar 

crackles)  


 


Cardiovascular:   Rate/Rhythm: regular rate, regular rhythm and + tachycardic   

Heart Sounds: no murmur    Extremities: no edema  


 


Gastrointestinal (Abdomen):   Inspection/Auscultation: normal bowel sounds; 

abdomen not distended    Percussion/Palpation: abdomen soft; abdomen nontender  


 


Musculoskeletal:   Left upper extremity in sling


 


Neurologic:   Alert, awake and oriented x3.  Generally weak


 


Psychiatric:   A+Ox3, euthymic affect    Insight: + poor insight  


 


Lymphatic:   no cervical or axillary lymphadenopathy  








Results & Data


Results & Data ([facility])


Vital Signs (Past 12 Hours)


                                   Vital Signs











  Temp Pulse Resp BP BP Pulse Ox


 


 06/14/21 15:24  37.1 C  94 H  19  103/56 L   98


 


 06/14/21 10:53  36.8 C  99 H  20  144/95 H   99


 


 06/14/21 07:09  36.7 C  89  19  139/75   99


 


 06/14/21 03:38  36.6 C  71  16   155/79 H  95











Laboratory Results





                                       [facility]











  06/14/21





  06:24


 


Sodium  135 L


 


Potassium  4.0


 


Chloride  106


 


Carbon Dioxide  21


 


BUN  9


 


Creatinine  0.71


 


Glucose  92


 


Calcium  7.4 L











Medications Administered





                          Current Inpatient Medications





Fluticasone Propionate (Fluticasone Propionate Na Spr 16 Gm Btl)  2 sprays NA 

DAILY [facility]


   Stop: 07/08/21 08:59


   Last Admin: 06/14/21 08:14 Dose:  2 sprays


   Documented by: 


Folic Acid (Folic Acid 1 Mg Tab)  1 mg PO QAM [facility]


   Stop: 07/13/21 08:59


   Last Admin: 06/14/21 08:14 Dose:  1 mg


   Documented by: 


Promethazine HCl 12.5 mg/ (Sodium Chloride)  50.5 mls @ 202 mls/hr IV Q6H PRN


   PRN Reason: Nausea And Vomiting


   Stop: 07/07/21 23:02


Lorazepam (Ativan)  1 mg in 2 mls @ 0.5 mls/min IV Q3H PRN


   PRN Reason: Alcohol Withdrawal


   Stop: 07/11/21 18:03


Ipratropium Bromide (Ipratropium Bromide Neb Soln 0.02% 2.5 Ml Vial)  0.5 mg INH

Q4R PRN


   PRN Reason: Shortness Of Breath Or Wheezing


   Stop: 07/07/21 21:14


Levalbuterol HCl (Levalbuterol 1.25mg/0.5ml Neb)  1.25 mg INH Q4R PRN


   PRN Reason: Shortness Of Breath Or Wheezing


   Stop: 07/07/21 21:14


Miscellaneous (Remove Nicoderm Patch)  1 ea N/A DAILY@0859 [facility]


   Stop: 07/12/21 08:58


   Last Admin: 06/14/21 08:14 Dose:  1 ea


   Documented by: 


[facility]ivitamins ([facility]ivitamin Tab)  [facility]


   Stop: 07/08/21 08:59


   Last Admin: 06/14/21 08:13 Dose:  1 tab


   Documented by: 


Nicotine (Nicotine 21 Mg/24 Hr Tdsy)  21 mg TD QAM [facility]


   Stop: 07/11/21 18:14


   Last Admin: 06/14/21 08:13 Dose:  21 mg


   Documented by: 


Oxycodone HCl (Oxycodone Hcl Ir 5 Mg Tab (Immediate Release))  5 mg PO Q6H PRN


   PRN Reason: Pain


   Stop: 06/26/21 21:34


   Last Admin: 06/14/21 05:53 Dose:  5 mg


   Documented by: 


Pantoprazole Sodium (Pantoprazole 40 Mg Tab)  40 mg PO BID [facility]


   Stop: 07/12/21 20:59


   Last Admin: 06/14/21 08:13 Dose:  40 mg


   Documented by: 


Phenobarbital Sodium (Phenobarbital Sodium 130 Mg/Ml Vial)  64 mg IV Q6 PRN


   PRN Reason: Undecided


   Stop: 07/08/21 10:32


   Last Admin: 06/09/21 20:58 Dose:  64 mg


   Documented by: 


Thiamine HCl (Thiamine Hcl 100 Mg Tab)  100 mg PO QAM [facility]


   Stop: 07/13/21 08:59


   Last Admin: 06/14/21 08:13 Dose:  100 mg


   Documented by:

## 2021-06-15 RX ADMIN — FLUTICASONE PROPIONATE SCH SPRAYS: 50 SPRAY, METERED NASAL at 08:53

## 2021-06-15 RX ADMIN — FOLIC ACID SCH MG: 1 TABLET ORAL at 08:53

## 2021-06-15 RX ADMIN — Medication SCH MG: at 08:53

## 2021-06-15 RX ADMIN — FUROSEMIDE SCH MG: 20 TABLET ORAL at 08:52

## 2021-06-15 RX ADMIN — NICOTINE SCH MG: 21 PATCH, EXTENDED RELEASE TRANSDERMAL at 08:54

## 2021-06-15 RX ADMIN — Medication SCH TAB: at 08:53

## 2021-06-15 NOTE — HOSPITALIST PROGRESS NOTE
Date of Service


Kalpana 15, 2021


 





Assessment & Plan


(1) Encephalopathy: 


      Acute metabolic encephalopathy


Multifactorial:


Alcohol withdrawal,Hyponatremia, ARF secondary to illness,Hypoxemic respiratory 

failure secondary to COPD exacerbation


Requiring mild sedation to suppress withdrawal symptoms


Remains hemodynamically stable


Has been on alcohol withdrawal protocol


Clinically a little bit improved


Mental status is improving but remains very confused


Remains very confused otherwise alert and awake


Hemodynamically stable


Clinically much better with the improvement of confusion and restlessness


Has not been requiring any anxiolytics


Does not have any significant withdrawal symptoms


We will get PT and OT evaluation-recommended rehab


He wants to go to a place near to [place] for rehab








Acute blood loss anemia


Hemoglobin is noted to be 2.4 on admission


Secondary to multiple sources of bleeding : 


UGI B in a newly diagnosed cirrhotic patient based on CT


 bleed (history chronic hematuria, metastatic prostate cancer status post 

surgery status post chemotherapy/Lupron therapy)


IV PPI, Ceftriaxone for SBP prophylaxis in a cirrhotic patient with UGIB


Received 4 unit of packed red cell transfusion and the hemoglobin went up to 8.4

and remains stable


Appreciate GI input and recommendation for possible EGD


Has been on prophylactic antibiotic for possible SBP with ceftriaxone


No signs and or symptoms of infection


Hemoglobin is dropped to 7.7 without any evidence of bleeding


Will monitor-transfuse if hemoglobin less than 7








Traumatic left shoulder ecchymosis with fracture of the left humerus


Secondary to fall


Contributing low hemoglobin


Ortho has been consulted-appreciate input and recommendation


Minimal pain in the left shoulder


Will have appointment at Ortho as an outpatient








Ronni hematuria


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


Status post urinary catheter


Appreciate urology input and recommendation


Hematuria resolved





Nutrition


Pulled out his NG tube this morning


Is more alert awake


Started on clears and has been tolerating well


We will advance diet as tolerated-has been tolerating regular diet





Hypertension, stable 


Blood pressure remains stable








Seizure disorder (medication noncompliance) 


Has been getting phenobarbital as needed


Steroid-induced hyperglycemia rule out DM


Ongoing tobacco abuse








Possible COPD


Has been getting intravenous Solu-Medrol











Check hemoglobin A1c-5.1 as the patient is nondiabetic-5.1





Nicotine patch as needed





DVT prophylaxis.  SCDs RE left shoulder/GI/ bleed causing severe anemia





Full code








Attempted to contact patient's cousin (Mr. Denzel Ferrer, [phone]) 

over the phone to obtain additional history and provide update on patient 

situation and plan of care.  No answer.





Patient ex-girlfriend/alternative contact (Ms. Jamia Rebollar, [phone]) we will 

try to reach patient cussing a.m.





We will continue current care


Social service discharge planning-Will need rehab placement with left shoulder 

fracture


Transfer to medical floor























Admission and Anticipated Discharge Date


Admission Date: June 7, 2021








Subjective


06/08/2021


The patient was seen and examined in ICU


Remains obtunded without any acute distress


Continue to have hematuria but no bowel movement yet and no hematemesis





6/9/2021


The patient was seen and examined in ICU


He remains alert and awake but very confused


No apparent distress 





6/10/2021


The patient was seen and examined in ICU


He is alert and awake but very confused


Remains hemodynamically stable





6/11/2021


The patient was seen and examined in telemetry unit


He remains lethargic and pleasantly confused


Denies any acute symptoms


Pulled out his NG tube and started with clears since this morning





6/12/2021


The patient was seen and examined in telemetry unit


He remains hemodynamically stable but pleasantly confused


He has been eating and drinking almost normally


He denies any significant symptoms





6/13/2021


The patient was seen and examined in telemetry unit


He has been much better without any symptoms except ongoing weakness


Seems to be presently confused





6/14/2021


The patient was seen and examined in telemetry unit


He remains stable but weak


Complains of some pain left shoulder


Denies any other significant symptoms





6/15/2021


The patient was seen and examined in telemetry unit


He remains stable and wants to go home


He denies any symptoms except pain in the left shoulder and weakness


He was told that he will be going to rehab from here not home and he was 

agreeable with that





 





Review of Systems








Review of Systems:   All systems reviewed and are unremarkable except as noted 

below


 


Musculoskeletal:   Left shoulder pain


 


Neurologic:   + generalized weakness  








Physical Exam








Physical Exam:   Lying in bed without any acute symptoms


 


Constitutional:   average body habitus; not ill appearing  


 


Eyes:   PERRL, conjunctivae normal, anicteric sclerae  


 


ENMT:   external ear and nose normal, oropharynx normal  


 


Neck:   trachea midline, no thyromegaly  


 


Respiratory:   no respiratory distress (Minimal distress at rest)    

Auscultation: + diminished lung sounds and + crackles (Minimal bibasilar 

crackles)  


 


Cardiovascular:   Rate/Rhythm: regular rate, regular rhythm and + tachycardic   

Heart Sounds: no murmur    Extremities: no edema  


 


Gastrointestinal (Abdomen):   Inspection/Auscultation: normal bowel sounds; 

abdomen not distended    Percussion/Palpation: abdomen soft; abdomen nontender  


 


Musculoskeletal:   Left shoulder pain in the left upper extremity is in a sling


 


Neurologic:   Alert, awake and oriented x3.  Generally weak.  No focal neuro 

deficit


 


Psychiatric:   A+Ox3, euthymic affect    Insight: + poor insight  


 


Lymphatic:   no cervical or axillary lymphadenopathy  








Results & Data


Results & Data ([facility])


Vital Signs (Past 12 Hours)


                                   Vital Signs











  Temp Pulse Resp BP Pulse Ox


 


 06/15/21 12:17  36.7 C  87  20  111/62  98


 


 06/15/21 07:57  36.7 C  99 H  18  119/65  99


 


 06/15/21 03:44  36.7 C  82  16  104/65  93











Medications Administered





                          Current Inpatient Medications





Fluticasone Propionate (Fluticasone Propionate Na Spr 16 Gm Btl)  2 sprays NA 

DAILY [facility]


   Stop: 07/08/21 08:59


   Last Admin: 06/15/21 08:53 Dose:  2 sprays


   Documented by: 


Folic Acid (Folic Acid 1 Mg Tab)  1 mg PO QAM [facility]


   Stop: 07/13/21 08:59


   Last Admin: 06/15/21 08:53 Dose:  1 mg


   Documented by: 


Furosemide (Furosemide 20 Mg Tab)  20 mg PO QAM [facility]


   Stop: 07/15/21 08:59


   Last Admin: 06/15/21 08:52 Dose:  20 mg


   Documented by: 


Promethazine HCl 12.5 mg/ (Sodium Chloride)  50.5 mls @ 202 mls/hr IV Q6H PRN


   PRN Reason: Nausea And Vomiting


   Stop: 07/07/21 23:02


Ipratropium Bromide (Ipratropium Bromide Neb Soln 0.02% 2.5 Ml Vial)  0.5 mg INH

Q4R PRN


   PRN Reason: Shortness Of Breath Or Wheezing


   Stop: 07/07/21 21:14


Levalbuterol HCl (Levalbuterol 1.25mg/0.5ml Neb)  1.25 mg INH Q4R PRN


   PRN Reason: Shortness Of Breath Or Wheezing


   Stop: 07/07/21 21:14


Miscellaneous (Remove Nicoderm Patch)  1 ea N/A DAILY@0859 [facility]


   Stop: 07/12/21 08:58


   Last Admin: 06/15/21 08:54 Dose:  1 ea


   Documented by: 


[facility]ivitamins ([facility]ivitamin Tab)  [facility] [place]


   Stop: 07/08/21 08:59


   Last Admin: 06/15/21 08:53 Dose:  1 tab


   Documented by: 


Nicotine (Nicotine 21 Mg/24 Hr Tdsy)  21 mg TD QAM [facility]


   Stop: 07/11/21 18:14


   Last Admin: 06/15/21 08:54 Dose:  21 mg


   Documented by: 


Oxycodone HCl (Oxycodone Hcl Ir 5 Mg Tab (Immediate Release))  5 mg PO Q6H PRN


   PRN Reason: Pain


   Stop: 06/26/21 21:34


   Last Admin: 06/14/21 19:45 Dose:  5 mg


   Documented by: 


Pantoprazole Sodium (Pantoprazole 40 Mg Tab)  40 mg PO BID [facility]


   Stop: 07/12/21 20:59


   Last Admin: 06/15/21 08:53 Dose:  40 mg


   Documented by: 


Thiamine HCl (Thiamine Hcl 100 Mg Tab)  [facility] [place]


   Stop: 07/13/21 08:59


   Last Admin: 06/15/21 08:53 Dose:  100 mg


   Documented by: 


Trazodone HCl (Trazodone Hcl 100 Mg Tab)  300 mg PO [facility]


   Stop: 07/14/21 20:59


   Last Admin: 06/14/21 21:04 Dose:  300 mg


   Documented by:

## 2021-06-16 LAB
ANION GAP SERPL CALC-SCNC: 6 MMOL/L (ref 3–11)
BUN SERPL-MCNC: 11 MG/DL (ref 7–18)
CALCIUM SERPL-MCNC: 7.6 MG/DL (ref 8.5–10.1)
CHLORIDE SERPL-SCNC: 107 MMOL/L (ref 98–107)
CO2 SERPL-SCNC: 25 MMOL/L (ref 21–32)
CREAT CL PREDICTED SERPL C-G-VRATE: 112.1 ML/MIN
DEPRECATED RDW RBC: 57.1 FL (ref 36.4–46.3)
GIANT PLATELETS BLD QL SMEAR: (no result)
GLUCOSE SERPL-MCNC: 85 MG/DL (ref 70–99)
HCT VFR BLD AUTO: 22.4 % (ref 42–52)
HGB BLD-MCNC: 7.3 G/DL (ref 14–18)
IMM GRANULOCYTES # BLD: 0 K/UL (ref 0–0.02)
IMM GRANULOCYTES NFR BLD AUTO: 0 %
MAGNESIUM SERPL-MCNC: 1.3 MG/DL (ref 1.8–2.4)
MCH RBC QN AUTO: 30 PG (ref 25–34)
MCV RBC: 92.2 FL (ref 80–100)
PLATELET # BLD AUTO: 99 K/UL (ref 130–400)
POTASSIUM SERPL-SCNC: 3.3 MMOL/L (ref 3.5–5.1)
RBC # BLD AUTO: 2.43 M/UL (ref 4.7–6.1)
SODIUM SERPL-SCNC: 138 MMOL/L (ref 136–145)
WBC # BLD AUTO: 4 K/UL (ref 4.8–10.8)

## 2021-06-16 RX ADMIN — FOLIC ACID SCH MG: 1 TABLET ORAL at 08:53

## 2021-06-16 RX ADMIN — FUROSEMIDE SCH MG: 20 TABLET ORAL at 09:00

## 2021-06-16 RX ADMIN — NICOTINE SCH MG: 21 PATCH, EXTENDED RELEASE TRANSDERMAL at 08:50

## 2021-06-16 RX ADMIN — Medication SCH TAB: at 08:52

## 2021-06-16 RX ADMIN — FLUTICASONE PROPIONATE SCH: 50 SPRAY, METERED NASAL at 08:51

## 2021-06-16 RX ADMIN — Medication SCH MG: at 08:53

## 2021-06-16 NOTE — HOSPITALIST PROGRESS NOTE
Date of Service


June 16, 2021


 





Assessment & Plan


(1) Encephalopathy: 


      Acute metabolic encephalopathy


Multifactorial:


Alcohol withdrawal,Hyponatremia, ARF secondary to illness,Hypoxemic respiratory 

failure secondary to COPD exacerbation


Requiring mild sedation to suppress withdrawal symptoms


Remains hemodynamically stable


Patient was on CIWA protocol.


Today patient is awake, alert and oriented. 


Slowly blood pressure was on the soft side this morning.


Has not been requiring any anxiolytics


Does not have any significant withdrawal symptoms


Continue to work with PT/OT.


Case management is working on basement.





Acute blood loss anemia


Hemoglobin has been slowly trending down since admission.  Today hemoglobin at 

7.3.


Secondary to multiple sources of bleeding : 


UGI B in a newly diagnosed cirrhotic patient based on CT


 bleed (history chronic hematuria, metastatic prostate cancer status post 

surgery status post chemotherapy/Lupron therapy)


Received 4 unit of packed red cell transfusion and the hemoglobin went up to 

8.4.


Appreciate GI input and recommendation for possible EGD


No signs and or symptoms of infection


Hemoglobin is dropped to 7.3 without any evidence of bleeding


Will monitor-transfuse if hemoglobin less than 7








Traumatic left shoulder ecchymosis with fracture of the left humerus


Secondary to fall


Possibly contributing to low hemoglobin.


Ortho has been consulted-appreciate input and recommendation


Minimal pain in the left shoulder


Will have appointment at Ortho as an outpatient





Ronni hematuria


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


Status post urinary catheter


Appreciate urology input and recommendation


Hematuria is resolved now.





Nutrition


Patient has pulled his NG tube.  He is awake and alert.


He is on regular diet.





Hypertension, stable 


Blood pressure this morning on the soft side.  We will continue to monitor.





Seizure disorder (medication noncompliance) 


Has been getting phenobarbital as needed


Steroid-induced hyperglycemia rule out DM


Ongoing tobacco abuse





Possible COPD


Has been getting intravenous Solu-Medrol





Check hemoglobin A1c-5.1 as the patient is nondiabetic-5.1





Nicotine patch as needed





DVT prophylaxis.  SCDs RE left shoulder/GI/ bleed causing severe anemia





Full code





Patient's cousin (Mr. Denzel Ferrer, [phone])





Patient ex-girlfriend/alternative contact (Ms. Jamia Rebollar, [phone])





Care management is working on placement.


Admission and Anticipated Discharge Date


Admission Date: June 7, 2021








Subjective


Patient is awake, alert and oriented x3 this morning.  Does not have any major 

concerns.  Has any chest pain, shortness of breath, abdominal pain, dysuria or 

diarrhea.





Review of Systems








Review of Systems:   All systems reviewed & are unremarkable except as noted in 

HPI & below  








Physical Exam








Physical Exam:   General: A&Ox3.  Chronically ill-appearing gentleman


HENT: NCAT, MMM, EOMI


Eyes: PERRLA


Neck: Supple, normal range of motion


CVS: normal rate and rhythm


Resp: b/l breath sounds


Abdomen: Soft, nondistended nontender


Extremities: No c/c/e


Neuro: face symmetric, s no focal deficit


Skin: warm and dry, no rashes/lesions/errythema


MSK: normal ROM, no joint swelling/erythema


Alas catheter in place.











Results & Data


Results & Data ([place])


Vital Signs (Past 12 Hours)


                                   Vital Signs











  Temp Pulse Resp BP BP Pulse Ox


 


 06/16/21 07:26  36.7 C  80  18   106/62  96


 


 06/16/21 05:24     143/75 H  118/69

## 2021-06-17 LAB
ANION GAP SERPL CALC-SCNC: 7 MMOL/L (ref 3–11)
BUN SERPL-MCNC: 11 MG/DL (ref 7–18)
CALCIUM SERPL-MCNC: 7.7 MG/DL (ref 8.5–10.1)
CHLORIDE SERPL-SCNC: 108 MMOL/L (ref 98–107)
CO2 SERPL-SCNC: 23 MMOL/L (ref 21–32)
CREAT CL PREDICTED SERPL C-G-VRATE: 108.8 ML/MIN
DEPRECATED RDW RBC: 59.8 FL (ref 36.4–46.3)
GLUCOSE SERPL-MCNC: 91 MG/DL (ref 70–99)
HCT VFR BLD AUTO: 21.8 % (ref 42–52)
HGB BLD-MCNC: 6.9 G/DL (ref 14–18)
IMM GRANULOCYTES # BLD: 0.01 K/UL (ref 0–0.02)
IMM GRANULOCYTES NFR BLD AUTO: 0.2 %
MCH RBC QN AUTO: 30.3 PG (ref 25–34)
MCV RBC: 95.6 FL (ref 80–100)
PLATELET # BLD AUTO: 118 K/UL (ref 130–400)
POTASSIUM SERPL-SCNC: 3.4 MMOL/L (ref 3.5–5.1)
PSA SERPL-MCNC: < 0.01 NG/ML (ref 0–4)
RBC # BLD AUTO: 2.28 M/UL (ref 4.7–6.1)
SODIUM SERPL-SCNC: 138 MMOL/L (ref 136–145)
WBC # BLD AUTO: 4.53 K/UL (ref 4.8–10.8)

## 2021-06-17 RX ADMIN — Medication SCH TAB: at 09:11

## 2021-06-17 RX ADMIN — FOLIC ACID SCH MG: 1 TABLET ORAL at 09:11

## 2021-06-17 RX ADMIN — NICOTINE SCH MG: 21 PATCH, EXTENDED RELEASE TRANSDERMAL at 09:12

## 2021-06-17 RX ADMIN — FLUTICASONE PROPIONATE SCH: 50 SPRAY, METERED NASAL at 09:11

## 2021-06-17 RX ADMIN — FUROSEMIDE SCH: 20 TABLET ORAL at 09:11

## 2021-06-17 RX ADMIN — Medication SCH MG: at 09:11

## 2021-06-17 NOTE — HOSPITALIST PROGRESS NOTE
Date of Service


June 17, 2021


 





Assessment & Plan


(1) Encephalopathy: 


      Acute metabolic encephalopathy


Multifactorial:


Alcohol withdrawal,Hyponatremia, ARF secondary to illness,Hypoxemic respiratory 

failure secondary to COPD exacerbation


Requiring mild sedation to suppress withdrawal symptoms


Remains hemodynamically stable; but intermittently systolic pressures in the 

soft side.


Patient was on CIWA protocol.


Today patient is awake, alert and oriented. 


Has not been requiring any anxiolytics


Does not have any significant withdrawal symptoms


Continue to work with PT/OT.


Case management is working on placement.





Acute blood loss anemia


Hemoglobin has been slowly trending down since admission.  Today hemoglobin at 

6.9.  Ordered 1 unit of PRBC.


Secondary to multiple sources of bleeding : 


UGI B in a newly diagnosed cirrhotic patient based on CT


 bleed (history chronic hematuria, metastatic prostate cancer status post 

surgery status post chemotherapy/Lupron therapy)


Received 4 unit of packed red cell transfusion.  Today ordered 1 more.


Appreciate GI input and recommendation for possible EGD


No signs and or symptoms of infection


Hemoglobin is dropped to 6.9 this morning.  Ordered 1 unit of PRBC.  GI notified

for possible intervention.





Traumatic left shoulder ecchymosis with fracture of the left humerus


Secondary to fall


Possibly contributing to low hemoglobin.


Appreciate orthopedics input.


Minimal pain in the left shoulder


Will have appointment at Ortho as an outpatient





Ronni hematuria


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


Status post urinary catheter


Appreciate urology input and recommendation


Hematuria is resolved now.





Nutrition


Patient has pulled his NG tube.  He is awake and alert.


He is on regular diet.





Hypertension, stable 


Blood pressure this morning on the soft side.  We will continue to monitor.





Seizure disorder (medication noncompliance) 


Has been getting phenobarbital as needed


Ongoing tobacco abuse





Possible COPD


Stable





Check hemoglobin A1c-5.1 as the patient is nondiabetic-5.1





Nicotine patch as needed





DVT prophylaxis.  SCDs RE left shoulder/GI/ bleed causing severe anemia





Full code





Patient's cousin (Mr. Denzel Ferrer, [phone])





Patient ex-girlfriend/alternative contact (Ms. Jamia Rebollar, [phone])





Care management is working on placement.


Admission and Anticipated Discharge Date


Admission Date: June 7, 2021








Subjective


Patient was awake, alert and oriented x3.  Denies any chest pain, shortness of 

breath, abdominal pain, diarrhea, melena or bright red blood per rectum.  

Patient does complain of left shoulder pain.





Physical Exam








Physical Exam:   General: A&Ox3.  Chronically ill-appearing gentleman


HENT: NCAT, MMM, EOMI


Eyes: PERRLA


Neck: Supple, normal range of motion


CVS: normal rate and rhythm


Resp: b/l breath sounds


Abdomen: Soft, nondistended nontender


Extremities: No c/c/e


Neuro: face symmetric, s no focal deficit


Skin: warm and dry, no rashes/lesions/errythema


MSK: normal ROM, no joint swelling/erythema


Alas catheter in place.











Results & Data


Results & Data ([facility])


Vital Signs (Past 12 Hours)


                                   Vital Signs











  Temp Pulse Pulse Resp BP BP Pulse Ox


 


 06/17/21 14:05  37 C  75   20  99/60 L   96


 


 06/17/21 13:35  37 C  85   18  96/57 L   96


 


 06/17/21 13:20  37.1 C  86   18  106/68   97


 


 06/17/21 13:09  37.0 C  90   18  117/75  


 


 06/17/21 13:07  37.0 C  90   18  117/75  


 


 06/17/21 10:45  36.8 C  79   16  115/78  


 


 06/17/21 09:45  36.5 C  78   16  96/62 L   97


 


 06/17/21 09:00  36.9 C  91 H   16  96/59 L   95


 


 06/17/21 08:42  37 C  82   18  91/53 L  


 


 06/17/21 07:21  37 C   79  16   96/55 L  95

## 2021-06-18 LAB
DEPRECATED RDW RBC: 54.5 FL (ref 36.4–46.3)
HCT VFR BLD AUTO: 31.5 % (ref 42–52)
HGB BLD-MCNC: 10.3 G/DL (ref 14–18)
IMM GRANULOCYTES # BLD: 0.01 K/UL (ref 0–0.02)
IMM GRANULOCYTES NFR BLD AUTO: 0.2 %
MCH RBC QN AUTO: 30.1 PG (ref 25–34)
MCV RBC: 92.1 FL (ref 80–100)
PLATELET # BLD AUTO: 111 K/UL (ref 130–400)
RBC # BLD AUTO: 3.42 M/UL (ref 4.7–6.1)
WBC # BLD AUTO: 4.36 K/UL (ref 4.8–10.8)

## 2021-06-18 RX ADMIN — Medication SCH MG: at 11:49

## 2021-06-18 RX ADMIN — Medication SCH TAB: at 11:49

## 2021-06-18 RX ADMIN — FLUTICASONE PROPIONATE SCH SPRAYS: 50 SPRAY, METERED NASAL at 11:49

## 2021-06-18 RX ADMIN — FUROSEMIDE SCH MG: 20 TABLET ORAL at 11:49

## 2021-06-18 RX ADMIN — NICOTINE SCH MG: 21 PATCH, EXTENDED RELEASE TRANSDERMAL at 11:49

## 2021-06-18 RX ADMIN — FOLIC ACID SCH MG: 1 TABLET ORAL at 11:49

## 2021-06-18 NOTE — HOSPITALIST PROGRESS NOTE
Date of Service


June 18, 2021


 





Assessment & Plan


(1) Encephalopathy: 


      Acute metabolic encephalopathy


Multifactorial:


Alcohol withdrawal,Hyponatremia, ARF secondary to illness,Hypoxemic respiratory 

failure secondary to COPD exacerbation


Requiring mild sedation to suppress withdrawal symptoms


Remains hemodynamically stable; but intermittently systolic pressures in the 

soft side.


Patient was on CIWA protocol.


Today patient is awake, alert and oriented. 


Has not been requiring any anxiolytics


Does not have any significant withdrawal symptoms


Continue to work with PT/OT.


Case management is working on placement.





Acute blood loss anemia


Hemoglobin has been slowly trending down since admission.  Hemoglobin at 6.9 on 

6/17.  Ordered 1 unit of PRBC. Today Hgb >10.


Secondary to multiple sources of bleeding : 


UGI B in a newly diagnosed cirrhotic patient based on CT


 bleed (history chronic hematuria, metastatic prostate cancer status post 

surgery status post chemotherapy/Lupron therapy)


Received 4 unit of packed red cell transfusion.  Today ordered 1 more.


No signs and or symptoms of infection


GI was consulted again and requested evaluation.





Traumatic left shoulder ecchymosis with fracture of the left humerus


Secondary to fall


Possibly contributing to low hemoglobin.


Appreciate orthopedics input.


Minimal pain in the left shoulder


Will have appointment at Ortho as an outpatient





Ronni hematuria


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


Status post urinary catheter


Appreciate urology input and recommendation


Hematuria is resolved now.





Nutrition


Patient has pulled his NG tube.  He is awake and alert.


He is on regular diet.





Hypertension, stable 


Blood pressure this morning on the soft side.  We will continue to monitor.





Seizure disorder (medication noncompliance) 


Has been getting phenobarbital as needed


Ongoing tobacco abuse





Possible COPD


Stable





Check hemoglobin A1c-5.1 as the patient is nondiabetic-5.1





Nicotine patch as needed





DVT prophylaxis.  SCDs RE left shoulder/GI/ bleed causing severe anemia





Full code





Patient's cousin (Mr. Denzel Ferrer, [phone])





Patient ex-girlfriend/alternative contact (Ms. Jamia Rebollar, [phone])





Care management is working on placement.


Admission and Anticipated Discharge Date


Admission Date: June 7, 2021








Subjective


Patient is doing okay this morning.  Hemoglobin is improved.  Review of system 

is negative.





Review of Systems








Review of Systems:   All systems reviewed & are unremarkable except as noted in 

HPI & below  








Physical Exam








Physical Exam:   General: A&Ox3.  Chronically ill-appearing gentleman


HENT: NCAT, MMM, EOMI


Eyes: PERRLA


Neck: Supple, normal range of motion


CVS: normal rate and rhythm


Resp: b/l breath sounds


Abdomen: Soft, nondistended nontender


Extremities: No c/c/e


Neuro: face symmetric, s no focal deficit


Skin: warm and dry, no rashes/lesions/errythema


MSK: normal ROM, no joint swelling/erythema


Alas catheter in place.











Results & Data


Results & Data ([facility])


Vital Signs (Past 12 Hours)


                                   Vital Signs











  Temp Pulse Resp BP Pulse Ox


 


 06/18/21 07:25  37 C  71  18  120/67  95

## 2021-06-19 LAB
ALBUMIN SERPL-MCNC: 2.4 GM/DL (ref 3.4–5)
ALBUMIN/GLOB SERPL: 0.6 {RATIO} (ref 0.9–2)
ALP SERPL-CCNC: 220 U/L (ref 45–117)
ALT SERPL-CCNC: 22 U/L (ref 12–78)
ANION GAP SERPL CALC-SCNC: 6 MMOL/L (ref 3–11)
BILIRUB SERPL-MCNC: 0.8 MG/DL (ref 0.2–1)
BUN SERPL-MCNC: 17 MG/DL (ref 7–18)
CALCIUM SERPL-MCNC: 8.2 MG/DL (ref 8.5–10.1)
CHLORIDE SERPL-SCNC: 105 MMOL/L (ref 98–107)
CO2 SERPL-SCNC: 27 MMOL/L (ref 21–32)
CREAT CL PREDICTED SERPL C-G-VRATE: 85.5 ML/MIN
DEPRECATED RDW RBC: 55.8 FL (ref 36.4–46.3)
GLOBULIN SER CALC-MCNC: 4.3 GM/DL (ref 2.5–4)
GLUCOSE SERPL-MCNC: 101 MG/DL (ref 70–99)
HCT VFR BLD AUTO: 30.4 % (ref 42–52)
HGB BLD-MCNC: 9.8 G/DL (ref 14–18)
IMM GRANULOCYTES # BLD: 0.01 K/UL (ref 0–0.02)
IMM GRANULOCYTES NFR BLD AUTO: 0.2 %
MCH RBC QN AUTO: 30.1 PG (ref 25–34)
MCV RBC: 93.3 FL (ref 80–100)
PLATELET # BLD AUTO: 126 K/UL (ref 130–400)
POTASSIUM SERPL-SCNC: 3.4 MMOL/L (ref 3.5–5.1)
PROT SERPL-MCNC: 6.7 GM/DL (ref 6.4–8.2)
RBC # BLD AUTO: 3.26 M/UL (ref 4.7–6.1)
SODIUM SERPL-SCNC: 138 MMOL/L (ref 136–145)
WBC # BLD AUTO: 5.84 K/UL (ref 4.8–10.8)

## 2021-06-19 RX ADMIN — FOLIC ACID SCH MG: 1 TABLET ORAL at 09:58

## 2021-06-19 RX ADMIN — NICOTINE SCH MG: 21 PATCH, EXTENDED RELEASE TRANSDERMAL at 09:58

## 2021-06-19 RX ADMIN — Medication SCH MG: at 09:58

## 2021-06-19 RX ADMIN — FUROSEMIDE SCH MG: 20 TABLET ORAL at 09:58

## 2021-06-19 RX ADMIN — FLUTICASONE PROPIONATE SCH SPRAYS: 50 SPRAY, METERED NASAL at 09:58

## 2021-06-19 RX ADMIN — Medication SCH TAB: at 09:58

## 2021-06-19 NOTE — HOSPITALIST PROGRESS NOTE
Date of Service


June 19, 2021


 





Assessment & Plan


(1) Encephalopathy: 


      Acute metabolic encephalopathy


Multifactorial:


Alcohol withdrawal,Hyponatremia, ARF secondary to illness,Hypoxemic respiratory 

failure secondary to COPD exacerbation


Requiring mild sedation to suppress withdrawal symptoms


Remains hemodynamically stable.


Patient was on CIWA protocol.


Today patient is awake, alert and oriented. 


Has not been requiring any anxiolytics


Does not have any significant withdrawal symptoms


Continue to work with PT/OT.


Case management is working on placement.





Acute blood loss anemia


Hemoglobin has been slowly trending down since admission.  


Secondary to multiple sources of bleeding:


   UGI B in a newly diagnosed cirrhotic patient based on CT


    bleed (history chronic hematuria, metastatic prostate cancer status post 

surgery status post chemotherapy/Lupron therapy)


Received 5 unit of packed red cell transfusion. 


No signs and or symptoms of infection


GI was consulted again and requested evaluation for any possible GI source.


CBC pending today.





Traumatic left shoulder ecchymosis with fracture of the left humerus


Secondary to fall


Possibly contributing to low hemoglobin.


Appreciate orthopedics input.


Minimal pain in the left shoulder


Will have appointment at Ortho as an outpatient





Ronni hematuria


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


Status post urinary catheter.


Appreciate urology input and recommendation


Found to have significant gross hematuria today.  Urology notified today 

reevaluate him tomorrow.





Acute kidney injury


Resolved now.





Nutrition


On regular diet.  Tolerating well.





Hypertension, stable 


Intermittently hypotensive.





Seizure disorder (medication noncompliance) 


Has been getting phenobarbital as needed


Ongoing tobacco abuse





Possible COPD


Stable





Check hemoglobin A1c-5.1 as the patient is nondiabetic-5.1





Nicotine patch as needed





DVT prophylaxis.  SCDs RE left shoulder/GI/ bleed causing severe anemia





Full code





Patient's cousin (Mr. Denzel Ferrer, [phone])





Patient ex-girlfriend/alternative contact (Ms. Jamia Rebollar, [phone])





Care management is working on placement.


Admission and Anticipated Discharge Date


Admission Date: June 7, 2021








Subjective


Patient continues to have significant gross hematuria.  Rest of the review of 

system is negative.  Denies any melena or bright red blood per rectum.  Denies 

any pain in the left upper extremity today.





Review of Systems








Review of Systems:   All systems reviewed & are unremarkable except as noted in 

HPI & below  








Physical Exam








Physical Exam:   General: A&Ox3.  Chronically ill-appearing gentleman


HENT: NCAT, MMM, EOMI


Eyes: PERRLA


Neck: Supple, normal range of motion


CVS: normal rate and rhythm


Resp: b/l breath sounds


Abdomen: Soft, nondistended nontender


Extremities: No c/c/e


Neuro: face symmetric, s no focal deficit


Skin: warm and dry, no rashes/lesions/errythema


MSK: normal ROM, no joint swelling/erythema


Alas catheter in place with gross hematuria.











Results & Data


Results & Data ([facility])


Vital Signs (Past 12 Hours)


                                   Vital Signs











  Temp Pulse Resp BP Pulse Ox


 


 06/19/21 07:54  36.8 C  82  16  117/67  97

## 2021-06-20 RX ADMIN — Medication SCH TAB: at 08:11

## 2021-06-20 RX ADMIN — FLUTICASONE PROPIONATE SCH SPRAYS: 50 SPRAY, METERED NASAL at 08:11

## 2021-06-20 RX ADMIN — FUROSEMIDE SCH MG: 20 TABLET ORAL at 08:11

## 2021-06-20 RX ADMIN — NICOTINE SCH MG: 21 PATCH, EXTENDED RELEASE TRANSDERMAL at 08:10

## 2021-06-20 RX ADMIN — Medication SCH MG: at 08:11

## 2021-06-20 RX ADMIN — FOLIC ACID SCH MG: 1 TABLET ORAL at 08:11

## 2021-06-20 NOTE — HOSPITALIST PROGRESS NOTE
Date of Service


June 20, 2021


 





Assessment & Plan


(1) Encephalopathy: 


      


Acute metabolic encephalopathy


Multifactorial:Alcohol withdrawal,Hyponatremia, LARA, Hypoxemic respiratory 

failure secondary to COPD exacerbation


CT head:No acute intracranial findings. No change in appearance of the brain.


Mental status back to baseline


Continue thiamine, folic acid


PT/OT 








Acute blood loss anemia


Secondary to multiple sources of bleeding:GI bleed,  bleed--hematuria, anemia 

of chronic disease


H/O Metastatic prostate cancer S/P surgery, chemotherapy/Lupron 


Appreciate GI, urology input


S/P 5 units PRBCs


Continue PPI


Hb 9.8


Hematuria  resolved











Traumatic left shoulder ecchymosis with fracture of the left humerus


Secondary to fall


Appreciate orthopedics input.


Conservative management


Maintain sling immobilization, nonweightbearing left upper extremity


Pin control 


May need repeat x-ray in 2 weeks and follow-up with orthopedics upon discharge














Ronni hematuria


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


Appreciate Urology Input 


Monitor CBC 











Acute kidney injury


Resolved 


Monitor renal function








Hypokalemia


Replace electrolytes as needed








Hypertension


stable 








Seizure disorder


H/O Medication Noncompliance 


Has been getting phenobarbital as needed


No seizure activity since hospitalization


Follow-up with neurology as outpatient











Ongoing tobacco abuse


Nicotine patch


Counseled to quit











Possible COPD


Stable











DVT Px:


SCDs RE Anemia 








Code Status 


Full code








Disposition 


SNF when accepted 











Admission and Anticipated Discharge Date


Admission Date: June 7, 2021








Subjective





Patient is seen and examined at bedside


Hematuria resolved


States having left shoulder pain, significant with movement


Reports generalized weakness


Denies chest pain, shortness of breath, dizziness, nausea, abdominal pain











 





Review of Systems








Review of Systems:   All systems reviewed & are unremarkable except as noted in 

HPI & below  








Physical Exam








Physical Exam:   








Physical Exam:


Vitals signs as noted above 


General Appearance:Moderately built and nourished, no apparent distress, Ill 

appearing 


Head:  normocephalic, Atraumatic 


Eyes:  normal inspection, EOMI


Neck:  supple, Trachea midline


Respiratory/Chest: Normal breath sounds, CTA


Cardiovascular: S1, S2, No murmur


Abdomen/GI:Soft, Non tender, Bowel sounds present


Extremities/Musculoskeletal:normal inspection, no edema, LUE in sling   


Neurologic/Psych:AAOX3, grossly no focal neurological deficits 


Skin:  normal color, warm














Results & Data


Results & Data ([facility])


Vital Signs (Past 12 Hours)


                                   Vital Signs











  Temp Pulse Resp BP Pulse Ox


 


 06/20/21 06:26  36.8 C  70  16  122/71  95











Laboratory Results





                                    Short CBC











  06/19/21 Range/Units





  15:56 


 


WBC  5.84  (4.8-10.8)  K/uL


 


Hgb  9.8 L  (14.0-18.0)  g/dL


 


Hct  30.4 L  (42-52)  %


 


Plt Count  126 L  (130-400)  K/uL








                                       BMP











  06/19/21





  15:56


 


Sodium  138


 


Potassium  3.4 L


 


Chloride  105


 


Carbon Dioxide  27


 


BUN  17


 


Creatinine  0.85


 


Glucose  101 H


 


Calcium  8.2 L








                                 Liver Function











  06/19/21 Range/Units





  15:56 


 


Total Bilirubin  0.8  (0.2-1)  mg/dl


 


AST  45 H  (15-37)  U/L


 


ALT  22  (12-78)  U/L


 


Alkaline Phosphatase  220 H  ()  U/L


 


Albumin  2.4 L  (3.4-5.0)  gm/dl

## 2021-06-20 NOTE — UROLOGY PROGRESS NOTE
Date of Service


June 20, 2021


 





Assessment & Plan


(1) Prostate cancer: 


      Castrate sensitive metastatic prostate cancer


PSA undetectable


Prior radiation now causing radiation cystitis plus or minus UTI


Continue the Alas catheter throughout the day today, if his mobility improves I

would like to have the catheter removed tomorrow or Tuesday


He does not exhibit any current hematuriano interventions required at this 

stage from a blood standpoint


Admission and Anticipated Discharge Date


Admission Date: June 7, 2021








Subjective


We have been following Mr. aPge by earlier during his hospitalization for 

hematuriabelieved to be secondary to radiation cystitis


He had cleared and we had signed off on the consultation


He has unfortunately started to bleed again earlier this weekend and did require

transfusion


He still has a Alas catheter and reports that he is not mobile enough to have 

the catheter removed yet


His urine now has cleared again


He did receive a transfusion yesterday and his labs remained stable today 





Physical Exam








Physical Exam:   Alas draining clear yellow urine without clots or blood


 


Constitutional:   well developed and well nourished  


 


Respiratory:   no respiratory distress  


 


Cardiovascular:   Extremities: no pedal edema  


 


Gastrointestinal (Abdomen):   Inspection/Auscultation: abdomen normal to 

inspection  








Results & Data ([facility])


Vital Signs (Past 12 Hours)


                                   Vital Signs











  Temp Pulse Resp BP Pulse Ox


 


 06/20/21 06:26  36.8 C  70  16  122/71  95














PG Care Time/CCT


Total # of Minutes Spent


Total Time Spent with Patient: Total time spent is greater than 50% in 

coordination of care (as documented) at patient's floor/unit and/or counseling 

patient:








Coding


Level of Care Code


15905 Subseq Hosp Care Lvl 3





Diagnoses


Prostate cancer  C61

## 2021-06-21 LAB
ANION GAP SERPL CALC-SCNC: 8 MMOL/L (ref 3–11)
BUN SERPL-MCNC: 18 MG/DL (ref 7–18)
CALCIUM SERPL-MCNC: 8.7 MG/DL (ref 8.5–10.1)
CHLORIDE SERPL-SCNC: 106 MMOL/L (ref 98–107)
CO2 SERPL-SCNC: 26 MMOL/L (ref 21–32)
CREAT CL PREDICTED SERPL C-G-VRATE: 80.8 ML/MIN
DEPRECATED RDW RBC: 54.8 FL (ref 36.4–46.3)
GLUCOSE SERPL-MCNC: 90 MG/DL (ref 70–99)
HCT VFR BLD AUTO: 32.8 % (ref 42–52)
HGB BLD-MCNC: 10.6 G/DL (ref 14–18)
MAGNESIUM SERPL-MCNC: 1.5 MG/DL (ref 1.8–2.4)
MCH RBC QN AUTO: 30 PG (ref 25–34)
MCV RBC: 92.9 FL (ref 80–100)
PLATELET # BLD AUTO: 134 K/UL (ref 130–400)
POTASSIUM SERPL-SCNC: 4 MMOL/L (ref 3.5–5.1)
RBC # BLD AUTO: 3.53 M/UL (ref 4.7–6.1)
SODIUM SERPL-SCNC: 139 MMOL/L (ref 136–145)
WBC # BLD AUTO: 5.71 K/UL (ref 4.8–10.8)

## 2021-06-21 RX ADMIN — FUROSEMIDE SCH MG: 20 TABLET ORAL at 08:42

## 2021-06-21 RX ADMIN — MAGNESIUM SULFATE IN DEXTROSE SCH MLS/HR: 10 INJECTION, SOLUTION INTRAVENOUS at 12:01

## 2021-06-21 RX ADMIN — FOLIC ACID SCH MG: 1 TABLET ORAL at 08:42

## 2021-06-21 RX ADMIN — MAGNESIUM SULFATE IN DEXTROSE SCH MLS/HR: 10 INJECTION, SOLUTION INTRAVENOUS at 10:43

## 2021-06-21 RX ADMIN — Medication SCH MG: at 08:43

## 2021-06-21 RX ADMIN — FLUTICASONE PROPIONATE SCH: 50 SPRAY, METERED NASAL at 08:41

## 2021-06-21 RX ADMIN — NICOTINE SCH MG: 21 PATCH, EXTENDED RELEASE TRANSDERMAL at 08:42

## 2021-06-21 RX ADMIN — Medication SCH TAB: at 08:42

## 2021-06-21 NOTE — UROLOGY PROGRESS NOTE
Date of Service


June 21, 2021


 





Assessment & Plan


(1) Gross hematuria: 


      Castrate sensitive metastatic prostate cancer


Prior radiation now causing radiation cystitis plus or minus UTI


Afebrile, lab work reviewed - Hgb improved to 10.6, creatinine 0.90


Alas catheter intact, patent and draining cherry red urine. After 

repositioning, urine cleared to lighter red


Continue the Alas catheter throughout the day today, possible voiding trial 

tomorrow if urine clears


Continue supportive care and management per primary service


Will continue to follow





Admission and Anticipated Discharge Date


Admission Date: June 7, 2021








Subjective


Patient awake and sitting up in bedside chair


No complaints at present


No abdominal pain or suprapubic pain


Tolerating Alas catheter


Alas catheter intact, patent and draining cherry red urine, no clots noted


Of note, during exam he was sitting in chair with legs crossed, catheter under 

the crossed over leg


After repositioning, urine seemed to clear slightly to a lighter tinged red


No nausea or vomiting


No fever or chills





No additional  concerns today.





Chart review: Afebrile. Creatinine 0.90, WBC 5.71, Hgb 10.6 





Review of Systems








Constitutional:   as per Subjective / HPI  


 


Gastrointestinal:   as per Subjective / HPI  


 


Genitourinary:   + as per Subjective / HPI  








Physical Exam








Constitutional:   well developed and well nourished; no acute distress and not 

ill appearing  


 


Respiratory:   normal respiratory effort and able to speak in complete 

sentences; no respiratory distress and no labored breathing  


 


Cardiovascular:   Extremities: no pedal edema  


 


Gastrointestinal (Abdomen):   Inspection/Auscultation: abdomen normal to 

inspection; abdomen not distended  


 


Musculoskeletal:   Head/Neck/Chest: normocephalic and head atraumatic  


 


Skin:   no rashes, warm and dry  


 


Neurologic:   moves all extremities and awake  


 


Psychiatric:   Orientation: alert and oriented x 3  


 


Genitourinary:   Alas catheter intact, patent and draining cherry red urine, no

clots noted


During exam he was sitting in chair with legs crossed, catheter under the 

crossed over leg


After repositioning, urine seemed to clear slightly to a lighter tinged red








Results & Data ([facility])


Vital Signs (Past 12 Hours)


                                   Vital Signs











  Temp Pulse Resp BP Pulse Ox


 


 06/21/21 07:13  36.8 C  88  16  132/80  98














PG Care Time/CCT


Total # of Minutes Spent


Total Time Spent with Patient: Total time spent is greater than 50% in 

coordination of care (as documented) at patient's floor/unit and/or counseling 

patient:








Coding


Level of Care Code


76554 Subseq Hosp Care Lvl 2





Diagnoses


Gross hematuria  R31.0

## 2021-06-21 NOTE — HOSPITALIST PROGRESS NOTE
Date of Service


June 21, 2021


 





Assessment & Plan


(1) Encephalopathy: 


      


Acute metabolic encephalopathy


Multifactorial:Alcohol withdrawal,Hyponatremia, LARA, Hypoxemic respiratory 

failure secondary to COPD exacerbation


CT head:No acute intracranial findings. No change in appearance of the brain.


Mental status back to baseline


Continue thiamine, folic acid


Continue PT/OT 











Acute blood loss anemia


Secondary to multiple sources of bleeding:GI bleed,  bleed--hematuria, anemia 

of chronic disease


H/O Metastatic prostate cancer S/P surgery, chemotherapy/Lupron 


Appreciate GI, urology input


S/P 5 units PRBCs


Continue PPI


Hb 9.8>10.6


Hematuria  resolved











Traumatic left shoulder ecchymosis with fracture of the left humerus


Secondary to fall


Appreciate orthopedics input.


Conservative management


Maintain sling immobilization, nonweightbearing left upper extremity


Pin control 


May need repeat x-ray in 2 weeks and follow-up with orthopedics upon discharge














Ronni hematuria


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


Appreciate Urology Input 


Monitor CBC 


Plan for voiding trial tomorrow


Still draining cherry red urine











Acute kidney injury


Resolved 


Monitor renal function








Hypokalemia


Replace electrolytes as needed








Hypertension


stable 








Seizure disorder


H/O Medication Noncompliance 


Has been getting phenobarbital as needed


No seizure activity since hospitalization


Follow-up with neurology as outpatient











Ongoing tobacco abuse


Nicotine patch


Counseled to quit











Possible COPD


Stable











DVT Px:


SCDs RE Anemia 








Code Status 


Full code








Disposition 


SNF when accepted 











Admission and Anticipated Discharge Date


Admission Date: June 7, 2021








Subjective


Patient is seen and examined at bedside


Urinary and urine noted on Alas catheter


Plan for voiding trial tomorrow 


Reports left shoulder pain


Denies chest pain, shortness of breath, dizziness, nausea, abdominal pain








 





Review of Systems








Review of Systems:   All systems reviewed & are unremarkable except as noted in 

HPI & below  








Physical Exam








Physical Exam:   








Physical Exam:


Vitals signs as noted above 


General Appearance:Moderately built and nourished, no apparent distress, Ill 

appearing 


Head:  normocephalic, Atraumatic 


Eyes:  normal inspection, EOMI


Neck:  supple, Trachea midline


Respiratory/Chest: Normal breath sounds, CTA


Cardiovascular: S1, S2, No murmur


Abdomen/GI:Soft, Non tender, Bowel sounds present


Extremities/Musculoskeletal:normal inspection, no edema, LUE in sling   


Neurologic/Psych:AAOX3, grossly no focal neurological deficits 


Skin:  normal color, warm














Results & Data


Results & Data ([facility])


Vital Signs (Past 12 Hours)


                                   Vital Signs











  Temp Pulse Resp BP Pulse Ox


 


 06/21/21 15:25  36.5 C  68  16  108/63  99


 


 06/21/21 07:13  36.8 C  88  16  132/80  98











Laboratory Results





                                    Short CBC











  06/21/21 Range/Units





  06:32 


 


WBC  5.71  (4.8-10.8)  K/uL


 


Hgb  10.6 L  (14.0-18.0)  g/dL


 


Hct  32.8 L  (42-52)  %


 


Plt Count  134  (130-400)  K/uL








                                       BMP











  06/21/21





  06:32


 


Sodium  139


 


Potassium  4.0  D


 


Chloride  106


 


Carbon Dioxide  26


 


BUN  18


 


Creatinine  0.90


 


Glucose  90


 


Calcium  8.7

## 2021-06-22 LAB
ANION GAP SERPL CALC-SCNC: 4 MMOL/L (ref 3–11)
BUN SERPL-MCNC: 17 MG/DL (ref 7–18)
CALCIUM SERPL-MCNC: 8.4 MG/DL (ref 8.5–10.1)
CHLORIDE SERPL-SCNC: 106 MMOL/L (ref 98–107)
CO2 SERPL-SCNC: 27 MMOL/L (ref 21–32)
CREAT CL PREDICTED SERPL C-G-VRATE: 86.5 ML/MIN
DEPRECATED RDW RBC: 58 FL (ref 36.4–46.3)
GLUCOSE SERPL-MCNC: 87 MG/DL (ref 70–99)
HCT VFR BLD AUTO: 30.6 % (ref 42–52)
HGB BLD-MCNC: 9.8 G/DL (ref 14–18)
MCH RBC QN AUTO: 30.2 PG (ref 25–34)
MCV RBC: 94.4 FL (ref 80–100)
PLATELET # BLD AUTO: 151 K/UL (ref 130–400)
POTASSIUM SERPL-SCNC: 3.8 MMOL/L (ref 3.5–5.1)
RBC # BLD AUTO: 3.24 M/UL (ref 4.7–6.1)
SODIUM SERPL-SCNC: 137 MMOL/L (ref 136–145)
WBC # BLD AUTO: 6.12 K/UL (ref 4.8–10.8)

## 2021-06-22 RX ADMIN — NICOTINE SCH MG: 21 PATCH, EXTENDED RELEASE TRANSDERMAL at 10:00

## 2021-06-22 RX ADMIN — FOLIC ACID SCH MG: 1 TABLET ORAL at 10:01

## 2021-06-22 RX ADMIN — Medication SCH MG: at 10:01

## 2021-06-22 RX ADMIN — FUROSEMIDE SCH MG: 20 TABLET ORAL at 10:04

## 2021-06-22 RX ADMIN — FLUTICASONE PROPIONATE SCH: 50 SPRAY, METERED NASAL at 10:01

## 2021-06-22 RX ADMIN — Medication SCH TAB: at 10:01

## 2021-06-22 NOTE — HOSPITALIST PROGRESS NOTE
Date of Service


June 22, 2021


 





Assessment & Plan


(1) Encephalopathy: 


      


Acute metabolic encephalopathy-Resolved 


Multifactorial:Alcohol withdrawal,Hyponatremia, LARA, Hypoxemic respiratory 

failure secondary to COPD exacerbation


CT head:No acute intracranial findings. No change in appearance of the brain.


Mental status back to baseline


Continue thiamine, folic acid


Continue PT/OT 











Acute blood loss anemia


Secondary to multiple sources of bleeding:GI bleed,  bleed--hematuria, anemia 

of chronic disease


H/O Metastatic prostate cancer S/P surgery, chemotherapy/Lupron 


Appreciate GI, urology input


S/P 5 units PRBCs


Continue PPI


Hb 9.8


Hb stable, Monitor  











Traumatic left shoulder ecchymosis with fracture of the left humerus


Secondary to fall


Appreciate orthopedics input.


Conservative management


Maintain sling immobilization, nonweightbearing left upper extremity


Pin control 


May need repeat x-ray in 2 weeks and follow-up with orthopedics upon discharge














Ronni hematuria


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


Appreciate Urology Input 


Monitor CBC 


Voiding trial today 














Acute kidney injury


Resolved 


Monitor renal function








Hypokalemia


Replace electrolytes as needed








Hypertension


stable 








Seizure disorder


H/O Medication Noncompliance 


Has been getting phenobarbital as needed


No seizure activity since hospitalization


Follow-up with neurology as outpatient











Ongoing tobacco abuse


Nicotine patch


Counseled to quit











Possible COPD


Stable











DVT Px:


SCDs RE Anemia 








Code Status 


Full code








Disposition 


SNF when accepted 











Admission and Anticipated Discharge Date


Admission Date: June 7, 2021








Subjective








Patient is seen and examined at bedside


No new complaints 


Left shoulder pain is controlled 


Denies chest pain, shortness of breath, dizziness, nausea, abdominal pain


Voiding trial today as per Urology 











 





Review of Systems








Review of Systems:   All systems reviewed & are unremarkable except as noted in 

HPI & below  








Physical Exam








Physical Exam:   








Physical Exam:


Vitals signs as noted above 


General Appearance:Moderately built and nourished, no apparent distress, Ill 

appearing 


Head:  normocephalic, Atraumatic 


Eyes:  normal inspection, EOMI


Neck:  supple, Trachea midline


Respiratory/Chest: Normal breath sounds, CTA


Cardiovascular: S1, S2, No murmur


Abdomen/GI:Soft, Non tender, Bowel sounds present


Extremities/Musculoskeletal:normal inspection, no edema, LUE in sling   


Neurologic/Psych:AAOX3, grossly no focal neurological deficits 


Skin:  normal color, warm














Results & Data


Results & Data ([facility])


Vital Signs (Past 12 Hours)


                                   Vital Signs











  Temp Pulse Resp BP Pulse Ox


 


 06/22/21 15:27  36.8 C  64  16  129/77  96


 


 06/22/21 10:03     109/63 


 


 06/22/21 07:26  37.0 C  67  16  102/53 L  95











Laboratory Results





                                    Short CBC











  06/22/21 Range/Units





  05:47 


 


WBC  6.12  (4.8-10.8)  K/uL


 


Hgb  9.8 L  (14.0-18.0)  g/dL


 


Hct  30.6 L  (42-52)  %


 


Plt Count  151  (130-400)  K/uL








                                       BMP











  06/22/21





  05:47


 


Sodium  137


 


Potassium  3.8


 


Chloride  106


 


Carbon Dioxide  27


 


BUN  17


 


Creatinine  0.84


 


Glucose  87


 


Calcium  8.4 L

## 2021-06-22 NOTE — UROLOGY PROGRESS NOTE
Date of Service


June 22, 2021


 





Assessment & Plan


(1) Gross hematuria: 


      


- Castrate sensitive metastatic prostate cancer.


- Prior radiation now causing radiation cystitis plus or minus UTI.


- Afebrile, lab work reviewed - Hgb 9.8 today, creatinine 0.84.


- Plan of care reviewed with Dr. Palomo. 


- Will attempt voiding trial this morning and monitor patient's ability to 

spontaneously void.


- Bladder scan as needed.


- Continue supportive care and management per primary service.


- Will continue to follow closely. 


Admission and Anticipated Discharge Date


Admission Date: June 7, 2021








Subjective


Patient awake and resting in bed on arrival. 


No complaints of pain at present. 


Tolerating Alas catheter.


Alas catheter intact, patent and draining dark red urine, a few small clots 

noted in tubing. 


No fever or chills.


Denies nausea and vomiting. 





No additional  concerns today.





Chart review: Afebrile. Creatinine 0.84, WBC 6.12, Hgb 9.8 





Review of Systems








Constitutional:   as per Subjective / HPI  


 


Gastrointestinal:   as per Subjective / HPI  


 


Genitourinary:   + as per Subjective / HPI  








Physical Exam








Constitutional:   well developed and well nourished; no acute distress  


 


Respiratory:   no labored breathing and no audible wheezes  


 


Cardiovascular:   Extremities: no calf tenderness  


 


Gastrointestinal (Abdomen):   Percussion/Palpation: abdomen soft; abdomen non

tender and no guarding  


 


Musculoskeletal:   Head/Neck/Chest: normocephalic  


 


Neurologic:   awake  


 


Psychiatric:   Orientation: alert, oriented x 3 and cooperative  


 


Genitourinary:   no CVA tenderness    Alas catheter intact








Results & Data ([facility])


Vital Signs (Past 12 Hours)


                                   Vital Signs











  Temp Pulse Resp BP Pulse Ox


 


 06/22/21 07:26  37.0 C  67  16  102/53 L  95


 


 06/21/21 22:24  36.8 C  68  16  93/56 L  96














PG Care Time/CCT


Total # of Minutes Spent


Total Time Spent with Patient: Total time spent is greater than 50% in 

coordination of care (as documented) at patient's floor/unit and/or counseling 

patient:








Coding


Level of Care Code


48836 Subseq Hosp Care Lvl 2





Diagnoses


Gross hematuria  R31.0

## 2021-06-23 LAB
ANION GAP SERPL CALC-SCNC: 4 MMOL/L (ref 3–11)
BUN SERPL-MCNC: 19 MG/DL (ref 7–18)
CALCIUM SERPL-MCNC: 9 MG/DL (ref 8.5–10.1)
CHLORIDE SERPL-SCNC: 106 MMOL/L (ref 98–107)
CO2 SERPL-SCNC: 27 MMOL/L (ref 21–32)
CREAT CL PREDICTED SERPL C-G-VRATE: 84.4 ML/MIN
DEPRECATED RDW RBC: 57.3 FL (ref 36.4–46.3)
GLUCOSE SERPL-MCNC: 90 MG/DL (ref 70–99)
HCT VFR BLD AUTO: 33.6 % (ref 42–52)
HGB BLD-MCNC: 10.5 G/DL (ref 14–18)
MCH RBC QN AUTO: 30 PG (ref 25–34)
MCV RBC: 96 FL (ref 80–100)
PLATELET # BLD AUTO: 157 K/UL (ref 130–400)
POTASSIUM SERPL-SCNC: 3.7 MMOL/L (ref 3.5–5.1)
RBC # BLD AUTO: 3.5 M/UL (ref 4.7–6.1)
SODIUM SERPL-SCNC: 138 MMOL/L (ref 136–145)
WBC # BLD AUTO: 5.3 K/UL (ref 4.8–10.8)

## 2021-06-23 RX ADMIN — FUROSEMIDE SCH: 20 TABLET ORAL at 08:39

## 2021-06-23 RX ADMIN — Medication SCH MG: at 08:37

## 2021-06-23 RX ADMIN — NICOTINE SCH MG: 21 PATCH, EXTENDED RELEASE TRANSDERMAL at 08:38

## 2021-06-23 RX ADMIN — Medication SCH TAB: at 08:37

## 2021-06-23 RX ADMIN — FLUTICASONE PROPIONATE SCH: 50 SPRAY, METERED NASAL at 08:37

## 2021-06-23 RX ADMIN — FOLIC ACID SCH MG: 1 TABLET ORAL at 08:37

## 2021-06-23 NOTE — UROLOGY PROGRESS NOTE
Date of Service


June 23, 2021


 





Assessment & Plan


(1) Gross hematuria: 


      - Castrate sensitive metastatic prostate cancer, prior radiation now 

causing radiation cystitis


- Afebrile, lab work reviewed - Hgb 10.5 today, creatinine 0.86


- Plan of care reviewed with Dr. Palomo


- Alas catheter removed yesterday for voiding trial


- Per notes, he was straight catheterized this morning for 700 mL


- Bladder scan after next void and prn


- If he requires straight cath again, then recommend replace Alas


- Will make him NPO at [facility] for possible cystoscopy tomorrow 


- Continue supportive care and management per primary service


- Will continue to follow closely





Admission and Anticipated Discharge Date


Admission Date: June 7, 2021








Subjective


Patient sleeping, arouses easily to speech


No acute issues overnight


Offers no complaints at present


Alas catheter removed yesterday for voiding trial


Voided spontaneously after catheter removal, incontinent x 2 


Per nursing notes, bladder scan earlier this morning for 559 mL, straight 

catheterized for 700 mL


No additional  concerns at present.





Chart review: Afebrile, creatinine 0.86, WBC 5.30, Hgb 10.5


 





Review of Systems








Constitutional:   as per Subjective / HPI  


 


Gastrointestinal:   no problem reported  


 


Genitourinary:   + as per Subjective / HPI  








Physical Exam








Constitutional:   comfortable; no acute distress    chronically ill-appearing


 


Respiratory:   normal respiratory effort and able to speak in complete 

sentences; no respiratory distress and no labored breathing  


 


Gastrointestinal (Abdomen):   Inspection/Auscultation: abdomen normal to inspe

ction; abdomen not distended  


 


Musculoskeletal:   Head/Neck/Chest: normocephalic and head atraumatic  


 


Neurologic:   moves all extremities; not obtunded  


 


Psychiatric:   Orientation: alert and cooperative  








Results & Data ([facility])


Vital Signs (Past 12 Hours)


                                   Vital Signs











  Temp Pulse Resp BP Pulse Ox


 


 06/23/21 07:14  36.8 C  68  16  94/59 L  92


 


 06/22/21 22:35  36.9 C  72  16  97/60 L  98

## 2021-06-23 NOTE — HOSPITALIST PROGRESS NOTE
Date of Service


June 23, 2021


 





Assessment & Plan


(1) Encephalopathy: 


      


Acute metabolic encephalopathy-Resolved 


Multifactorial:Alcohol withdrawal,Hyponatremia, LARA, Hypoxemic respiratory 

failure secondary to COPD exacerbation


CT head:No acute intracranial findings. No change in appearance of the brain.


Mental status back to baseline


Continue thiamine, folic acid


Continue PT/OT 











Acute blood loss anemia


Secondary to multiple sources of bleeding:GI bleed,  bleed--hematuria, anemia 

of chronic disease


H/O Metastatic prostate cancer S/P surgery, chemotherapy/Lupron 


Appreciate GI, urology input


S/P 5 units PRBCs


Continue PPI


Hb 9.8


Hb stable, Monitor  











Traumatic left shoulder ecchymosis with fracture of the left humerus


Secondary to fall


Appreciate orthopedics input.


Conservative management


Maintain sling immobilization, nonweightbearing left upper extremity


Pin control 


May need repeat x-ray in 2 weeks and follow-up with orthopedics upon discharge














Ronni hematuria


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


Appreciate Urology Input 


scheduled for cystoscopy tomorrow 














Acute kidney injury


Resolved 


Monitor renal function








Hypokalemia


Replace electrolytes as needed








Hypertension


stable 








Seizure disorder


H/O Medication Noncompliance 


Has been getting phenobarbital as needed


No seizure activity since hospitalization


Follow-up with neurology as outpatient











Ongoing tobacco abuse


Nicotine patch


Counseled to quit











Possible COPD


Stable











DVT Px:


SCDs RE Anemia 








Code Status 


Full code








Disposition 


SNF when accepted 











Admission and Anticipated Discharge Date


Admission Date: June 7, 2021








Subjective





No acute issues overnight


Offers no complaints at present


Does not have any fever or chills, no hematuria 





Review of Systems








Review of Systems:   All systems reviewed & are unremarkable except as noted in 

Subjective  








Physical Exam








Constitutional:   WD/WN, vitals as above    + cachectic  


 


Eyes:   PERRL, conjunctivae normal, anicteric sclerae  


 


ENMT:   external ear and nose normal, oropharynx normal  


 


Neck:   trachea midline, no thyromegaly  


 


Respiratory:   normal respiratory effort, lungs clear to auscultation  


 


Cardiovascular:   RRR, no murmur, no edema  


 


Gastrointestinal (Abdomen):   normal bowel sounds, soft, nontender, no 

hepatosplenomegaly  


 


Neurologic:   PERRL, EOMI, accommodation nl, no face palsy, no dysarthria  


 


Psychiatric:   Orientation: alert, oriented to person and oriented to place    

Affect: + flat affect  








Results & Data


Results & Data ([facility])


Vital Signs (Past 12 Hours)


                                   Vital Signs











  Temp Pulse Resp BP Pulse Ox


 


 06/23/21 07:14  36.8 C  68  16  94/59 L  92

## 2021-06-24 RX ADMIN — Medication SCH: at 11:17

## 2021-06-24 RX ADMIN — FUROSEMIDE SCH: 20 TABLET ORAL at 11:17

## 2021-06-24 RX ADMIN — FOLIC ACID SCH: 1 TABLET ORAL at 11:17

## 2021-06-24 RX ADMIN — FLUTICASONE PROPIONATE SCH: 50 SPRAY, METERED NASAL at 10:15

## 2021-06-24 RX ADMIN — NICOTINE SCH MG: 21 PATCH, EXTENDED RELEASE TRANSDERMAL at 09:51

## 2021-06-24 NOTE — HOSPITALIST PROGRESS NOTE
Date of Service


June 24, 2021


 





Assessment & Plan


(1) Encephalopathy: 


      


Acute metabolic encephalopathy-Resolved 


Multifactorial:Alcohol withdrawal,Hyponatremia, LARA, Hypoxemic respiratory 

failure secondary to COPD exacerbation


CT head:No acute intracranial findings. No change in appearance of the brain.


Mental status back to baseline


Continue thiamine, folic acid


Continue PT/OT 


Patient will need acute rehab








Acute blood loss anemia


Secondary to multiple sources of bleeding:GI bleed,  bleed--hematuria, anemia 

of chronic disease


H/O Metastatic prostate cancer S/P surgery, chemotherapy/Lupron 


Appreciate GI, urology input


S/P 5 units PRBCs


Continue PPI


Hb 9.8


Hb stable, Monitor  











Traumatic left shoulder ecchymosis with fracture of the left humerus


Secondary to fall


Appreciate orthopedics input.


Conservative management


Maintain sling immobilization, nonweightbearing left upper extremity


Pain control


Need repeat x-ray in 2 weeks and follow-up with orthopedics upon discharge














Ronni hematuria


Resolved


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


Appreciate Urology Input 


Urologic procedure/cystoscopy canceled as patient did not had any ronni 

hematuria.


Recommends outpatient follow-up





Urinary retention.





Due to metastatic prostate cancer


Alas catheter placed, be discharged on chronic indwelling Alas catheter





Acute kidney injury


Resolved 


Monitor renal function

















Hypertension


stable 








Seizure disorder


H/O Medication Noncompliance 


Has been getting phenobarbital as needed


No seizure activity since hospitalization


Follow-up with neurology as outpatient











Ongoing tobacco abuse


Nicotine patch


Counseled to quit











Possible COPD


Stable











DVT Px:


SCDs RE Anemia 








Code Status 


Full code








Disposition 


Appreciate PT OT evaluation, patient will need skilled rehab on discharge, 

referral placed











Admission and Anticipated Discharge Date


Admission Date: June 7, 2021








Subjective





No acute issues overnight


Offers no complaints at present


Does not have any fever or chills, no hematuria 





Review of Systems








Review of Systems:   All systems reviewed and are unremarkable except as noted 

below


 


Musculoskeletal:   Left shoulder pain


 


Neurologic:   + generalized weakness  








Physical Exam








Constitutional:   WD/WN, vitals as above    + cachectic  


 


Eyes:   PERRL, conjunctivae normal, anicteric sclerae  


 


ENMT:   external ear and nose normal, oropharynx normal  


 


Neck:   trachea midline, no thyromegaly  


 


Respiratory:   normal respiratory effort, lungs clear to auscultation  


 


Cardiovascular:   RRR, no murmur, no edema  


 


Gastrointestinal (Abdomen):   normal bowel sounds, soft, nontender, no 

hepatosplenomegaly  


 


Neurologic:   PERRL, EOMI, accommodation nl, no face palsy, no dysarthria  


 


Psychiatric:   Orientation: alert, oriented to person and oriented to place    

Affect: + flat affect  








Results & Data


Results & Data ([facility])


Vital Signs (Past 12 Hours)


                                   Vital Signs











  Temp Pulse Resp BP Pulse Ox


 


 06/24/21 07:30  36.8 C  86  18  127/72  98

## 2021-06-25 LAB
HCT VFR BLD AUTO: 29.7 % (ref 42–52)
HGB BLD-MCNC: 9.7 G/DL (ref 14–18)

## 2021-06-25 RX ADMIN — Medication SCH MG: at 08:43

## 2021-06-25 RX ADMIN — NICOTINE SCH MG: 21 PATCH, EXTENDED RELEASE TRANSDERMAL at 08:43

## 2021-06-25 RX ADMIN — FOLIC ACID SCH MG: 1 TABLET ORAL at 08:43

## 2021-06-25 RX ADMIN — Medication SCH TAB: at 08:42

## 2021-06-25 RX ADMIN — FLUTICASONE PROPIONATE SCH: 50 SPRAY, METERED NASAL at 08:39

## 2021-06-25 NOTE — HOSPITALIST PROGRESS NOTE
Date of Service


June 25, 2021


 





Assessment & Plan


(1) Encephalopathy: 


      


Acute metabolic encephalopathy-Resolved 


Multifactorial:Alcohol withdrawal,Hyponatremia, LARA, Hypoxemic respiratory 

failure secondary to COPD exacerbation


CT head:No acute intracranial findings. No change in appearance of the brain.


Mental status back to baseline


Continue thiamine, folic acid


Continue PT/OT 


Patient will need acute rehab








Acute blood loss anemia


Secondary to multiple sources of bleeding:GI bleed,  bleed--hematuria, anemia 

of chronic disease


H/O Metastatic prostate cancer S/P surgery, chemotherapy/Lupron 


Appreciate GI, urology input


S/P 5 units PRBCs


Continue PPI


Hb 9.8


Hb stable, Monitor  











Traumatic left shoulder ecchymosis with fracture of the left humerus


Secondary to fall


Appreciate orthopedics input.


Conservative management


Maintain sling immobilization, nonweightbearing left upper extremity


Pain control


Need repeat x-ray in 2 weeks and follow-up with orthopedics upon discharge














Ronni hematuria


Resolved


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


Appreciate Urology Input 


Urologic procedure/cystoscopy canceled as patient did not had any ronni 

hematuria.


Recommends outpatient follow-up





Urinary retention.





Due to metastatic prostate cancer


Alas catheter placed, be discharged on chronic indwelling Alas catheter





Acute kidney injury


Resolved 


Monitor renal function

















Hypertension


stable 








Seizure disorder


H/O Medication Noncompliance 


Has been getting phenobarbital as needed


No seizure activity since hospitalization


Follow-up with neurology as outpatient











Ongoing tobacco abuse


Nicotine patch


Counseled to quit











Possible COPD


Stable











DVT Px:


SCDs RE Anemia 








Code Status 


Full code








Disposition 


Appreciate PT OT evaluation, patient will need skilled rehab on discharge, 

referral placed


plan to discharge to [facility] for skilled rehab next week 











Admission and Anticipated Discharge Date


Admission Date: June 7, 2021








Subjective


complains of pain on shoulder 


getting relief with pain meds 


 offers no other complain 


no shortness of breath , no fever or chills 


Alas draining dark urine , no ronni hematuria noted  





Review of Systems








Review of Systems:   All systems reviewed & are unremarkable except as noted in 

Subjective  








Physical Exam








Constitutional:   WD/WN, vitals as above    + cachectic  


 


Eyes:   PERRL, conjunctivae normal, anicteric sclerae  


 


ENMT:   external ear and nose normal, oropharynx normal  


 


Neck:   trachea midline, no thyromegaly  


 


Respiratory:   normal respiratory effort, lungs clear to auscultation  


 


Cardiovascular:   RRR, no murmur, no edema  


 


Gastrointestinal (Abdomen):   normal bowel sounds, soft, nontender, no 

hepatosplenomegaly  


 


Neurologic:   PERRL, EOMI, accommodation nl, no face palsy, no dysarthria  


 


Psychiatric:   Orientation: alert, oriented to person and oriented to place    

Affect: + flat affect  








Results & Data


Results & Data ([facility])


Vital Signs (Past 12 Hours)


                                   Vital Signs











  Temp Pulse Resp BP Pulse Ox


 


 06/25/21 07:30     93/50 L 


 


 06/25/21 07:22  36.8 C  68  16  87/52 L  95

## 2021-06-26 RX ADMIN — FLUTICASONE PROPIONATE SCH: 50 SPRAY, METERED NASAL at 08:28

## 2021-06-26 RX ADMIN — FLUTICASONE PROPIONATE SCH SPRAYS: 50 SPRAY, METERED NASAL at 08:25

## 2021-06-26 RX ADMIN — FOLIC ACID SCH MG: 1 TABLET ORAL at 09:38

## 2021-06-26 RX ADMIN — Medication SCH MG: at 09:37

## 2021-06-26 RX ADMIN — Medication SCH TAB: at 10:15

## 2021-06-26 RX ADMIN — Medication SCH MG: at 09:38

## 2021-06-26 RX ADMIN — NICOTINE SCH MG: 21 PATCH, EXTENDED RELEASE TRANSDERMAL at 09:37

## 2021-06-26 NOTE — HOSPITALIST PROGRESS NOTE
Date of Service


June 26, 2021


 





Assessment & Plan


(1) Encephalopathy: 


      


Acute metabolic encephalopathy-Resolved 


Multifactorial:Alcohol withdrawal,Hyponatremia, LARA, Hypoxemic respiratory 

failure secondary to COPD exacerbation


CT head:No acute intracranial findings. No change in appearance of the brain.


Mental status back to baseline


Continue thiamine, folic acid


Continue PT/OT 


Patient will need acute rehab








Acute blood loss anemia


Secondary to multiple sources of bleeding:GI bleed,  bleed--hematuria, anemia 

of chronic disease


H/O Metastatic prostate cancer S/P surgery, chemotherapy/Lupron 


Appreciate GI, urology input


S/P 5 units PRBCs


Continue PPI


Hb 9.8


Hb stable, Monitor  











Traumatic left shoulder ecchymosis with fracture of the left humerus


Secondary to fall


Appreciate orthopedics input.


Conservative management


Maintain sling immobilization, nonweightbearing left upper extremity


Pain control


Need repeat x-ray in 2 weeks and follow-up with orthopedics upon discharge














Ronni hematuria


Resolved


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


Appreciate Urology Input 


Urologic procedure/cystoscopy canceled as patient did not had any ronni 

hematuria.


Recommends outpatient follow-up





Urinary retention.





Due to metastatic prostate cancer


Alas catheter placed, be discharged on chronic indwelling Alas catheter





Acute kidney injury


Resolved 


renal function stable 

















Hypertension


stable 








Seizure disorder


H/O Medication Noncompliance 


Has been getting phenobarbital as needed


No seizure activity since hospitalization


Follow-up with neurology as outpatient











Ongoing tobacco abuse


Nicotine patch


Counseled to quit











Possible COPD


Stable











DVT Px:


SCDs RE Anemia 








Code Status 


Full code








Disposition 


needs skilled rehab 


needs  insurance auth for [facility] 


possible transfer early next week if bed available 











Admission and Anticipated Discharge Date


Admission Date: June 7, 2021








Subjective


no new event , 


 offers no other complain 


no shortness of breath , no fever or chills 


 





Review of Systems








Review of Systems:   All systems reviewed & are unremarkable except as noted in 

Subjective  








Physical Exam








Constitutional:   WD/WN, vitals as above    + cachectic  


 


Eyes:   PERRL, conjunctivae normal, anicteric sclerae  


 


ENMT:   external ear and nose normal, oropharynx normal  


 


Neck:   trachea midline, no thyromegaly  


 


Respiratory:   normal respiratory effort, lungs clear to auscultation  


 


Cardiovascular:   RRR, no murmur, no edema  


 


Gastrointestinal (Abdomen):   normal bowel sounds, soft, nontender, no 

hepatosplenomegaly  


 


Neurologic:   PERRL, EOMI, accommodation nl, no face palsy, no dysarthria  


 


Psychiatric:   Orientation: alert, oriented to person and oriented to place    

Affect: + flat affect  








Results & Data


Results & Data ([facility])


Vital Signs (Past 12 Hours)


                                   Vital Signs











  Temp Pulse Resp BP Pulse Ox


 


 06/26/21 15:24  36.9 C  74  16  121/72  98


 


 06/26/21 07:04  37.1 C  73  16  104/60  95

## 2021-06-27 RX ADMIN — Medication SCH MG: at 09:00

## 2021-06-27 RX ADMIN — Medication SCH TAB: at 09:00

## 2021-06-27 RX ADMIN — FLUTICASONE PROPIONATE SCH: 50 SPRAY, METERED NASAL at 09:01

## 2021-06-27 RX ADMIN — NICOTINE SCH MG: 21 PATCH, EXTENDED RELEASE TRANSDERMAL at 09:01

## 2021-06-27 RX ADMIN — FOLIC ACID SCH MG: 1 TABLET ORAL at 09:00

## 2021-06-27 NOTE — HOSPITALIST PROGRESS NOTE
Date of Service


June 27, 2021


 





Assessment & Plan


(1) Encephalopathy: 


      


Acute metabolic encephalopathy-Resolved 


Multifactorial:Alcohol withdrawal,Hyponatremia, LARA, Hypoxemic respiratory 

failure secondary to COPD exacerbation


CT head:No acute intracranial findings. No change in appearance of the brain.


Mental status back to baseline


Continue thiamine, folic acid


Continue PT/OT 


Patient will need acute rehab








Acute blood loss anemia


Secondary to multiple sources of bleeding:GI bleed,  bleed--hematuria, anemia 

of chronic disease


H/O Metastatic prostate cancer S/P surgery, chemotherapy/Lupron 


Appreciate GI, urology input


S/P 5 units PRBCs


Continue PPI


Hb 9.8


Hb stable, Monitor  











Traumatic left shoulder ecchymosis with fracture of the left humerus


Secondary to fall


Appreciate orthopedics input.


Conservative management


Maintain sling immobilization, nonweightbearing left upper extremity


Pain control


Need repeat x-ray in 2 weeks and follow-up with orthopedics upon discharge














Ronni hematuria


Resolved


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


Appreciate Urology Input 


Urologic procedure/cystoscopy canceled as patient did not had any ronni 

hematuria.


Recommends outpatient follow-up





Urinary retention.





Due to metastatic prostate cancer


Alas catheter placed, be discharged on chronic indwelling Alas catheter





Acute kidney injury


Resolved 


renal function stable 

















Hypertension


stable 








Seizure disorder


H/O Medication Noncompliance 


Has been getting phenobarbital as needed


No seizure activity since hospitalization


Follow-up with neurology as outpatient











Ongoing tobacco abuse


Nicotine patch


Counseled to quit











Possible COPD


Stable











DVT Px:


SCDs RE Anemia 








Code Status 


Full code








Disposition 


needs skilled rehab 


needs  insurance auth for [facility] 


possible transfer early next week if bed available 











Admission and Anticipated Discharge Date


Admission Date: June 7, 2021








Subjective


Shoulder pain much better after adjustment of pain medications


No complaint of shortness of breath no cough no fever or chills


 





Physical Exam








Constitutional:   WD/WN, vitals as above    + cachectic  


 


Eyes:   PERRL, conjunctivae normal, anicteric sclerae  


 


ENMT:   external ear and nose normal, oropharynx normal  


 


Neck:   trachea midline, no thyromegaly  


 


Respiratory:   normal respiratory effort, lungs clear to auscultation  


 


Cardiovascular:   RRR, no murmur, no edema  


 


Gastrointestinal (Abdomen):   normal bowel sounds, soft, nontender, no 

hepatosplenomegaly  


 


Neurologic:   PERRL, EOMI, accommodation nl, no face palsy, no dysarthria  


 


Psychiatric:   Orientation: alert, oriented to person and oriented to place    

Affect: + flat affect  








Results & Data


Results & Data ([facility])


Vital Signs (Past 12 Hours)


                                   Vital Signs











  Temp Pulse Resp BP Pulse Ox


 


 06/27/21 15:02  36.7 C  72  16  121/75  97


 


 06/27/21 07:29  36.8 C  75  16  113/70  98

## 2021-06-28 RX ADMIN — FOLIC ACID SCH MG: 1 TABLET ORAL at 10:28

## 2021-06-28 RX ADMIN — Medication SCH TAB: at 10:29

## 2021-06-28 RX ADMIN — FLUTICASONE PROPIONATE SCH: 50 SPRAY, METERED NASAL at 10:27

## 2021-06-28 RX ADMIN — Medication SCH MG: at 10:29

## 2021-06-28 RX ADMIN — NICOTINE SCH: 21 PATCH, EXTENDED RELEASE TRANSDERMAL at 10:30

## 2021-06-28 RX ADMIN — Medication SCH MG: at 10:28

## 2021-06-28 NOTE — HOSPITALIST PROGRESS NOTE
Date of Service


June 28, 2021


 





Assessment & Plan


(1) Encephalopathy: 


      


Acute metabolic encephalopathy-Resolved 


Multifactorial:Alcohol withdrawal,Hyponatremia, LARA, Hypoxemic respiratory 

failure secondary to COPD exacerbation


CT head:No acute intracranial findings. No change in appearance of the brain.


Mental status back to baseline


Continue thiamine, folic acid


Continue PT/OT 


Patient will need acute rehab











Ronni hematuria


recurrent episode , ronni hematuria noted in Alas 


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


Urology following 


will ask urology team to evaluate pt in am 


need to address hematuria before tx to rehab 





Acute blood loss anemia


Secondary to multiple sources of bleeding:GI bleed,  bleed--hematuria, anemia 

of chronic disease


H/O Metastatic prostate cancer S/P surgery, chemotherapy/Lupron 


Appreciate GI, urology input


S/P 5 units PRBCs


Continue PPI


repeat H&H ordered in am as pt developed hematuria 








Traumatic left shoulder ecchymosis with fracture of the left humerus


Secondary to fall


Appreciate orthopedics input.


Conservative management


Maintain sling immobilization, nonweightbearing left upper extremity


Pain control


Need repeat x-ray in 2 weeks and follow-up with orthopedics upon discharge

















Urinary retention.





Due to metastatic prostate cancer


Alas catheter placed, be discharged on chronic indwelling Alas catheter





Acute kidney injury


Resolved 


renal function stable 

















Hypertension


stable 








Seizure disorder


H/O Medication Noncompliance 


Has been getting phenobarbital as needed


No seizure activity since hospitalization


Follow-up with neurology as outpatient











Ongoing tobacco abuse


Nicotine patch


Counseled to quit











Possible COPD


Stable











DVT Px:


SCDs RE Anemia 








Code Status 


Full code








Disposition 


needs skilled rehab 


pending insurance auth 











Admission and Anticipated Discharge Date


Admission Date: June 7, 2021








Subjective


shoulder pain remains an issue 


pain meds helps , pt is getting frustrated as his ability is much limited now 

due to shoulder fx 


wants go back to his daily life as soon as possible 





pt will need to be in rehab to regain strength and rehab for shoulder fx and 

generalized deconditioning 





Alas draining dark cherry coloured urine /ronni hematuria 


pt denies of any suprapubic tenderness , no abdominal pain 


 no fever or chills , no nausea  





Review of Systems








Review of Systems:   All systems reviewed and are unremarkable except as noted 

below


 


Musculoskeletal:   Left shoulder pain


 


Neurologic:   + generalized weakness  








Physical Exam








Constitutional:   WD/WN, vitals as above    + cachectic  


 


Eyes:   PERRL, conjunctivae normal, anicteric sclerae  


 


ENMT:   external ear and nose normal, oropharynx normal  


 


Neck:   trachea midline, no thyromegaly  


 


Respiratory:   normal respiratory effort, lungs clear to auscultation  


 


Cardiovascular:   RRR, no murmur, no edema  


 


Gastrointestinal (Abdomen):   normal bowel sounds, soft, nontender, no 

hepatosplenomegaly  


 


Neurologic:   PERRL, EOMI, accommodation nl, no face palsy, no dysarthria  


 


Psychiatric:   Orientation: alert, oriented to person and oriented to place    

Affect: + flat affect  








Results & Data


Results & Data ([facility])


Vital Signs (Past 12 Hours)


                                   Vital Signs











  Temp Pulse Pulse Resp BP Pulse Ox


 


 06/28/21 15:48  36.6 C   67  16  123/73  97


 


 06/28/21 07:15  36.9 C  73   14  121/75  99

## 2021-06-29 LAB
ANION GAP SERPL CALC-SCNC: 8 MMOL/L (ref 3–11)
BUN SERPL-MCNC: 19 MG/DL (ref 7–18)
CALCIUM SERPL-MCNC: 8.8 MG/DL (ref 8.5–10.1)
CHLORIDE SERPL-SCNC: 110 MMOL/L (ref 98–107)
CO2 SERPL-SCNC: 21 MMOL/L (ref 21–32)
CREAT CL PREDICTED SERPL C-G-VRATE: 82.6 ML/MIN
DEPRECATED RDW RBC: 60.9 FL (ref 36.4–46.3)
GLUCOSE SERPL-MCNC: 120 MG/DL (ref 70–99)
HCT VFR BLD AUTO: 34.9 % (ref 42–52)
HGB BLD-MCNC: 11 G/DL (ref 14–18)
MCH RBC QN AUTO: 30.6 PG (ref 25–34)
MCV RBC: 97.2 FL (ref 80–100)
PLATELET # BLD AUTO: 219 K/UL (ref 130–400)
POTASSIUM SERPL-SCNC: 3.4 MMOL/L (ref 3.5–5.1)
RBC # BLD AUTO: 3.59 M/UL (ref 4.7–6.1)
SODIUM SERPL-SCNC: 139 MMOL/L (ref 136–145)
WBC # BLD AUTO: 6.36 K/UL (ref 4.8–10.8)

## 2021-06-29 RX ADMIN — FLUTICASONE PROPIONATE SCH: 50 SPRAY, METERED NASAL at 09:38

## 2021-06-29 RX ADMIN — NICOTINE SCH MG: 21 PATCH, EXTENDED RELEASE TRANSDERMAL at 09:36

## 2021-06-29 RX ADMIN — Medication SCH TAB: at 09:39

## 2021-06-29 RX ADMIN — FOLIC ACID SCH MG: 1 TABLET ORAL at 09:38

## 2021-06-29 RX ADMIN — Medication SCH MG: at 09:39

## 2021-06-29 RX ADMIN — Medication SCH MG: at 09:40

## 2021-06-29 NOTE — HOSPITALIST PROGRESS NOTE
Date of Service


June 29, 2021


 





Assessment & Plan


(1) Encephalopathy: 


      


Acute metabolic encephalopathy-Resolved 


Multifactorial:Alcohol withdrawal,Hyponatremia, LARA, Hypoxemic respiratory 

failure secondary to COPD exacerbation


CT head:No acute intracranial findings. No change in appearance of the brain.


Mental status back to baseline


Continue thiamine, folic acid


Continue PT/OT 


Patient will need acute rehab











Ronni hematuria


recurrent episode , ronni hematuria noted in Alas 


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


Urology following 


D/w Urology -due to radiation cystitis , intermittent hematuria is common , no 

intervention needed as long as catheter is draining 


per patient , he has been experiencing intermittent hematuria in past after 

radiation tx , which usally gets resolved on its own 


no complain of suprabubic tenderness or discomfort 


H&H been stable @ 11 


ok to trasfer to rehab when med available 





Acute blood loss anemia


Secondary to multiple sources of bleeding:GI bleed,  bleed--hematuria, anemia 

of chronic disease


H/O Metastatic prostate cancer S/P surgery, chemotherapy/Lupron 


Appreciate GI, urology input


S/P 5 units PRBCs


Continue PPI


repeat H&H shows stable HB@11








Traumatic left shoulder ecchymosis with fracture of the left humerus


Secondary to fall


Appreciate orthopedics input.


Conservative management


Maintain sling immobilization, nonweightbearing left upper extremity


Pain control


Need repeat x-ray in 2 weeks and follow-up with orthopedics upon discharge

















Urinary retention.





Due to metastatic prostate cancer


Alas catheter placed, be discharged on chronic indwelling Alas catheter





Acute kidney injury


Resolved 


renal function stable 

















Hypertension


stable 








Seizure disorder


H/O Medication Noncompliance 


Has been getting phenobarbital as needed


No seizure activity since hospitalization


Follow-up with neurology as outpatient











Ongoing tobacco abuse


Nicotine patch


Counseled to quit











Possible COPD


Stable











DVT Px:


SCDs RE Anemia 








Code Status 


Full code








Disposition 


needs skilled rehab 


pending insurance auth 


TX to rehab when bed available 











Admission and Anticipated Discharge Date


Admission Date: June 7, 2021








Subjective


shoulder pain remains an issue 


pain meds helps , pt is getting frustrated as his ability is much limited now 

due to shoulder fx 


wants go back to his daily life as soon as possible 





pt will need to be in rehab to regain strength and rehab for shoulder fx and 

generalized deconditioning 





Alas draining dark cherry coloured urine /ronni hematuria 


pt denies of any suprapubic tenderness , no abdominal pain 


 no fever or chills , no nausea  





Review of Systems








Review of Systems:   All systems reviewed and are unremarkable except as noted 

below


 


Musculoskeletal:   Left shoulder pain


 


Neurologic:   + generalized weakness  








Physical Exam








Constitutional:   WD/WN, vitals as above    + cachectic  


 


Eyes:   PERRL, conjunctivae normal, anicteric sclerae  


 


ENMT:   external ear and nose normal, oropharynx normal  


 


Neck:   trachea midline, no thyromegaly  


 


Respiratory:   normal respiratory effort, lungs clear to auscultation  


 


Cardiovascular:   RRR, no murmur, no edema  


 


Gastrointestinal (Abdomen):   normal bowel sounds, soft, nontender, no 

hepatosplenomegaly  


 


Neurologic:   PERRL, EOMI, accommodation nl, no face palsy, no dysarthria  


 


Psychiatric:   Orientation: alert, oriented to person and oriented to place    

Affect: + flat affect  








Results & Data


Results & Data ([facility])


Vital Signs (Past 12 Hours)


                                   Vital Signs











  Temp Pulse Resp BP Pulse Ox


 


 06/29/21 16:02  36.7 C  68  18  122/71  98


 


 06/29/21 07:18  36.7 C  80  16  99/62 L  95

## 2021-06-30 LAB
HCT VFR BLD AUTO: 27.6 % (ref 42–52)
HCT VFR BLD AUTO: 29.9 % (ref 42–52)
HGB BLD-MCNC: 8.9 G/DL (ref 14–18)
HGB BLD-MCNC: 9.8 G/DL (ref 14–18)

## 2021-06-30 RX ADMIN — NICOTINE SCH MG: 21 PATCH, EXTENDED RELEASE TRANSDERMAL at 09:02

## 2021-06-30 RX ADMIN — FLUTICASONE PROPIONATE SCH: 50 SPRAY, METERED NASAL at 09:03

## 2021-06-30 RX ADMIN — Medication SCH MG: at 09:04

## 2021-06-30 RX ADMIN — Medication SCH MG: at 09:05

## 2021-06-30 RX ADMIN — FOLIC ACID SCH MG: 1 TABLET ORAL at 09:04

## 2021-06-30 RX ADMIN — Medication SCH TAB: at 09:04

## 2021-06-30 NOTE — UROLOGY PROGRESS NOTE
Date of Service


June 30, 2021


 





Assessment & Plan


(1) Gross hematuria: 


      60 year-old male patient, with multiple comorbidities, admitted with 

encephalopathy, severe anemia, gross hematuria. 





- Castrate sensitive metastatic prostate cancer and prior radiation, now causing

radiation cystitis.


- Reconsulted due to recurrence of hematuria. 


- Plan of care reviewed with Dr. Hernandez. 


- Patient afebrile.


- Labs reviewed - hemoglobin 9.8 today. 


- Mendez catheter exchanged with 20F catheter and irrigated due to clot 

retention, now patent and draining appropriately. 


- Suspect he will continue to have intermittent episodes of gross hematuria 

likely secondary to radiation cystitis. 


- Okay to continue to flush catheter PRN retention/suprapubic pain.


- No acute  intervention indicated at this time. 


- Continue supportive care and management per primary service.


- Presuming he remains stable with no acute changes, recommend outpatient 

follow-up with urology for voiding trial - he has followed with [facility] 

urology in the past. 





Admission and Anticipated Discharge Date


Admission Date: June 7, 2021








Subjective


Patient re-examined per request of hospitalist due to hematuria. 


Examined at bedside - patient comfortable, alert, and awake.


Mendez catheter draining dark cherry urine.


Did require hand irrigation due to catheter not draining overnight.


Reports left arm pain.


Denies abdominal or suprapubic discomfort.


Denies nausea or vomiting.


Denies fevers or chills. 





Chart review:


Afebrile


Labs 6/29 -


   Wbc 6.36


   Creatinine 0.87


Hemoglobin today 9.8 (previously 11.0).





Denies additional urologic concerns today.  





Review of Systems








Constitutional:   as per Subjective / HPI; no fever and no chills  


 


Gastrointestinal:   as per Subjective / HPI; no nausea and no vomiting  


 


Genitourinary:   + as per Subjective / HPI  








Physical Exam








Constitutional:   well developed and + thin; no acute distress and not ill 

appearing  


 


Respiratory:   normal respiratory effort and able to speak in complete sen

tences; no respiratory distress and no audible wheezes  


 


Cardiovascular:   Extremities: no edema  


 


Gastrointestinal (Abdomen):   Inspection/Auscultation: abdomen normal to 

inspection; abdomen not distended    Percussion/Palpation: abdomen soft; abdomen

nontender and no guarding  


 


Musculoskeletal:   Left arm in sling


 


Psychiatric:   Orientation: alert, oriented x 3 and cooperative    Affect: 

euthymic affect  


 


Genitourinary:   no CVA tenderness    Mendez catheter intact, draining dark evelyn

ry urine. Hand-irrigated catheter with multiple small clots in return. Mendez 

catheter stopped draining and was unable to be further irrigated. Mendez 

exchanged from 16F to 20F catheter with 300 cc urine return upon insertion. Did 

irrigate x1 after new mendez catheter placed with minimal clot return. Urine was 

then noted to be clear lighter red without clots. Catheter patent and now 

draining. Patient tolerated procedure well. 








Results & Data ([facility])


Vital Signs (Past 12 Hours)


                                   Vital Signs











  Temp Pulse Resp BP Pulse Ox


 


 06/30/21 07:26  36.7 C  76  16  111/66  95














PG Care Time/CCT


Total # of Minutes Spent


Total Time Spent with Patient: Total time spent is greater than 50% in 

coordination of care (as documented) at patient's floor/unit and/or counseling 

patient:








Coding


Level of Care Code


12691 Subseq Hosp Care Lvl 2





Diagnoses


Gross hematuria  R31.0

## 2021-06-30 NOTE — HOSPITALIST PROGRESS NOTE
Date of Service


June 30, 2021


 





Assessment & Plan


(1) Encephalopathy: 


      


Acute metabolic encephalopathy-Resolved 


Multifactorial:Alcohol withdrawal,Hyponatremia, LARA, Hypoxemic respiratory 

failure secondary to COPD exacerbation


CT head:No acute intracranial findings. No change in appearance of the brain.


Mental status back to baseline


Continue thiamine, folic acid


Continue PT/OT 


Monitor Delirium


Reorient frequently 


 








Acute blood loss anemia


Secondary to multiple sources of bleeding:GI bleed,  bleed--hematuria, anemia 

of chronic disease


H/O Metastatic prostate cancer S/P surgery, chemotherapy/Lupron 


Appreciate GI, urology input


S/P 5 units PRBCs


Continue PPI


Hb 8.9 today 


Hb stable, Monitor  














Traumatic left shoulder ecchymosis with fracture of the left humerus


Secondary to fall


Appreciate orthopedics input.


Conservative management


Maintain sling immobilization, nonweightbearing left upper extremity


Pin control 


May need repeat x-ray in 2 weeks and follow-up with orthopedics upon discharge














Recurrent hematuria


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


Appreciate Urology Input 


Catheter as needed for retention/suprapubic pain


No plan for  intervention as per urology


Needs follow-up with urology upon discharge


Monitor CBC 














Acute kidney injury


Resolved 


Monitor renal function











Hypokalemia


Replace electrolytes as needed








Hypertension


stable 








Seizure disorder


H/O Medication Noncompliance 


Has been getting phenobarbital as needed


No seizure activity since hospitalization


Follow-up with neurology as outpatient











Ongoing tobacco abuse


Nicotine patch


Counseled to quit











Possible COPD


Stable











DVT Px:


SCDs RE Anemia 








Code Status 


Full code








Disposition 


SNF when accepted 











Admission and Anticipated Discharge Date


Admission Date: June 7, 2021








Subjective





Patient is seen and examined at bedside


Noted to have hematuria today


Left shoulder pain is controlled


Patient denies dysuria, chest pain, dyspnea, dizziness


Offers no other complaints











 





Review of Systems








Review of Systems:   All systems reviewed & are unremarkable except as noted in 

HPI & below  








Physical Exam








Physical Exam:   








Physical Exam:


Vitals signs as noted above 


General Appearance:Moderately built and nourished, no apparent distress, Ill 

appearing 


Head:  normocephalic, Atraumatic 


Eyes:  normal inspection, EOMI


Neck:  supple, Trachea midline


Respiratory/Chest: Normal breath sounds, CTA


Cardiovascular: S1, S2, No murmur


Abdomen/GI:Soft, Non tender, Bowel sounds present


Extremities/Musculoskeletal:normal inspection, no edema, LUE in sling   


Neurologic/Psych:AAOX3, grossly no focal neurological deficits 


Skin:  normal color, warm














Results & Data


Results & Data ([facility])


Vital Signs (Past 12 Hours)


                                   Vital Signs











  Temp Pulse Resp BP Pulse Ox


 


 06/30/21 16:35     98/62 L 


 


 06/30/21 16:22     94/59 L 


 


 06/30/21 16:18  36.6 C  76  16  89/58 L  99











Laboratory Results





                                    Short CBC











  06/30/21 06/30/21 Range/Units





  09:46 19:31 


 


Hgb  9.8 L  8.9 L  (14.0-18.0)  g/dL


 


Hct  29.9 L  27.6 L  (42-52)  %

## 2021-07-01 LAB
ANION GAP SERPL CALC-SCNC: 4 MMOL/L (ref 3–11)
BUN SERPL-MCNC: 22 MG/DL (ref 7–18)
CALCIUM SERPL-MCNC: 8.6 MG/DL (ref 8.5–10.1)
CHLORIDE SERPL-SCNC: 108 MMOL/L (ref 98–107)
CO2 SERPL-SCNC: 25 MMOL/L (ref 21–32)
CREAT CL PREDICTED SERPL C-G-VRATE: 111.3 ML/MIN
GLUCOSE SERPL-MCNC: 100 MG/DL (ref 70–99)
HCT VFR BLD AUTO: 25.2 % (ref 42–52)
HCT VFR BLD AUTO: 26.7 % (ref 42–52)
HCT VFR BLD AUTO: 29.2 % (ref 42–52)
HGB BLD-MCNC: 8.2 G/DL (ref 14–18)
HGB BLD-MCNC: 8.7 G/DL (ref 14–18)
HGB BLD-MCNC: 9.3 G/DL (ref 14–18)
POTASSIUM SERPL-SCNC: 3.8 MMOL/L (ref 3.5–5.1)
PROTHROM ACT/NOR PPP: 1.1 % (ref 0.9–1.1)
PROTHROM ACT/NOR PPP: 10.8 SECONDS (ref 9–12)
SODIUM SERPL-SCNC: 137 MMOL/L (ref 136–145)

## 2021-07-01 RX ADMIN — FOLIC ACID SCH MG: 1 TABLET ORAL at 08:20

## 2021-07-01 RX ADMIN — Medication SCH MG: at 08:20

## 2021-07-01 RX ADMIN — NICOTINE SCH MG: 21 PATCH, EXTENDED RELEASE TRANSDERMAL at 08:17

## 2021-07-01 RX ADMIN — FLUTICASONE PROPIONATE SCH: 50 SPRAY, METERED NASAL at 08:16

## 2021-07-01 RX ADMIN — Medication SCH TAB: at 08:19

## 2021-07-01 NOTE — UROLOGY PROGRESS NOTE
Date of Service


July 1, 2021


 





Assessment & Plan


(1) Gross hematuria: 


      60 year-old male patient, with multiple comorbidities, admitted with 

encephalopathy, severe anemia, gross hematuria.





- Castrate sensitive metastatic prostate cancer and prior radiation, now causing

radiation cystitis


- Case reviewed with Dr. Bonilla


- Patient afebrile, labs reviewed - hemoglobin decreased to 8.7 today, continue 

to monitor


- Alas catheter exchanged yesterday to 20F catheter and irrigated due to clot 

retention, remains patent and draining


- No manual irrigation required overnight per chart review


- Okay to continue to flush catheter PRN retention/suprapubic pain


- No acute  intervention indicated at this time


- Suspect he will continue to have intermittent episodes of gross hematuria 

likely secondary to radiation cystitis and coagulopathy


- Continue supportive care and management per primary service


- Can consider hyperbaric oxygen for radiation cystitis, however may not improve

significantly due to underlying coagulopathy from long term liver dysfunction


- Presuming he remains stable with no acute changes, recommend outpatient 

follow-up with urology for voiding trial - he has followed with [facility] 

urology in the past





Thank you for allowing us to participate in the acute care of Mr. Ferrer.

Please reconsult us with additional questions, concerns or changes in patient 

status.





Admission and Anticipated Discharge Date


Admission Date: June 7, 2021








Subjective


Awake and resting in bed


No acute issues overnight


Offers no complaints at present


Alas catheter exchanged yesterday to 20F catheter and irrigated due to clot 

retention


Tolerating Alas catheter


No dysuria or suprapubic pain


Alas catheter intact, patent and draining dark red urine with a few clots noted

in tubing


Per chart review, no manual irrigation required overnight


No nausea or vomiting


No fever or chills





No additional  concerns today.





Chart review: Afebrile, creatinine 0.66, WBC 6.36, Hgb 8.7 





Review of Systems








Constitutional:   as per Subjective / HPI  


 


Gastrointestinal:   as per Subjective / HPI  


 


Genitourinary:   + as per Subjective / HPI  








Physical Exam








Constitutional:   + thin and comfortable    chronically ill-appearing, no acute 

distress


 


Respiratory:   normal respiratory effort and able to speak in complete 

sentences; no respiratory distress and no labored breathing  


 


Cardiovascular:   Extremities: no pedal edema  


 


Gastrointestinal (Abdomen):   Inspection/Auscultation: abdomen normal to 

inspection; abdomen not distended  


 


Musculoskeletal:   Head/Neck/Chest: normocephalic and head atraumatic    LUE 

immobilizer


 


Neurologic:   moves all extremities and awake  


 


Psychiatric:   Orientation: alert and oriented x 3  


 


Genitourinary:   Alas catheter intact, patent and draining dark red urine with 

a few clots noted in tubing








Results & Data ([facility])


Vital Signs (Past 12 Hours)


                                   Vital Signs











  Temp Pulse Resp BP Pulse Ox


 


 07/01/21 07:23  36.9 C  79  16  105/65  95


 


 06/30/21 22:29  36.8 C  77  16  92/54 L  97














PG Care Time/CCT


Total # of Minutes Spent


Total Time Spent with Patient: Total time spent is greater than 50% in 

coordination of care (as documented) at patient's floor/unit and/or counseling 

patient:








Coding


Level of Care Code


79341 Subseq Hosp Care Lvl 2





Diagnoses


Gross hematuria  R31.0

## 2021-07-01 NOTE — HOSPITALIST PROGRESS NOTE
Date of Service


July 1, 2021


 





Assessment & Plan


(1) Encephalopathy: 


      


Acute metabolic encephalopathy-Resolved 


Multifactorial:Alcohol withdrawal,Hyponatremia, LARA, Hypoxemic respiratory 

failure secondary to COPD exacerbation


CT head:No acute intracranial findings. No change in appearance of the brain.


Mental status back to baseline


Continue thiamine, folic acid


Continue PT/OT 


Monitor for delirium


Reorient frequently 


 








Acute blood loss anemia


Secondary to multiple sources of bleeding:GI bleed,  bleed--hematuria, anemia 

of chronic disease


H/O Metastatic prostate cancer S/P surgery, chemotherapy/Lupron 


Appreciate GI, urology input


S/P 5 units PRBCs


Continue PPI


Hb 9.3   


We will consider to transfuse PRBCs if hemoglobin continues to drop














Traumatic left shoulder ecchymosis with fracture of the left humerus


Secondary to fall


Appreciate orthopedics input.


Conservative management


Maintain sling immobilization, nonweightbearing left upper extremity


Pin control 


May need repeat x-ray in 2 weeks and follow-up with orthopedics upon discharge














Recurrent hematuria


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


Appreciate Urology Input 


Catheter as needed for retention/suprapubic pain


No plan for  intervention as per urology


Monitor CBC 


Catheter changed on 6/30/21 due to poor clot retention


Expect intermittent hematuria


Consider hyperbaric oxygen for radiation cystitis as outpatient


Needs follow-up with urology upon discharge











Acute kidney injury


Resolved 


Monitor renal function











Hypokalemia


Replace electrolytes as needed








Hypertension


stable 








Seizure disorder


H/O Medication Noncompliance 


Has been getting phenobarbital as needed


No seizure activity since hospitalization


Follow-up with neurology as outpatient











Ongoing tobacco abuse


Nicotine patch


Counseled to quit











Possible COPD


Stable











DVT Px:


SCDs RE Anemia 








Code Status 


Full code








Disposition 


SNF as able  











Admission and Anticipated Discharge Date


Admission Date: June 7, 2021








Subjective





Patient is seen and examined at bedside


Persistent hematuria


Discussed with radiation oncology today


Hemoglobin 9.3


Left shoulder pain improved 


Patient denies dysuria, chest pain, dyspnea, dizziness, abdominal pain


Offers no other complaints








 





Review of Systems








Review of Systems:   All systems reviewed & are unremarkable except as noted in 

HPI & below  








Physical Exam








Physical Exam:   








Physical Exam:


Vitals signs as noted above 


General Appearance:Moderately built and nourished, no apparent distress, Ill 

appearing 


Head:  normocephalic, Atraumatic 


Eyes:  normal inspection, EOMI


Neck:  supple, Trachea midline


Respiratory/Chest: Normal breath sounds, CTA


Cardiovascular: S1, S2, No murmur


Abdomen/GI:Soft, Non tender, Bowel sounds present


Extremities/Musculoskeletal:normal inspection, no edema, LUE in sling   


Neurologic/Psych:AAOX3, grossly no focal neurological deficits 


Skin:  normal color, warm














Results & Data


Results & Data ([facility])


Vital Signs (Past 12 Hours)


                                   Vital Signs











  Temp Pulse Resp BP Pulse Ox


 


 07/01/21 15:07  37 C  82  16  89/58 L  98


 


 07/01/21 07:23  36.9 C  79  16  105/65  95











Laboratory Results





                                    Short CBC











  06/30/21 07/01/21 07/01/21 Range/Units





  19:31 05:39 12:48 


 


Hgb  8.9 L  8.7 L  9.3 L  (14.0-18.0)  g/dL


 


Hct  27.6 L  26.7 L  29.2 L  (42-52)  %








                                       BMP











  07/01/21





  05:39


 


Sodium  137


 


Potassium  3.8


 


Chloride  108 H


 


Carbon Dioxide  25


 


BUN  22 H


 


Creatinine  0.66


 


Glucose  100 H


 


Calcium  8.6

## 2021-07-02 LAB
HCT VFR BLD AUTO: 28.4 % (ref 42–52)
HGB BLD-MCNC: 9 G/DL (ref 14–18)
RBC # UR AUTO: >30 /HPF (ref 0–4)

## 2021-07-02 RX ADMIN — Medication SCH MG: at 08:11

## 2021-07-02 RX ADMIN — Medication SCH TAB: at 08:11

## 2021-07-02 RX ADMIN — NICOTINE SCH MG: 21 PATCH, EXTENDED RELEASE TRANSDERMAL at 08:10

## 2021-07-02 RX ADMIN — FLUTICASONE PROPIONATE SCH: 50 SPRAY, METERED NASAL at 08:11

## 2021-07-02 RX ADMIN — FOLIC ACID SCH MG: 1 TABLET ORAL at 08:10

## 2021-07-02 NOTE — HOSPITALIST PROGRESS NOTE
Date of Service


July 2, 2021


 





Assessment & Plan


(1) Encephalopathy: 


      


Acute metabolic encephalopathy-Resolved 


Multifactorial:Alcohol withdrawal,Hyponatremia, LARA, Hypoxemic respiratory 

failure secondary to COPD exacerbation


CT head:No acute intracranial findings. No change in appearance of the brain.


Mental status back to baseline


Continue thiamine, folic acid


Continue PT/OT 


Monitor for delirium


Reorient frequently 


 








Acute blood loss anemia


Secondary to multiple sources of bleeding:GI bleed,  bleed--hematuria, anemia 

of chronic disease


H/O Metastatic prostate cancer S/P surgery, chemotherapy/Lupron 


Appreciate GI, urology input


S/P 5 units PRBCs


Continue PPI


Hb 9.3>8.2>9.0 


Developed  a rash while getting blood transfusion today but completely resolved 

with Benadryl














Traumatic left shoulder ecchymosis with fracture of the left humerus


Secondary to fall


Appreciate orthopedics input.


Conservative management


Maintain sling immobilization, nonweightbearing left upper extremity


Pin control 


May need repeat x-ray in 2 weeks and follow-up with orthopedics upon discharge














Recurrent hematuria


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


Appreciate Urology Input 


Catheter as needed for retention/suprapubic pain


No plan for  intervention as per urology


Monitor CBC 


Catheter changed on 6/30/21 due to poor clot retention


Expect intermittent hematuria


Consider hyperbaric oxygen for radiation cystitis as outpatient


Needs follow-up with urology upon discharge











Acute kidney injury


Resolved 


Monitor renal function











Hypokalemia


Replace electrolytes as needed








Hypertension


stable 








Seizure disorder


H/O Medication Noncompliance 


Has been getting phenobarbital as needed


No seizure activity since hospitalization


Follow-up with neurology as outpatient











Ongoing tobacco abuse


Nicotine patch


Counseled to quit











Possible COPD


Stable











DVT Px:


SCDs RE Anemia 








Code Status 


Full code








Disposition 


SNF today 











Admission and Anticipated Discharge Date


Admission Date: June 7, 2021








Subjective





Patient is seen and examined at bedside


Persistent hematuria


Developed a rash from blood transfusion today which later resolved 


Left shoulder pain is controlled  


Patient denies dysuria, chest pain, dyspnea, dizziness, abdominal pain


Offers no other complaints








 





Review of Systems








Review of Systems:   All systems reviewed & are unremarkable except as noted in 

HPI & below  








Physical Exam








Physical Exam:   








Physical Exam:


Vitals signs as noted above 


General Appearance:Moderately built and nourished, no apparent distress, Ill 

appearing 


Head:  normocephalic, Atraumatic 


Eyes:  normal inspection, EOMI


Neck:  supple, Trachea midline


Respiratory/Chest: Normal breath sounds, CTA


Cardiovascular: S1, S2, No murmur


Abdomen/GI:Soft, Non tender, Bowel sounds present


Extremities/Musculoskeletal:normal inspection, no edema, LUE in sling   


Neurologic/Psych:AAOX3, grossly no focal neurological deficits 


Skin:  normal color, warm














Results & Data


Results & Data ([facility])


Vital Signs (Past 12 Hours)


                                   Vital Signs











  Temp Pulse Pulse Pulse Pulse Resp BP


 


 07/02/21 15:03  36.7 C    91 H   16 


 


 07/02/21 12:15  36.5 C  86     16  98/61 L


 


 07/02/21 12:06  36.8 C   73  80  68  16 


 


 07/02/21 11:15  36.8 C  76     16  119/72


 


 07/02/21 10:45  36.6 C  78     16  107/65


 


 07/02/21 10:30  36.9 C  78     16  104/66


 


 07/02/21 10:29  36.8 C  82     16  100/62


 


 07/02/21 10:15  36.6 C  87     16  92/61 L


 


 07/02/21 07:35  37 C    80   16 














  BP Pulse Ox


 


 07/02/21 15:03  101/65  99


 


 07/02/21 12:15   98


 


 07/02/21 12:06  91/55 L  98


 


 07/02/21 11:15   98


 


 07/02/21 10:45   97


 


 07/02/21 10:30   98


 


 07/02/21 10:29   98


 


 07/02/21 10:15   98


 


 07/02/21 07:35  91/55 L  95











Laboratory Results





                                    Short CBC











  07/01/21 07/02/21 Range/Units





  21:22 08:26 


 


Hgb  8.2 L  9.0 L  (14.0-18.0)  g/dL


 


Hct  25.2 L  28.4 L  (42-52)  %
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
## 2021-07-05 ENCOUNTER — HOSPITAL ENCOUNTER (INPATIENT)
Dept: HOSPITAL 45 - 2E | Age: 61
LOS: 4 days | Discharge: HOME HEALTH | DRG: 669 | End: 2021-07-09

## 2021-07-05 LAB
ALBUMIN SERPL-MCNC: 2.9 GM/DL (ref 3.4–5)
ALBUMIN/GLOB SERPL: 0.7 {RATIO} (ref 0.9–2)
ALP SERPL-CCNC: 195 U/L (ref 45–117)
ALT SERPL-CCNC: 21 U/L (ref 12–78)
ANION GAP SERPL CALC-SCNC: 7 MMOL/L (ref 3–11)
BILIRUB SERPL-MCNC: 0.5 MG/DL (ref 0.2–1)
BUN SERPL-MCNC: 15 MG/DL (ref 7–18)
CALCIUM SERPL-MCNC: 8.5 MG/DL (ref 8.5–10.1)
CHLORIDE SERPL-SCNC: 109 MMOL/L (ref 98–107)
CO2 SERPL-SCNC: 23 MMOL/L (ref 21–32)
CREAT CL PREDICTED SERPL C-G-VRATE: 93.4 ML/MIN
DEPRECATED RDW RBC: 58.5 FL (ref 36.4–46.3)
GLOBULIN SER CALC-MCNC: 4.3 GM/DL (ref 2.5–4)
GLUCOSE SERPL-MCNC: 122 MG/DL (ref 70–99)
HCT VFR BLD AUTO: 23.6 % (ref 42–52)
HGB BLD-MCNC: 7.7 G/DL (ref 14–18)
IMM GRANULOCYTES # BLD: 0 K/UL (ref 0–0.02)
IMM GRANULOCYTES NFR BLD AUTO: 0 %
MCH RBC QN AUTO: 31 PG (ref 25–34)
MCV RBC: 95.2 FL (ref 80–100)
PLATELET # BLD AUTO: 204 K/UL (ref 130–400)
POTASSIUM SERPL-SCNC: 3.6 MMOL/L (ref 3.5–5.1)
PROT SERPL-MCNC: 7.2 GM/DL (ref 6.4–8.2)
RBC # BLD AUTO: 2.48 M/UL (ref 4.7–6.1)
SODIUM SERPL-SCNC: 139 MMOL/L (ref 136–145)
WBC # BLD AUTO: 5.46 K/UL (ref 4.8–10.8)

## 2021-07-05 NOTE — XRAY REPORT
XR chest 1V portable



HISTORY:  60 years-old Male admit acute shortness of breath



COMPARISON: Chest radiograph 6/10/2021, CTA chest 6/7/2021



TECHNIQUE: Portable AP view the chest



FINDINGS: 

Cardiomediastinal and hilar silhouettes are within normal limits. No pneumothorax, pleural effusion, 
airspace consolidation or overt pulmonary edema. Opacities project over the right upper lung correlat
e with the healed fractures of the posterior right fifth and sixth ribs. Healed chronic bilateral rib
 fractures. Left-sided nipple shadow. Degenerative changes of the shoulders and spine.



IMPRESSION: No acute process. 





: Negative or not required by law.



The above report was generated using voice recognition software. It may contain grammatical, syntax o
r spelling errors.









Electronically signed by:  Jacky Monroe M.D.

7/5/2021 7:27 PM

## 2021-07-05 NOTE — UROLOGY CONSULTATION
Date of Consultation


July 5, 2021


 





Assessment & Plan


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 


      Patient has been admitted to the hospital service proceeding as follows:


Plans noted to support the patient with transfusions as needed


Follow serial labs


Maintain Alas catheter.  If clots develop obstructing the flow of urine the 

Alas catheter can be irrigated.  If this becomes a persistent problem 

consideration can be given to initiating continuous bladder irrigation but at 

the present time his Alas catheter appears to be draining adequately so we will

maintain this for the present time.





Additional recommendations were made based on his clinical course as it unfolds.





Remaining plan as directed by primary service





History of Present Illness


Reason for Consultation: Hematuria


Attending Physician: Jarrod Loving MD








History of Present Illness


This is a 60-year-old male who is known to our urology service.  Patient was 

previously admitted to [facility] in June of this year and 

seen by the urology service secondary to gross hematuria.  The patient's 

hematuria was felt to be secondary to radiation cystitis as well as coagulopathy

from underlying liver disease.  He did have a Alas catheter in place at time of

discharge.  It was felt that patient was likely to have episodes of gross 

hematuria secondary to his underlying liver disease as well as his radiation 

cystitis.  Consideration was given to have the patient undergo hyperbaric 

treatment as an outpatient.  In addition plans were to be made for patient to 

have an outpatient voiding trial.





Patient was ultimately discharged to a rehab facility in the [place] area.  

The patient was noted to have worsening hematuria while at his rehab facility 

was seen at [facility] where labs were performed that showed worsening 

anemia.  The patient was reportedly orthostatic when changing positions so 

transfer to [facility] for urologic intervention was 

requested.





Since arrival to [facility] the patient had labs and imaging 

which independent reviewed.  Chest x-ray showed no evidence of pneumonia or CHF.

 A CBC revealed his white blood cell count and platelet count are both within 

normal range.  His hemoglobin and hematocrit were low at 7.7 and 23.6 

respectively.  Of note his most recent hemoglobin at our facility was on July 2 

which was 9.0 so today's value shows approximately a 1-1/2 g drop.  Chemistry 

profile showed that his sodium, potassium, BUN, and creatinine were all within 

normal range.  Patient's previous labs were reviewed and he did have a negative 

Covid test on July 1 of this year.





I visited with the patient at the bedside and at the present time he says he fe

els better than when he did at time of arrival.  He denies any chest pain or 

shortness of breath.  He denies any fevers, shakes, chills.  He notes that his 

Alas catheter has been draining blood.  At the present time he does not have 

any discomfort from his Alas and he says that he does not feel as though his 

bladder is full.





At the time of my interview he is resting comfortably in bed in no distress.  

Patient's Alas catheter was examined and was draining bloody urine.


Allergies














Allergy/AdvReac Type Severity Reaction Status Date / Time


 


lisinopril Allergy Severe ANGIOEDEMA Verified 06/07/21 17:09


 


oxybutynin [From Ditropan] AdvReac Unknown fever & Verified 06/07/21 17:09





   perhaps  





   seizures,  





   was  





   detoxing  





   @time  











Home Medications


                                        











 Medication  Instructions  Recorded  Confirmed  Type


 


folic acid 1 mg PO QAM 04/02/19 07/05/21 History


 


fluticasone propionate 2 spray INTRANASAL DAILY 04/30/21 07/05/21 History


 


trazodone 300 mg PO HS 04/30/21 07/05/21 History


 


magnesium oxide 400 mg PO QAM #30 tab 06/29/21 07/05/21 Rx


 


oxycodone 5 mg PO Q6H PRN #20 tab 06/29/21 07/05/21 Rx


 


pantoprazole 40 mg PO BID #60 tab 06/29/21 07/05/21 Rx


 


polyethylene glycol 3350 [Miralax] 17 g PO DAILY PRN #119 g 06/29/21 07/05/21 Rx


 


nicotine [Nicoderm CQ] 21 mg TRANSDERMAL QAM #30 ea 07/02/21 07/05/21 Rx














Patient History


Medical History (Reviewed 07/05/21 @ 21:21 by Gilbert Mcrae PA-C)





Altered mental status


Confusion


Depression


Elevated troponin


History of torn meniscus of left knee


History of torn meniscus of right knee


Prostate cancer (06/02/14)


   "Rising PSA


   Status post ultrasound-guided biopsies 06/02/2014


   Biopsy stage T2c [place] 4+5 with perineural invasion


   Status post robotic prostatectomy 08/25/2014


   Pathologic stage [id number] [place] 4+4 Tertiary 5


   Post prostatectomy rising PSA


   Status post completion of radiation therapy 05/19/2015 received 7040 cGy"


   


   *** On 05/26/15 16:14 Jeanine Horowitz wrote ***


   "Rising PSA


   Status post ultrasound-guided biopsies 06/02/2014


   Biopsy stage T2c [place] 4+5 with perineural invasion


   Status post robotic prostatectomy 08/25/2014


   Pathologic stage [id number] [place] 4+4


   Post prostatectomy rising PSA


   Status post completion of radiation therapy 05/19/2015 received 7040 cGy"


Psychiatric exam requested by authority


Right knee sprain


UTI (urinary tract infection)








Surgical History (Reviewed 07/05/21 @ 21:21 by Gilbert Mcrae PA-C)





H/O knee surgery


H/O prostate biopsy





Family History (Reviewed 07/05/21 @ 21:21 by Gilbert Mcrae PA-C)


Other   No pertinent family history











Social History (Reviewed 07/05/21 @ 21:21 by Gilbert Mcrae PA-C)


Smoking Status:  Current every day smoker 


Tobacco Type:  Cigarettes 


Cigarettes Per Day:  12; 


Hx Alcohol Use:  Yes Alcohol type: hard liquor 


Hx Substance Use:  No 


Preferred Language:  English 


Communication Ability:  Effective 


 Required:  No 


Beliefs That Will Affect Care:  None 


Current Living Situation:  Alone 


Current Living Situation Comment:  unknown 


Other Information That Helps Us Care for You:  No 


Feels Safe at Home:  Yes 


Safety Concerns:  Feels Safe At This Time 


Assistive Devices:  Glasses 











Review of Systems








Constitutional:   + fatigue; no fever and no chills  


 


Eyes:   no diplopia  


 


Ear, Nose, Mouth, Throat:   no ear pain  


 


Respiratory:   no cough and no dyspnea  


 


Cardiovascular:   + lightheadedness; no chest pain  


 


Gastrointestinal:   no abdominal pain, no nausea and no vomiting  


 


Genitourinary:   + hematuria; no flank pain  


 


Musculoskeletal:   no back pain  


 


Integumentary:   no rash  


 


Neurologic:   + generalized weakness  








Physical Exam








Constitutional:   no acute distress  


 


Eyes:   no conjunctival abnormality  


 


ENMT:   Ears: no hearing impairment  


 


Neck:   trachea midline  


 


Respiratory:   normal respiratory effort; no respiratory distress and no labored

breathing  


 


Cardiovascular:   Rate/Rhythm: regular rate and regular rhythm  


 


Gastrointestinal (Abdomen):   Percussion/Palpation: abdomen soft; abdomen 

nontender  


 


Musculoskeletal:   No calf tenderness


 


Skin:   no rashes, warm and dry  


 


Neurologic:   moves all extremities  


 


Psychiatric:   A+Ox3, euthymic affect  


 


Genitourinary:   Alas catheter was in place draining grossly bloody urine.  The

Alas catheter did appear patent and the plate patient's bladder did not appear 

distended at the time of my exam








Results & Data ([facility])


Vital Signs (Past 12 Hours)


                                   Vital Signs











  Temp Pulse Resp BP Pulse Ox


 


 07/05/21 19:08  36.6 C  87  20  125/78  98














PG Care Time/CCT


Total # of Minutes Spent


Total Time Spent with Patient: Total time spent is greater than 50% in 

coordination of care (as documented) at patient's floor/unit and/or counseling 

patient:








Coding


Level of Care Code


51895 Inpt Consult Level 5





Diagnoses


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

## 2021-07-05 NOTE — HISTORY & PHYSICAL REPORT
Date of Service


July 5, 2021


 





Assessment & Plan


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 


      Recently admitted and treated for acute blood loss anemia 2/2 this 

diagnosis.  Over the weekend at rehab facility, he had worsened gross hematuria 

into his Mendez bag.  He reports orthostasis when changing position.  Anemia on 

labwork today.  Will support with blood transfusion.  Consider bladder 

irrigation, Urology consulted.  It was discussed that hyperbaric treatment may 

be an option but this has not been setup yet.  NPO after midnight in case of 

procedure. 


(2) Acute blood loss anemia: 


      Transfuse 1 unit now in setting of orthostasis reported when changing 

positions and repeat in am.  There in ongoing active bleeding so may need more 

if he becomes more symptomatic.  


(3) H/O ETOH abuse: 


      Reports last drink was two months ago.  


(4) Cirrhosis: 


      Appears compensated, needs outpatient followup for this to begin screening

with six month AFP and RUQ US with primary care.


(5) Smoker: 


      Nicoderm, smoking cessation advised. 


(6) DVT prophylaxis: 


      SCD


Full code-confirmed on admission 


Dispo-PCU





[facility]








History of Present Illness


Chief Complaint: gross hematuria  Primary Care Provider: Dorian Castellon MD





 This is a 61yo M with a PMH of Metastatic prostate cancer S/P surgery, 

chemotherapy/Lupron, recurrent hematuria, HTN, history of alcohol abuse, tobacco

use who presents with worsening gross hematuria. Was initially seen in 

[place] today and hgb was found to be 7. Requested transfer to [facility] for 

continued care for symptomatic anemia and urology. History of multiple sources 

of bleeding requiring blood transfusions, most recently 3 weeks ago. Has been 

evaluated by [facility] [place] service recently with recommendations for catheter as 

needed for retention/suprapubic pain and expected intermittent hematuria. 

Recommended to consider hyperbaric oxygen for radiation cystitis as outpatient. 

Discharged on 6/7 to [facility]. Since discharge home, has experienced 4 days

of worsening bright red blood in mendez collection bag with associated suprapubic

discomfort. Denies fever, chills, nausea, vomiting or abdominal pain. Endorses 

lightheadedness with positional change. No chest pain or SOB. Patient is a poor 

historian. 





 Allergies 














Allergy/AdvReac Type Severity Reaction Status Date / Time


 


lisinopril Allergy Severe ANGIOEDEMA Verified 06/07/21 17:09


 


oxybutynin [From Ditropan] AdvReac Unknown fever & Verified 06/07/21 17:09





   perhaps  





   seizures,  





   was  





   detoxing  





   @time  








 Home Medications 


                                        











 Medication  Instructions  Recorded  Confirmed  Type


 


folic acid 1 mg PO QAM 04/02/19 07/05/21 History


 


fluticasone propionate 2 spray INTRANASAL DAILY 04/30/21 07/05/21 History


 


trazodone 300 mg PO HS 04/30/21 07/05/21 History


 


magnesium oxide 400 mg PO QAM #30 tab 06/29/21 07/05/21 Rx


 


oxycodone 5 mg PO Q6H PRN #20 tab 06/29/21 07/05/21 Rx


 


pantoprazole 40 mg PO BID #60 tab 06/29/21 07/05/21 Rx


 


polyethylene glycol 3350 [Miralax] 17 g PO DAILY PRN #119 g 06/29/21 07/05/21 Rx


 


nicotine [Nicoderm CQ] 21 mg TRANSDERMAL QAM #30 ea 07/02/21 07/05/21 Rx








 








Past Med/Surg History


Medical History (Reviewed 07/05/21 @ 22:52 by Clare Pinzon DO)





Altered mental status


Confusion


Depression


Elevated troponin


History of torn meniscus of left knee


History of torn meniscus of right knee


Prostate cancer (06/02/14)


   "Rising PSA


   Status post ultrasound-guided biopsies 06/02/2014


   Biopsy stage T2c [place] 4+5 with perineural invasion


   Status post robotic prostatectomy 08/25/2014


   Pathologic stage [id number] [place] 4+4 Tertiary 5


   Post prostatectomy rising PSA


   Status post completion of radiation therapy 05/19/2015 received 7040 cGy"


   


   *** On 05/26/15 16:14 Jeanine Horowitz wrote ***


   "Rising PSA


   Status post ultrasound-guided biopsies 06/02/2014


   Biopsy stage T2c [place] 4+5 with perineural invasion


   Status post robotic prostatectomy 08/25/2014


   Pathologic stage [id number] [place] 4+4


   Post prostatectomy rising PSA


   Status post completion of radiation therapy 05/19/2015 received 7040 cGy"


Psychiatric exam requested by authority


Right knee sprain


UTI (urinary tract infection)








Surgical History (Reviewed 07/05/21 @ 22:52 by Clare Pinzon DO)





H/O knee surgery


H/O prostate biopsy





Family History (Reviewed 07/05/21 @ 22:52 by Clare Pinzon DO)


Other   No pertinent family history











Social History (Reviewed 07/05/21 @ 22:52 by Clare Pinzon DO)


Smoking Status:  Current every day smoker 


Tobacco Type:  Cigarettes 


Cigarettes Per Day:  12; 


Hx Alcohol Use:  Yes Alcohol type: hard liquor 


Hx Substance Use:  No 


Preferred Language:  English 


Communication Ability:  Effective 


 Required:  No 


Beliefs That Will Affect Care:  None 


Current Living Situation:  Alone 


Current Living Situation Comment:  unknown 


Other Information That Helps Us Care for You:  No 


Feels Safe at Home:  Yes 


Safety Concerns:  Feels Safe At This Time 


Assistive Devices:  Glasses 














Review of Systems








Review of Systems:   At least ten systems reviewed and negative except as noted 

in the HPI. 











Physical Exam








Physical Exam:   CONSTITUTIONAL:  WNWD, vitals as above, generally NAD, but is 

pale


EYES: pupils are round and equal bilaterally, normal conjunctivae, no scleral 

icterus


ENT:  external ear and nose normal, MMM


RESPIRATORY:  clear to auscultation bilaterally, no crackles, rales or wheezes, 

normal respiratory effort 


CARDIOVASCULAR:  regular rate and rhythm, S1 and 2 heard without murmurs, 

gallops or rubs, no JVD, no peripheral edema


GASTROINTESTINAL:  soft, nontender, nondistended


MUSCULOSKELETAL:  strength 5/5 throughout, head is normocephalic and atraumatic


SKIN:  warm and dry


NEUROLOGIC:  No facial palsy, no dysarthria.  CN 2-12 grossly intact, no sensory

deficit, normal cognition, normal speech


PSYCHIATRIC:  alert cooperative and oriented to person, place and time.  














Results & Data


Results & Data ([facility])


Laboratory Results





                                    Short CBC











  07/05/21 Range/Units





  20:03 


 


WBC  5.46  (4.8-10.8)  K/uL


 


Hgb  7.7 L  (14.0-18.0)  g/dL


 


Hct  23.6 L  (42-52)  %


 


Plt Count  204  (130-400)  K/uL








                                       BMP











  07/05/21





  20:03


 


Sodium  139


 


Potassium  3.6


 


Chloride  109 H


 


Carbon Dioxide  23


 


BUN  15


 


Creatinine  0.76


 


Glucose  122 H


 


Calcium  8.5








                                 Liver Function











  07/05/21 Range/Units





  20:03 


 


Total Bilirubin  0.5  (0.2-1)  mg/dl


 


AST  23  (15-37)  U/L


 


ALT  21  (12-78)  U/L


 


Alkaline Phosphatase  195 H  ()  U/L


 


Albumin  2.9 L  (3.4-5.0)  gm/dl











Diagnostic Findings





                                        





Chest X-Ray  07/05/21 17:52


XR chest 1V portable


 


HISTORY:  60 years-old Male admit acute shortness of breath


 


COMPARISON: Chest radiograph 6/10/2021, CTA chest 6/7/2021


 


TECHNIQUE: Portable AP view the chest


 


FINDINGS: 


Cardiomediastinal and hilar silhouettes are within normal limits. No 

pneumothorax, pleural effusion, airspace consolidation or overt pulmonary edema.

Opacities project over the right upper lung correlate with the healed fractures 

of the posterior right fifth and sixth ribs. Healed chronic bilateral rib 

fractures. Left-sided nipple shadow. Degenerative changes of the shoulders and 

spine.


 


IMPRESSION: No acute process. 


 


 


: Negative or not required by law.


 


The above report was generated using voice recognition software. It may contain 

grammatical, syntax or spelling errors.


 


 


 


 


Electronically signed by:  Jacky Monroe M.D.


7/5/2021 7:27 PM

## 2021-07-06 LAB
DEPRECATED RDW RBC: 57.9 FL (ref 36.4–46.3)
HCT VFR BLD AUTO: 24.2 % (ref 42–52)
HCT VFR BLD AUTO: 26.7 % (ref 42–52)
HGB BLD-MCNC: 8 G/DL (ref 14–18)
HGB BLD-MCNC: 8.8 G/DL (ref 14–18)
MCH RBC QN AUTO: 30.8 PG (ref 25–34)
MCV RBC: 93.1 FL (ref 80–100)
PLATELET # BLD AUTO: 170 K/UL (ref 130–400)
RBC # BLD AUTO: 2.6 M/UL (ref 4.7–6.1)
WBC # BLD AUTO: 4.41 K/UL (ref 4.8–10.8)

## 2021-07-06 RX ADMIN — CIPROFLOXACIN SCH MLS/HR: 2 INJECTION, SOLUTION INTRAVENOUS at 11:49

## 2021-07-06 RX ADMIN — CIPROFLOXACIN SCH MLS/HR: 2 INJECTION, SOLUTION INTRAVENOUS at 15:56

## 2021-07-06 RX ADMIN — FOLIC ACID SCH MG: 1 TABLET ORAL at 08:16

## 2021-07-06 RX ADMIN — FLUTICASONE PROPIONATE SCH SPRAYS: 50 SPRAY, METERED NASAL at 08:16

## 2021-07-06 RX ADMIN — NICOTINE SCH MG: 21 PATCH, EXTENDED RELEASE TRANSDERMAL at 08:17

## 2021-07-06 RX ADMIN — Medication SCH MG: at 08:17

## 2021-07-06 NOTE — UROLOGY PROGRESS NOTE
Date of Service


July 6, 2021


 





Assessment & Plan


(1) Gross hematuria: 


      60 year-old male patient, with multiple comorbidities, readmitted with 

worsening gross hematuria and anemia.





- Hx of prostate cancer and prior radiation, with recent hospitalization for 

acute blood loss anemia secondary to hematuria due to radiation cystitis.


- He is afebrile. 


- Wbc and creatinine stable. Hemoglobin 8.0 today (transfused with 1 unit 

yesterday).


- Mendez catheter intact, draining with hematuria. 


- Plan of care reviewed with Dr. Palomo, on-call urologist.


- Given his continued gross hematuria, will proceed with OR for Cystoscopy, clot

evacuation, and possible fulguration depending on findings.  Risks and benefits 

to be reviewed with patient by . 


- OR notified. Preoperative CXR and EKG in chart. Will cover with IV 

Ciprofloxacin preoperatively. 


- Keep NPO.


- Maintain mendez catheter, OK to irrigate prn for clots, retention, or 

suprapubic pain.


- Expected clinical course reviewed with patient, he is agreeable to plan, all 

questions were answered. 


- Will continue to follow. 


Admission and Anticipated Discharge Date


Admission Date: July 5, 2021








Subjective


Pt examined at bedside this AM.


Awake, resting in bed on arrival.


He denies any pain or discomfort at this time. 


No fevers overnight. No chills.


Mendez catheter intact, draining with hematuria and clots noted in tubing.


Denies nausea or vomiting.


Has been NPO since midnight. 





Review of Systems








Constitutional:   as per Subjective / HPI  


 


Gastrointestinal:   as per Subjective / HPI  


 


Genitourinary:   + as per Subjective / HPI  








Physical Exam








Constitutional:   cooperative; no acute distress  


 


Respiratory:   no labored breathing and no audible wheezes  


 


Gastrointestinal (Abdomen):   Percussion/Palpation: abdomen soft; abdomen 

nontender and no guarding  


 


Musculoskeletal:   Head/Neck/Chest: normocephalic  


 


Skin:   Warm and dry. 


 


Neurologic:   awake  


 


Psychiatric:   Orientation: alert and oriented x 3  


 


Genitourinary:   Mendez catheter intact








Results & Data ([facility])


Vital Signs (Past 12 Hours)


                                   Vital Signs











  Temp Pulse Pulse Pulse Resp BP BP


 


 07/06/21 06:59  36.7 C    68  17   93/57 L


 


 07/06/21 03:05  36.5 C    66  17   99/61 L


 


 07/06/21 00:49  36.5 C  65    18  107/65 


 


 07/06/21 00:16  36.3 C L  74    18  100/67 


 


 07/05/21 23:16  36.3 C L  71    18  95/61 L 


 


 07/05/21 23:10  36.8 C    68  18   92/58 L


 


 07/05/21 22:46  36.7 C  78     


 


 07/05/21 22:31  36.8 C  79    20  109/61 


 


 07/05/21 22:30  36.7 C  73    20  133/75 


 


 07/05/21 22:15  36.7 C  69     122/73 


 


 07/05/21 22:01  36.7 C   68     126/72














  Pulse Ox


 


 07/06/21 06:59  95


 


 07/06/21 03:05  96


 


 07/06/21 00:49  96


 


 07/06/21 00:16  97


 


 07/05/21 23:16  99


 


 07/05/21 23:10  97


 


 07/05/21 22:46  97


 


 07/05/21 22:31  99


 


 07/05/21 22:30  100


 


 07/05/21 22:15  100


 


 07/05/21 22:01  99














PG Care Time/CCT


Total # of Minutes Spent


Total Time Spent with Patient: Total time spent is greater than 50% in 

coordination of care (as documented) at patient's floor/unit and/or counseling 

patient:








Coding


Level of Care Code


21398 Subseq Hosp Care Lvl 2





Diagnoses


Gross hematuria  R31.0

## 2021-07-06 NOTE — ANESTHESIOLOGY PROGRESS NOTE
Date of Service


July 6, 2021








Anesthesia Post Procedure


Vital Signs


Vital Signs:                            











  Temp Pulse Pulse Pulse Resp BP BP


 


 07/06/21 16:55    82   16   106/68


 


 07/06/21 16:45    91 H   14   87/60 L


 


 07/06/21 16:38  96.8 F L   87   15   93/60 L


 


 07/06/21 14:28  98.2 F    76  18   82/53 L


 


 07/06/21 11:45   64     


 


 07/06/21 11:39       


 


 07/06/21 11:10  98.2 F    80  18   106/59 L


 


 07/06/21 06:59  98.1 F    68  17   93/57 L


 


 07/06/21 03:05  97.7 F    66  17   99/61 L


 


 07/06/21 00:49  97.7 F  65    18  107/65 


 


 07/06/21 00:16  97.3 F L  74    18  100/67 


 


 07/05/21 23:16  97.3 F L  71    18  95/61 L 


 


 07/05/21 23:10  98.2 F    68  18   92/58 L


 


 07/05/21 22:46  98.1 F  78     


 


 07/05/21 22:31  98.2 F  79    20  109/61 


 


 07/05/21 22:30  98.1 F  73    20  133/75 


 


 07/05/21 22:15  98.1 F  69     122/73 


 


 07/05/21 22:01  98.1 F   68     126/72


 


 07/05/21 20:00   90     


 


 07/05/21 19:08  97.9 F   87   20   125/78














  Pulse Ox Pulse Ox


 


 07/06/21 16:55  98 


 


 07/06/21 16:45  96 


 


 07/06/21 16:38  94 


 


 07/06/21 14:28  97 


 


 07/06/21 11:45  


 


 07/06/21 11:39   97


 


 07/06/21 11:10  97 


 


 07/06/21 06:59  95 


 


 07/06/21 03:05  96 


 


 07/06/21 00:49  96 


 


 07/06/21 00:16  97 


 


 07/05/21 23:16  99 


 


 07/05/21 23:10  97 


 


 07/05/21 22:46  97 


 


 07/05/21 22:31  99 


 


 07/05/21 22:30  100 


 


 07/05/21 22:15  100 


 


 07/05/21 22:01  99 


 


 07/05/21 20:00  


 


 07/05/21 19:08  98 











Pain Intensity


Left Shoulder: 


      Pain Intensity: 8


Transfer of Care Handoff


Completed per policy


Notes


Mental Status: alert / awake / arousable and participated in evaluation


Patient Amnestic to Procedure: Yes


Nausea / Vomiting: adequately controlled


Pain: adequately controlled


Airway Patency, RR, SpO2: stable & adequate


BP & HR: stable & adequate


Hydration State: stable & adequate


Anesthetic Complications: no major complications apparent and Pt Satisfied with 

anesthetic care

## 2021-07-06 NOTE — ANESTHESIOLOGY CONSULTATION
Date of Service


July 6, 2021








Assessment & Plan


(1) Encounter for pre-operative examination:


Chart Review


Chart Review: Acceptable Risk for Surgery


Consults Requested


none


ASA


ASA3


Proposed Anesthesia


Anesthesia Type: MAC


Risk / Benefits Reviewed With: PT / POA / Parent / Guardian, Accepts Plan and 

Informed Consent Obtained





History


Surgery


                                        





Operation Date: 07/06/21 14:35


Proposed Procedures


p Cystoscopy, Clot Evacuation, Possible Fulguration - Tone Palomo MD








Height/Weight


Height: 5 ft 9 in


Weight: 65.7 kg


Allergies














Allergy/AdvReac Type Severity Reaction Status Date / Time


 


lisinopril Allergy Severe ANGIOEDEMA Verified 06/07/21 17:09


 


oxybutynin [From Ditropan] AdvReac Unknown fever & Verified 06/07/21 17:09





   perhaps  





   seizures,  





   was  





   detoxing  





   @time  











Medications


                                Home Medications











 Medication  Instructions  Recorded  Confirmed  Last Taken


 


folic acid 1 mg PO QAM 04/02/19 07/05/21 08/29/19


 


fluticasone propionate 2 spray INTRANASAL DAILY 04/30/21 07/05/21 Unknown


 


trazodone 300 mg PO HS 04/30/21 07/05/21 Unknown


 


magnesium oxide 400 mg PO QAM #30 tab 06/29/21 07/05/21 Unknown


 


oxycodone 5 mg PO Q6H PRN #20 tab 06/29/21 07/05/21 Unknown


 


pantoprazole 40 mg PO BID #60 tab 06/29/21 07/05/21 Unknown


 


polyethylene glycol 3350 [Miralax] 17 g PO DAILY PRN #119 g 06/29/21 07/05/21 

Unknown


 


nicotine [Nicoderm CQ] 21 mg TRANSDERMAL QAM #30 ea 07/02/21 07/05/21 Unknown








                               Active Medications











Generic Name Dose Route Start Last Admin





  Trade Name Freq  PRN Reason Stop Dose Admin


 


Fluticasone Propionate  2 sprays  07/06/21 09:00  07/06/21 08:16





  Fluticasone Propionate Na Spr 16 Gm Btl  VINNIE  08/05/21 08:59  2 sprays





  DAILY ANDRE   Administration


 


Folic Acid  1 mg  07/06/21 09:00  07/06/21 08:16





  Folic Acid 1 Mg Tab  PO  08/05/21 08:59  1 mg





  QAM ANDRE   Administration


 


Ciprofloxacin  400 mg in 200 mls @ 100 mls/hr  07/06/21 10:15  07/06/21 11:49





  Cipro / D5w  IV  07/07/21 10:14  100 mls/hr





  PREOP ANDRE   Administration





  Protocol  


 


Magnesium Oxide  400 mg  07/06/21 09:00  07/06/21 08:17





  Magnesium Oxide 400 Mg Tab  PO  08/05/21 08:59  400 mg





  QAM ANDRE   Administration


 


Miscellaneous  1 ea  07/06/21 08:59  07/06/21 08:15





  Remove Nicoderm Patch  N/A  08/05/21 08:58  1 ea





  DAILY@0859 ANDRE   Administration


 


Nicotine  21 mg  07/06/21 09:00  07/06/21 08:17





  Nicotine 21 Mg/24 Hr Tdsy  TD  08/05/21 08:59  21 mg





  QAM ANDRE   Administration


 


Oxycodone HCl  5 mg  07/05/21 21:11  07/05/21 21:41





  Oxycodone Hcl Ir 5 Mg Tab (Immediate Release)  PO  07/19/21 21:10  5 mg





  Q6H PRN   Administration





  pain  


 


Pantoprazole Sodium  40 mg  07/06/21 09:00  07/06/21 08:17





  Pantoprazole 40 Mg Tab  PO  08/05/21 08:59  40 mg





  BID ANDRE   Administration


 


Trazodone HCl  300 mg  07/05/21 21:55  07/05/21 22:34





  Trazodone Hcl 100 Mg Tab  PO  08/04/21 21:54  300 mg





  HS ANDRE   Administration











NPO


Date Last Intake of Fluids: 07/05/21


Time Last Intake of Fluids: 21:30


Date Last Intake of Solids: 07/05/21


Time Last Intake of Solids: 18:00


Past Medical History


Medical History (Updated 07/06/21 @ 15:43 by Samuel Sharpe DO)





Altered mental status


Confusion


Depression


Elevated troponin


History of torn meniscus of left knee


History of torn meniscus of right knee


Prostate cancer (06/02/14)


   "Rising PSA


   Status post ultrasound-guided biopsies 06/02/2014


   Biopsy stage T2c Stanley 4+5 with perineural invasion


   Status post robotic prostatectomy 08/25/2014


   Pathologic stage [id number] Nano 4+4 Tertiary 5


   Post prostatectomy rising PSA


   Status post completion of radiation therapy 05/19/2015 received 7040 cGy"


   


   *** On 05/26/15 16:14 Jeanine Horowitz wrote ***


   "Rising PSA


   Status post ultrasound-guided biopsies 06/02/2014


   Biopsy stage T2c Nano 4+5 with perineural invasion


   Status post robotic prostatectomy 08/25/2014


   Pathologic stage [id number] Stanley 4+4


   Post prostatectomy rising PSA


   Status post completion of radiation therapy 05/19/2015 received 7040 cGy"


Psychiatric exam requested by authority


Right knee sprain


UTI (urinary tract infection)








Exercise / Class Metabolic Activity


II 4-5 Yardwork/Stairs/Walk up hill


Past Family History


Family History (Reviewed 07/06/21 @ 15:35 by Samuel Sharpe DO)


Other   No pertinent family history











Past Surgical History


Surgical History (Reviewed 07/06/21 @ 15:35 by Samuel Sharpe DO)





H/O knee surgery


H/O prostate biopsy





Past Anesthesia History


No Hx of Anesthesia Complications and No Family Hx of Anesthesia Complications


History of PONV


No Hx of PONV and No Hx of Motion Sickness


Social History


Smoking Status: Current every day smoker


tobacco type: cigarettes


Smoking cigarettes per day: 12


Hx Alcohol Use: Yes


Alcohol type: hard liquor


alcohol intake frequency: a few times a week


Hx Substance Use: No





Physical Exam


Vital Signs


                                Last Vital Signs











Temp  98.2 F   07/06/21 14:28


 


Pulse  76   07/06/21 14:28


 


Resp  18   07/06/21 14:28


 


BP  82/53 L  07/06/21 14:28


 


Pulse Ox  97   07/06/21 14:28











ENMT


Mouth: no dentition abnormality


Thyromental Distance: > or= 3.5 Finger Breadths


Mallampati Class: II


Neck


normal visual inspection


Respiratory


normal respiratory effort


Auscultation: lungs clear to auscultation bilaterally


Cardiovascular


Rate/Rhythm: regular rate and regular rhythm





Testing


Laboratory Results


                                        





                                 07/06/21 06:24 





                                 07/05/21 20:03 





                                        











Blood Type  A Positive   07/05/21  20:03    


 


Antibody Screen  NEGATIVE   07/05/21  20:03    











Electrocardiogram


Date: 07/06/21


Normal sinus rhythm, rate 70 bpm


Septal infarct , age undetermined


Abnormal ECG


When compared with ECG of 07-JUN-2021 16:06,


Vent. rate has decreased BY 56 BPM


Non-specific change in ST segment in Lateral leads


Nonspecific T wave abnormality no longer evident in Inferior leads


Nonspecific T wave abnormality no longer evident in Lateral leads


Chest X-Ray


Date: 07/05/21


Findings: + NAD

## 2021-07-06 NOTE — HOSPITALIST PROGRESS NOTE
Date of Service


July 6, 2021


 





Assessment & Plan


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


Acute on chronic blood Loss Anemia 


H/O prostate cancer, prior radiation


S/P 1 unit PRBCs


Appreciate urology input


Plan for cystoscopy, clot evacuation and possible fulguration


Continue Alas catheter


Monitor H&H and transfuse PRBCs as needed











H/O Traumatic left shoulder ecchymosis with fracture of the left humerus


Secondary to fall


Evaluated by orthopedics during prior admission


Needs follow-up with orthopedics in 1 week


Conservative management


Maintain sling immobilization, nonweightbearing left upper extremity











H/O Seizure disorder


H/O Medication Noncompliance


Was on phenobarbital as needed


Follows with neurology as outpatient











Ongoing tobacco abuse


Counseled to quit











DVT Px:


SCDs RE Hematuria 











Code Status


Full code














Admission and Anticipated Discharge Date


Admission Date: July 5, 2021








Subjective





Patient is seen and examined at bedside


Persistent hematuria--urinary colored urine on catheter


Left shoulder pain is controlled 


Patient denies dysuria, chest pain, dyspnea, dizziness, abdominal pain


Received a unit of blood yesterday 


Plan for cystoscopy today. 


 





Review of Systems








Review of Systems:   All systems reviewed & are unremarkable except as noted in 

HPI & below  








Physical Exam








Physical Exam:   


Physical Exam:


Vitals signs as noted above


General Appearance:Moderately built and nourished, no apparent distress, Ill 

appearing


Head:  normocephalic, Atraumatic


Eyes:  normal inspection, EOMI


Neck:  supple, Trachea midline


Respiratory/Chest: Normal breath sounds, CTA


Cardiovascular: S1, S2, No murmur


Abdomen/GI:Soft, Non tender, Bowel sounds present


Extremities/Musculoskeletal:normal inspection, no edema, LUE in sling  


Neurologic/Psych:AAOX3, grossly no focal neurological deficits


Skin:  normal color, warm





 











Results & Data


Results & Data ([facility])


Vital Signs (Past 12 Hours)


                                   Vital Signs











  Temp Pulse Pulse Resp BP Pulse Ox Pulse Ox


 


 07/06/21 11:45   64     


 


 07/06/21 11:39        97


 


 07/06/21 11:10  36.8 C   80  18  106/59 L  97 


 


 07/06/21 06:59  36.7 C   68  17  93/57 L  95 


 


 07/06/21 03:05  36.5 C   66  17  99/61 L  96 











Laboratory Results





                                    Short CBC











  07/05/21 07/06/21 Range/Units





  20:03 06:24 


 


WBC  5.46  4.41 L  (4.8-10.8)  K/uL


 


Hgb  7.7 L  8.0 L  (14.0-18.0)  g/dL


 


Hct  23.6 L  24.2 L  (42-52)  %


 


Plt Count  204  170  (130-400)  K/uL








                                       BMP











  07/05/21





  20:03


 


Sodium  139


 


Potassium  3.6


 


Chloride  109 H


 


Carbon Dioxide  23


 


BUN  15


 


Creatinine  0.76


 


Glucose  122 H


 


Calcium  8.5








                                 Liver Function











  07/05/21 Range/Units





  20:03 


 


Total Bilirubin  0.5  (0.2-1)  mg/dl


 


AST  23  (15-37)  U/L


 


ALT  21  (12-78)  U/L


 


Alkaline Phosphatase  195 H  ()  U/L


 


Albumin  2.9 L  (3.4-5.0)  gm/dl

## 2021-07-06 NOTE — OPERATIVE REPORT
PG Post Operative Report


Pre & Post Diagnosis


                                        





Operation Date: 07/06/21 14:35


Pre-Op Diagnosis:


GROSS HEMATURIA


Post-Op Diagnosis:


GROSS HEMATURIA; radiation cystitis








I identified the patient and participated in the time-out.: Yes


Procedure


                                        





Operation Date: 07/06/21 14:35


Actual Procedures


p Cystoscopy, Clot Evacuation, Fulguration(Not Applicable) - Tone Palomo MD








Surgeon


Saji Palomo MD





Assistant


none


Estimated Blood Loss


0


Findings


Consistent with Post-Op Diagnosis


Specimens


none


Description of Procedure


The patient was identified in the preoperative holding area, appropriate 

informed consents were reviewed and completed and the patient was transferred to

the operative suite.  Upon arrival, appropriate antibiotics and anesthesia were 

administered and the patient was placed in dorsal lithotomy position and prepped

and draped in sterile fashion.





To begin the case I passed a 27 Sami resectoscope per urethra.  Of note he is 

status post prostatectomy but has had prior radiation and had notable radiation 

cystitis related changes immediately upon entry into the bladder.  He also had a

significant amount of blood within the bladder.  I irrigated approximately 200 

cc of old clot out of the bladder to clear before being able to fully inspect.  

Full inspection revealed significant hypervascularity with oozing from numerous 

small venous clusters throughout the trigone and dependent portion of the 

bladder.  Following my inspection I utilized a button electrode to cauterize 

these areas and sequentially work my way through the bladder trying to treat all

areas that look suspicious for recent bleeding or seem to have the potential for

bleed in the near future.  After treating these areas his bladder was quite 

clear.  UOs were identified and preserved.  In total, I would estimated that 7cm

of bladder was treated.





At the conclusion of the case, I elected to leave him without a mendez catheter. 

He was reversed from anesthesia and taken to the recovery room in stable 

condition.


I attest to the content of the Intraoperative Record and any orders documented 

therein.  Any exceptions are noted below.

## 2021-07-07 LAB
ANION GAP SERPL CALC-SCNC: 4 MMOL/L (ref 3–11)
BUN SERPL-MCNC: 16 MG/DL (ref 7–18)
CALCIUM SERPL-MCNC: 8.1 MG/DL (ref 8.5–10.1)
CHLORIDE SERPL-SCNC: 108 MMOL/L (ref 98–107)
CO2 SERPL-SCNC: 27 MMOL/L (ref 21–32)
CREAT CL PREDICTED SERPL C-G-VRATE: 71.4 ML/MIN
DEPRECATED RDW RBC: 57.1 FL (ref 36.4–46.3)
GLUCOSE SERPL-MCNC: 94 MG/DL (ref 70–99)
HCT VFR BLD AUTO: 23.9 % (ref 42–52)
HCT VFR BLD AUTO: 24.1 % (ref 42–52)
HGB BLD-MCNC: 7.9 G/DL (ref 14–18)
HGB BLD-MCNC: 8 G/DL (ref 14–18)
MCH RBC QN AUTO: 30.3 PG (ref 25–34)
MCV RBC: 91.6 FL (ref 80–100)
PLATELET # BLD AUTO: 167 K/UL (ref 130–400)
POTASSIUM SERPL-SCNC: 4 MMOL/L (ref 3.5–5.1)
RBC # BLD AUTO: 2.61 M/UL (ref 4.7–6.1)
SODIUM SERPL-SCNC: 139 MMOL/L (ref 136–145)
WBC # BLD AUTO: 5.19 K/UL (ref 4.8–10.8)

## 2021-07-07 RX ADMIN — ACETAMINOPHEN PRN MG: 325 TABLET ORAL at 08:36

## 2021-07-07 RX ADMIN — FOLIC ACID SCH MG: 1 TABLET ORAL at 08:37

## 2021-07-07 RX ADMIN — NICOTINE SCH MG: 21 PATCH, EXTENDED RELEASE TRANSDERMAL at 08:36

## 2021-07-07 RX ADMIN — Medication SCH MG: at 08:37

## 2021-07-07 RX ADMIN — ACETAMINOPHEN PRN MG: 325 TABLET ORAL at 17:09

## 2021-07-07 RX ADMIN — FLUTICASONE PROPIONATE SCH SPRAYS: 50 SPRAY, METERED NASAL at 08:37

## 2021-07-07 NOTE — UROLOGY PROGRESS NOTE
Date of Service


July 7, 2021


 





Assessment & Plan


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 


      60 year-old male patient, with multiple comorbidities, readmitted with 

worsening gross hematuria and anemia.





- Hx of prostate cancer and prior radiation, with recent hospitalization for 

acute blood loss anemia secondary to hematuria due to radiation cystitis.


- Postop day #1 status post Cystoscopy, Clot Evacuation, and Fulguration with 

Dr. Palomo. 


- He is feeling well today, offers no complaints this morning. 


- Remains afebrile, VSS.


- Labs reviewed, Wbc and creatinine stable. Hgb 7.9 today (8.8 postoperatively 

yesterday) -Continue to follow. 


- Voiding without difficulty with no reports of hematuria - Continue to monitor.




- Continue supportive care and management per primary service.


- Recommend outpatient follow-up with urology - he followed with [facility] 

urology in the past.


- Thank you for allowing us to participate in the acute care of Mr. Ferrer. Please reconsult us with additional questions, concerns or 

changes in patient status








Admission and Anticipated Discharge Date


Admission Date: July 5, 2021








Subjective





Pt examined at bedside this AM.


Awake, resting in bed on arrival.


Pt reports he is voiding spontaneously in urinal without difficulty.


He is having some urinary leakage. 


Denies hematuria and dysuria.


No complaints of pain or discomfort at this time.


No fevers overnight. No chills.


Tolerating diet, no nausea or vomiting. 





Pt is eager to go home.  He stated he is planning to f/u with [facility] 

after discharege.  He reports he is due for his 6month injection for prostate 

cancer.





No additional  complaints or concerns today  





Review of Systems








Constitutional:   as per Subjective / HPI  


 


Gastrointestinal:   as per Subjective / HPI  


 


Genitourinary:   + as per Subjective / HPI  








Physical Exam








Constitutional:   cooperative; no acute distress  


 


Respiratory:   no labored breathing and no audible wheezes  


 


Gastrointestinal (Abdomen):   Percussion/Palpation: abdomen soft; abdomen 

nontender and no guarding  


 


Skin:   Warm and dry


 


Neurologic:   awake  


 


Psychiatric:   Orientation: alert, oriented x 3 and cooperative  


 


Genitourinary:   no CVA tenderness  








Results & Data ([facility])


Vital Signs (Past 12 Hours)


                                   Vital Signs











  Temp Pulse Pulse Resp BP Pulse Ox


 


 07/07/21 03:01  36.7 C   84  18  109/62  95


 


 07/07/21 00:00   83    


 


 07/06/21 23:22  36.6 C   85  17  124/68  99














PG Care Time/CCT


Total # of Minutes Spent


Total Time Spent with Patient: Total time spent is greater than 50% in 

coordination of care (as documented) at patient's floor/unit and/or counseling 

patient:








Coding


Level of Care Code


95277 Subseq Hosp Care Lvl 2





Diagnoses


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

## 2021-07-07 NOTE — HOSPITALIST PROGRESS NOTE
Date of Service


July 7, 2021


 





Assessment & Plan


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


Acute on chronic blood Loss Anemia 


H/O prostate cancer, prior radiation


S/P 1 unit PRBCs


Appreciate urology input


Now s/p  cystoscopy, clot evacuation and fulguration with Dr. Palomo, on July 6, 2021


Alas catheter removed, patient is now using urinal and does not have any more 

hematuria


Monitor H&H and transfuse PRBCs as needed











H/O Traumatic left shoulder ecchymosis with fracture of the left humerus


Secondary to fall


Evaluated by orthopedics during prior admission


Needs follow-up with orthopedics in 1 week


Conservative management


Maintain sling immobilization, nonweightbearing left upper extremity








H/O Seizure disorder


H/O Medication Noncompliance


Was on phenobarbital as needed


Follows with neurology as outpatient








Ongoing tobacco abuse


Counseled about cessation





DVT Px:


SCDs RE Hematuria 





Code Status


Full code





Admission and Anticipated Discharge Date


Admission Date: July 5, 2021








Subjective





Pt seen in follow up of hematuria


S/p Cystoscopy, Clot Evacuation, and Fulguration with Dr. Palomo yesterday


Currently feels well, laying in bed in no acute distress


Pt reports he is voiding spontaneously in urinal without difficulty, denies any 

more hematuria





No complaints of pain or discomfort at this time.


No fevers overnight. No chills.


 





Review of Systems








Review of Systems:   All systems reviewed & are unremarkable except as noted in 

HPI & below  


 


Constitutional:   no fever and no chills  


 


Respiratory:   no cough and no dyspnea  


 


Cardiovascular:   no chest pain and no palpitations  


 


Gastrointestinal:   no abdominal pain, no nausea and no vomiting  








Physical Exam








Physical Exam:   


General Appearance: chronically Ill appearing M, in no acute distress


Head:  normocephalic, atraumatic


Eyes:  normal inspection, EOMI


Neck:  supple, Trachea midline


Respiratory/Chest: Normal breath sounds, CTA


Cardiovascular: S1, S2, No murmur


Abdomen/GI:Soft, Non tender, Bowel sounds present


Extremities/Musculoskeletal:normal inspection, no edema, LUE in sling 


Neurologic/Psych:AAOX3, grossly no focal neurological deficits


Skin:  normal color, warm











Results & Data


Results & Data ([facility])


Vital Signs (Past 12 Hours)


                                   Vital Signs











  Temp Pulse Pulse Resp BP Pulse Ox


 


 07/07/21 08:20  36.8 C   83  18  126/71  99


 


 07/07/21 08:00   74    


 


 07/07/21 03:01  36.7 C   84  18  109/62  95


 


 07/07/21 00:00   83    











Laboratory Results





                                        











  07/07/21 07/07/21 07/07/21 Range/Units





  10:51 07:24 07:24 


 


WBC    5.19  (4.8-10.8)  K/uL


 


RBC    2.61 L  (4.7-6.1)  M/uL


 


Hgb    7.9 L  (14.0-18.0)  g/dL


 


Hct    23.9 L  (42-52)  %


 


MCV    91.6  ()  fL


 


MCH    30.3  (25-34)  pg


 


MCHC    33.1  (32-36)  g/dL


 


RDW Std Deviation    57.1 H  (36.4-46.3)  fL


 


RDW Coeff of Nell    17.3 H  (11.5-14.5)  %


 


Plt Count    167  (130-400)  K/uL


 


MPV    8.7  (7.4-10.4)  fL


 


Sodium   139   (136-145)  mmol/L


 


Potassium   4.0   (3.5-5.1)  mmol/L


 


Chloride   108 H   ()  mmol/L


 


Carbon Dioxide   27   (21-32)  mmol/L


 


Anion Gap   4.0   (3-11)  


 


BUN   16   (7-18)  mg/dl


 


Creatinine   1.00   (0.6-1.4)  mg/dl


 


Est Cr Clr Drug Dosing   71.4   ml/min


 


Est GFR ( Amer)   93.1   ml/min


 


Est GFR (Non-Af Amer)   80.3   ml/min


 


BUN/Creatinine Ratio   16.2   (10-20)  


 


Glucose   94   (70-99)  mg/dl


 


POC Glucose  135 H    (70-99)  mg/dl


 


Calcium   8.1 L   (8.5-10.1)  mg/dl


 


SARS-CoV-2 (PCR)     (Negative)  














  07/06/21 07/06/21 Range/Units





  19:15 11:25 


 


WBC    (4.8-10.8)  K/uL


 


RBC    (4.7-6.1)  M/uL


 


Hgb  8.8 L   (14.0-18.0)  g/dL


 


Hct  26.7 L   (42-52)  %


 


MCV    ()  fL


 


MCH    (25-34)  pg


 


MCHC    (32-36)  g/dL


 


RDW Std Deviation    (36.4-46.3)  fL


 


RDW Coeff of Nell    (11.5-14.5)  %


 


Plt Count    (130-400)  K/uL


 


MPV    (7.4-10.4)  fL


 


Sodium    (136-145)  mmol/L


 


Potassium    (3.5-5.1)  mmol/L


 


Chloride    ()  mmol/L


 


Carbon Dioxide    (21-32)  mmol/L


 


Anion Gap    (3-11)  


 


BUN    (7-18)  mg/dl


 


Creatinine    (0.6-1.4)  mg/dl


 


Est Cr Clr Drug Dosing    ml/min


 


Est GFR ( Amer)    ml/min


 


Est GFR (Non-Af Amer)    ml/min


 


BUN/Creatinine Ratio    (10-20)  


 


Glucose    (70-99)  mg/dl


 


POC Glucose    (70-99)  mg/dl


 


Calcium    (8.5-10.1)  mg/dl


 


SARS-CoV-2 (PCR)   NEGATIVE  (Negative)  











Medications Administered





                          Current Inpatient Medications





Acetaminophen (Acetaminophen 325 Mg Tab)  650 mg PO Q4H PRN


   PRN Reason: Pain or Fever


   Stop: 08/04/21 17:46


   Last Admin: 07/07/21 08:36 Dose:  650 mg


   Documented by: 


Fluticasone Propionate (Fluticasone Propionate Na Spr 16 Gm Btl)  2 sprays VINNIE 

DAILY [facility]


   Stop: 08/05/21 08:59


   Last Admin: 07/07/21 08:37 Dose:  2 sprays


   Documented by: 


Folic Acid (Folic Acid 1 Mg Tab)  1 mg PO QAM [facility]


   Stop: 08/05/21 08:59


   Last Admin: 07/07/21 08:37 Dose:  1 mg


   Documented by: 


Magnesium Oxide (Magnesium Oxide 400 Mg Tab)  400 mg PO QAM [facility]


   Stop: 08/05/21 08:59


   Last Admin: 07/07/21 08:37 Dose:  400 mg


   Documented by: 


Miscellaneous (Remove Nicoderm Patch)  1 ea N/A DAILY@0859 [facility]


   Stop: 08/05/21 08:58


   Last Admin: 07/07/21 08:37 Dose:  1 ea


   Documented by: 


Nicotine (Nicotine 21 Mg/24 Hr Tdsy)  21 mg TD QAM [facility]


   Stop: 08/05/21 08:59


   Last Admin: 07/07/21 08:36 Dose:  21 mg


   Documented by: 


Ondansetron HCl (Ondansetron Inj 2 Mg/Ml 2 Ml Vial)  4 mg IV Q6H PRN


   PRN Reason: Nausea


   Stop: 08/04/21 17:46


Oxycodone HCl (Oxycodone Hcl Ir 5 Mg Tab (Immediate Release))  5 mg PO Q6H PRN


   PRN Reason: pain


   Stop: 07/19/21 21:10


   Last Admin: 07/05/21 21:41 Dose:  5 mg


   Documented by: 


Pantoprazole Sodium (Pantoprazole 40 Mg Tab)  40 mg PO BID ANDRE


   Stop: 08/05/21 08:59


   Last Admin: 07/07/21 08:37 Dose:  40 mg


   Documented by: 


Polyethylene Glycol (Polyethylene (Miralax) 17 Gm Pack)  17 gm PO DAILY PRN


   PRN Reason: constipation


   Stop: 08/04/21 21:10


Trazodone HCl (Trazodone Hcl 100 Mg Tab)  300 mg PO HS ANDRE


   Stop: 08/04/21 21:54


   Last Admin: 07/06/21 22:12 Dose:  300 mg


   Documented by:

## 2021-07-07 NOTE — ELECTROCARDIOGRAM REPORT
Test Reason : 

Blood Pressure : ***/*** mmHG

Vent. Rate : 070 BPM     Atrial Rate : 070 BPM

   P-R Int : 184 ms          QRS Dur : 082 ms

    QT Int : 456 ms       P-R-T Axes : 059 070 066 degrees

   QTc Int : 492 ms

 

Normal sinus rhythm

Possible Septal infarct , age undetermined

Prolonged QT

Abnormal ECG

When compared with ECG of 07-JUN-2021 16:06,

Vent. rate has decreased BY  56 BPM

Non-specific change in ST segment in Lateral leads

Nonspecific T wave abnormality no longer evident in Inferior leads

Nonspecific T wave abnormality no longer evident in Lateral leads

Confirmed by Tito Bai ([id number]) on 7/7/2021 5:57:48 AM

 

Referred By: Jarrod Loving           Confirmed By:Tito Bai

## 2021-07-08 LAB
ANION GAP SERPL CALC-SCNC: 4 MMOL/L (ref 3–11)
BUN SERPL-MCNC: 18 MG/DL (ref 7–18)
CALCIUM SERPL-MCNC: 8.4 MG/DL (ref 8.5–10.1)
CHLORIDE SERPL-SCNC: 108 MMOL/L (ref 98–107)
CO2 SERPL-SCNC: 26 MMOL/L (ref 21–32)
CREAT CL PREDICTED SERPL C-G-VRATE: 102 ML/MIN
GLUCOSE SERPL-MCNC: 93 MG/DL (ref 70–99)
HCT VFR BLD AUTO: 23.5 % (ref 42–52)
HGB BLD-MCNC: 7.8 G/DL (ref 14–18)
MAGNESIUM SERPL-MCNC: 1.8 MG/DL (ref 1.8–2.4)
POTASSIUM SERPL-SCNC: 4 MMOL/L (ref 3.5–5.1)
SODIUM SERPL-SCNC: 138 MMOL/L (ref 136–145)

## 2021-07-08 RX ADMIN — Medication SCH MG: at 08:00

## 2021-07-08 RX ADMIN — FOLIC ACID SCH MG: 1 TABLET ORAL at 08:00

## 2021-07-08 RX ADMIN — FLUTICASONE PROPIONATE SCH: 50 SPRAY, METERED NASAL at 08:00

## 2021-07-08 RX ADMIN — NICOTINE SCH MG: 21 PATCH, EXTENDED RELEASE TRANSDERMAL at 08:00

## 2021-07-08 NOTE — HOSPITALIST PROGRESS NOTE
Date of Service


July 8, 2021


 





Assessment & Plan


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


Acute on chronic blood Loss Anemia 


H/O prostate cancer, prior radiation


S/P 1 unit PRBCs


Appreciate urology input


Now s/p  cystoscopy, clot evacuation and fulguration with Dr. Palomo, on July 6, 2021


Alas catheter removed, patient is now using urinal and does not have any more 

hematuria


Monitor H&H and transfuse PRBCs as needed


H&H stable at 7.8











H/O Traumatic left shoulder ecchymosis with fracture of the left humerus


Secondary to fall


Evaluated by orthopedics during prior admission


Needs follow-up with orthopedics in 1 week


Conservative management


Maintain sling immobilization, nonweightbearing left upper extremity








H/O Seizure disorder


H/O Medication Noncompliance


Was on phenobarbital as needed


Follows with neurology as outpatient








Ongoing tobacco abuse


Counseled about cessation





DVT Px:


SCDs RE Hematuria 





Code Status


Full code





Dispo: Patient is interested in being discharged home.  Was in rehab previously.

 Now not interested in rehab.  Awaiting PT evaluation.  CM aware.





Admission and Anticipated Discharge Date


Admission Date: July 5, 2021








Subjective





Pt seen in follow up of hematuria


S/p Cystoscopy, Clot Evacuation, and Fulguration with Dr. Palomo


Currently feels well, laying in bed in no acute distress


Pt reports he is voiding spontaneously in urinal without difficulty, denies any 

more hematuria





No complaints of pain or discomfort at this time.


No fevers overnight. No chills.





He would like to be discharged home, and does not wish to go back to rehab


Awaiting PT evaluation, CM aware


 





Review of Systems








Review of Systems:   All systems reviewed & are unremarkable except as noted in 

HPI & below  


 


Constitutional:   no fever and no chills  


 


Respiratory:   no cough and no dyspnea  


 


Cardiovascular:   no chest pain and no palpitations  


 


Gastrointestinal:   no abdominal pain, no nausea and no vomiting  








Physical Exam








Physical Exam:   


General Appearance: chronically Ill appearing M, in no acute distress


Head:  normocephalic, atraumatic


Eyes:  normal inspection, EOMI


Neck:  supple, Trachea midline


Respiratory/Chest: Normal breath sounds, CTA


Cardiovascular: S1, S2, No murmur


Abdomen/GI:Soft, Non tender, Bowel sounds present


Extremities/Musculoskeletal:normal inspection, no edema, LUE in sling 


Neurologic/Psych:AAOX3, grossly no focal neurological deficits


Skin:  normal color, warm











Results & Data


Results & Data ([facility])


Vital Signs (Past 12 Hours)


                                   Vital Signs











  Temp Pulse Pulse Pulse Resp BP Pulse Ox


 


 07/08/21 11:45  36.8 C   77   19  106/53 L  98


 


 07/08/21 07:00   73     


 


 07/08/21 06:51  36.9 C    81  19  108/65  96


 


 07/08/21 04:27  37.0 C    89  16  134/80  97











Laboratory Results





                                        











  07/08/21 07/08/21 07/07/21 Range/Units





  06:47 06:47 16:51 


 


Hgb   7.8 L  8.0 L  (14.0-18.0)  g/dL


 


Hct   23.5 L  24.1 L  (42-52)  %


 


Sodium  138    (136-145)  mmol/L


 


Potassium  4.0    (3.5-5.1)  mmol/L


 


Chloride  108 H    ()  mmol/L


 


Carbon Dioxide  26    (21-32)  mmol/L


 


Anion Gap  4.0    (3-11)  


 


BUN  18    (7-18)  mg/dl


 


Creatinine  0.70  D    (0.6-1.4)  mg/dl


 


Est Cr Clr Drug Dosing  102.0    ml/min


 


Est GFR ( Amer)  117.2    ml/min


 


Est GFR (Non-Af Amer)  101.1    ml/min


 


BUN/Creatinine Ratio  25.5 H    (10-20)  


 


Glucose  93    (70-99)  mg/dl


 


Calcium  8.4 L    (8.5-10.1)  mg/dl


 


Phosphorus  3.3    (2.5-4.9)  mg/dl


 


Magnesium  1.8    (1.8-2.4)  mg/dl











Medications Administered





                          Current Inpatient Medications





Acetaminophen (Acetaminophen 325 Mg Tab)  650 mg PO Q4H PRN


   PRN Reason: Pain or Fever


   Stop: 08/04/21 17:46


   Last Admin: 07/07/21 17:09 Dose:  650 mg


   Documented by: 


Fluticasone Propionate (Fluticasone Propionate Na Spr 16 Gm Btl)  2 sprays VINNIE 

DAILY [facility]


   Stop: 08/05/21 08:59


   Last Admin: 07/08/21 08:00 Dose:  Not Given


   Documented by: 


Folic Acid (Folic Acid 1 Mg Tab)  1 mg PO QAM [facility]


   Stop: 08/05/21 08:59


   Last Admin: 07/08/21 08:00 Dose:  1 mg


   Documented by: 


Magnesium Oxide (Magnesium Oxide 400 Mg Tab)  400 mg PO QAM [facility]


   Stop: 08/05/21 08:59


   Last Admin: 07/08/21 08:00 Dose:  400 mg


   Documented by: 


Miscellaneous (Remove Nicoderm Patch)  1 ea N/A DAILY@0859 [facility]


   Stop: 08/05/21 08:58


   Last Admin: 07/08/21 08:00 Dose:  1 ea


   Documented by: 


Nicotine (Nicotine 21 Mg/24 Hr Tdsy)  21 mg TD QAM [facility]


   Stop: 08/05/21 08:59


   Last Admin: 07/08/21 08:00 Dose:  21 mg


   Documented by: 


Ondansetron HCl (Ondansetron Inj 2 Mg/Ml 2 Ml Vial)  4 mg IV Q6H PRN


   PRN Reason: Nausea


   Stop: 08/04/21 17:46


Oxycodone HCl (Oxycodone Hcl Ir 5 Mg Tab (Immediate Release))  5 mg PO Q6H PRN


   PRN Reason: pain


   Stop: 07/19/21 21:10


   Last Admin: 07/05/21 21:41 Dose:  5 mg


   Documented by: 


Pantoprazole Sodium (Pantoprazole 40 Mg Tab)  40 mg PO BID [facility]


   Stop: 08/05/21 08:59


   Last Admin: 07/08/21 08:00 Dose:  40 mg


   Documented by: 


Polyethylene Glycol (Polyethylene (Miralax) 17 Gm Pack)  17 gm PO DAILY PRN


   PRN Reason: constipation


   Stop: 08/04/21 21:10


Trazodone HCl (Trazodone Hcl 100 Mg Tab)  300 mg PO HS [facility]


   Stop: 08/04/21 21:54


   Last Admin: 07/07/21 21:05 Dose:  300 mg


   Documented by:

## 2021-07-09 LAB
HCT VFR BLD AUTO: 24.1 % (ref 42–52)
HGB BLD-MCNC: 7.8 G/DL (ref 14–18)

## 2021-07-09 RX ADMIN — FOLIC ACID SCH MG: 1 TABLET ORAL at 07:19

## 2021-07-09 RX ADMIN — FLUTICASONE PROPIONATE SCH SPRAYS: 50 SPRAY, METERED NASAL at 07:20

## 2021-07-09 RX ADMIN — Medication SCH MG: at 07:19

## 2021-07-09 RX ADMIN — NICOTINE SCH MG: 21 PATCH, EXTENDED RELEASE TRANSDERMAL at 07:20

## 2021-07-09 NOTE — DISCHARGE SUMMARY
Date of Service


July 9, 2021








Admission HPI


Per Admitting Provider


This is a 59yo M with a PMH of Metastatic prostate cancer S/P surgery, 

chemotherapy/Lupron, recurrent hematuria, HTN, history of alcohol abuse, tobacco

use who presents with worsening gross hematuria. Was initially seen in 

[place] today and hgb was found to be 7. Requested transfer to [facility] for 

continued care for symptomatic anemia and urology. History of multiple sources 

of bleeding requiring blood transfusions, most recently 3 weeks ago. Has been 

evaluated [place] [facility] [place] with recommendations for catheter as 

needed for retention/suprapubic pain and expected intermittent hematuria. 

Recommended to consider hyperbaric oxygen for radiation cystitis as outpatient. 

Discharged on 6/7 to [facility]. Since discharge home, has experienced 4 days

of worsening bright red blood in mendez collection bag with associated suprapubic

discomfort. Denies fever, chills, nausea, vomiting or abdominal pain. Endorses 

lightheadedness with positional change. No chest pain or SOB. Patient is a poor 

historian. 











Admission Exam


Per Admitting Provider





CONSTITUTIONAL:  WNWD, vitals as above, generally NAD, but is pale


EYES: pupils are round and equal bilaterally, normal conjunctivae, no scleral 

icterus


ENT:  external ear and nose normal, MMM


RESPIRATORY:  clear to auscultation bilaterally, no crackles, rales or wheezes, 

normal respiratory effort


CARDIOVASCULAR:  regular rate and rhythm, S1 and 2 heard without murmurs, 

gallops or rubs, no JVD, no peripheral edema


GASTROINTESTINAL:  soft, nontender, nondistended


MUSCULOSKELETAL:  strength 5/5 throughout, head is normocephalic and atraumatic


SKIN:  warm and dry


NEUROLOGIC:  No facial palsy, no dysarthria.  CN 2-12 grossly intact, no sensory

deficit, normal cognition, normal speech


PSYCHIATRIC:  alert cooperative and oriented to person, place and time. 


 








Principal Diagnosis





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








Discharge Exam





General Appearance: chronically Ill appearing M, in no acute distress


Head:  normocephalic, atraumatic


Eyes:  normal inspection, EOMI


Neck:  supple, Trachea midline


Respiratory/Chest: Normal breath sounds, CTA


Cardiovascular: S1, S2, No murmur


Abdomen/GI:Soft, Non tender, Bowel sounds present


Extremities/Musculoskeletal:normal inspection, no edema, LUE in sling


Neurologic/Psych:AAOX3, grossly no focal neurological deficits


Skin:  normal color, warm








Discharge Data


Allergies














Allergy/AdvReac Type Severity Reaction Status Date / Time


 


lisinopril Allergy Severe ANGIOEDEMA Verified 06/07/21 17:09


 


oxybutynin [From Ditropan] AdvReac Unknown fever & Verified 06/07/21 17:09





   perhaps  





   seizures,  





   was  





   detoxing  





   @time  











Consultations


                                        





07/05/21 19:29


Consult Urology Routine 











Procedures Performed


                                        





Operation Date: 07/06/21 14:35


Actual Procedures


p Cystoscopy, Clot Evacuation, Fulguration(Not Applicable) - Tone Palomo MD











Hospital Course


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


Acute on chronic blood Loss Anemia 


H/O prostate cancer, prior radiation


S/P 1 unit PRBCs


Appreciate urology input


Now s/p  cystoscopy, clot evacuation and fulguration with Dr. Palomo, on July 6, 2021


Mendez catheter removed, patient is now using urinal and does not have any more 

hematuria


Monitor H&H and transfuse PRBCs as needed


H&H stable at 7.8











H/O Traumatic left shoulder ecchymosis with fracture of the left humerus


Secondary to fall


Evaluated by orthopedics during prior admission


Needs follow-up with orthopedics in 1 week


Conservative management


Maintain sling immobilization, nonweightbearing left upper extremity


Patient will need follow-upin the office for repeat x-rays, [phone].











H/O Seizure disorder


H/O Medication Noncompliance


Was on phenobarbital as needed


Follows with neurology as outpatient








Ongoing tobacco abuse


Counseled about cessation





DVT Px:


SCDs RE Hematuria 





Code Status


Full code





Dispo: Patient is interested in being discharged home.  Was in rehab previously.

 Now not interested in rehab.


Says he has help 3 times a week at home.  Patient's cousin who lives nearby, and

will be providing transport. 


Per PT okay to return home, however occupational therapy evaluation recommends 

SNF.  Patient is not interested in SNF or any other placement. 


Home health arranged by case management, also office of aging contacted.











Total Time


Total Time Spent


Total Time Spent (In Minutes): 35


Total Time Includes: Examination of the Patient, Discharge Planning, Medication 

Reconciliation and Communication With Other Providers





Discharge Plan


Discharge Items


Patient Disposition: Home - Self-Care





Reason For Visit: GROSS HEMATURIA





Discharge Diagnosis:





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








Activity: Per Instructions section








Non-emergency contact: Primary Care Provider








Call non-emergency contact if: you have any medication questions and your 

symptoms worsen








Follow-up/Referrals:


Dorian Castellon MD [Primary Care Provider] - 


(Date & Time 


7/15/2021 10:40 AM Provider 


Dorian Castellon MD Department 


Family Medicine [facility]





)





Diet: Regular








Addtl Attending Provider Instructions:





Follow-up with your primary care doctor, the appointment was scheduled for you 

for July 15.





Also follow-up with orthopedics for your broken left arm, they will want to 

repeat images -  call their office at [phone] at your earliest 

convenience.








Pending Studies at Discharge: No








Stand-Alone Forms:  My [facility] [employer], Smoking Cessation





Medications and DC Order


Prescriptions:


Continued


  folic acid 1 mg tablet 


   1 mg PO QAM RF: 0


  trazodone 300 mg tablet 


   300 mg PO HS RF: 0


  fluticasone propionate 50 mcg/actuation spray,suspension 


   2 spray INTRANASAL DAILY RF: 0


  oxycodone 5 mg Tablet 


   5 mg PO Q6H PRN (Reason: pain) Qty: 20 RF: 0


  magnesium oxide 400 mg (241.3 mg magnesium) Tablet 


   400 mg PO QAM Qty: 30 RF: 0


  pantoprazole 40 mg Tablet,Delayed Release (Dr/Ec) 


   40 mg PO BID Qty: 60 RF: 0


  polyethylene glycol 3350 [Miralax] 17 gram/dose powder 


   17 g PO DAILY PRN (Reason: constipation) Qty: 119 RF: 0


  nicotine [Nicoderm CQ] 21 mg/24 hr Patch 24 Hour 


   21 mg transdermal QAM Qty: 30 RF: 0





Discharge Orders:


Discharge Order  (Routine); Ordered 07/09/21


   Ordered By:  Talon Zamarripa





Admission Data


Admit Date/Time: 07/05/21 18:37





Attending Provider: Talon Zamarripa





Admit Provider: Jarrod Loving





Primary Care Provider: Dorian Castellon





Other Providers: Tuan Bonilla ; Jarrod Loving ; Brock Gabriel [facility]

## 2021-07-09 NOTE — HOSPITALIST PROGRESS NOTE
Date of Service


July 9, 2021


 





Assessment & Plan


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


Acute on chronic blood Loss Anemia 


H/O prostate cancer, prior radiation


S/P 1 unit PRBCs


Appreciate urology input


Now s/p  cystoscopy, clot evacuation and fulguration with Dr. Palomo, on July 6, 2021


Alas catheter removed, patient is now using urinal and does not have any more 

hematuria


Monitor H&H and transfuse PRBCs as needed


H&H stable at 7.8











H/O Traumatic left shoulder ecchymosis with fracture of the left humerus


Secondary to fall


Evaluated by orthopedics during prior admission


Needs follow-up with orthopedics in 1 week


Conservative management


Maintain sling immobilization, nonweightbearing left upper extremity


Patient will need follow-up in 2 weeks in the office for repeat x-rays, 

[phone].











H/O Seizure disorder


H/O Medication Noncompliance


Was on phenobarbital as needed


Follows with neurology as outpatient








Ongoing tobacco abuse


Counseled about cessation





DVT Px:


SCDs RE Hematuria 





Code Status


Full code





Dispo: Patient is interested in being discharged home.  Was in rehab previously.

 Now not interested in rehab.


Says he has help 3 times a week at home.  Patient's cousin who lives nearby, and

will be providing transport. 


Per PT okay to return home, however occupational therapy evaluation recommends 

SNF.  Patient is not interested in SNF or any other placement. 


Home health arranged by case management, also office of aging contacted.








Admission and Anticipated Discharge Date


Admission Date: July 5, 2021








Subjective





Pt seen in follow up of hematuria


S/p Cystoscopy, Clot Evacuation, and Fulguration with Dr. Palomo


Currently feels well, sitting up in bed in no acute distress


Pt reports he is voiding spontaneously in urinal without difficulty, denies any 

more hematuria


Hgb has been stable





No complaints of pain or discomfort at this time.


No fevers or chills.





He would like to be discharged home, and does not wish to go back to rehab


CM aware - home health arranged, office of aging also contacted


 





Review of Systems








Review of Systems:   All systems reviewed & are unremarkable except as noted in 

HPI & below  


 


Constitutional:   no fever and no chills  


 


Respiratory:   no cough and no dyspnea  


 


Cardiovascular:   no chest pain and no palpitations  


 


Gastrointestinal:   no abdominal pain, no nausea and no vomiting  








Physical Exam








Physical Exam:   


General Appearance: chronically Ill appearing M, in no acute distress


Head:  normocephalic, atraumatic


Eyes:  normal inspection, EOMI


Neck:  supple, Trachea midline


Respiratory/Chest: Normal breath sounds, CTA


Cardiovascular: S1, S2, No murmur


Abdomen/GI:Soft, Non tender, Bowel sounds present


Extremities/Musculoskeletal:normal inspection, no edema, LUE in sling 


Neurologic/Psych:AAOX3, grossly no focal neurological deficits


Skin:  normal color, warm











Results & Data


Results & Data ([facility])


Vital Signs (Past 12 Hours)


                                   Vital Signs











  Temp Pulse Pulse Resp BP Pulse Ox


 


 07/09/21 07:24  36.9 C  75   20  112/69  96


 


 07/09/21 04:55  37.1 C   79  22  133/72  95


 


 07/09/21 00:32  36.9 C   80  16  121/64  96











Laboratory Results





                                        











  07/09/21 07/05/21 Range/Units





  06:01 20:03 


 


Hgb  7.8 L   (14.0-18.0)  g/dL


 


Hct  24.1 L   (42-52)  %


 


Crossmatch   See Detail  











Medications Administered





                          Current Inpatient Medications





Acetaminophen (Acetaminophen 325 Mg Tab)  650 mg PO Q4H PRN


   PRN Reason: Pain or Fever


   Stop: 08/04/21 17:46


   Last Admin: 07/07/21 17:09 Dose:  650 mg


   Documented by: 


Fluticasone Propionate (Fluticasone Propionate Na Spr 16 Gm Btl)  2 sprays VINNIE 

DAILY [facility]


   Stop: 08/05/21 08:59


   Last Admin: 07/09/21 07:20 Dose:  2 sprays


   Documented by: 


Folic Acid (Folic Acid 1 Mg Tab)  1 mg PO QAM [facility]


   Stop: 08/05/21 08:59


   Last Admin: 07/09/21 07:19 Dose:  1 mg


   Documented by: 


Magnesium Oxide (Magnesium Oxide 400 Mg Tab)  400 mg PO QAM [facility]


   Stop: 08/05/21 08:59


   Last Admin: 07/09/21 07:19 Dose:  400 mg


   Documented by: 


Miscellaneous (Remove Nicoderm Patch)  1 ea N/A DAILY@0859 [facility]


   Stop: 08/05/21 08:58


   Last Admin: 07/09/21 07:20 Dose:  1 ea


   Documented by: 


Nicotine (Nicotine 21 Mg/24 Hr Tdsy)  21 mg TD QAM [facility]


   Stop: 08/05/21 08:59


   Last Admin: 07/09/21 07:20 Dose:  21 mg


   Documented by: 


Ondansetron HCl (Ondansetron Inj 2 Mg/Ml 2 Ml Vial)  4 mg IV Q6H PRN


   PRN Reason: Nausea


   Stop: 08/04/21 17:46


Oxycodone HCl (Oxycodone Hcl Ir 5 Mg Tab (Immediate Release))  5 mg PO Q6H PRN


   PRN Reason: pain


   Stop: 07/19/21 21:10


   Last Admin: 07/05/21 21:41 Dose:  5 mg


   Documented by: 


Pantoprazole Sodium (Pantoprazole 40 Mg Tab)  40 mg PO BID [facility]


   Stop: 08/05/21 08:59


   Last Admin: 07/09/21 07:19 Dose:  40 mg


   Documented by: 


Polyethylene Glycol (Polyethylene (Miralax) 17 Gm Pack)  17 gm PO DAILY PRN


   PRN Reason: constipation


   Stop: 08/04/21 21:10


Trazodone HCl (Trazodone Hcl 100 Mg Tab)  300 mg PO HS [facility]


   Stop: 08/04/21 21:54


   Last Admin: 07/08/21 20:30 Dose:  300 mg


   Documented by:

## 2022-01-31 ENCOUNTER — HOSPITAL ENCOUNTER (INPATIENT)
Dept: HOSPITAL 45 - ED | Age: 62
LOS: 15 days | Discharge: HOME HEALTH | DRG: 871 | End: 2022-02-15

## 2022-01-31 LAB
ALBUMIN SERPL-MCNC: 2.9 GM/DL (ref 3.4–5)
ALBUMIN/GLOB SERPL: 0.7 {RATIO} (ref 0.9–2)
ALP SERPL-CCNC: 146 U/L (ref 34–104)
ALT SERPL-CCNC: 38 U/L (ref 7–52)
ANION GAP SERPL CALC-SCNC: 13 MMOL/L (ref 3–11)
ANION GAP SERPL CALC-SCNC: 9 MMOL/L (ref 3–11)
APTT BLD: 29.1 SECONDS (ref 21–31)
BILIRUB SERPL-MCNC: 3.1 MG/DL (ref 0.2–1)
BUN SERPL-MCNC: 54 MG/DL (ref 6–23)
BUN SERPL-MCNC: 60 MG/DL (ref 6–23)
CALCIUM SERPL-MCNC: 7 MG/DL (ref 8.5–10.1)
CALCIUM SERPL-MCNC: 7 MG/DL (ref 8.5–10.1)
CHLORIDE SERPL-SCNC: 101 MMOL/L (ref 98–107)
CHLORIDE SERPL-SCNC: 105 MMOL/L (ref 98–107)
CK SERPL-CCNC: 156 U/L (ref 30–223)
CO2 SERPL-SCNC: 21 MMOL/L (ref 21–32)
CO2 SERPL-SCNC: 23 MMOL/L (ref 21–32)
CREAT CL PREDICTED SERPL C-G-VRATE: 29.6 ML/MIN
CREAT CL PREDICTED SERPL C-G-VRATE: 37 ML/MIN
DEPRECATED RDW RBC: 78.3 FL (ref 36.4–46.3)
EPI CELLS #/AREA URNS AUTO: (no result) /LPF (ref 0–5)
GLOBULIN SER CALC-MCNC: 4.2 GM/DL (ref 2.5–4)
GLUCOSE SERPL-MCNC: 103 MG/DL
GLUCOSE SERPL-MCNC: 97 MG/DL
HCT VFR BLD AUTO: 25.1 % (ref 42–52)
HCT VFR BLD AUTO: 27.1 % (ref 42–52)
HGB BLD-MCNC: 7.9 G/DL (ref 14–18)
HGB BLD-MCNC: 8.4 G/DL (ref 14–18)
HYALINE CASTS #/AREA URNS AUTO: (no result) /LPF (ref 0–5)
IMM GRANULOCYTES # BLD: 0.05 K/UL (ref 0–0.02)
IMM GRANULOCYTES NFR BLD AUTO: 0.6 %
MAGNESIUM SERPL-MCNC: 1.6 MG/DL (ref 1.7–2.4)
MAGNESIUM SERPL-MCNC: 2.2 MG/DL (ref 1.7–2.4)
MCH RBC QN AUTO: 32.6 PG (ref 25–34)
MCV RBC: 105 FL (ref 80–100)
PLATELET # BLD AUTO: 116 K/UL (ref 130–400)
POLYCHROMASIA BLD QL SMEAR: (no result)
POTASSIUM SERPL-SCNC: (no result) MMOL/L (ref 3.5–5.1)
POTASSIUM SERPL-SCNC: 2.6 MMOL/L (ref 3.5–5.1)
POTASSIUM SERPL-SCNC: 2.8 MMOL/L (ref 3.5–5.1)
PROT SERPL-MCNC: 7.1 GM/DL (ref 6–8.3)
PROTHROM ACT/NOR PPP: 1.2 % (ref 0.9–1.1)
PROTHROM ACT/NOR PPP: 12.4 SECONDS (ref 9–12)
RBC # BLD AUTO: 2.58 M/UL (ref 4.7–6.1)
RBC # UR AUTO: >30 /HPF (ref 0–4)
SODIUM SERPL-SCNC: 135 MMOL/L (ref 136–145)
SODIUM SERPL-SCNC: 137 MMOL/L (ref 136–145)
TROPONIN I: < 0.03 NG/ML (ref 0–0.04)
WBC # BLD AUTO: 8.91 K/UL (ref 4.8–10.8)
WBC #/AREA URNS HPF: >30 /HPF (ref 0–5)

## 2022-01-31 RX ADMIN — SODIUM CHLORIDE AND POTASSIUM CHLORIDE SCH MLS/HR: 9; 1.49 INJECTION, SOLUTION INTRAVENOUS at 19:33

## 2022-01-31 RX ADMIN — POTASSIUM CHLORIDE SCH MLS/HR: 10 INJECTION, SOLUTION INTRAVENOUS at 21:31

## 2022-01-31 RX ADMIN — POTASSIUM CHLORIDE SCH MLS/HR: 10 INJECTION, SOLUTION INTRAVENOUS at 15:20

## 2022-01-31 RX ADMIN — POTASSIUM CHLORIDE SCH MLS/HR: 10 INJECTION, SOLUTION INTRAVENOUS at 19:33

## 2022-01-31 RX ADMIN — POTASSIUM CHLORIDE SCH MLS/HR: 10 INJECTION, SOLUTION INTRAVENOUS at 16:22

## 2022-01-31 NOTE — CT SCAN REPORT
CT SCAN OF THE ABDOMEN AND PELVIS WITHOUT IV CONTRAST



CLINICAL HISTORY:   Hematuria.



COMPARISON STUDY:  Abdominal CT dated 6/7/2021.



TECHNIQUE: CT scan of the abdomen and pelvis is performed from the lung bases to the proximal femora.
 Images are reviewed in the axial, sagittal, and coronal planes. IV contrast was not administered for
 this examination. A dose lowering technique was utilized adhering to the principles of ALARA. The pa
tient was scanned twice due to significant motion artifact.



CT DOSE: 1732.90 mGycm



FINDINGS:



Lung bases: The heart is normal in size and without pericardial effusion. The lung bases are clear. T
here is a tiny hiatal hernia.



Liver: The unenhanced liver is cirrhotic in morphology and heterogeneous in attenuation. There is hyp
ertrophy of the left lobe and nodularity of the hepatic surface contour. There is no intrahepatic alexa
iary ductal dilatation. There is recanalization of the periumbilical vein.



Gallbladder: Surgically absent noting clips in the gallbladder fossa.



Spleen: Normal in size and attenuation.



Pancreas: Unremarkable.



Adrenal glands: Unremarkable.



Kidneys: The unenhanced kidneys are normal in size and without hydronephrosis. There are no renal janeth
culi identified. There is no evidence of contour deforming renal mass lesion.



Abdominal vasculature: The abdominal aorta is normal in course and caliber noting advanced atheroscle
rotic calcification. There is ectasia of the celiac trunk which measures up to 11 mm in diameter.



Bowel: There are scattered colonic diverticula without CT evidence of acute diverticulitis. No bowel 
obstruction is identified. The appendix is  well-visualized and normal.



Peritoneum: There is no intraperitoneal free air or abdominal ascites. There is a fat-containing umbi
lical hernia.



Lymphadenopathy: None.



Pelvic viscera: The bladder is distended and appears mildly thick-walled. There are tiny bladder calc
blair versus postoperative change at the base of the bladder seen on image #374. The prostate gland is 
surgically absent.



Skeletal structures: The skeletal structures are osteopenic. There are mild chronic superior endplate
 compression deformities of T11, T12, and L1. Mild lumbosacral spondylosis is observed. No lytic or b
lastic lesions are seen. There are subacute appearing right lateral rib fractures. Additional chronic
 rib fractures are seen bilaterally. There is chronic posttraumatic deformity of the sacrum.





IMPRESSION: 



1. The bladder is markedly distended and appears mildly thick-walled.



2. The prostate gland is surgically absent.



3. There are tiny bladder calculi versus surgical material the base of the bladder.



4. Cirrhotic liver morphology.



5. There are subacute/healing right lateral rib fractures.



6. Additional findings as above.







: Negative or not required by law.









Electronically signed by:  Valente Sultana M.D.

1/31/2022 2:46 PM

## 2022-01-31 NOTE — ELECTROCARDIOGRAM REPORT
Test Reason : 

Blood Pressure : ***/*** mmHG

Vent. Rate : 095 BPM     Atrial Rate : 095 BPM

   P-R Int : 152 ms          QRS Dur : 080 ms

    QT Int : 428 ms       P-R-T Axes : 072 077 058 degrees

   QTc Int : 537 ms

 

*** Poor data quality, interpretation may be adversely affected

Sinus rhythm with Premature supraventricular complexes and with occasional Premature ventricular comp
lexes

Nonspecific T wave abnormality

Abnormal ECG

When compared with ECG of 06-JUL-2021 02:03,

Premature ventricular complexes are now Present

Premature supraventricular complexes are now Present

Nonspecific T wave abnormality now evident in Inferior leads

Nonspecific T wave abnormality now evident in Lateral leads

Confirmed by Rodney Oliveros ([id number]) on 1/31/2022 3:35:43 PM

 

Referred By: REFERRED SELF           Confirmed By:Rodney Oliveros

## 2022-01-31 NOTE — XRAY REPORT
SINGLE VIEW CHEST



CLINICAL HISTORY:  Generalized weakness.



FINDINGS: An AP, portable, upright chest radiograph is compared to study dated 7/21 and correlated wi
th chest CT dated 6/7/2021. The heart is top normal for projection. The mediastinal contour is within
 normal limits. Emphysema and chronic interstitial thickening is similar to previous. No airspace con
solidation or large pleural effusion is identified. Scarring/atelectasis is noted at the lung bases. 
No pneumothorax is seen. The skeletal structures are osteopenic. The bony thorax is grossly intact.



IMPRESSION: Emphysematous change with no acute cardiopulmonary abnormality.







: Negative or not required by law. 









Electronically signed by:  Valente Sultana M.D.

1/31/2022 1:37 PM

## 2022-01-31 NOTE — CT SCAN REPORT
HEAD CT NONCONTRAST



CT DOSE: 638.56 mGycm



HISTORY: weakness



TECHNIQUE: Multiaxial CT images of the head were performed without the use of intravenous contrast. A
utomated exposure control was utilized for this study.  A dose lowering technique was utilized adheri
ng to the principles of ALARA.



Comparison: Head CT 6/7/2021



Findings: The paranasal sinuses and mastoid air cells are clear. The calvarium and skull base are int
act. There is no mass, hematoma, midline shift, acute infarct. White matter hypodensity is nonspecifi
c but suggestive of microvascular ischemic change. The ventricles and sulci demonstrate mild age-rela
flores involutional changes. Focal areas of encephalomalacia within the bilateral frontal lobes and left
 anterior temporal lobe remain unchanged.



Impression:

No significant change compared to the prior study. No acute intracranial abnormality. 





: Negative or not required by law.









Electronically signed by:  Jose Bo M.D.

1/31/2022 2:19 PM

## 2022-01-31 NOTE — HISTORY & PHYSICAL REPORT
Date of Service


January 31, 2022


 





Assessment & Plan


(1) Acute metabolic encephalopathy: 


(2) Severe sepsis: 


(3) Hypokalemia: 


(4) UTI (urinary tract infection): 


(5) Hypomagnesemia: 


(6) Hypocalcemia: 


(7) LARA (acute kidney injury): 


(8) Cirrhosis: 


(9) Gross hematuria: 


      Plan: 





This is a 61-year-old male who has significant past medical history of HTN, HLD,

cirrhosis, alcoholic liver disease, seizure disorder, COPD, tobacco abuse, 

depression, mood disorder, atherosclerosis of the aorta, history of prostate 

cancer status post prostatectomy who presents to ED after being found on floor 

by care aide covered in stool and feces. 





Patient found down for unknown duration incontinent of stool and feces.


Per CMS guidelines he meets severe sepsis criteria secondary to hypotension, 

tachycardia, lactic acidosis and evidence of urinary tract infection.





Metabolic encephalopathy secondary to LARA and UTI


Severe sepsis


Admit to PCU


Continue IV antibiotics with 2 g Rocephin


Await blood and urine cultures


Repeat lactic acid 1.6


Alcohol level WNL, TSH WNL


Drug tox screen ordered


Daily IV thiamine and folic acid due to known history of alcohol use


obtain EEG in setting of seizure d/o 





Hypokalemia


K2.6


Unable to take p.o., will receive 10 M EQ KCl x4 via IV in ED


Will supplement potassium and maintenance fluids as well


Repeat BMP at 8 PM





Hypocalcemia


Corrected calcium 7.9


1 g calcium gluconate


BMP at 8 PM





Hypomagnesemia


Mag 1.6, 2 g mag sulfate ordered


Repeat mag at 8 PM





LARA


Baseline creatinine 0.8


BUN/creatinine 60 and 2.26


Possibly obstructive uropathy versus UTI versus prerenal in setting of 

dehydration


CT with evidence of bladder distention -Alas catheter placed with gross 

hematuria


Avoid nephrotoxic agents





UTI


Urine consistent with possible UTI


IV Rocephin, await culture





Anemia


chronic in nature, likely in setting of chronic dz


Per ED provider, stool brown and heme negative


Evidence of gross hematuria


Hemoglobin 8.4 /27.1


Anemia panel in a.m., H&H every 6 starting at 8 PM





Thrombocytopenia


Secondary to cirrhosis


Platelet count 116





Cirrhosis


Hyperbilirubinemia


History of alcohol abuse


meld 21


awss protocol


IV thiamine, folic acid


repeat CMP in a.m.


low threshold to consult GI


NH3 WNL





Gross Hematuria


hx of prostate ca s/p prostatectomy


Radiation cystitis hx


[place] [facility] [place]


may be 2/2 to traumatic cath; however hospitalization 7/2021 s/p cysto, clot 

evacuation and fulgaration


consult urology





DVT ppx: SCD/TEDs 2/2 to hematuria





Dispo: PCU, CM consulted, pt may not be safe to return home as he lives alone 

and has very limited family support, at the least will need rehab


FULL CODE pt has limited family to speak too, contacted primary contact who is a

cousin but has not had recent contact to patient


PCP: Cande





Pt was seen and examined in collaboration with Dr. Bowman, please see addendum





The chart was completed utilizing Dragon Speech voice recognition software. 

Grammatical errors, random word insertions, pronoun errors, and incomplete 

sentences are an occasional consequence of this system due to software 

limitations, ambient noise, and hardware issues. Any formal questions or 

concerns about the content, text, or information contained within the body of 

this dictation should be directly addressed to the provider for clarification.














History of Present Illness


Chief Complaint: 


Found on floor by care aide covered in stool and feces. Primary Care Provider: 


Dorian Castellon MD





 This is a 61-year-old male who has significant past medical history of HTN, 

HLD, cirrhosis, alcoholic liver disease, seizure disorder, COPD, tobacco abuse, 

depression, mood disorder, atherosclerosis of the aorta, history of prostate 

cancer status post prostatectomy who presents to ED after being found on floor 

by care aide covered in stool and feces.  History is limited from patient 

secondary to mental status.  History obtained from ER provider and EMS notes.  

Over the past week EMS has been summoned to House on a few occasions due to 

weakness and falling.  Today he was summoned after being found on floor by care 

aide incontinent of stool, urine and confused.  When EMS arrived patient was 

found to be hypotensive with SBP's in the 80s.  He was euglycemic.  It is 

unknown how long patient was down.  He received 1 L of IV fluid in route.  In ED

patient was normotensive but mildly tachycardic.  Was found to have severe 

electrolyte derangements with hypokalemia hypomagnesemia, hypocalcemia along wit

h LARA.  Initial lactic acid was elevated 2.5.  He was felt to be jaundiced and 

did have mild elevation of total bilirubin and AST.  His urinalysis was 

concerning for UTI. Blood and urine cultures were obtained.  He did receive IV 

cefepime.  Electrolytes were being replaced upon admission with potassium, 

magnesium and calcium ordered.  Attempted to call family, Denzel Bowser 

without success.





 Allergies 














Allergy/AdvReac Type Severity Reaction Status Date / Time


 


lisinopril Allergy Severe ANGIOEDEMA Verified 01/31/22 14:40


 


oxybutynin [From Ditropan] AdvReac Unknown fever & Verified 01/31/22 14:40





   perhaps  





   seizures,  





   was  





   detoxing  





   @time  








 Home Medications 


                                        











 Medication  Instructions  Recorded  Confirmed  Type


 


folic acid 1 mg tablet 1 mg PO QAM 04/02/19 01/31/22 History


 


fluticasone propionate 50 2 spray INTRANASAL DAILY 04/30/21 01/31/22 History





mcg/actuation nasal    





spray,suspension    


 


trazodone 300 mg tablet 300 mg PO HS 04/30/21 01/31/22 History


 


polyethylene glycol 3350 17 17 g PO DAILY PRN #119 g 06/29/21 01/31/22 Rx





gram/dose oral powder (Miralax)    


 


nicotine 21 mg/24 hr daily 21 mg TRANSDERMAL QAM #30 ea 07/02/21 01/31/22 Rx





transdermal patch (Nicoderm CQ)    


 


furosemide 20 mg tablet 20 mg PO QAM 01/31/22 01/31/22 History


 


mirabegron 25 mg tablet,extended 25 mg PO QAM 01/31/22 01/31/22 History





release 24 hr (Myrbetriq)    








 








Past Med/Surg History


Medical History (Updated 01/31/22 @ 16:08 by Rita Baker PA-C)





Altered mental status


Confusion


Depression


Elevated troponin


History of torn meniscus of left knee


History of torn meniscus of right knee


Prostate cancer (06/02/14)


   "Rising PSA


   Status post ultrasound-guided biopsies 06/02/2014


   Biopsy stage T2c Nano 4+5 with perineural invasion


   Status post robotic prostatectomy 08/25/2014


   Pathologic stage [id number] [place] 4+4 Tertiary 5


   Post prostatectomy rising PSA


   Status post completion of radiation therapy 05/19/2015 received 7040 cGy"


   


   *** On 05/26/15 16:14 Jeanine Horowitz wrote ***


   "Rising PSA


   Status post ultrasound-guided biopsies 06/02/2014


   Biopsy stage T2c Nano 4+5 with perineural invasion


   Status post robotic prostatectomy 08/25/2014


   Pathologic stage [id number] [place] 4+4


   Post prostatectomy rising PSA


   Status post completion of radiation therapy 05/19/2015 received 7040 cGy"


Psychiatric exam requested by authority


Right knee sprain


UTI (urinary tract infection)








Surgical History (Reviewed 01/31/22 @ 15:57 by Rita Baker PA-C)





H/O knee surgery


H/O prostate biopsy





Family History (Reviewed 01/31/22 @ 15:57 by Rita Baker PA-C)


Other   Family history unobtainable











Social History (Updated 01/31/22 @ 15:57 by Rita Baker PA-C)


Smoking Status:  Current every day smoker 


Tobacco Type:  Cigarettes 


Cigarettes Per Day:  12; 


Hx Alcohol Use:  Yes (Unknown given mental status) Alcohol type: hard liquor 


Hx Substance Use:  No 


Preferred Language:  English 


Communication Ability:  Effective 


 Required:  No 


Beliefs That Will Affect Care:  None 


Current Living Situation:  Alone 


Current Living Situation Comment:  unknown 


Feels Safe at Home:  Yes 


Assistive Devices:  None 














Review of Systems








Review of Systems:   Unobtainable due to cognitive status  











Physical Exam








Physical Exam:   Constitutional:  Fraile, M, appears older than stated age, 

jaundice, acutely ill, vitals as above, NAD, sitting up in bed, alert to self 

and place 


Head: Normocephalic, Atraumatic


Eyes:  PERRL, conjunctivae normal, +icteric sclerae 


ENMT:  external ear and nose normal, oropharynx normal dry membranes


Neck:  trachea midline, no thyromegaly  normal visual inspection 


Respiratory:  normal respiratory effort, lungs clear to auscultation, no wheeze,

rales, rhonchi.  Normal insp/exp effort, no accessory muscle use 


Cardiovascular:  RRR, no murmur, no edema  Vessels: no JVD or carotid bruit 


Chest: normal inspection of chest 


Abdomen: normal bowel sounds, soft, nontender, no hepatosplenomegaly 


Musculoskeletal:  no cyanosis or clubbing, 


Skin: +Jaundice, no rashes, warm and dry  normal turgor 


Neurologic:  PERRL, EOMI, accommodation nl, no face palsy, no dysarthria  CN's 

II-XI intact bilaterally and moves all extremities 


Psychiatric:  A+Ox2, lethargic, easy to arouse but falls asleep quickly


Lymphatic:  no cervical or axillary lymphadenopathy 


: deferred 











Results & Data


Results & Data (MN)


Vital Signs (Past 12 Hours)


                                   Vital Signs











  Temp Pulse Resp BP Pulse Ox


 


 01/31/22 12:43   99 H  24   97


 


 01/31/22 11:34  36.9 C  107 H  22  107/69  99











Diagnostic Findings





                                        





Abdomen/Pelvis CT  01/31/22 12:43


CT SCAN OF THE ABDOMEN AND PELVIS WITHOUT IV CONTRAST


 


CLINICAL HISTORY:   Hematuria.


 


COMPARISON STUDY:  Abdominal CT dated 6/7/2021.


 


TECHNIQUE: CT scan of the abdomen and pelvis is performed from the lung bases to

the proximal femora. Images are reviewed in the axial, sagittal, and coronal 

planes. IV contrast was not administered for this examination. A dose lowering 

technique was utilized adhering to the principles of ALARA. The patient was 

scanned twice due to significant motion artifact.


 


CT DOSE: 1732.90 mGycm


 


FINDINGS:


 


Lung bases: The heart is normal in size and without pericardial effusion. The 

lung bases are clear. There is a tiny hiatal hernia.


 


Liver: The unenhanced liver is cirrhotic in morphology and heterogeneous in 

attenuation. There is hypertrophy of the left lobe and nodularity of the hepatic

surface contour. There is no intrahepatic biliary ductal dilatation. There is 

recanalization of the periumbilical vein.


 


Gallbladder: Surgically absent noting clips in the gallbladder fossa.


 


Spleen: Normal in size and attenuation.


 


Pancreas: Unremarkable.


 


Adrenal glands: Unremarkable.


 


Kidneys: The unenhanced kidneys are normal in size and without hydronephrosis. 

There are no renal calculi identified. There is no evidence of contour deforming

renal mass lesion.


 


Abdominal vasculature: The abdominal aorta is normal in course and caliber 

noting advanced atherosclerotic calcification. 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 

trunk which measures up to 11 mm in diameter.


 


Bowel: There are scattered colonic diverticula without CT evidence of acute 

diverticulitis. No bowel obstruction is identified. The appendix is  well-

visualized and normal.


 


Peritoneum: There is no intraperitoneal free air or abdominal ascites. There is 

a fat-containing umbilical hernia.


 


Lymphadenopathy: None.


 


Pelvic viscera: The bladder is distended and appears mildly thick-walled. There 

are tiny bladder calculi versus postoperative change at the base of the bladder 

seen on image #374. The prostate gland is surgically absent.


 


Skeletal structures: The skeletal structures are osteopenic. There are mild 

chronic superior endplate compression deformities of T11, T12, and L1. Mild 

lumbosacral spondylosis is observed. No lytic or blastic lesions are seen. There

are subacute appearing right lateral rib fractures. Additional chronic rib 

fractures are seen bilaterally. There is chronic posttraumatic deformity of the 

sacrum.


 


 


IMPRESSION: 


 


1. The bladder is markedly distended and appears mildly thick-walled.


 


2. The prostate gland is surgically absent.


 


3. There are tiny bladder calculi versus surgical material the base of the 

bladder.


 


4. Cirrhotic liver morphology.


 


5. There are subacute/healing right lateral rib fractures.


 


6. Additional findings as above.


 


 


 


: Negative or not required by law.


 


 


 


 


Electronically signed by:  Valente Sultana M.D.


1/31/2022 2:46 PM








Chest X-Ray  01/31/22 12:43


SINGLE VIEW CHEST


 


CLINICAL HISTORY:  Generalized weakness.


 


FINDINGS: An AP, portable, upright chest radiograph is compared to study dated 

7/21 and correlated with chest CT dated 6/7/2021. The heart is top normal for 

projection. The mediastinal contour is within normal limits. Emphysema and 

chronic interstitial thickening is similar to previous. No airspace 

consolidation or large pleural effusion is identified. Scarring/atelectasis is 

noted at the lung bases. No pneumothorax is seen. The skeletal structures are 

osteopenic. The bony thorax is grossly intact.


 


IMPRESSION: Emphysematous change with no acute cardiopulmonary abnormality.


 


 


 


: Negative or not required by law. 


 


 


 


 


Electronically signed by:  Valente Sultana M.D.


1/31/2022 1:37 PM








Head CT  01/31/22 12:43


HEAD CT NONCONTRAST


 


CT DOSE: 638.56 mGycm


 


HISTORY: weakness


 


TECHNIQUE: Multiaxial CT images of the head were performed without the use of 

intravenous contrast. Automated exposure control was utilized for this study.  A

dose lowering technique was utilized adhering to the principles of ALARA.


 


Comparison: Head CT 6/7/2021


 


Findings: The paranasal sinuses and mastoid air cells are clear. The calvarium 

and skull base are intact. There is no mass, hematoma, midline shift, acute 

infarct. White matter hypodensity is nonspecific but suggestive of microvascular

ischemic change. The ventricles and sulci demonstrate mild age-related 

involutional changes. Focal areas of encephalomalacia within the bilateral 

frontal lobes and left anterior temporal lobe remain unchanged.


 


Impression:


No significant change compared to the prior study. No acute intracranial 

abnormality. 


 


 


: Negative or not required by law.


 


 


 


 


Electronically signed by:  Jose Bo M.D.


1/31/2022 2:19 PM











Medications Administered





Medication List





Potassium Chloride (K Rex / Wtr)  10 meq in 100 mls @ 100 mls/hr IV Q1H ANDRE; 

Protocol


   Stop: 01/31/22 17:14


   Last Admin: 01/31/22 15:20  Dose: 100 mls/hr


   Documented by: [id number]





Discontinued Medications





Albuterol (Albut/Ipratrop 3mg/0.5mg Neb 3 Ml Vial)  3 ml NEB NOW STA; Protocol


   Stop: 01/31/22 13:37


   Last Admin: 01/31/22 14:33  Dose: 3 ml


   Documented by: [id number]


Sodium Chloride (Nss 1000ml)  1,000 mls @ 999 mls/hr IV .Q1H1M ANDRE


   Stop: 01/31/22 13:45


   Last Infusion: 01/31/22 15:16  Dose: 0 mls/hr


   Documented by: [id number]


   Admin: 01/31/22 13:00  Dose: 999 mls/hr


   Documented by: [id number]


Cefepime HCl (Maxipime)  2,000 mg in 20 mls @ 5 mls/min IV NOW STA; Protocol


   Stop: 01/31/22 14:54


   Last Admin: 01/31/22 15:07  Dose: 5 mls/min


   Documented by: [id number]


Magnesium Sulfate/Dextrose (Magnesium Sulfate / D5w)  1 gm in 100 mls @ 100 

mls/hr IV NOW STA


   Stop: 01/31/22 15:52


   Last Admin: 01/31/22 15:08  Dose: 100 mls/hr


   Documented by: [id number]


Calcium Gluconate ()  1,000 mg in 60 mls @ 240 mls/hr IV NOW STA


   Stop: 01/31/22 15:07


   Last Infusion: 01/31/22 15:23  Dose: 0 mls/hr


   Documented by: [id number]


   Admin: 01/31/22 15:08  Dose: 240 mls/hr


   Documented by: [id number]








ECG


Rate (beats per minute): 95


Rhythm: normal sinus


Findings: + PVC


Additional Comments: 


nonspecific t wave abd in inferolateral leads, poor ecg quality





COVID-19 Results


Results


COVID-19 Adm Lab Results: 








      RBC 2.58 M/uL (4.7-6.1)  L 01/31/22


 


      WBC 8.91 K/uL (4.8-10.8)  01/31/22


 


      Hgb 8.4 g/dL (14.0-18.0)  L 01/31/22


 


      Hct 27.1 % (42-52)  L 01/31/22


 


      Plt Count 116 K/uL (130-400)  L 01/31/22


 


      Neutrophils (%) (Auto) 53.8 % 01/31/22


 


      Lymphocytes (%) (Auto) 13.1 % 01/31/22


 


      Monocytes # (Auto) 2.85 K/uL (0.11-0.59)  H 01/31/22


 


      Eosinophils # (Auto) 0.03 K/uL (0-0.5)  01/31/22


 


      Immature Granulocyte % (Auto) 0.6 % 01/31/22


 


      Neutrophils # (Auto) 4.79 K/uL (1.4-6.5)  01/31/22


 


      Lymphocytes # (Auto) 1.17 K/uL (1.2-3.4)  L 01/31/22


 


      Monocytes # (Auto) 2.85 K/uL (0.11-0.59)  H 01/31/22


 


      Eosinophils # (Auto) 0.03 K/uL (0-0.5)  01/31/22


 


      Basophils # (Auto) 0.02 K/uL (0-0.2)  01/31/22


 


      Immature Granulocyte # (Auto) 0.05 K/uL (0.00-0.02)  H 01/31/22


 


      Polychromasia 1+  01/31/22


 


      Anisocytosis Present  01/31/22


 


      Na 137 mmol/L (136-145)  01/31/22


 


      K 2.6 mmol/L (3.5-5.1)  L 01/31/22


 


      Cl 101 mmol/L ()  01/31/22


 


      CO2 23 mmol/L (21-32)  01/31/22


 


      Anion Gap 13  (3-11)  H 01/31/22


 


      BUN 60 mg/dl (6-23)  H 01/31/22


 


      Creatinine 2.26 mg/dl (0.6-1.4)  H 01/31/22


 


      BUN/Creatinine Ratio 26.5  (10-20)  H 01/31/22


 


      Glucose Level 97 mg/dl (70-99(Fasting))  01/31/22


 


      Ca 7.0 mg/dl (8.5-10.1)  L 01/31/22


 


      Total Bilirubin 3.1 mg/dl (0.2-1.0)  H 01/31/22


 


      AST/SGOT 76 U/L (13-39)  H 01/31/22


 


      ALT/SGPT 38 U/L (7-52)  01/31/22


 


      Alkaline Phosphatase 146 U/L ()  H 01/31/22


 


      Total Protein 7.1 gm/dl (6.0-8.3)  01/31/22


 


      Albumin 2.9 gm/dl (3.4-5.0)  L 01/31/22


 


      Globulin 4.2 gm/dl (2.5-4.0)  H 01/31/22


 


      Albumin/Globulin Ratio 0.7  (0.9-2)  L 01/31/22


 


      Total  U/L ()  01/31/22


 


      Troponin I < 0.03 ng/ml (0-0.04)  01/31/22


 


      PTT 29.1 Seconds (21.0-31.0)  01/31/22


 


      INR 1.2  (0.9-1.1)  H 01/31/22


 


      SARS-CoV-2, RNA, NAAT NEGATIVE  (NEGATIVE)  01/31/22


 


      Chest X-Ray  01/31/22














Code Status & VTE Plan


Code Status


FULL CODE


VTE Prophylaxis Plan


VTE Prophylaxis will be ordered: Yes











Supervising Physician


Co-Signing Physician Notes


61-year-old male h/o alcoholic cirrhosis, alcohol abuse, HTN, HLD, cirrhosis, 

seizure disorder, COPD, tobacco abuse, depression, mood disorder, 

atherosclerosis of the aorta, history of prostate cancer status post 

prostatectomy presented to the ED 1/31 after being found on floor by care aide 

covered in stool and feces. Pt not able to communicate/drowsy and difficult to 

awake. Labs and imaging reviewed. 





#. Metabolic encephalopathy


#. Severe Sepsis 2/2 UTI


#. UTI


#. Hematuria -- likely traumatic vs recent h/o prostate cancer





Per chart, SBP's was in 80s per EMS


Upon presentation, lactic acid is elevated, heart rate and respiratory rate 

elevated, urinalysis suggestive of UTI, hematuria noted in urinary catheter


Upon presentation, patient drowsy, difficult to awake, unable to cooperate.





Patient received IV fluids and cefepime in the ED.


Continue with IV fluids, trend lactate, follow-up blood and urine culture, IV 

thiamine and IV folic acid given alcohol abuse history, monitor and replace 

electrolytes.


Urology consult for hematuria, no anticoagulation, SCDs re: hematuria.


Admitting hemoglobin seems to be at his baseline at 8.4, trend hemoglobin every 

6 hour or as needed, due to concerns of hematuria might need blood transfusion, 

blood transfusion consent signed by myself and two nurses.  Not able to get hold

of family member and patient not interested to understand/sign the consent.





#. LARA





IVF, BMP





#.  Hypomagnesemia


#.  Hypokalemia





Replete.  Follow-up evening BMP and magnesium level.





#DVT prophylaxis: SCDs re: hematuria








Upon Exam 


GENERAL: Drowsy, difficult to awake, unable to cooperate, NAD, on RA.


HEENT:  No pallor, + icterus.  Pupils equal, round and reactive to light.  Oral 

mucosa dry.


NECK:  No JVD, no neck masses.


HEART:  S1 and S2 heard.  Regular rate and rhythm.  No murmur, no gallop.


RESPIRATORY SYSTEM:  Normal AP diameter.  No accessory muscle use.  No wheezing,

no crackles.


ABDOMEN:  Soft, bowel sounds present, nontender, no distention.


CENTRAL NERVOUS SYSTEM:  No facial droop.  Speech is clear.  Obeys simple 

commands.  Moves extremities.


EXTREMITIES:  No edema, no erythema seen.


Jaundiced skin








I have seen and examined the patient and have discussed the case with the 

provider above.  I agree with the assessment and plan as stated.

## 2022-01-31 NOTE — EMERGENCY DEPARTMENT NOTE
Impression & Plan


 Weakness, Hypomagnesemia, Fall, Anemia, Hypokalemia, Hypocalcemia





ED Provider Note





 NAME: CITLALY FERRO JR


AGE: 61 SEX: M


: 1960


ARRIVES VIA: Ambulance


INFORMANT: [Patient][nursing]


ED PROVIDER(S): [Valente Hernandez MD]





CHIEF COMPLAINT:


Fall





HISTORY OF PRESENT ILLNESS:


The patient is a 61-year-old male who as per EMS, has been having issues for the

last week.  They have been called to his house a few times because of weakness 

and falling.  Today, he was found on the floor, it is unknown how long he was 

there.  He was covered in stool and urine.





The patient currently denies any complaints.  He has a history of COPD, alcohol 

abuse and bladder cancer.  As per EMS, they felt that his urine was bloody.





The patient denies fever or chills.  The patient denies abdominal pain.  The 

patient has had no recent sick contacts.





The patient is a poor historian, given his mental state and condition, no 

further history obtainable.





REVIEW OF SYSTEMS: 


Truly unobtainable given the mental state and his condition.





PMHx/PSHx: 


See Below 





SOCIAL HISTORY: 


See Below. 





PHYSICAL EXAM: 


GENERAL: Patient is in no acute distress.


HEENT: No acute trauma, normocephalic atraumatic, mucous membranes moist, no 

nasal congestion, no scleral icterus.


NECK: No stridor, no adenopathy, no meningismus, trachea is midline.


LUNGS: Wheezing bilaterally, no respiratory distress, no rhonchi.  Breath sounds

equal.


HEART: Without murmurs gallops or rubs, regular rate and rhythm.  Heart tones 

are quite distant though given the overriding lung sounds.


ABDOMEN: Soft, nontender, bowel sounds positive, no hernias, no peritonitis.


EXTREMITIES: No cyanosis, full range of motion of all the joints without pain or

difficulty, no signs for acute trauma.


NEUROLOGIC: Awake and alert, no acute motor or sensory deficits, no focal 

weakness.


SKIN: No rash, mild jaundice, no diaphoresis.


Rectal: Brown stool, heme-negative.





DIFFERENTIAL DIAGNOSIS:


Infection, dehydration, renal or liver failure, UTI, COVID-19, influenza, GI 

bleeding, hyperammonemia, metabolic abnormality, hypo/hyperglycemia, electrolyte

disturbance, anemia, hypoxia, cardiac sources, intracerebral event, toxicologic 

issues, stroke, TIA, as well as other pathologies. 





EMERGENCY DEPARTMENT COURSE/PROCEDURES: 


ECG:   Indication was weakness.  The ECG shows a sinus rhythm with PVCs and 

PACs.  The rate is 95.  There is diffuse artifact and some diffuse nonspecific 

ST change.  No ST elevation.  The QTc is 537.





Continuous Cardiac Monitoring:  An order was placed for continuous cardiac 

monitoring.  The monitor shows a rate of 99 with its rhythm with PACs and PVCs.





Critical Care Note:  I have personally spent 45 minutes of critical care time in

the direct management of this patient.  This includes bedside care, 

interpretation of diagnostic studies, and testing, discussion with consultants, 

patient, and family members, and other required patient management activities.  

This 45 minutes is in excess of all separately billable procedures.





MEDICAL DECISION MAKING:


There is no leukocytosis.  The patient is anemic but this appears baseline when 

looking back at previous testing.  Platelet count a bit low at 116.  INR is 

slightly elevated at 1.2.  Potassium is low at 2.6.  Creatinine is elevated at 

2.26-there is some acute kidney injury.  Magnesium is low at 1.6.  Calcium is 

low at 7.  Lactic acid level is somewhat elevated at 2.5, likely from 

dehydration.  Ammonia level is not elevated.  Total CK is not elevated.  There 

is some elevation to the LFTs.  Patient appears to be in a euthyroid state.  

Urinalysis is consistent with infection.  Alcohol level was undetectable.  Covid

and influenza testing was negative.  Chest x-ray shows chronic findings, no 

pneumonia.  Brain CT shows no acute bleed or mass-effect.  Abdominal CT does not

show hydronephrosis but, the bladder was distended.  Rectal exam was performed, 

this was heme negative.





The patient presents with weakness, falling.  He has multiple laboratory 

findings that warrant a hospital stay.





Patient received IV saline 1 L.  He received IV potassium, IV magnesium, IV 

cefepime, IV calcium and albuterol via MDI.  A Alas catheter was ordered to be 

placed.





I did speak with the patient, I talked to case management.  Hospitalization is 

certainly warranted.  The on-call hospitalist was consulted.


 








Past Med/Surg History


Medical History (Updated 22 @ 15:39 by Valente Hernandez MD)





Altered mental status


Confusion


Depression


Elevated troponin


History of torn meniscus of left knee


History of torn meniscus of right knee


Prostate cancer (14)


   "Rising PSA


   Status post ultrasound-guided biopsies 2014


   Biopsy stage T2c Leola 4+5 with perineural invasion


   Status post robotic prostatectomy 2014


   Pathologic stage [id number] Leola 4+4 Tertiary 5


   Post prostatectomy rising PSA


   Status post completion of radiation therapy 2015 received 7040 cGy"


   


   *** On 05/26/15 16:14 Jeanine ABEL Horowitz wrote ***


   "Rising PSA


   Status post ultrasound-guided biopsies 2014


   Biopsy stage T2c Nano 4+5 with perineural invasion


   Status post robotic prostatectomy 2014


   Pathologic stage [id number] Nano 4+4


   Post prostatectomy rising PSA


   Status post completion of radiation therapy 2015 received 7040 cGy"


Psychiatric exam requested by authority


Right knee sprain


UTI (urinary tract infection)








Surgical History (Reviewed 21 @ 15:35 by Samuel Sharpe DO)





H/O knee surgery


H/O prostate biopsy





Family History (Reviewed 21 @ 15:35 by Samuel Sharpe DO)


Other   No pertinent family history











Social History (Reviewed 22 @ 15:37 by Valente Hernandez MD)


Smoking Status:  Current every day smoker 


Tobacco Type:  Cigarettes 


Cigarettes Per Day:  12; 


Hx Alcohol Use:  Yes Alcohol type: hard liquor 


Hx Substance Use:  No 


Preferred Language:  English 


Communication Ability:  Effective 


 Required:  No 


Beliefs That Will Affect Care:  None 


Current Living Situation:  Alone 


Current Living Situation Comment:  unknown 


Feels Safe at Home:  Yes 


Assistive Devices:  None 











Allergies


                                    Allergies











Allergy/AdvReac Type Severity Reaction Status Date / Time


 


lisinopril Allergy Severe ANGIOEDEMA Verified 22 14:40


 


oxybutynin [From Ditropan] AdvReac Unknown fever & Verified 22 14:40





   perhaps  





   seizures,  





   was  





   detoxing  





   @time  

















Home Meds


                                Home Medications











 Medication  Instructions  Recorded  Confirmed


 


folic acid 1 mg tablet 1 mg PO QAM 19


 


fluticasone propionate 50 2 spray INTRANASAL DAILY 21





mcg/actuation nasal   





spray,suspension   


 


trazodone 300 mg tablet 300 mg PO HS 21


 


furosemide 20 mg tablet 20 mg PO QAM 22


 


mirabegron 25 mg tablet,extended 25 mg PO QAM 22





release 24 hr (Myrbetriq)   








                                  Previous Rx's











 Medication  Instructions  Recorded


 


polyethylene glycol 3350 17 17 g PO DAILY PRN #119 g 21





gram/dose oral powder (Miralax)  


 


nicotine 21 mg/24 hr daily 21 mg TRANSDERMAL QAM #30 ea 21





transdermal patch (Nicoderm CQ)  

















Results & Data (ED)


Vital Signs


                               Vital Signs - 24 hr











 22


11:34 22


12:43


 


Temperature 36.9 C 


 


Temperature Source Oral 


 


Pulse Rate 107 H 99 H


 


Respiratory Rate 22 24


 


Blood Pressure 107/69 


 


Blood Pressure Mean 81 


 


Blood Pressure Position Semi-fowlers 


 


Pulse Oximetry 99 97


 


Oxygen Delivery Method Room Air Room Air


 


Sepsis Recent Fever Within 48 Hours No 


 


Sepsis New/Unexplained Change in Mental Status Yes 


 


Sepsis Action Taken by Nursing Physician


Notified 














Home Medications


Current Medication List: was personally reviewed by me


Laboratory Data


Attestation: I reviewed the patient's lab results.





Result diagrams: 


                                                        22 11:55          





                                                        22 14:18          





                                   Lab Results











  22 Range/Units





  11:55 11:55 13:13 


 


WBC  8.91    (4.8-10.8)  K/uL


 


RBC  2.58 L    (4.7-6.1)  M/uL


 


Hgb  8.4 L    (14.0-18.0)  g/dL


 


Hct  27.1 L    (42-52)  %


 


MCV  105.0 H    ()  fL


 


MCH  32.6    (25-34)  pg


 


MCHC  31.0 L    (32-36)  g/dL


 


RDW Std Deviation  78.3 H    (36.4-46.3)  fL


 


RDW Coeff of Nell  20.5 H    (11.5-14.5)  %


 


Plt Count  116 L    (130-400)  K/uL


 


MPV  11.6 H    (7.4-10.4)  fL


 


Immature Gran % (Auto)  0.6    %


 


Neut % (Auto)  53.8    %


 


Lymph % (Auto)  13.1    %


 


Mono % (Auto)  32.0    %


 


Eos % (Auto)  0.3    %


 


Baso % (Auto)  0.2    %


 


Neut # (Auto)  4.79    (1.4-6.5)  K/uL


 


Lymph # (Auto)  1.17 L    (1.2-3.4)  K/uL


 


Mono # (Auto)  2.85 H    (0.11-0.59)  K/uL


 


Eos # (Auto)  0.03    (0-0.5)  K/uL


 


Baso # (Auto)  0.02    (0-0.2)  K/uL


 


Immature Gran # (Auto)  0.05 H    (0.00-0.02)  K/uL


 


Polychromasia  1+    


 


Anisocytosis  Present    


 


PT   12.4 H   (9.0-12.0)  Seconds


 


INR   1.2 H   (0.9-1.1)  


 


APTT   29.1   (21.0-31.0)  Seconds


 


PTT Ratio   1.1   


 


Sodium     (136-145)  mmol/L


 


Potassium     


 


Chloride     ()  mmol/L


 


Carbon Dioxide     (21-32)  mmol/L


 


Anion Gap     (3-11)  


 


BUN     (6-23)  mg/dl


 


Creatinine     (0.6-1.4)  mg/dl


 


Est Cr Clr Drug Dosing     ml/min


 


Est GFR ( Amer)     ml/min


 


Est GFR (Non-Af Amer)     ml/min


 


BUN/Creatinine Ratio     (10-20)  


 


Glucose     (70-99(Fasting))  mg/dl


 


Lactate     (0.4-2.0)  mmol/L


 


Calcium     (8.5-10.1)  mg/dl


 


Magnesium     (1.7-2.4)  mg/dl


 


Total Bilirubin     (0.2-1.0)  mg/dl


 


AST     


 


ALT     (7-52)  U/L


 


Alkaline Phosphatase     ()  U/L


 


Ammonia     


 


Total Creatine Kinase     ()  U/L


 


Troponin I     (0-0.04)  ng/ml


 


Total Protein     (6.0-8.3)  gm/dl


 


Albumin     (3.4-5.0)  gm/dl


 


Globulin     (2.5-4.0)  gm/dl


 


Albumin/Globulin Ratio     (0.9-2)  


 


TSH    2.414  (0.300-4.500)  uIu/ml


 


Urine Color     


 


Urine Appearance     (Clear)  


 


Urine pH     (4.5-7.5)  


 


Ur Specific Gravity     (1.000-1.030)  


 


Urine Protein     (Negative)  


 


Urine Glucose (UA)     (Negative)  


 


Urine Ketones     (Negative)  


 


Urine Blood     (Negative)  


 


Urine Nitrite     (Negative)  


 


Urine Bilirubin     (Negative)  


 


Urine Urobilinogen     (Negative)  


 


Ur Leukocyte Esterase     (Negative)  


 


Urine WBC (Auto)     (0-5)  /hpf


 


Urine RBC (Auto)     (0-4)  /hpf


 


U Hyaline Cast (Auto)     (0-5)  /lpf


 


U Epithel Cells (Auto)     (0-5)  /lpf


 


Urine Bacteria (Auto)     (Negative)  


 


Urine Yeast     


 


Ethyl Alcohol mg/dL     (<10.0)  mg/dl


 


Influ A Molecular Assay     (Negative)  


 


Influ B Molecular Assay     (Negative)  


 


SARS-CoV-2, RNA, NAAT     (NEGATIVE)  














  22 Range/Units





  13:15 13:15 13:15 


 


WBC     (4.8-10.8)  K/uL


 


RBC     (4.7-6.1)  M/uL


 


Hgb     (14.0-18.0)  g/dL


 


Hct     (42-52)  %


 


MCV     ()  fL


 


MCH     (25-34)  pg


 


MCHC     (32-36)  g/dL


 


RDW Std Deviation     (36.4-46.3)  fL


 


RDW Coeff of Nell     (11.5-14.5)  %


 


Plt Count     (130-400)  K/uL


 


MPV     (7.4-10.4)  fL


 


Immature Gran % (Auto)     %


 


Neut % (Auto)     %


 


Lymph % (Auto)     %


 


Mono % (Auto)     %


 


Eos % (Auto)     %


 


Baso % (Auto)     %


 


Neut # (Auto)     (1.4-6.5)  K/uL


 


Lymph # (Auto)     (1.2-3.4)  K/uL


 


Mono # (Auto)     (0.11-0.59)  K/uL


 


Eos # (Auto)     (0-0.5)  K/uL


 


Baso # (Auto)     (0-0.2)  K/uL


 


Immature Gran # (Auto)     (0.00-0.02)  K/uL


 


Polychromasia     


 


Anisocytosis     


 


PT     (9.0-12.0)  Seconds


 


INR     (0.9-1.1)  


 


APTT     (21.0-31.0)  Seconds


 


PTT Ratio     


 


Sodium  137    (136-145)  mmol/L


 


Potassium  TNP    


 


Chloride  101    ()  mmol/L


 


Carbon Dioxide  23    (21-32)  mmol/L


 


Anion Gap  13 H    (3-11)  


 


BUN  60 H    (6-23)  mg/dl


 


Creatinine  2.26 H    (0.6-1.4)  mg/dl


 


Est Cr Clr Drug Dosing  29.6    ml/min


 


Est GFR ( Amer)  34.5    ml/min


 


Est GFR (Non-Af Amer)  29.8    ml/min


 


BUN/Creatinine Ratio  26.5 H    (10-20)  


 


Glucose  97    (70-99(Fasting))  mg/dl


 


Lactate   2.5 H*   (0.4-2.0)  mmol/L


 


Calcium  7.0 L    (8.5-10.1)  mg/dl


 


Magnesium  1.6 L    (1.7-2.4)  mg/dl


 


Total Bilirubin  3.1 H    (0.2-1.0)  mg/dl


 


AST  TNP    


 


ALT  38    (7-52)  U/L


 


Alkaline Phosphatase  146 H    ()  U/L


 


Ammonia    Cancelled  


 


Total Creatine Kinase  156    ()  U/L


 


Troponin I  < 0.03    (0-0.04)  ng/ml


 


Total Protein  7.1    (6.0-8.3)  gm/dl


 


Albumin  2.9 L    (3.4-5.0)  gm/dl


 


Globulin  4.2 H    (2.5-4.0)  gm/dl


 


Albumin/Globulin Ratio  0.7 L    (0.9-2)  


 


TSH     (0.300-4.500)  uIu/ml


 


Urine Color     


 


Urine Appearance     (Clear)  


 


Urine pH     (4.5-7.5)  


 


Ur Specific Gravity     (1.000-1.030)  


 


Urine Protein     (Negative)  


 


Urine Glucose (UA)     (Negative)  


 


Urine Ketones     (Negative)  


 


Urine Blood     (Negative)  


 


Urine Nitrite     (Negative)  


 


Urine Bilirubin     (Negative)  


 


Urine Urobilinogen     (Negative)  


 


Ur Leukocyte Esterase     (Negative)  


 


Urine WBC (Auto)     (0-5)  /hpf


 


Urine RBC (Auto)     (0-4)  /hpf


 


U Hyaline Cast (Auto)     (0-5)  /lpf


 


U Epithel Cells (Auto)     (0-5)  /lpf


 


Urine Bacteria (Auto)     (Negative)  


 


Urine Yeast     


 


Ethyl Alcohol mg/dL     (<10.0)  mg/dl


 


Influ A Molecular Assay     (Negative)  


 


Influ B Molecular Assay     (Negative)  


 


SARS-CoV-2, RNA, NAAT     (NEGATIVE)  














  22 Range/Units





  13:15 13:28 13:28 


 


WBC     (4.8-10.8)  K/uL


 


RBC     (4.7-6.1)  M/uL


 


Hgb     (14.0-18.0)  g/dL


 


Hct     (42-52)  %


 


MCV     ()  fL


 


MCH     (25-34)  pg


 


MCHC     (32-36)  g/dL


 


RDW Std Deviation     (36.4-46.3)  fL


 


RDW Coeff of Nell     (11.5-14.5)  %


 


Plt Count     (130-400)  K/uL


 


MPV     (7.4-10.4)  fL


 


Immature Gran % (Auto)     %


 


Neut % (Auto)     %


 


Lymph % (Auto)     %


 


Mono % (Auto)     %


 


Eos % (Auto)     %


 


Baso % (Auto)     %


 


Neut # (Auto)     (1.4-6.5)  K/uL


 


Lymph # (Auto)     (1.2-3.4)  K/uL


 


Mono # (Auto)     (0.11-0.59)  K/uL


 


Eos # (Auto)     (0-0.5)  K/uL


 


Baso # (Auto)     (0-0.2)  K/uL


 


Immature Gran # (Auto)     (0.00-0.02)  K/uL


 


Polychromasia     


 


Anisocytosis     


 


PT     (9.0-12.0)  Seconds


 


INR     (0.9-1.1)  


 


APTT     (21.0-31.0)  Seconds


 


PTT Ratio     


 


Sodium     (136-145)  mmol/L


 


Potassium     


 


Chloride     ()  mmol/L


 


Carbon Dioxide     (21-32)  mmol/L


 


Anion Gap     (3-11)  


 


BUN     (6-23)  mg/dl


 


Creatinine     (0.6-1.4)  mg/dl


 


Est Cr Clr Drug Dosing     ml/min


 


Est GFR ( Amer)     ml/min


 


Est GFR (Non-Af Amer)     ml/min


 


BUN/Creatinine Ratio     (10-20)  


 


Glucose     (70-99(Fasting))  mg/dl


 


Lactate     (0.4-2.0)  mmol/L


 


Calcium     (8.5-10.1)  mg/dl


 


Magnesium     (1.7-2.4)  mg/dl


 


Total Bilirubin     (0.2-1.0)  mg/dl


 


AST     


 


ALT     (7-52)  U/L


 


Alkaline Phosphatase     ()  U/L


 


Ammonia     


 


Total Creatine Kinase     ()  U/L


 


Troponin I     (0-0.04)  ng/ml


 


Total Protein     (6.0-8.3)  gm/dl


 


Albumin     (3.4-5.0)  gm/dl


 


Globulin     (2.5-4.0)  gm/dl


 


Albumin/Globulin Ratio     (0.9-2)  


 


TSH     (0.300-4.500)  uIu/ml


 


Urine Color     


 


Urine Appearance     (Clear)  


 


Urine pH     (4.5-7.5)  


 


Ur Specific Gravity     (1.000-1.030)  


 


Urine Protein     (Negative)  


 


Urine Glucose (UA)     (Negative)  


 


Urine Ketones     (Negative)  


 


Urine Blood     (Negative)  


 


Urine Nitrite     (Negative)  


 


Urine Bilirubin     (Negative)  


 


Urine Urobilinogen     (Negative)  


 


Ur Leukocyte Esterase     (Negative)  


 


Urine WBC (Auto)     (0-5)  /hpf


 


Urine RBC (Auto)     (0-4)  /hpf


 


U Hyaline Cast (Auto)     (0-5)  /lpf


 


U Epithel Cells (Auto)     (0-5)  /lpf


 


Urine Bacteria (Auto)     (Negative)  


 


Urine Yeast     


 


Ethyl Alcohol mg/dL  < 10.0    (<10.0)  mg/dl


 


Influ A Molecular Assay    Negative  (Negative)  


 


Influ B Molecular Assay    Negative  (Negative)  


 


SARS-CoV-2, RNA, NAAT   NEGATIVE   (NEGATIVE)  














  22 Range/Units





  14:17 14:18 14:26 


 


WBC     (4.8-10.8)  K/uL


 


RBC     (4.7-6.1)  M/uL


 


Hgb     (14.0-18.0)  g/dL


 


Hct     (42-52)  %


 


MCV     ()  fL


 


MCH     (25-34)  pg


 


MCHC     (32-36)  g/dL


 


RDW Std Deviation     (36.4-46.3)  fL


 


RDW Coeff of Nell     (11.5-14.5)  %


 


Plt Count     (130-400)  K/uL


 


MPV     (7.4-10.4)  fL


 


Immature Gran % (Auto)     %


 


Neut % (Auto)     %


 


Lymph % (Auto)     %


 


Mono % (Auto)     %


 


Eos % (Auto)     %


 


Baso % (Auto)     %


 


Neut # (Auto)     (1.4-6.5)  K/uL


 


Lymph # (Auto)     (1.2-3.4)  K/uL


 


Mono # (Auto)     (0.11-0.59)  K/uL


 


Eos # (Auto)     (0-0.5)  K/uL


 


Baso # (Auto)     (0-0.2)  K/uL


 


Immature Gran # (Auto)     (0.00-0.02)  K/uL


 


Polychromasia     


 


Anisocytosis     


 


PT     (9.0-12.0)  Seconds


 


INR     (0.9-1.1)  


 


APTT     (21.0-31.0)  Seconds


 


PTT Ratio     


 


Sodium     (136-145)  mmol/L


 


Potassium   2.6 L   


 


Chloride     ()  mmol/L


 


Carbon Dioxide     (21-32)  mmol/L


 


Anion Gap     (3-11)  


 


BUN     (6-23)  mg/dl


 


Creatinine     (0.6-1.4)  mg/dl


 


Est Cr Clr Drug Dosing     ml/min


 


Est GFR ( Amer)     ml/min


 


Est GFR (Non-Af Amer)     ml/min


 


BUN/Creatinine Ratio     (10-20)  


 


Glucose     (70-99(Fasting))  mg/dl


 


Lactate     (0.4-2.0)  mmol/L


 


Calcium     (8.5-10.1)  mg/dl


 


Magnesium     (1.7-2.4)  mg/dl


 


Total Bilirubin     (0.2-1.0)  mg/dl


 


AST   76 H   


 


ALT     (7-52)  U/L


 


Alkaline Phosphatase     ()  U/L


 


Ammonia  45.0    


 


Total Creatine Kinase     ()  U/L


 


Troponin I     (0-0.04)  ng/ml


 


Total Protein     (6.0-8.3)  gm/dl


 


Albumin     (3.4-5.0)  gm/dl


 


Globulin     (2.5-4.0)  gm/dl


 


Albumin/Globulin Ratio     (0.9-2)  


 


TSH     (0.300-4.500)  uIu/ml


 


Urine Color    Orange  


 


Urine Appearance    Turbid A  (Clear)  


 


Urine pH    7.0  (4.5-7.5)  


 


Ur Specific Gravity    1.016  (1.000-1.030)  


 


Urine Protein    3+ H  (Negative)  


 


Urine Glucose (UA)    Negative  (Negative)  


 


Urine Ketones    Negative  (Negative)  


 


Urine Blood    3+ H  (Negative)  


 


Urine Nitrite    Positive A  (Negative)  


 


Urine Bilirubin    2+ H  (Negative)  


 


Urine Urobilinogen    Negative  (Negative)  


 


Ur Leukocyte Esterase    3+ H  (Negative)  


 


Urine WBC (Auto)    >30 H  (0-5)  /hpf


 


Urine RBC (Auto)    >30 H  (0-4)  /hpf


 


U Hyaline Cast (Auto)    1-5  (0-5)  /lpf


 


U Epithel Cells (Auto)    5-10 H  (0-5)  /lpf


 


Urine Bacteria (Auto)    Negative  (Negative)  


 


Urine Yeast    Not Reportable  


 


Ethyl Alcohol mg/dL     (<10.0)  mg/dl


 


Influ A Molecular Assay     (Negative)  


 


Influ B Molecular Assay     (Negative)  


 


SARS-CoV-2, RNA, NAAT     (NEGATIVE)  














Administered Medications








Magnesium Sulfate/Dextrose (Magnesium Sulfate / D5w)  1 gm in 100 mls @ 100 

mls/hr IV NOW STA


   Stop: 22 15:52


   Last Admin: 22 15:08  Dose: 100 mls/hr


   Documented by: [id number]


Potassium Chloride (K Rex / Wtr)  10 meq in 100 mls @ 100 mls/hr IV Q1H ANDRE; 

Protocol


   Stop: 22 17:14


   Last Admin: 22 15:20  Dose: 100 mls/hr


   Documented by: [id number]





Discontinued Medications





Albuterol (Albut/Ipratrop 3mg/0.5mg Neb 3 Ml Vial)  3 ml NEB NOW STA; Protocol


   Stop: 22 13:37


   Last Admin: 22 14:33  Dose: 3 ml


   Documented by: [id number]


Sodium Chloride (Nss 1000ml)  1,000 mls @ 999 mls/hr IV .Q1H1M ANDRE


   Stop: 22 13:45


   Last Infusion: 22 15:16  Dose: 0 mls/hr


   Documented by: [id number]


   Admin: 22 13:00  Dose: 999 mls/hr


   Documented by: [id number]


Cefepime HCl (Maxipime)  2,000 mg in 20 mls @ 5 mls/min IV NOW STA; Protocol


   Stop: 22 14:54


   Last Admin: 22 15:07  Dose: 5 mls/min


   Documented by: [id number]


Calcium Gluconate ()  1,000 mg in 60 mls @ 240 mls/hr IV NOW STA


   Stop: 22 15:07


   Last Infusion: 22 15:23  Dose: 0 mls/hr


   Documented by: [id number]


   Admin: 22 15:08  Dose: 240 mls/hr


   Documented by: [id number]











Imaging Data


Radiologist's Impression: 


                                        





Abdomen/Pelvis CT  22 12:43


CT SCAN OF THE ABDOMEN AND PELVIS WITHOUT IV CONTRAST


 


CLINICAL HISTORY:   Hematuria.


 


COMPARISON STUDY:  Abdominal CT dated 2021.


 


TECHNIQUE: CT scan of the abdomen and pelvis is performed from the lung bases to

the proximal femora. Images are reviewed in the axial, sagittal, and coronal 

planes. IV contrast was not administered for this examination. A dose lowering 

technique was utilized adhering to the principles of ALARA. The patient was 

scanned twice due to significant motion artifact.


 


CT DOSE: 1732.90 mGycm


 


FINDINGS:


 


Lung bases: The heart is normal in size and without pericardial effusion. The 

lung bases are clear. There is a tiny hiatal hernia.


 


Liver: The unenhanced liver is cirrhotic in morphology and heterogeneous in 

attenuation. There is hypertrophy of the left lobe and nodularity of the hepatic

surface contour. There is no intrahepatic biliary ductal dilatation. There is 

recanalization of the periumbilical vein.


 


Gallbladder: Surgically absent noting clips in the gallbladder fossa.


 


Spleen: Normal in size and attenuation.


 


Pancreas: Unremarkable.


 


Adrenal glands: Unremarkable.


 


Kidneys: The unenhanced kidneys are normal in size and without hydronephrosis. 

There are no renal calculi identified. There is no evidence of contour deforming

renal mass lesion.


 


Abdominal vasculature: The abdominal aorta is normal in course and caliber 

noting advanced atherosclerotic calcification. 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 

trunk which measures up to 11 mm in diameter.


 


Bowel: There are scattered colonic diverticula without CT evidence of acute 

diverticulitis. No bowel obstruction is identified. The appendix is  well-

visualized and normal.


 


Peritoneum: There is no intraperitoneal free air or abdominal ascites. There is 

a fat-containing umbilical hernia.


 


Lymphadenopathy: None.


 


Pelvic viscera: The bladder is distended and appears mildly thick-walled. There 

are tiny bladder calculi versus postoperative change at the base of the bladder 

seen on image #[id number]. The prostate gland is surgically absent.


 


Skeletal structures: The skeletal structures are osteopenic. There are mild 

chronic superior endplate compression deformities of T11, T12, and L1. Mild 

lumbosacral spondylosis is observed. No lytic or blastic lesions are seen. There

are subacute appearing right lateral rib fractures. Additional chronic rib 

fractures are seen bilaterally. There is chronic posttraumatic deformity of the 

sacrum.


 


 


IMPRESSION: 


 


1. The bladder is markedly distended and appears mildly thick-walled.


 


2. The prostate gland is surgically absent.


 


3. There are tiny bladder calculi versus surgical material the base of the 

bladder.


 


4. Cirrhotic liver morphology.


 


5. There are subacute/healing right lateral rib fractures.


 


6. Additional findings as above.


 


 


 


: Negative or not required by law.


 


 


 


 


Electronically signed by:  Valente Sultana M.D.


2022 2:46 PM








Chest X-Ray  22 12:43


SINGLE VIEW CHEST


 


CLINICAL HISTORY:  Generalized weakness.


 


FINDINGS: An AP, portable, upright chest radiograph is compared to study dated 

 and correlated with chest CT dated 2021. The heart is top normal for pr

ojection. The mediastinal contour is within normal limits. Emphysema and chronic

interstitial thickening is similar to previous. No airspace consolidation or 

large pleural effusion is identified. Scarring/atelectasis is noted at the lung 

bases. No pneumothorax is seen. The skeletal structures are osteopenic. The bony

thorax is grossly intact.


 


IMPRESSION: Emphysematous change with no acute cardiopulmonary abnormality.


 


 


 


: Negative or not required by law. 


 


 


 


 


Electronically signed by:  Valente Sultana M.D.


2022 1:37 PM








Head CT  22 12:43


HEAD CT NONCONTRAST


 


CT DOSE: 638.56 mGycm


 


HISTORY: weakness


 


TECHNIQUE: Multiaxial CT images of the head were performed without the use of 

intravenous contrast. Automated exposure control was utilized for this study.  A

dose lowering technique was utilized adhering to the principles of ALARA.


 


Comparison: Head CT 2021


 


Findings: The paranasal sinuses and mastoid air cells are clear. The calvarium 

and skull base are intact. There is no mass, hematoma, midline shift, acute 

infarct. White matter hypodensity is nonspecific but suggestive of microvascular

ischemic change. The ventricles and sulci demonstrate mild age-related 

involutional changes. Focal areas of encephalomalacia within the bilateral 

frontal lobes and left anterior temporal lobe remain unchanged.


 


Impression:


No significant change compared to the prior study. No acute intracranial 

abnormality. 


 


 


: Negative or not required by law.


 


 


 


 


Electronically signed by:  Jose Bo M.D.


2022 2:19 PM

















Discharge Plan


Visit Data


Chief Complaint: Fall





ED Provider: Valente Hernandez





Discharge Problem:


 Weakness, Hypomagnesemia, Fall, Anemia, Hypokalemia, Hypocalcemia








Patient Disposition: Admitted As Inpatient





Condition: Fair





Forms


Stand Alone Forms:  My [facility]





Prescriptions


Prescriptions:


No Action


  folic acid 1 mg tablet 


   1 mg PO QAM RF: 0


  trazodone 300 mg tablet 


   300 mg PO HS RF: 0


  fluticasone propionate 50 mcg/actuation spray,suspension 


   2 spray INTRANASAL DAILY RF: 0


  furosemide 20 mg tablet 


   20 mg PO QAM RF: 0


  Myrbetriq 25 mg tablet extended release 24 hr 


   25 mg PO QAM RF: 0


  polyethylene glycol 3350 [Miralax] 17 gram/dose powder 


   17 g PO DAILY PRN (Reason: constipation) Qty: 119 RF: 0


  nicotine [Nicoderm CQ] 21 mg/24 hr Patch 24 Hour 


   21 mg transdermal QAM Qty: 30 RF: 0





Referrals


Referrals:


Dorian Castellon MD [Primary Care Provider] - 











Discharge Problem:


Fall


Qualifiers:


 Encounter type: initial encounter Qualified Code(s): W19.XXXA - Unspecified 

fall, initial encounter





Anemia


Qualifiers:


 Anemia type: unspecified type Qualified Code(s): D64.9 - Anemia, unspecified

## 2022-01-31 NOTE — UROLOGY CONSULTATION
Date of Consultation


January 31, 2022


 





Assessment & Plan


(1) Gross hematuria: 


      Patient has been admitted by the hospitalist service and we recommend 

proceeding as follows:


Transfusion as needed and directed by the primary service


Follow serial labs


Continue Alas catheter.  I instructed the nursing staff that if the clots 

developed obstructing catheter can be manually irrigated.  If this is 

unsuccessful additional measures can be undertaken





Additional recommendations be forthcoming based on his clinical course as it 

unfolds





History of Present Illness


Reason for Consultation: 


Hematuria


Attending Physician: 


Florida Bowman MD








History of Present Illness


Is a 61-year-old male who presented to [facility] emergency 

department after being found on the floor by a caregiver.  The patient was noted

be covered in stool.  Reportedly at the time of presentation the emergency 

department the patient was noted to be somewhat confused and could not provide 

any meaningful history but the patient was lucid and coherent at the time of my 

interview.  Upon arrival to the emergency department the patient was reported to

be hypotensive blood pressure responded to fluid resuscitation.  Was concerned 

the patient was suffering from a urinary tract infection and he received 

antibiotics in form of cefepime.





The patient notes that over the past 2 weeks he has been having difficulty 

urinating.  He specifically notes he has been having some blood in his urine 

along with difficulty initiating urination.  (Urinary hesitancy) he also notes 

that the force of his urine stream appears to be weaker than what it once was.  

Patient does report that he has fallen several times over the past few days.  He

denies any head injuries or loss of consciousness.  Patient currently denies any

nausea vomiting or abdominal pain.  View of patient's records show that he has 

been admitted to the hospital in the past and was most recently seen by [facility] urology in July 2021 for gross hematuria.  His gross hematuria is felt 

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 

liver disease.  In July 2021 he did require cystoscopic intervention by Dr. Palomo at which time the patient underwent a clot evacuation as well as 

fulguration.





In the emergency department the patient did have labs and imaging which 

independent reviewed.  He had a CT scan of the head that showed no acute 

intracranial abnormalities.  He also had a chest x-ray that showed no evidence 

of pneumonia.  A CT scan of the abdomen and pelvis was performed that showed a 

markedly distended and thick-walled bladder.  His prostate gland was noted to be

surgically absent.  Labs including CBC were white blood cell count was 8.9.  His

hemoglobin and hematocrit were 7.9 and 25.1.  Platelet count was noted to be 

116,000.  Coagulation studies showed an INR of 1.2.  A chemistry showed a sodium

of 137.  His BUN and creatinine were 60 and 2.2.  This level of creatinine 

showed a significant rise from his baseline which is 0.6-1.0.





At the time of my interview the patient is resting comfortably in bed in no 

distress


Allergies














Allergy/AdvReac Type Severity Reaction Status Date / Time


 


lisinopril Allergy Severe ANGIOEDEMA Verified 01/31/22 14:40


 


oxybutynin [From Ditropan] AdvReac Unknown fever & Verified 01/31/22 14:40





   perhaps  





   seizures,  





   was  





   detoxing  





   @time  











Home Medications


                                        











 Medication  Instructions  Recorded  Confirmed  Type


 


folic acid 1 mg tablet 1 mg PO QAM 04/02/19 01/31/22 History


 


fluticasone propionate 50 2 spray INTRANASAL DAILY 04/30/21 01/31/22 History





mcg/actuation nasal    





spray,suspension    


 


trazodone 300 mg tablet 300 mg PO HS 04/30/21 01/31/22 History


 


polyethylene glycol 3350 17 17 g PO DAILY PRN #119 g 06/29/21 01/31/22 Rx





gram/dose oral powder (Miralax)    


 


nicotine 21 mg/24 hr daily 21 mg TRANSDERMAL QAM #30 ea 07/02/21 01/31/22 Rx





transdermal patch (Nicoderm CQ)    


 


furosemide 20 mg tablet 20 mg PO QAM 01/31/22 01/31/22 History


 


mirabegron 25 mg tablet,extended 25 mg PO QAM 01/31/22 01/31/22 History





release 24 hr (Myrbetriq)    














Patient History


Medical History (Reviewed 01/31/22 @ 20:43 by Gilbert Mcrae PA-C)





Altered mental status


Confusion


Depression


Elevated troponin


History of torn meniscus of left knee


History of torn meniscus of right knee


Prostate cancer (06/02/14)


   "Rising PSA


   Status post ultrasound-guided biopsies 06/02/2014


   Biopsy stage T2c Nano 4+5 with perineural invasion


   Status post robotic prostatectomy 08/25/2014


   Pathologic stage [id number] Mathews 4+4 Tertiary 5


   Post prostatectomy rising PSA


   Status post completion of radiation therapy 05/19/2015 received 7040 cGy"


   


   *** On 05/26/15 16:14 Jeanine Horowitz wrote ***


   "Rising PSA


   Status post ultrasound-guided biopsies 06/02/2014


   Biopsy stage T2c Nano 4+5 with perineural invasion


   Status post robotic prostatectomy 08/25/2014


   Pathologic stage [id number] Mathews 4+4


   Post prostatectomy rising PSA


   Status post completion of radiation therapy 05/19/2015 received 7040 cGy"


Psychiatric exam requested by authority


Right knee sprain


UTI (urinary tract infection)








Surgical History (Reviewed 01/31/22 @ 20:43 by Gilbert Mcrae PA-C)





H/O knee surgery


H/O prostate biopsy





Family History (Reviewed 01/31/22 @ 20:43 by Gilbert Mcrae PA-C)


Other   Family history unobtainable











Social History (Reviewed 01/31/22 @ 20:43 by Gilbert Mcrae PA-C)


Smoking Status:  Current every day smoker 


Tobacco Type:  Cigarettes 


Cigarettes Per Day:  12; 


Second Hand Exposure:  No; 


Do You Dip or Chew Tobacco:  No; 


Tobacco Cessation Education Requested by Patient:  No 


Hx Alcohol Use:  Yes Alcohol type: hard liquor 


Hx Substance Use:  No 


Preferred Language:  English 


Communication Ability:  Effective 


 Required:  No 


Beliefs That Will Affect Care:  None 


Current Living Situation:  Alone 


Current Living Situation Comment:  unknown 


Feels Safe at Home:  Yes 


Safety Concerns:  Feels Safe At This Time 


Assistive Devices:  None 











Review of Systems








Constitutional:   no fever and no chills  


 


Eyes:   no diplopia  


 


Ear, Nose, Mouth, Throat:   no ear pain  


 


Respiratory:   no cough and no dyspnea  


 


Cardiovascular:   no chest pain  


 


Gastrointestinal:   no abdominal pain, no nausea and no vomiting  


 


Genitourinary:   + difficulty urinating, + urinary hesitancy and + hematuria; no

dysuria or no flank pain  


 


Musculoskeletal:   no back pain  


 


Integumentary:   no rash  


 


Neurologic:   no localized weakness  








Physical Exam








Constitutional:   + disheveled; no acute distress  


 


Eyes:   no conjunctival abnormality  


 


ENMT:   Ears: no hearing impairment and no external ear abnormality  


 


Neck:   trachea midline  


 


Respiratory:   normal respiratory effort; no respiratory distress and no labored

breathing  


 


Cardiovascular:   Rate/Rhythm: regular rate and regular rhythm  


 


Gastrointestinal (Abdomen):   Soft, nontender


 


Musculoskeletal:   No calf tenderness


 


Skin:   no rashes  


 


Neurologic:   moves all extremities  


 


Psychiatric:   Orientation: alert and oriented x 3    Affect: + flat affect  


 


Genitourinary:   no CVA tenderness    Alas catheter is in place draining mehreen-

colored urine.  There are some small clots located in the collection bag but the

catheter is grossly patent








Results & Data ([facility])


Vital Signs (Past 12 Hours)


                                   Vital Signs











  Temp Pulse Pulse Resp BP BP Pulse Ox


 


 01/31/22 18:40  37.1 C   82  18   115/66  100


 


 01/31/22 18:15  37.1 C   76  18   115/66  100


 


 01/31/22 17:00   92 H   22  110/74  


 


 01/31/22 16:00   94 H   20   


 


 01/31/22 15:00   90   21  103/64  


 


 01/31/22 12:43   99 H   24    97


 


 01/31/22 11:34  36.9 C  107 H   22  107/69   99














PG Care Time/CCT


Total # of Minutes Spent


Total Time Spent with Patient: 


Total time spent is greater than 50% in coordination of care (as documented) at 

patient's floor/unit and/or counseling patient:








Coding


Level of Care Code


40074 Inpt Consult Level 5





Diagnoses


Gross hematuria  R31.0

## 2022-02-01 LAB
ALBUMIN SERPL-MCNC: 2.4 GM/DL (ref 3.4–5)
ALBUMIN/GLOB SERPL: 0.7 {RATIO} (ref 0.9–2)
ALP SERPL-CCNC: 141 U/L (ref 34–104)
ALT SERPL-CCNC: 31 U/L (ref 7–52)
ANION GAP SERPL CALC-SCNC: 9 MMOL/L (ref 3–11)
BILIRUB SERPL-MCNC: 2.7 MG/DL (ref 0.2–1)
BUN SERPL-MCNC: 48 MG/DL (ref 6–23)
CALCIUM SERPL-MCNC: 6.8 MG/DL (ref 8.5–10.1)
CHLORIDE SERPL-SCNC: 109 MMOL/L (ref 98–107)
CO2 SERPL-SCNC: 20 MMOL/L (ref 21–32)
CREAT CL PREDICTED SERPL C-G-VRATE: 42.4 ML/MIN
DEPRECATED RDW RBC: 77.4 FL (ref 36.4–46.3)
FERRITIN SERPL-MCNC: 206.3 NG/ML (ref 8–388)
GLOBULIN SER CALC-MCNC: 3.6 GM/DL (ref 2.5–4)
GLUCOSE SERPL-MCNC: 90 MG/DL
HCT VFR BLD AUTO: 22.2 % (ref 42–52)
HCT VFR BLD AUTO: 22.3 % (ref 42–52)
HCT VFR BLD AUTO: 27.1 % (ref 42–52)
HCT VFR BLD AUTO: 31.9 % (ref 42–52)
HGB BLD-MCNC: 10.1 G/DL (ref 14–18)
HGB BLD-MCNC: 7 G/DL (ref 14–18)
HGB BLD-MCNC: 7.1 G/DL (ref 14–18)
HGB BLD-MCNC: 8.6 G/DL (ref 14–18)
IMM GRANULOCYTES # BLD: 0.02 K/UL (ref 0–0.02)
IMM GRANULOCYTES NFR BLD AUTO: 0.2 %
IRON SERPL-MCNC: 39 MCG/DL (ref 35–175)
MAGNESIUM SERPL-MCNC: 2.1 MG/DL (ref 1.7–2.4)
MCH RBC QN AUTO: 33 PG (ref 25–34)
MCV RBC: 104.7 FL (ref 80–100)
PLATELET # BLD AUTO: 88 K/UL (ref 130–400)
POLYCHROMASIA BLD QL SMEAR: (no result)
POTASSIUM SERPL-SCNC: 3 MMOL/L (ref 3.5–5.1)
PROT SERPL-MCNC: 6 GM/DL (ref 6–8.3)
PROTHROM ACT/NOR PPP: 1.3 % (ref 0.9–1.1)
PROTHROM ACT/NOR PPP: 13 SECONDS (ref 9–12)
RBC # BLD AUTO: 2.12 M/UL (ref 4.7–6.1)
SODIUM SERPL-SCNC: 138 MMOL/L (ref 136–145)
TRANSFERRIN SERPL-MCNC: 104 MG/DL (ref 200–360)
WBC # BLD AUTO: 8.45 K/UL (ref 4.8–10.8)

## 2022-02-01 RX ADMIN — POTASSIUM CHLORIDE SCH MLS/HR: 10 INJECTION, SOLUTION INTRAVENOUS at 11:09

## 2022-02-01 RX ADMIN — POTASSIUM CHLORIDE SCH MLS/HR: 10 INJECTION, SOLUTION INTRAVENOUS at 12:13

## 2022-02-01 RX ADMIN — SODIUM CHLORIDE AND POTASSIUM CHLORIDE SCH: 9; 1.49 INJECTION, SOLUTION INTRAVENOUS at 17:42

## 2022-02-01 RX ADMIN — POTASSIUM CHLORIDE SCH MLS/HR: 10 INJECTION, SOLUTION INTRAVENOUS at 09:52

## 2022-02-01 RX ADMIN — POTASSIUM CHLORIDE SCH MLS/HR: 10 INJECTION, SOLUTION INTRAVENOUS at 08:52

## 2022-02-01 RX ADMIN — FOLIC ACID SCH MLS/MIN: 5 INJECTION, SOLUTION INTRAMUSCULAR; INTRAVENOUS; SUBCUTANEOUS at 07:55

## 2022-02-01 RX ADMIN — PIPERACILLIN AND TAZOBACTAM SCH MLS/HR: 3; .375 INJECTION, POWDER, LYOPHILIZED, FOR SOLUTION INTRAVENOUS; PARENTERAL at 21:34

## 2022-02-01 RX ADMIN — THIAMINE HYDROCHLORIDE SCH MLS/MIN: 100 INJECTION, SOLUTION INTRAMUSCULAR; INTRAVENOUS at 07:55

## 2022-02-01 RX ADMIN — PIPERACILLIN AND TAZOBACTAM SCH MLS/HR: 3; .375 INJECTION, POWDER, LYOPHILIZED, FOR SOLUTION INTRAVENOUS; PARENTERAL at 15:55

## 2022-02-01 RX ADMIN — SODIUM CHLORIDE AND POTASSIUM CHLORIDE SCH MLS/HR: 9; 1.49 INJECTION, SOLUTION INTRAVENOUS at 18:15

## 2022-02-01 NOTE — UROLOGY PROGRESS NOTE
Date of Service


February 1, 2022


 





Assessment & Plan


(1) Severe sepsis: 


(2) UTI (urinary tract infection): 


(3) Gross hematuria: 


      Plan: 


60yo M with a hx of prostate cancer and prior radiation admitted with sepsis, 

suspected UTI, LARA, and GH.





- Plan of care reviewed with Dr. Palomo, on-call urologist. 


- CTAP with evidence of bladder distention, Alas catheter placed on 

presentation.


- Hematuria has improved today and was likely secondary to UTI along with radiat

ion cystitis.


- Pt afebrile. 


- Labs reviewed - Wbc 8.45, Creatinine improved to 1.59 today (1.81 yesterday), 

Hemoglobin 7.1  - Continue to trend.


- Urine and blood cultures pending  - On IV Zosyn, follow cultures.


- Alas catheter intact, draining yellow urine at present.


- No acute  intervention warranted at this time.


- Maintain Alas catheter for maximum drainage.  OK to gently hand irrigate as 

needed for clots, retention, suprapubic pain.


- Continue supportive care, antibiotic therapy, and management per primary team


- Will continue to follow


Admission and Anticipated Discharge Date


Admission Date: 


January 31, 2022








Subjective


Pt examined at bedside this AM.


Patient sleeping, arouses easily to speech.


No acute distress.


Tmax 38.0C overnight.


Offers no complaints at present.


Alas catheter intact, draining yellow urine with sediment noted in tubing.


Denies nausea or vomiting.


Has been NPO. 


 





Review of Systems








Constitutional:   as per Subjective / HPI  


 


Gastrointestinal:   as per Subjective / HPI  


 


Genitourinary:   + as per Subjective / HPI  








Physical Exam








Constitutional:   + disheveled; no acute distress  


 


Respiratory:   normal respiratory effort; no respiratory distress and no labored

breathing  


 


Gastrointestinal (Abdomen):   Soft, nontender


 


Musculoskeletal:   No calf tenderness


 


Skin:   no rashes  


 


Neurologic:   moves all extremities  


 


Psychiatric:   Orientation: alert and oriented x 3    Affect: + flat affect  


 


Genitourinary:   no CVA tenderness    Alas catheter intact, draining yellow 

urine








Results & Data ([facility])


Vital Signs (Past 12 Hours)


                                   Vital Signs











  Temp Pulse Pulse Resp BP Pulse Ox


 


 02/01/22 08:13  37.0 C   84  16  94/57 L  99


 


 02/01/22 03:09  38.0 C H   95 H  20  91/57 L 


 


 01/31/22 23:53   86    


 


 01/31/22 23:24  37.6 C H   98 H  18  106/69  98














PG Care Time/CCT


Total # of Minutes Spent


Total Time Spent with Patient: 


Total time spent is greater than 50% in coordination of care (as documented) at 

patient's floor/unit and/or counseling patient:








Coding


Level of Care Code


08055 Subseq Hosp Care Lvl 2





Diagnoses


Severe sepsis  A41.9; R65.20


UTI (urinary tract infection)  N39.0


Gross hematuria  R31.0

## 2022-02-01 NOTE — HOSPITALIST PROGRESS NOTE
Date of Service


February 1, 2022


 





Assessment & Plan


(1) UTI (urinary tract infection): 


(2) Severe sepsis: 


(3) Acute metabolic encephalopathy: 


      Plan: 


61-year-old male h/o alcoholic cirrhosis, alcohol abuse, HTN, HLD, cirrhosis, 

seizure disorder, COPD, tobacco abuse, depression, mood disorder, 

atherosclerosis of the aorta, history of prostate cancer status post 

prostatectomy presented to the ED 1/31 after being found on floor by care aide 

covered in stool and feces. Pt not able to communicate/drowsy and difficult to 

awake. Labs and imaging reviewed.





#. Metabolic encephalopathy - resolved


#. Severe Sepsis 2/2 UTI - sepsis resolved


#. UTI


#. Hematuria-- likely traumatic vs recent h/o prostate cancer





Per chart, SBP's was in 80s per EMS


Upon presentation, lactic acid is elevated, heart rate and respiratory rate 

elevated, urinalysis suggestive of UTI, hematuria noted in urinary catheter


Upon presentation, patient drowsy, difficult to awake, unable to cooperate. 

Imagings reviewed. 





Patient received IV fluids and cefepime in the ED.


Continue with IV fluids, lactate nl, follow-up blood and urine culture (growing 

GPC), IV thiamine and IV folic acid given alcohol abuse history, monitor and 

replace electrolytes.


Urology consult for hematuria, no anticoagulation, SCDs re: hematuria. 

Appreciate recs. Hematuria improving. 


Admitting hemoglobin seems to be at his baseline at 8.4, trend hemoglobin every 

6 hour or as needed, due to concerns of hematuria might need blood transfusion 

for Hb <7


c/w Zosyn 2/1. 





#. LARA/CKD





Baseline Cr 0.8, admitting BUN/Cr 60 and 2.26


likely prerenal in the setting of dehydration vs obstructive uropathy


Admitting CTAP s/o bladder distension (mendez placed and revealed gross 

heamturia)





c/w IVF, BMP daily, Cr trending down. 





#. H/o alcohol abuse





Drink frequently may be 3-4 drinks, may be every other day, pt not sure how much

he drinks


Last drink may be 2-3 days ago per pt. 


Prn ativan, closely monitor ciwa. 








#. Hypomagnesemia


#. Hypokalemia





Repleted, monitor and replete as appropriate. 





#DVT prophylaxis: SCDs re: hematuria





Full code





PT/OT to MARKUS rodriguez to assist with DC planning. 





Admission and Anticipated Discharge Date


Admission Date: 


January 31, 2022








Subjective


Patient seen and examined at the bedside for severe sepsis, UTI, metabolic 

encephalopathy.





 lying in bed, on room air, NAD, per RN patient had been having liquid bowel 

movements several times overnight.  Patient had a temperature of 38 overnight.  

Patient was n.p.o. owing to confusion at admission, can resume his diet.  RN 

made aware.





Patient denies fever/chills/chest pain/palpitations/belly pain/other review of 

symptoms. 





Physical Exam








Physical Exam:   


GENERAL: AOx3, NAD, on RA.


HEENT: No pallor, + icterus. Pupils equal, round and reactive to light. Oral 

mucosa moist.


NECK: No JVD, no neck masses.


HEART: S1 and S2 heard. Regular rate and rhythm. No murmur, no gallop.


RESPIRATORY SYSTEM: Normal AP diameter. No accessory muscle use. No wheezing,

no crackles. Decreased breath sounds.


ABDOMEN: Soft, bowel sounds present, nontender, no distention.


CENTRAL NERVOUS SYSTEM: No facial droop. Speech is clear. Obeys simple 

commands. Moves extremities.


EXTREMITIES: No edema, no erythema seen.


Jaundiced skin


UC in situ with dark urine and ting of hematuria








Results & Data


Results & Data ([facility])


Vital Signs (Past 12 Hours)


                                   Vital Signs











  Temp Pulse Pulse Resp BP BP Pulse Ox


 


 02/01/22 15:00  37.4 C  91 H   20  141/84 H   100


 


 02/01/22 14:30  36.8 C  86   18  96/53 L   96


 


 02/01/22 14:00  36.7 C  84   18  99/51 L   100


 


 02/01/22 13:45  37 C  81   18  99/60 L   100


 


 02/01/22 13:24  37 C  90   18  101/64   100


 


 02/01/22 11:47  37.5 C   82  16   109/64  98


 


 02/01/22 08:13  37.0 C   84  16   94/57 L  99

## 2022-02-02 LAB
ANION GAP SERPL CALC-SCNC: 8 MMOL/L (ref 3–11)
BUN SERPL-MCNC: 31 MG/DL (ref 6–23)
CALCIUM SERPL-MCNC: 7.2 MG/DL (ref 8.5–10.1)
CHLORIDE SERPL-SCNC: 110 MMOL/L (ref 98–107)
CO2 SERPL-SCNC: 17 MMOL/L (ref 21–32)
CREAT CL PREDICTED SERPL C-G-VRATE: 44.9 ML/MIN
DEPRECATED RDW RBC: 77.7 FL (ref 36.4–46.3)
GLUCOSE SERPL-MCNC: 90 MG/DL
HCT VFR BLD AUTO: 27.2 % (ref 42–52)
HGB BLD-MCNC: 8.5 G/DL (ref 14–18)
MCH RBC QN AUTO: 32.1 PG (ref 25–34)
MCV RBC: 102.6 FL (ref 80–100)
PLATELET # BLD AUTO: 111 K/UL (ref 130–400)
RBC # BLD AUTO: 2.65 M/UL (ref 4.7–6.1)
SODIUM SERPL-SCNC: 135 MMOL/L (ref 136–145)
WBC # BLD AUTO: 13.6 K/UL (ref 4.8–10.8)

## 2022-02-02 RX ADMIN — PIPERACILLIN AND TAZOBACTAM SCH MLS/HR: 3; .375 INJECTION, POWDER, LYOPHILIZED, FOR SOLUTION INTRAVENOUS; PARENTERAL at 06:14

## 2022-02-02 RX ADMIN — SODIUM CHLORIDE, SODIUM LACTATE, POTASSIUM CHLORIDE, AND CALCIUM CHLORIDE SCH MLS/HR: 600; 310; 30; 20 INJECTION, SOLUTION INTRAVENOUS at 11:52

## 2022-02-02 RX ADMIN — THIAMINE HYDROCHLORIDE SCH MLS/MIN: 100 INJECTION, SOLUTION INTRAMUSCULAR; INTRAVENOUS at 09:15

## 2022-02-02 RX ADMIN — ALBUTEROL SULFATE PRN MG: 2.5 SOLUTION RESPIRATORY (INHALATION) at 17:37

## 2022-02-02 RX ADMIN — PIPERACILLIN AND TAZOBACTAM SCH MLS/HR: 3; .375 INJECTION, POWDER, LYOPHILIZED, FOR SOLUTION INTRAVENOUS; PARENTERAL at 21:21

## 2022-02-02 RX ADMIN — FOLIC ACID SCH MLS/MIN: 5 INJECTION, SOLUTION INTRAMUSCULAR; INTRAVENOUS; SUBCUTANEOUS at 09:15

## 2022-02-02 RX ADMIN — PIPERACILLIN AND TAZOBACTAM SCH MLS/HR: 3; .375 INJECTION, POWDER, LYOPHILIZED, FOR SOLUTION INTRAVENOUS; PARENTERAL at 14:08

## 2022-02-02 NOTE — HOSPITALIST PROGRESS NOTE
Date of Service


February 2, 2022


 





Assessment & Plan


(1) UTI (urinary tract infection): 


(2) Severe sepsis: 


(3) Acute metabolic encephalopathy: 


      Plan: 


61-year-old male h/o alcoholic cirrhosis, alcohol abuse, HTN, HLD, cirrhosis, 

seizure disorder, COPD, tobacco abuse, depression, mood disorder, 

atherosclerosis of the aorta, history of prostate cancer status post 

prostatectomy presented to the ED 1/31 after being found on floor by care aide 

covered in stool and feces. Pt not able to communicate/drowsy and difficult to 

awake. Labs and imaging reviewed.





#. Metabolic encephalopathy 


#. Severe Sepsis 2/2 UTI


#. UTI


#. Hematuria-- likely due to UTI vs traumatic, recent h/o prostate cancer





Per chart, SBP's was in 80s per EMS


Upon presentation, lactic acid is elevated, heart rate and respiratory rate 

elevated, urinalysis suggestive of UTI, hematuria noted in urinary catheter


Upon presentation, patient drowsy, difficult to awake, unable to cooperate. 

Imagings reviewed. 





Patient received IV fluids and cefepime in the ED.


Continue with IV fluids, lactate nl


Blood culture negative so far. Urine culture growing GPC


Continue zosyn for now and follow up speciation and sensitivities


Monitor WBC





IV thiamine and IV folic acid given alcohol abuse history, monitor and replace 

electrolytes.


Mental status appear to have improved per chart review compared to admission. 

Hence, metabolic encephalopathy likely resolved. I do not know patient's 

baseline MS at this time. He reports he lives alone at home





Admitting hemoglobin seems to be at his baseline at 8.4


Urologist jennifer appreciated


Hematuria currently resolved


Got 1 PRBC


Hb stable in 8s 





#. LARA





Baseline Cr 0.8, admitting BUN/Cr 60 and 2.26


likely prerenal in the setting of dehydration vs obstructive uropathy


Admitting CTAP s/o bladder distension (mendez placed and revealed gross 

heamturia)





Cr continues to improve


Currently at 1.5 today


Add some maintenance LR to po intake and monitor


Avoid nephrotoxins





#. H/o alcohol abuse





Drink frequently may be 3-4 drinks, may be every other day, pt not sure how much

he drinks


Last drink may be 2-3 days ago per pt. 


Prn ativan, closely monitor ciwa. 








#. Hypomagnesemia


#. Hypokalemia


Monitor K, phos and Mag and replete as needed





#DVT prophylaxis: SCDs re: hematuria





Full code





PT/OT to MARKUS rodriguez to assist with DC planning. 





Admission and Anticipated Discharge Date


Admission Date: 


January 31, 2022








Subjective


Patient seen and examined.





Patient is currently alert and oriented to person, place and month only.


Patient appears to be a poor historian. Only reports that he was down at home.


RN reports occasional confusion


He currently denies any chest pain, abd pain or pain anywhere. However, appeared

to be in pain when perineal rash was being cleaned up by RN


Denied cough, shortness of breath


Denied nausea, vomiting





Has limited insight into current med condition 





Physical Exam








Constitutional:   + well hydrated; no acute distress  


 


Eyes:   PERRL, conjunctivae normal, anicteric sclerae  


 


ENMT:   external ear and nose normal, oropharynx normal  


 


Respiratory:   normal respiratory effort, lungs clear to auscultation  


 


Cardiovascular:   Rate/Rhythm: regular rate and regular rhythm    S1 S2


 


Gastrointestinal (Abdomen):   normal bowel sounds, soft, nontender, no 

hepatosplenomegaly  


 


Musculoskeletal:   No pedal edema


 


Skin:   Ecchymoses on arms (R>L)


Perineal rash 


 


Neurologic:   PERRL, EOMI, accommodation nl, no face palsy, no dysarthria  


 


Psychiatric:   Orientation: alert, oriented to person, oriented to place and 

cooperative; + not oriented to time    Insight: + poor insight  


 


Genitourinary:   Mendez in situ








Results & Data


Results & Data ([facility])


Vital Signs (Past 12 Hours)


                                   Vital Signs











  Temp Pulse Resp BP Pulse Ox


 


 02/02/22 06:42  37.5 C  106 H  22  116/74  99


 


 02/02/22 03:29  37.5 C  105 H  22  114/68  98


 


 02/01/22 23:26  37.2 C  106 H  22  106/71  99











Laboratory Results





                              Abnormal lab results











  01/31/22 02/01/22 02/01/22 Range/Units





  13:13 17:38 23:03 


 


WBC     (4.8-10.8)  K/uL


 


RBC     (4.7-6.1)  M/uL


 


Hgb   10.1 L D  8.6 L  (14.0-18.0)  g/dL


 


Hct   31.9 L  27.1 L  (42-52)  %


 


MCV     ()  fL


 


MCHC     (32-36)  g/dL


 


RDW Std Deviation     (36.4-46.3)  fL


 


RDW Coeff of Nell     (11.5-14.5)  %


 


Plt Count     (130-400)  K/uL


 


MPV     (7.4-10.4)  fL


 


Sodium     (136-145)  mmol/L


 


Potassium     (3.5-5.1)  mmol/L


 


Chloride     ()  mmol/L


 


Carbon Dioxide     (21-32)  mmol/L


 


BUN     (6-23)  mg/dl


 


Creatinine     (0.6-1.4)  mg/dl


 


BUN/Creatinine Ratio     (10-20)  


 


Calcium     (8.5-10.1)  mg/dl


 


Phosphorus     (2.5-4.9)  mg/dl


 


Crossmatch  See Detail    














  02/02/22 02/02/22 02/02/22 Range/Units





  05:44 07:56 07:56 


 


WBC   13.60 H   (4.8-10.8)  K/uL


 


RBC   2.65 L   (4.7-6.1)  M/uL


 


Hgb   8.5 L   (14.0-18.0)  g/dL


 


Hct   27.2 L   (42-52)  %


 


MCV   102.6 H   ()  fL


 


MCHC   31.3 L   (32-36)  g/dL


 


RDW Std Deviation   77.7 H   (36.4-46.3)  fL


 


RDW Coeff of Nell   21.5 H   (11.5-14.5)  %


 


Plt Count   111 L   (130-400)  K/uL


 


MPV   11.6 H   (7.4-10.4)  fL


 


Sodium  135 L    (136-145)  mmol/L


 


Potassium    3.3 L  (3.5-5.1)  mmol/L


 


Chloride  110 H    ()  mmol/L


 


Carbon Dioxide  17 L    (21-32)  mmol/L


 


BUN  31 H    (6-23)  mg/dl


 


Creatinine  1.50 H    (0.6-1.4)  mg/dl


 


BUN/Creatinine Ratio  20.7 H    (10-20)  


 


Calcium  7.2 L    (8.5-10.1)  mg/dl


 


Phosphorus  1.6 L    (2.5-4.9)  mg/dl


 


Crossmatch

## 2022-02-02 NOTE — XRAY REPORT
XR chest 1V portable



HISTORY: Cough.  wheeze



COMPARISON: Chest 1/31/2022.



FINDINGS: Mild emphysema. No focal lung consolidations to suggest pneumonia. There are old, healed le
ft lower rib fractures again noted. No evidence for pulmonary edema. The heart is normal in size. No 
pleural effusions. No pneumothorax.



IMPRESSION:

Emphysema. No focal lung consolidations to suggest pneumonia.





: Negative or not required by law.









Electronically signed by:  Jose Bo M.D.

2/2/2022 7:56 AM

## 2022-02-02 NOTE — UROLOGY PROGRESS NOTE
Date of Service


February 2, 2022


 





Assessment & Plan


(1) UTI (urinary tract infection): 


(2) Gross hematuria: 


(3) Acute metabolic encephalopathy: 


(4) LARA (acute kidney injury): 


      Plan: 


62yo M with a hx of prostate cancer and prior radiation admitted with sepsis, 

suspected UTI, LARA, and GH.





- Hematuria has improved. 


- Pt afebrile.


- Labs reviewed - Wbc up to 13.60 today, Creatinine downtrending-1.5 today, 

Hemoglobin 8.5 - Continue to trend.


- Urine culture prelim gram positive cocci; Blood cultures no growth x 48 hours

- On IV Zosyn, follow cultures.


- Alas catheter intact, draining mehreen-colored urine at present.


- No acute  intervention warranted at this time.


- Maintain Alas catheter for maximum drainage. OK to gently hand irrigate as 

needed for clots, retention, suprapubic pain.


- Continue supportive care, antibiotic therapy, and management per primary team.


- Will need outpatient follow-up with his urologist, pt follows with [facility] 

urology.


- Urology will sign-off now, please contact us with any further questions or 

concerns. 


Admission and Anticipated Discharge Date


Admission Date: 


January 31, 2022








Subjective


Pt examined at bedside this AM.


Awake, resting in bed on arrival.


No acute distress.


No fevers overnight.


Alas catheter intact, draining mehreen-colored urine.  No clots noted. 


Offered no additional complaints at time of exam. 


 





Review of Systems








Constitutional:   as per Subjective / HPI  


 


Genitourinary:   + as per Subjective / HPI  


 


Neurologic:   as per Subjective / HPI  








Physical Exam








Constitutional:   + disheveled; no acute distress  


 


Respiratory:   normal respiratory effort; no respiratory distress and no labored

breathing  


 


Gastrointestinal (Abdomen):   Inspection/Auscultation: abdomen normal to 

inspection  


 


Musculoskeletal:   Head/Neck/Chest: normocephalic  


 


Skin:   No visible rashes or lesions to exposed skin areas


 


Neurologic:   moves all extremities and awake  


 


Psychiatric:   Orientation: alert and oriented to person  


 


Genitourinary:   Alas catheter intact, draining mehreen-colored urine








Results & Data ([facility])


Vital Signs (Past 12 Hours)


                                   Vital Signs











  Temp Pulse Resp BP Pulse Ox


 


 02/02/22 06:42  37.5 C  106 H  22  116/74  99


 


 02/02/22 03:29  37.5 C  105 H  22  114/68  98


 


 02/01/22 23:26  37.2 C  106 H  22  106/71  99


 


 02/01/22 21:44   98 H  24   100














PG Care Time/CCT


Total # of Minutes Spent


Total Time Spent with Patient: 


Total time spent is greater than 50% in coordination of care (as documented) at 

patient's floor/unit and/or counseling patient:








Coding


Level of Care Code


65918 Subseq Hosp Care Lvl 2





Diagnoses


Acute metabolic encephalopathy  G93.41


UTI (urinary tract infection)  N39.0


Gross hematuria  R31.0


LARA (acute kidney injury)  N17.9

## 2022-02-03 LAB
ALBUMIN SERPL-MCNC: 2.4 GM/DL (ref 3.4–5)
ALBUMIN/GLOB SERPL: 0.6 {RATIO} (ref 0.9–2)
ALP SERPL-CCNC: 161 U/L (ref 34–104)
ALT SERPL-CCNC: 27 U/L (ref 7–52)
ANION GAP SERPL CALC-SCNC: 8 MMOL/L (ref 3–11)
BILIRUB SERPL-MCNC: 4.5 MG/DL (ref 0.2–1)
BUN SERPL-MCNC: 21 MG/DL (ref 6–23)
CALCIUM SERPL-MCNC: 7 MG/DL (ref 8.5–10.1)
CHLORIDE SERPL-SCNC: 110 MMOL/L (ref 98–107)
CO2 SERPL-SCNC: 18 MMOL/L (ref 21–32)
CREAT CL PREDICTED SERPL C-G-VRATE: 58.2 ML/MIN
DEPRECATED RDW RBC: 76.3 FL (ref 36.4–46.3)
GLOBULIN SER CALC-MCNC: 4.1 GM/DL (ref 2.5–4)
GLUCOSE SERPL-MCNC: 93 MG/DL
HCT VFR BLD AUTO: 25.1 % (ref 42–52)
HGB BLD-MCNC: 8 G/DL (ref 14–18)
MAGNESIUM SERPL-MCNC: 1.5 MG/DL (ref 1.7–2.4)
MCH RBC QN AUTO: 32.5 PG (ref 25–34)
MCV RBC: 102 FL (ref 80–100)
PLATELET # BLD AUTO: 122 K/UL (ref 130–400)
POTASSIUM SERPL-SCNC: 3.4 MMOL/L (ref 3.5–5.1)
PROT SERPL-MCNC: 6.5 GM/DL (ref 6–8.3)
RBC # BLD AUTO: 2.46 M/UL (ref 4.7–6.1)
SODIUM SERPL-SCNC: 136 MMOL/L (ref 136–145)
WBC # BLD AUTO: 14.3 K/UL (ref 4.8–10.8)

## 2022-02-03 RX ADMIN — PIPERACILLIN AND TAZOBACTAM SCH: 3; .375 INJECTION, POWDER, LYOPHILIZED, FOR SOLUTION INTRAVENOUS; PARENTERAL at 14:29

## 2022-02-03 RX ADMIN — ALBUTEROL SULFATE PRN MG: 2.5 SOLUTION RESPIRATORY (INHALATION) at 14:11

## 2022-02-03 RX ADMIN — PANTOPRAZOLE SODIUM SCH MLS/MIN: 40 INJECTION, POWDER, FOR SOLUTION INTRAVENOUS at 21:30

## 2022-02-03 RX ADMIN — AMPICILLIN SODIUM SCH MLS/HR: 1 INJECTION, POWDER, FOR SOLUTION INTRAVENOUS at 21:30

## 2022-02-03 RX ADMIN — FOLIC ACID SCH MLS/MIN: 5 INJECTION, SOLUTION INTRAMUSCULAR; INTRAVENOUS; SUBCUTANEOUS at 08:09

## 2022-02-03 RX ADMIN — POTASSIUM CITRATE SCH MEQ: 10 TABLET ORAL at 10:10

## 2022-02-03 RX ADMIN — MAGNESIUM SULFATE IN DEXTROSE SCH MLS/HR: 10 INJECTION, SOLUTION INTRAVENOUS at 12:39

## 2022-02-03 RX ADMIN — SODIUM CHLORIDE, SODIUM LACTATE, POTASSIUM CHLORIDE, AND CALCIUM CHLORIDE SCH MLS/HR: 600; 310; 30; 20 INJECTION, SOLUTION INTRAVENOUS at 03:38

## 2022-02-03 RX ADMIN — PIPERACILLIN AND TAZOBACTAM SCH MLS/HR: 3; .375 INJECTION, POWDER, LYOPHILIZED, FOR SOLUTION INTRAVENOUS; PARENTERAL at 05:15

## 2022-02-03 RX ADMIN — THIAMINE HYDROCHLORIDE SCH MLS/MIN: 100 INJECTION, SOLUTION INTRAMUSCULAR; INTRAVENOUS at 08:09

## 2022-02-03 RX ADMIN — AMPICILLIN SODIUM SCH MLS/HR: 1 INJECTION, POWDER, FOR SOLUTION INTRAVENOUS at 15:26

## 2022-02-03 RX ADMIN — SODIUM CHLORIDE, SODIUM LACTATE, POTASSIUM CHLORIDE, AND CALCIUM CHLORIDE SCH MLS/HR: 600; 310; 30; 20 INJECTION, SOLUTION INTRAVENOUS at 18:12

## 2022-02-03 RX ADMIN — UMECLIDINIUM BROMIDE AND VILANTEROL TRIFENATATE SCH PUFFS: 62.5; 25 POWDER RESPIRATORY (INHALATION) at 18:12

## 2022-02-03 RX ADMIN — NICOTINE SCH MG: 7 PATCH, EXTENDED RELEASE TRANSDERMAL at 18:11

## 2022-02-03 RX ADMIN — MAGNESIUM SULFATE IN DEXTROSE SCH MLS/HR: 10 INJECTION, SOLUTION INTRAVENOUS at 10:10

## 2022-02-03 NOTE — HOSPITALIST PROGRESS NOTE
Date of Service


February 3, 2022


 





Assessment & Plan


(1) UTI (urinary tract infection): 


(2) Severe sepsis: 


(3) Acute metabolic encephalopathy: 


      Plan: 


61-year-old male h/o alcoholic cirrhosis, alcohol abuse, HTN, HLD, cirrhosis, 

seizure disorder, COPD, tobacco abuse, depression, mood disorder, 

atherosclerosis of the aorta, history of prostate cancer status post 

prostatectomy presented to the ED 1/31 after being found on floor by care aide 

covered in stool and feces. Pt not able to communicate/drowsy and difficult to 

awake. Labs and imaging reviewed.





#. Metabolic encephalopathy 


#. Severe Sepsis 2/2 UTI


#. UTI


#. Hematuria-- likely due to UTI vs traumatic, recent h/o prostate cancer





Per chart, SBP's was in 80s per EMS


Upon presentation, lactic acid is elevated, heart rate and respiratory rate 

elevated, urinalysis suggestive of UTI, hematuria noted in urinary catheter


Upon presentation, patient drowsy, difficult to awake, unable to cooperate. 

Imagings reviewed. 





Patient received IV fluids and cefepime in the ED.


Continue with IV fluids, lactate nl


Blood culture negative so far. 


Urine culture growing enterococcus. Zosyn switched to ampicillin





IV thiamine and IV folic acid given alcohol abuse history, monitor and replace e

lectrolytes.


Mental status appear to have improved per chart review as well as compared to 

admission. Hence, metabolic encephalopathy likely resolved. I do not know 

patient's baseline MS at this time. He reports he lives alone at home. Called 

contact (Denzel Ferrer) but call was unanswered





Admitting hemoglobin seems to be at his baseline at 8.4


Urologist jennifer appreciated


Hematuria currently resolved


Got 1 PRBC


Hb stable in 8s 





Remove mendez and monitor voiding


Send stool to rule out c diff





#. LARA





Baseline Cr 0.8, admitting BUN/Cr 60 and 2.26


likely prerenal in the setting of dehydration vs obstructive uropathy


Admitting CTAP s/o bladder distension (mendez placed and revealed gross 

heamturia)





Cr continues to improve


Currently at 1.3 today


On maintenance LR to supplement po intake and monitor


Avoid nephrotoxins





#. H/o alcohol abuse





Drink frequently may be 3-4 drinks, may be every other day, pt not sure how much

he drinks


Last drink may be 2-3 days ago per pt. 


Prn ativan, closely monitor ciwa. 








#. Hypomagnesemia


#. Hypokalemia


Monitor K, phos and Mag and replete as needed





#DVT prophylaxis: SCDs re: hematuria





Full code





PT/OT


May need SNF on discharge


CM to assist with DC planning. 





Admission and Anticipated Discharge Date


Admission Date: 


January 31, 2022








Subjective


Patient seen and examined.





Patient is currently alert and oriented to person, place and time.


Patient appear more interactive today


Reported he had some diarrhea at home prior to presentation


Denied any abd pain


RN reports patient had diarrhea 3x today


Denies any chest pain, abd pain 


Reports chronic dry cough


Denied palpitation


Denied nausea, vomiting


Reported he had some difficulty urinating at home with some hematuria





Reports he smokes 1pack every 3 days


He acknowledges drinking alcohol but could not tell how much





 





Physical Exam








Constitutional:   + well hydrated; no acute distress  


 


Eyes:   PERRL, conjunctivae normal, anicteric sclerae  


 


ENMT:   external ear and nose normal, oropharynx normal  


 


Respiratory:   normal respiratory effort, lungs clear to auscultation  


 


Cardiovascular:   Rate/Rhythm: regular rate and regular rhythm    S1 S2


 


Gastrointestinal (Abdomen):   normal bowel sounds, soft, nontender, no 

hepatosplenomegaly  


 


Skin:   Perineal rash


 


Neurologic:   PERRL, EOMI, accommodation nl, no face palsy, no dysarthria  


 


Psychiatric:   Orientation: alert, oriented x 3 and cooperative    Insight: + 

poor insight  


 


Genitourinary:   Mendez in situ








Results & Data


Results & Data ([facility])


Vital Signs (Past 12 Hours)


                                   Vital Signs











  Temp Pulse Pulse Resp BP Pulse Ox


 


 02/03/22 08:00  36.5 C  112 H  108 H  16  121/62  98


 


 02/03/22 03:40  36.6 C   118 H  20  118/70  99











Laboratory Results





                              Abnormal lab results











  01/31/22 02/03/22 02/03/22 Range/Units





  13:13 05:56 05:56 


 


WBC   14.30 H   (4.8-10.8)  K/uL


 


RBC   2.46 L   (4.7-6.1)  M/uL


 


Hgb   8.0 L   (14.0-18.0)  g/dL


 


Hct   25.1 L   (42-52)  %


 


MCV   102.0 H   ()  fL


 


MCHC   31.9 L   (32-36)  g/dL


 


RDW Std Deviation   76.3 H   (36.4-46.3)  fL


 


RDW Coeff of Nell   20.8 H   (11.5-14.5)  %


 


Plt Count   122 L   (130-400)  K/uL


 


MPV   11.1 H   (7.4-10.4)  fL


 


Potassium    3.4 L  (3.5-5.1)  mmol/L


 


Chloride    110 H  ()  mmol/L


 


Carbon Dioxide    18 L  (21-32)  mmol/L


 


POC Glucose     (70-99)  mg/dl


 


Calcium    7.0 L  (8.5-10.1)  mg/dl


 


Magnesium    1.5 L  (1.7-2.4)  mg/dl


 


Total Bilirubin    4.5 H D  (0.2-1.0)  mg/dl


 


AST    51 H  (13-39)  U/L


 


Alkaline Phosphatase    161 H  ()  U/L


 


Albumin    2.4 L  (3.4-5.0)  gm/dl


 


Globulin    4.1 H  (2.5-4.0)  gm/dl


 


Albumin/Globulin Ratio    0.6 L  (0.9-2)  


 


Crossmatch  See Detail    














  02/03/22 Range/Units





  07:25 


 


WBC   (4.8-10.8)  K/uL


 


RBC   (4.7-6.1)  M/uL


 


Hgb   (14.0-18.0)  g/dL


 


Hct   (42-52)  %


 


MCV   ()  fL


 


MCHC   (32-36)  g/dL


 


RDW Std Deviation   (36.4-46.3)  fL


 


RDW Coeff of Nell   (11.5-14.5)  %


 


Plt Count   (130-400)  K/uL


 


MPV   (7.4-10.4)  fL


 


Potassium   (3.5-5.1)  mmol/L


 


Chloride   ()  mmol/L


 


Carbon Dioxide   (21-32)  mmol/L


 


POC Glucose  147 H  (70-99)  mg/dl


 


Calcium   (8.5-10.1)  mg/dl


 


Magnesium   (1.7-2.4)  mg/dl


 


Total Bilirubin   (0.2-1.0)  mg/dl


 


AST   (13-39)  U/L


 


Alkaline Phosphatase   ()  U/L


 


Albumin   (3.4-5.0)  gm/dl


 


Globulin   (2.5-4.0)  gm/dl


 


Albumin/Globulin Ratio   (0.9-2)  


 


Crossmatch

## 2022-02-04 LAB
ALBUMIN SERPL-MCNC: 2.4 GM/DL (ref 3.4–5)
ALBUMIN/GLOB SERPL: 0.6 {RATIO} (ref 0.9–2)
ALP SERPL-CCNC: 164 U/L (ref 34–104)
ALT SERPL-CCNC: 23 U/L (ref 7–52)
ANION GAP SERPL CALC-SCNC: 6 MMOL/L (ref 3–11)
BILIRUB SERPL-MCNC: 4 MG/DL (ref 0.2–1)
BUN SERPL-MCNC: 16 MG/DL (ref 6–23)
CALCIUM SERPL-MCNC: 6.9 MG/DL (ref 8.5–10.1)
CHLORIDE SERPL-SCNC: 107 MMOL/L (ref 98–107)
CO2 SERPL-SCNC: 19 MMOL/L (ref 21–32)
CREAT CL PREDICTED SERPL C-G-VRATE: 66.3 ML/MIN
DEPRECATED RDW RBC: 74.8 FL (ref 36.4–46.3)
GLOBULIN SER CALC-MCNC: 4.1 GM/DL (ref 2.5–4)
GLUCOSE SERPL-MCNC: 81 MG/DL
HCT VFR BLD AUTO: 25.2 % (ref 42–52)
HGB BLD-MCNC: 8 G/DL (ref 14–18)
MAGNESIUM SERPL-MCNC: 1.6 MG/DL (ref 1.7–2.4)
MCH RBC QN AUTO: 32.3 PG (ref 25–34)
MCV RBC: 101.6 FL (ref 80–100)
PLATELET # BLD AUTO: 148 K/UL (ref 130–400)
POTASSIUM SERPL-SCNC: 3.3 MMOL/L (ref 3.5–5.1)
PROT SERPL-MCNC: 6.5 GM/DL (ref 6–8.3)
RBC # BLD AUTO: 2.48 M/UL (ref 4.7–6.1)
SODIUM SERPL-SCNC: 132 MMOL/L (ref 136–145)
WBC # BLD AUTO: 10.92 K/UL (ref 4.8–10.8)

## 2022-02-04 RX ADMIN — AMPICILLIN SODIUM SCH MLS/HR: 1 INJECTION, POWDER, FOR SOLUTION INTRAVENOUS at 09:09

## 2022-02-04 RX ADMIN — AMPICILLIN SODIUM SCH MLS/HR: 1 INJECTION, POWDER, FOR SOLUTION INTRAVENOUS at 04:32

## 2022-02-04 RX ADMIN — PANTOPRAZOLE SODIUM SCH MLS/MIN: 40 INJECTION, POWDER, FOR SOLUTION INTRAVENOUS at 08:00

## 2022-02-04 RX ADMIN — PANTOPRAZOLE SODIUM SCH MLS/MIN: 40 INJECTION, POWDER, FOR SOLUTION INTRAVENOUS at 21:09

## 2022-02-04 RX ADMIN — MAGNESIUM SULFATE IN DEXTROSE SCH MLS/HR: 10 INJECTION, SOLUTION INTRAVENOUS at 12:41

## 2022-02-04 RX ADMIN — NICOTINE SCH MG: 7 PATCH, EXTENDED RELEASE TRANSDERMAL at 08:01

## 2022-02-04 RX ADMIN — SODIUM CHLORIDE, SODIUM LACTATE, POTASSIUM CHLORIDE, AND CALCIUM CHLORIDE SCH MLS/HR: 600; 310; 30; 20 INJECTION, SOLUTION INTRAVENOUS at 07:10

## 2022-02-04 RX ADMIN — POTASSIUM CITRATE SCH MEQ: 10 TABLET ORAL at 08:01

## 2022-02-04 RX ADMIN — AMPICILLIN SODIUM SCH: 1 INJECTION, POWDER, FOR SOLUTION INTRAVENOUS at 17:39

## 2022-02-04 RX ADMIN — AMPICILLIN SODIUM SCH MLS/HR: 1 INJECTION, POWDER, FOR SOLUTION INTRAVENOUS at 21:18

## 2022-02-04 RX ADMIN — THIAMINE HYDROCHLORIDE SCH MLS/MIN: 100 INJECTION, SOLUTION INTRAMUSCULAR; INTRAVENOUS at 08:00

## 2022-02-04 RX ADMIN — FOLIC ACID SCH MLS/MIN: 5 INJECTION, SOLUTION INTRAMUSCULAR; INTRAVENOUS; SUBCUTANEOUS at 08:00

## 2022-02-04 RX ADMIN — MAGNESIUM SULFATE IN DEXTROSE SCH MLS/HR: 10 INJECTION, SOLUTION INTRAVENOUS at 09:46

## 2022-02-04 RX ADMIN — UMECLIDINIUM BROMIDE AND VILANTEROL TRIFENATATE SCH PUFFS: 62.5; 25 POWDER RESPIRATORY (INHALATION) at 08:00

## 2022-02-04 NOTE — HOSPITALIST PROGRESS NOTE
Date of Service


February 4, 2022


 





Assessment & Plan


(1) UTI (urinary tract infection): 


(2) Severe sepsis: 


(3) Acute metabolic encephalopathy: 


      Plan: 


61-year-old male h/o alcoholic cirrhosis, alcohol abuse, HTN, HLD, cirrhosis, 

seizure disorder, COPD, tobacco abuse, depression, mood disorder, 

atherosclerosis of the aorta, history of prostate cancer status post 

prostatectomy presented to the ED 1/31 after being found on floor by care aide 

covered in stool and feces. Pt not able to communicate/drowsy and difficult to 

awake. Labs and imaging reviewed.





#. Metabolic encephalopathy 


#. Severe Sepsis 2/2 UTI


#. UTI


#. Hematuria-- likely due to UTI vs traumatic, recent h/o prostate cancer





Per chart, SBP's was in 80s per EMS


Upon presentation, lactic acid is elevated, heart rate and respiratory rate 

elevated, urinalysis suggestive of UTI, hematuria noted in urinary catheter


Upon presentation, patient drowsy, difficult to awake, unable to cooperate. 

Imagings reviewed. 





Patient received IV fluids and cefepime in the ED.


Blood culture negative so far. 


Urine culture growing enterococcus. Zosyn switched to ampicillin





IV thiamine and IV folic acid given alcohol abuse history, monitor and replace 

electrolytes.


Mental status appear to have improved per chart review as well as compared to 

admission. Hence, metabolic encephalopathy likely resolved. I do not know 

patient's baseline MS at this time. He reports he lives alone at home. Called 

contact (Denzel Ferrer) but call was unanswered





Admitting hemoglobin seems to be at his baseline at 8.4


Urologist eval appreciated


Hematuria currently resolved


Got 1 PRBC


Hb stable in 8s 





#. Rectal bleeding


C diff negative


Bleeding stopped today


GI eval appreciated. Patient to follow up with GI outpatient for EGD and 

colonoscopy





#. LARA





Baseline Cr 0.8, admitting BUN/Cr 60 and 2.26


likely prerenal in the setting of dehydration vs obstructive uropathy


Admitting CTAP s/o bladder distension (mendez placed and revealed gross heamtur

ia)





Cr continues to improve


Currently at 1.14 today


Stop IVF


Avoid nephrotoxins





#. H/o alcohol abuse


#. Cirrhosis





Drink frequently may be 3-4 drinks, may be every other day, pt not sure how much

he drinks


Last drink may be 2-3 days ago per pt. 


Prn ativan, closely monitor ciwa. 





Started on lactulose per GI. Monitor








#. Hypomagnesemia


#. Hypokalemia


#.  Hypophosphatemia


Monitor K, phos and Mag and replete as needed





#DVT prophylaxis: SCDs re: hematuria





Full code





PT/OT noted


CM to assist with DC planning/placement


Downgrade to med surg





Admission and Anticipated Discharge Date


Admission Date: 


January 31, 2022








Subjective


Patient seen and examined.





Patient is currently alert and oriented to person, place and time.


Patient is more alert today and active


Had some rectal bleeding yesterday with diarrhea. C diff is negative. 


Bleeding has stopped today


Denied any abd pain, nausea, vomiting


Denies any chest pain, abd pain


Reports chronic dry cough


Denied palpitation





Per RN, had few clots in urine this AM. No hematuria since 





Physical Exam








Constitutional:   + well hydrated; no acute distress  


 


Eyes:   PERRL, conjunctivae normal, anicteric sclerae  


 


ENMT:   external ear and nose normal, oropharynx normal  


 


Respiratory:   normal respiratory effort, lungs clear to auscultation  


 


Cardiovascular:   Rate/Rhythm: regular rate and regular rhythm    S1 S2


 


Gastrointestinal (Abdomen):   normal bowel sounds, soft, nontender, no 

hepatosplenomegaly  


 


Musculoskeletal:   no cyanosis or clubbing, extremities motor strength 5/5  


 


Neurologic:   PERRL, EOMI, accommodation nl, no face palsy, no dysarthria  


 


Psychiatric:   Orientation: alert, oriented x 3 and cooperative  








Results & Data


Results & Data ([facility])


Vital Signs (Past 12 Hours)


                                   Vital Signs











  Temp Pulse Pulse Resp BP Pulse Ox


 


 02/04/22 08:00  37.1 C  98 H  98 H  22   96


 


 02/04/22 04:00  37.1 C   93 H  22  130/81  99


 


 02/04/22 00:24  36.9 C   91 H  18  112/66  96











Laboratory Results





                              Abnormal lab results











  02/04/22 02/04/22 Range/Units





  06:21 06:21 


 


WBC   10.92 H  (4.8-10.8)  K/uL


 


RBC   2.48 L  (4.7-6.1)  M/uL


 


Hgb   8.0 L  (14.0-18.0)  g/dL


 


Hct   25.2 L  (42-52)  %


 


MCV   101.6 H  ()  fL


 


MCHC   31.7 L  (32-36)  g/dL


 


RDW Std Deviation   74.8 H  (36.4-46.3)  fL


 


RDW Coeff of Nell   20.8 H  (11.5-14.5)  %


 


MPV   11.1 H  (7.4-10.4)  fL


 


Sodium  132 L   (136-145)  mmol/L


 


Potassium  3.3 L   (3.5-5.1)  mmol/L


 


Carbon Dioxide  19 L   (21-32)  mmol/L


 


Calcium  6.9 L   (8.5-10.1)  mg/dl


 


Phosphorus  1.4 L* D   (2.5-4.9)  mg/dl


 


Magnesium  1.6 L   (1.7-2.4)  mg/dl


 


Total Bilirubin  4.0 H   (0.2-1.0)  mg/dl


 


AST  47 H   (13-39)  U/L


 


Alkaline Phosphatase  164 H   ()  U/L


 


Albumin  2.4 L   (3.4-5.0)  gm/dl


 


Globulin  4.1 H   (2.5-4.0)  gm/dl


 


Albumin/Globulin Ratio  0.6 L   (0.9-2)

## 2022-02-04 NOTE — GASTROINTESTINAL CONSULTATION
Date of Consultation


February 4, 2022


 





Assessment & Plan


(1) Cirrhosis: 


      His elevated LFTs and imaging are consistent with cirrhosis and ETOH 

hepatitis. 





- Alcohol cessation/abstention. 


- OP GI f/u for management of what appears to be ETOH cirrhosis w/o ascites. He 

needs OP EGD for screening for varices and Q 6m imaging of the liver for 

screening for HCC> 


(2) Alcohol withdrawal: 


      Appreciate primary hospitalists management of withdrawal and electrolyte 

derangements. 


(3) Rectal bleeding: 


      Appears to be minimal, w stable Hb, normal BUN, and most recent BM was 

brown this morning.


- Would defer endoscopy for now and will arrange for OP GI f/u with plans for 

EGD/Colonoscopy. 


GI will sign off. Please notify us if new/worsening GI issues. 








Supervising Physician


Co-Signing Physician Notes


I performed a history and physical examination of the patient today, including 

specifically on physical exam - soft abdomen.  I have discussed the patient's 

management with the advanced practitioner.  Please refer to the nurse 

practitioner's note for the documented findings and plan of care.


H/H stable, no evidence of ongoing GI bleeding.


Correct electrolytes.


Plan for OP E/C and follow up with Hepatology clinic.


Recall GI if needed.





History of Present Illness


Reason for Consultation: 


Bloody BMs


Requesting Physician: 


Dr. Mackay


Attending Physician: 


Theresa Noriega MD








History of Present Illness


Mr. Juan Pablo Ferrer is a 61 yr old male pt of Dr. Castellon with a hx of ETOH 

cirrhosis continuing to drink alcohol, head trauma (Sept 2021), HTN, COPD, 

seizure disorder, chronic hematuria, metastatic prostate ca s/p surgery, 

chemo/Lupron, anemia. He was brought to the [facility] ED on 1/31/22 because he was 

found in his home laying on the floor covered in stool and feces. 





On arrival, Hb was 10 but his baseline seems to be closer to 8.5. Yesterday, PT 

reported that he passed a brown BM with some red. He had passed a yellow BM 

yesterday morning and has passed a brown BM this morning. His Hb has remained 

relatively stable at 8 this morning. BUN and Cr are normal though his LFTs are 

elevated (T Bili 4.0, AST 47, ALT 23, Alk Phos 164). Fecal occult is (+). Non 

contrast CT or abd/pelvis on arrival with diverticulosis, cirrhosis, w/o ascites

and w/o any acute abnormalities. 





On exam this morning, he is awake, alert, denies abdominal pain. He is able to 

tell me his name, and the month and year and that he is in a hospital. He is 

unable to tell me the name of the [place] president. When asked if he has had blood 

in his BMs, he reports that he has had it "sometimes," in his urine and BMs. Of 

note, he doesn't seem to be a reliable historian as his report of BMs over the 

past few days, differs from the RN who has been taking care of him.  


Allergies














Allergy/AdvReac Type Severity Reaction Status Date / Time


 


lisinopril Allergy Severe ANGIOEDEMA Verified 01/31/22 14:40


 


oxybutynin [From Ditropan] AdvReac Unknown fever & Verified 01/31/22 14:40





   perhaps  





   seizures,  





   was  





   detoxing  





   @time  











Home Medications


                                        











 Medication  Instructions  Recorded  Confirmed  Type


 


folic acid 1 mg tablet 1 mg PO QAM 04/02/19 01/31/22 History


 


fluticasone propionate 50 2 spray INTRANASAL DAILY 04/30/21 01/31/22 History





mcg/actuation nasal    





spray,suspension    


 


trazodone 300 mg tablet 300 mg PO HS 04/30/21 01/31/22 History


 


polyethylene glycol 3350 17 17 g PO DAILY PRN #119 g 06/29/21 01/31/22 Rx





gram/dose oral powder (Miralax)    


 


nicotine 21 mg/24 hr daily 21 mg TRANSDERMAL QAM #30 ea 07/02/21 01/31/22 Rx





transdermal patch (Nicoderm CQ)    


 


furosemide 20 mg tablet 20 mg PO QAM 01/31/22 01/31/22 History


 


mirabegron 25 mg tablet,extended 25 mg PO QAM 01/31/22 01/31/22 History





release 24 hr (Myrbetriq)    














Patient History


Medical History (Updated 02/04/22 @ 12:08 by TEE Aponte)





Altered mental status


Confusion


Depression


Elevated troponin


History of torn meniscus of left knee


History of torn meniscus of right knee


Prostate cancer (06/02/14)


   "Rising PSA


   Status post ultrasound-guided biopsies 06/02/2014


   Biopsy stage T2c Nano 4+5 with perineural invasion


   Status post robotic prostatectomy 08/25/2014


   Pathologic stage [id number] Nano 4+4 Tertiary 5


   Post prostatectomy rising PSA


   Status post completion of radiation therapy 05/19/2015 received 7040 cGy"


   


   *** On 05/26/15 16:14 Jeanine M Horowitz wrote ***


   "Rising PSA


   Status post ultrasound-guided biopsies 06/02/2014


   Biopsy stage T2c Nano 4+5 with perineural invasion


   Status post robotic prostatectomy 08/25/2014


   Pathologic stage [id number] Nano 4+4


   Post prostatectomy rising PSA


   Status post completion of radiation therapy 05/19/2015 received 7040 cGy"


Psychiatric exam requested by authority


Right knee sprain


UTI (urinary tract infection)








Surgical History (Reviewed 01/31/22 @ 20:43 by Gilbert Mcrae PA-C)





H/O knee surgery


H/O prostate biopsy





Family History (Reviewed 01/31/22 @ 20:43 by Gilbert Mcrae PA-C)


Other   Family history unobtainable











Social History (Reviewed 01/31/22 @ 20:43 by Gilbert Mcrae PA-C)


Smoking Status:  Current every day smoker 


Tobacco Type:  Cigarettes 


Cigarettes Per Day:  12; 


Second Hand Exposure:  No; 


Do You Dip or Chew Tobacco:  No; 


Tobacco Cessation Education Requested by Patient:  No 


Hx Alcohol Use:  Yes Alcohol type: hard liquor 


Hx Substance Use:  No 


Preferred Language:  English 


Communication Ability:  Impaired 


 Required:  No 


Beliefs That Will Affect Care:  None 


Current Living Situation:  Alone 


Current Living Situation Comment:  unknown 


How many Children do You have:  3 


Feels Safe at Home:  Yes 


Safety Concerns:  Feels Safe At This Time 


Assistive Devices:  None 











Review of Systems








Review of Systems:   ROS:


Gen: Denies weakness, fevers, weight loss


Eyes: No eye redness, or pain, no recent vision changes


Resp: No SOB, no cough


Cardio: No palpitations/irregular beats, no chest pain


GI: As per HPI, otherwise (-). 


: + blood in urine. 


Skin: No jaundice, itching or new rashes


A total of 12 systems were reviewed, all others (-). 








Physical Exam








Constitutional:   + ill appearing, + thin and cooperative  


 


Eyes:   PERRL, conjunctivae normal, anicteric sclerae  


 


ENMT:   external ear and nose normal, oropharynx normal  


 


Neck:   trachea midline, no thyromegaly  


 


Respiratory:   normal respiratory effort, lungs clear to auscultation  


 


Cardiovascular:   RRR, no murmur, no edema  


 


Gastrointestinal (Abdomen):   normal bowel sounds, soft, nontender, no 

hepatosplenomegaly    Inspection/Auscultation: abdomen normal to inspection and 

normal bowel sounds; abdomen not distended and no abdominal edema (or ascites)  


 


Skin:   no rashes, warm and dry    normal turgor and + pallor    multiple scabs


 


Neurologic:   PERRL, EOMI, accommodation nl, no face palsy, no dysarthria    

awake; not confused  


 


Psychiatric:   A+Ox3, euthymic affect  








Results & Data ([facility])


Vital Signs (Past 12 Hours)


                                   Vital Signs











  Temp Pulse Pulse Resp BP Pulse Ox


 


 02/04/22 08:00  37.1 C  98 H  98 H  22   96


 


 02/04/22 04:00  37.1 C   93 H  22  130/81  99


 


 02/04/22 00:24  36.9 C   91 H  18  112/66  96











Laboratory Results


WBC 10, Hb 8, Hct 25, Plts 148, Na 132, K 3.3, Cl 107, Co2 19, BUN 16, Cr 1.14, 

glucose 81. 


T Bili 4, AST 47, ALT 23, A P 164


Stool for C-diff (-).


Occult stool (+)


Diagnostic Findings


Non contrast CTAP: 


1. The bladder is markedly distended and appears mildly thick-walled.


2. The prostate gland is surgically absent.


3. There are tiny bladder calculi versus surgical material the base of the 

bladder.


4. Cirrhotic liver morphology.


5. There are subacute/healing right lateral rib fractures.





(1) Alcohol withdrawal


  Complication of substance-induced condition: with delirium  Qualified Code(s):

F10.231 - Alcohol dependence with withdrawal delirium

## 2022-02-05 LAB
ANION GAP SERPL CALC-SCNC: 6 MMOL/L (ref 3–11)
BUN SERPL-MCNC: 13 MG/DL (ref 6–23)
CALCIUM SERPL-MCNC: 6.8 MG/DL (ref 8.5–10.1)
CHLORIDE SERPL-SCNC: 109 MMOL/L (ref 98–107)
CO2 SERPL-SCNC: 18 MMOL/L (ref 21–32)
CREAT CL PREDICTED SERPL C-G-VRATE: 75.6 ML/MIN
DEPRECATED RDW RBC: 74.7 FL (ref 36.4–46.3)
GLUCOSE SERPL-MCNC: 94 MG/DL
HCT VFR BLD AUTO: 21.4 % (ref 42–52)
HCT VFR BLD AUTO: 21.8 % (ref 42–52)
HGB BLD-MCNC: 6.7 G/DL (ref 14–18)
HGB BLD-MCNC: 6.9 G/DL (ref 14–18)
MAGNESIUM SERPL-MCNC: 1.7 MG/DL (ref 1.7–2.4)
MCH RBC QN AUTO: 32.2 PG (ref 25–34)
MCV RBC: 101.9 FL (ref 80–100)
PLATELET # BLD AUTO: 184 K/UL (ref 130–400)
POTASSIUM SERPL-SCNC: 3.8 MMOL/L (ref 3.5–5.1)
RBC # BLD AUTO: 2.14 M/UL (ref 4.7–6.1)
SODIUM SERPL-SCNC: 133 MMOL/L (ref 136–145)
WBC # BLD AUTO: 8.73 K/UL (ref 4.8–10.8)

## 2022-02-05 RX ADMIN — LACTULOSE SCH GM: 20 SOLUTION ORAL at 08:36

## 2022-02-05 RX ADMIN — PANTOPRAZOLE SODIUM SCH MLS/MIN: 40 INJECTION, POWDER, FOR SOLUTION INTRAVENOUS at 08:36

## 2022-02-05 RX ADMIN — AMPICILLIN SODIUM SCH MLS/HR: 1 INJECTION, POWDER, FOR SOLUTION INTRAVENOUS at 03:38

## 2022-02-05 RX ADMIN — NICOTINE SCH MG: 7 PATCH, EXTENDED RELEASE TRANSDERMAL at 08:37

## 2022-02-05 RX ADMIN — AMPICILLIN SODIUM SCH MLS/HR: 1 INJECTION, POWDER, FOR SOLUTION INTRAVENOUS at 09:30

## 2022-02-05 RX ADMIN — PANTOPRAZOLE SODIUM SCH MLS/MIN: 40 INJECTION, POWDER, FOR SOLUTION INTRAVENOUS at 21:34

## 2022-02-05 RX ADMIN — AMPICILLIN SODIUM SCH MLS/HR: 1 INJECTION, POWDER, FOR SOLUTION INTRAVENOUS at 17:34

## 2022-02-05 RX ADMIN — UMECLIDINIUM BROMIDE AND VILANTEROL TRIFENATATE SCH PUFFS: 62.5; 25 POWDER RESPIRATORY (INHALATION) at 08:36

## 2022-02-05 RX ADMIN — POTASSIUM CITRATE SCH MEQ: 10 TABLET ORAL at 08:36

## 2022-02-05 RX ADMIN — CLOTRIMAZOLE PRN APPLN: 1 CREAM TOPICAL at 21:10

## 2022-02-05 RX ADMIN — ALBUTEROL SULFATE PRN MG: 2.5 SOLUTION RESPIRATORY (INHALATION) at 03:54

## 2022-02-05 RX ADMIN — ALBUTEROL SULFATE PRN MG: 2.5 SOLUTION RESPIRATORY (INHALATION) at 21:22

## 2022-02-05 RX ADMIN — THIAMINE HYDROCHLORIDE SCH MLS/MIN: 100 INJECTION, SOLUTION INTRAMUSCULAR; INTRAVENOUS at 08:37

## 2022-02-05 RX ADMIN — AMPICILLIN SODIUM SCH MLS/HR: 1 INJECTION, POWDER, FOR SOLUTION INTRAVENOUS at 21:42

## 2022-02-05 RX ADMIN — FOLIC ACID SCH MLS/MIN: 5 INJECTION, SOLUTION INTRAMUSCULAR; INTRAVENOUS; SUBCUTANEOUS at 08:37

## 2022-02-05 RX ADMIN — CLOTRIMAZOLE PRN APPLN: 1 CREAM TOPICAL at 18:17

## 2022-02-05 NOTE — HOSPITALIST PROGRESS NOTE
Date of Service


February 5, 2022


 





Assessment & Plan


(1) UTI (urinary tract infection): 


(2) Severe sepsis: 


(3) Acute metabolic encephalopathy: 


      Plan: 


61-year-old male h/o alcoholic cirrhosis, alcohol abuse, HTN, HLD, cirrhosis, 

seizure disorder, COPD, tobacco abuse, depression, mood disorder, 

atherosclerosis of the aorta, history of prostate cancer status post 

prostatectomy presented to the ED 1/31 after being found on floor by care aide 

covered in stool and feces. Pt not able to communicate/drowsy and difficult to 

awake. Labs and imaging reviewed.





#. Metabolic encephalopathy 


#. Severe Sepsis 2/2 UTI


#. UTI


#. Hematuria-- likely due to UTI vs traumatic, recent h/o prostate cancer





Per chart, SBP's was in 80s per EMS


Upon presentation, lactic acid is elevated, heart rate and respiratory rate 

elevated, urinalysis suggestive of UTI, hematuria noted in urinary catheter


Upon presentation, patient drowsy, difficult to awake, unable to cooperate. 

Imagings reviewed. 





Patient received IV fluids and cefepime in the ED.


Blood culture negative so far. 


Urine culture growing enterococcus. Zosyn switched to ampicillin





IV thiamine and IV folic acid given alcohol abuse history, monitor and replace 

electrolytes.


Mental status appear to have improved per chart review as well as compared to 

admission. Hence, metabolic encephalopathy likely resolved. I do not know 

patient's baseline MS at this time. He reports he lives alone at home. Called 

contact (Denzel Ferrer) but call was unanswered





Urologist eval appreciated


Hematuria stopped for now





#. Rectal bleeding


#. Acute on chronic anemia


C diff negative


Rectal bleeding stopped for now


GI eval appreciated. Patient to follow up with GI outpatient for EGD and 

colonoscopy





Acute on chronic anemia likely due to rectal bleeding and recent hematuria


Admitting hemoglobin seems to be at his baseline at 8.4


Hb dropped to 6.9 today. Repeat is 6.7


Give 1 PRBC. This will be 2nd PRBC this admission


Will monitor Hb and signs of bleeding





Has diaper dermatitis from bowel incontinence


Not improving with barrier cream


Clotrimazole topical





#. LARA





Baseline Cr 0.8, admitting BUN/Cr 60 and 2.26


likely prerenal in the setting of dehydration vs obstructive uropathy


Admitting CTAP s/o bladder distension (mendez placed and revealed gross 

heamturia)





Cr continues to improve


Currently at 1.18 today


Avoid nephrotoxins





#. H/o alcohol abuse


#. Cirrhosis





Drink frequently may be 3-4 drinks, may be every other day, pt not sure how much

he drinks


Last drink may be 2-3 days ago per pt. 


Prn ativan, closely monitor ciwa. 





Started on lactulose per GI. Monitor. Hold further doses once patient has 

3BM/day 








#. Hypomagnesemia


#. Hypokalemia


#.  Hypophosphatemia


Monitor K, phos and Mag and replete as needed





#DVT prophylaxis: SCDs re: hematuria





Full code





PT/OT noted


CM to assist with DC planning/placement








Admission and Anticipated Discharge Date


Admission Date: 


January 31, 2022








Subjective


Patient seen and examined.





Patient is currently alert and oriented to person, place, month and year.





Last rectal bleeding was 2 days ago


Last hematuria reported by RN was yesterday. None so far today per RN


Patient is a poor historian


Denied any abd pain, nausea, vomiting


Denies any chest pain, abd pain


Reports chronic dry cough


Denied palpitation 





Physical Exam








Constitutional:   + well hydrated; no acute distress  


 


Eyes:   PERRL, conjunctivae normal, anicteric sclerae  


 


ENMT:   external ear and nose normal, oropharynx normal  


 


Respiratory:   normal respiratory effort, lungs clear to auscultation  


 


Cardiovascular:   Rate/Rhythm: regular rate and regular rhythm    S1 s2


 


Gastrointestinal (Abdomen):   normal bowel sounds, soft, nontender, no 

hepatosplenomegaly  


 


Musculoskeletal:   no cyanosis or clubbing, extremities motor strength 5/5  


 


Neurologic:   PERRL, EOMI, accommodation nl, no face palsy, no dysarthria  


 


Psychiatric:   Orientation: alert, oriented x 3 and cooperative    Insight: + 

poor insight  








Results & Data


Results & Data ([facility])


Vital Signs (Past 12 Hours)


                                   Vital Signs











  Temp Pulse Pulse Pulse Resp BP BP


 


 02/05/22 11:26  37.0 C  65    18  146/84 H 


 


 02/05/22 10:50  37.0 C  83    17  123/80 


 


 02/05/22 10:18  36.8 C  98 H    18  121/75 


 


 02/05/22 10:03  37.1 C  101 H    18  99/70 L 


 


 02/05/22 09:35  37.2 C  105 H    18  105/70 


 


 02/05/22 07:57  37.5 C    81  16   120/66


 


 02/05/22 03:56    79   18  














  Pulse Ox


 


 02/05/22 11:26  96


 


 02/05/22 10:50  98


 


 02/05/22 10:18  100


 


 02/05/22 10:03  99


 


 02/05/22 09:35  99


 


 02/05/22 07:57  97


 


 02/05/22 03:56  97











Laboratory Results





                              Abnormal lab results











  02/05/22 02/05/22 02/05/22 Range/Units





  06:03 06:03 07:38 


 


RBC  2.14 L    (4.7-6.1)  M/uL


 


Hgb  6.9 L*   6.7 L*  (14.0-18.0)  g/dL


 


Hct  21.8 L   21.4 L  (42-52)  %


 


MCV  101.9 H    ()  fL


 


MCHC  31.7 L    (32-36)  g/dL


 


RDW Std Deviation  74.7 H    (36.4-46.3)  fL


 


RDW Coeff of Nell  21.0 H    (11.5-14.5)  %


 


MPV  11.2 H    (7.4-10.4)  fL


 


Sodium   133 L   (136-145)  mmol/L


 


Chloride   109 H   ()  mmol/L


 


Carbon Dioxide   18 L   (21-32)  mmol/L


 


Calcium   6.8 L   (8.5-10.1)  mg/dl


 


Phosphorus   2.1 L   (2.5-4.9)  mg/dl


 


Crossmatch     














  02/05/22 Range/Units





  08:20 


 


RBC   (4.7-6.1)  M/uL


 


Hgb   (14.0-18.0)  g/dL


 


Hct   (42-52)  %


 


MCV   ()  fL


 


MCHC   (32-36)  g/dL


 


RDW Std Deviation   (36.4-46.3)  fL


 


RDW Coeff of Nell   (11.5-14.5)  %


 


MPV   (7.4-10.4)  fL


 


Sodium   (136-145)  mmol/L


 


Chloride   ()  mmol/L


 


Carbon Dioxide   (21-32)  mmol/L


 


Calcium   (8.5-10.1)  mg/dl


 


Phosphorus   (2.5-4.9)  mg/dl


 


Crossmatch  See Detail

## 2022-02-06 LAB
ANION GAP SERPL CALC-SCNC: 5 MMOL/L (ref 3–11)
BUN SERPL-MCNC: 11 MG/DL (ref 6–23)
CALCIUM SERPL-MCNC: 7 MG/DL (ref 8.5–10.1)
CHLORIDE SERPL-SCNC: 110 MMOL/L (ref 98–107)
CO2 SERPL-SCNC: 20 MMOL/L (ref 21–32)
CREAT CL PREDICTED SERPL C-G-VRATE: 85 ML/MIN
DEPRECATED RDW RBC: 73.8 FL (ref 36.4–46.3)
GLUCOSE SERPL-MCNC: 98 MG/DL
HCT VFR BLD AUTO: 26.2 % (ref 42–52)
HGB BLD-MCNC: 8.3 G/DL (ref 14–18)
MAGNESIUM SERPL-MCNC: 1.7 MG/DL (ref 1.7–2.4)
MCH RBC QN AUTO: 31.7 PG (ref 25–34)
MCV RBC: 100 FL (ref 80–100)
PLATELET # BLD AUTO: 207 K/UL (ref 130–400)
POTASSIUM SERPL-SCNC: 4.4 MMOL/L (ref 3.5–5.1)
RBC # BLD AUTO: 2.62 M/UL (ref 4.7–6.1)
SODIUM SERPL-SCNC: 135 MMOL/L (ref 136–145)
WBC # BLD AUTO: 8.92 K/UL (ref 4.8–10.8)

## 2022-02-06 RX ADMIN — LACTULOSE SCH: 20 SOLUTION ORAL at 07:56

## 2022-02-06 RX ADMIN — PANTOPRAZOLE SODIUM SCH MLS/MIN: 40 INJECTION, POWDER, FOR SOLUTION INTRAVENOUS at 08:18

## 2022-02-06 RX ADMIN — NICOTINE SCH MG: 7 PATCH, EXTENDED RELEASE TRANSDERMAL at 08:18

## 2022-02-06 RX ADMIN — AMPICILLIN SODIUM SCH MLS/HR: 1 INJECTION, POWDER, FOR SOLUTION INTRAVENOUS at 16:32

## 2022-02-06 RX ADMIN — AMPICILLIN SODIUM SCH MLS/HR: 1 INJECTION, POWDER, FOR SOLUTION INTRAVENOUS at 09:35

## 2022-02-06 RX ADMIN — DIBASIC SODIUM PHOSPHATE, MONOBASIC POTASSIUM PHOSPHATE AND MONOBASIC SODIUM PHOSPHATE SCH TAB: 852; 155; 130 TABLET ORAL at 09:35

## 2022-02-06 RX ADMIN — FOLIC ACID SCH MLS/MIN: 5 INJECTION, SOLUTION INTRAMUSCULAR; INTRAVENOUS; SUBCUTANEOUS at 08:18

## 2022-02-06 RX ADMIN — PANTOPRAZOLE SODIUM SCH MLS/MIN: 40 INJECTION, POWDER, FOR SOLUTION INTRAVENOUS at 21:29

## 2022-02-06 RX ADMIN — DIBASIC SODIUM PHOSPHATE, MONOBASIC POTASSIUM PHOSPHATE AND MONOBASIC SODIUM PHOSPHATE SCH TAB: 852; 155; 130 TABLET ORAL at 21:29

## 2022-02-06 RX ADMIN — AMPICILLIN SODIUM SCH MLS/HR: 1 INJECTION, POWDER, FOR SOLUTION INTRAVENOUS at 21:35

## 2022-02-06 RX ADMIN — DIBASIC SODIUM PHOSPHATE, MONOBASIC POTASSIUM PHOSPHATE AND MONOBASIC SODIUM PHOSPHATE SCH TAB: 852; 155; 130 TABLET ORAL at 12:17

## 2022-02-06 RX ADMIN — THIAMINE HYDROCHLORIDE SCH MLS/MIN: 100 INJECTION, SOLUTION INTRAMUSCULAR; INTRAVENOUS at 08:18

## 2022-02-06 RX ADMIN — DIBASIC SODIUM PHOSPHATE, MONOBASIC POTASSIUM PHOSPHATE AND MONOBASIC SODIUM PHOSPHATE SCH TAB: 852; 155; 130 TABLET ORAL at 16:32

## 2022-02-06 RX ADMIN — Medication SCH MG: at 21:29

## 2022-02-06 RX ADMIN — UMECLIDINIUM BROMIDE AND VILANTEROL TRIFENATATE SCH PUFFS: 62.5; 25 POWDER RESPIRATORY (INHALATION) at 08:17

## 2022-02-06 RX ADMIN — AMPICILLIN SODIUM SCH MLS/HR: 1 INJECTION, POWDER, FOR SOLUTION INTRAVENOUS at 05:00

## 2022-02-06 RX ADMIN — Medication SCH MG: at 10:57

## 2022-02-06 NOTE — HOSPITALIST PROGRESS NOTE
Date of Service


February 6, 2022


 





Assessment & Plan


(1) UTI (urinary tract infection): 


(2) Severe sepsis: 


(3) Acute metabolic encephalopathy: 


      Plan: 


61-year-old male h/o alcoholic cirrhosis, alcohol abuse, HTN, HLD, cirrhosis, 

seizure disorder, COPD, tobacco abuse, depression, mood disorder, 

atherosclerosis of the aorta, history of prostate cancer status post 

prostatectomy presented to the ED 1/31 after being found on floor by care aide 

covered in stool and feces. Pt not able to communicate/drowsy and difficult to 

awake. Labs and imaging reviewed.





#. Metabolic encephalopathy 


#. Severe Sepsis 2/2 UTI


#. UTI


#. Hematuria-- likely due to UTI vs traumatic, recent h/o prostate cancer





Per chart, SBP's was in 80s per EMS


Upon presentation, lactic acid is elevated, heart rate and respiratory rate 

elevated, urinalysis suggestive of UTI, hematuria noted in urinary catheter


Upon presentation, patient drowsy, difficult to awake, unable to cooperate. 

Imagings reviewed. 





Patient received IV fluids and cefepime in the ED.


Blood culture negative so far. 


Urine culture growing enterococcus. Zosyn switched to ampicillin





Urologist eval appreciated


Hematuria stopped for now





#. Rectal bleeding


#. Acute on chronic anemia


C diff negative


Rectal bleeding stopped for now


GI eval appreciated. Patient to follow up with GI outpatient for EGD and 

colonoscopy





Acute on chronic anemia likely due to rectal bleeding and recent hematuria


Admitting hemoglobin seems to be at his baseline at 8.4


Dropped to 6.7


S/p 2 PRBC


Hb 8.3





Has diaper dermatitis from bowel incontinence


Continue clotrimazole cream





#. LARA





Baseline Cr 0.8, admitting BUN/Cr 60 and 2.26


likely prerenal in the setting of dehydration vs obstructive uropathy


Admitting CTAP s/o bladder distension (mendez placed and revealed gross 

hematuria)





Cr continues to improve


Currently at 1.05 today


Avoid nephrotoxins





#. H/o alcohol abuse


#. Cirrhosis





Drink frequently may be 3-4 drinks, may be every other day, pt not sure how much

he drinks


No signs of withdrawal


Continue thiamine and folic acid





Started on lactulose per GI. Monitor. Hold further doses once patient has 

3BM/day 








#. Hypomagnesemia


#. Hypokalemia


#.  Hypophosphatemia


Monitor K, phos and Mag and replete as needed





#DVT prophylaxis: SCDs re: hematuria





Full code





PT/OT noted


CM to assist with DC planning/placement





Spoke to patient's friend Jamia Rebollar who is one of her contacts


She reports patient's family are not involved in his care


She acknowledged patient's alcohol abuse and poor adherence with followup. 

Patient is at baseline mental status per her report


Updated her on plans and need to followup with GI and PCP. She will try to keep 

encouraging patient to be adherent and avoid alcohol use





Admission and Anticipated Discharge Date


Admission Date: 


January 31, 2022








Subjective


Patient seen and examined.





Patient is currently alert and oriented to person, place, month and year.


Per RN, he has intermittent periods of disorientation





Last rectal bleeding was 3 days ago


Last hematuria reported by RN 2days ago. None today





Denied any abd pain, nausea, vomiting


Denies any chest pain,palpitation, shortness of breath


Reports chronic dry cough


Denied dysuria, freq 





Physical Exam








Constitutional:   + well hydrated; no acute distress  


 


Eyes:   PERRL, conjunctivae normal, anicteric sclerae  


 


ENMT:   external ear and nose normal, oropharynx normal  


 


Respiratory:   normal respiratory effort, lungs clear to auscultation  


 


Cardiovascular:   Rate/Rhythm: regular rate and regular rhythm    S1 S2


 


Gastrointestinal (Abdomen):   normal bowel sounds, soft, nontender, no 

hepatosplenomegaly  


 


Musculoskeletal:   no cyanosis or clubbing, extremities motor strength 5/5  


 


Neurologic:   PERRL, EOMI, accommodation nl, no face palsy, no dysarthria  


 


Psychiatric:   Orientation: alert, oriented x 3 and cooperative    Insight: + 

poor insight  








Results & Data


Results & Data ([facility])


Vital Signs (Past 12 Hours)


                                   Vital Signs











  Temp Pulse Resp BP Pulse Ox


 


 02/06/22 07:30  37.1 C  95 H  16  122/77  98


 


 02/05/22 22:32  37.0 C  99 H  18  129/74  98











Laboratory Results





                              Abnormal lab results











  02/06/22 02/06/22 Range/Units





  06:07 06:07 


 


RBC   2.62 L  (4.7-6.1)  M/uL


 


Hgb   8.3 L  (14.0-18.0)  g/dL


 


Hct   26.2 L  (42-52)  %


 


MCHC   31.7 L  (32-36)  g/dL


 


RDW Std Deviation   73.8 H  (36.4-46.3)  fL


 


RDW Coeff of Nell   21.0 H  (11.5-14.5)  %


 


MPV   10.8 H  (7.4-10.4)  fL


 


Sodium  135 L   (136-145)  mmol/L


 


Chloride  110 H   ()  mmol/L


 


Carbon Dioxide  20 L   (21-32)  mmol/L


 


Calcium  7.0 L   (8.5-10.1)  mg/dl


 


Phosphorus  1.9 L   (2.5-4.9)  mg/dl

## 2022-02-07 LAB
ALBUMIN SERPL-MCNC: 2.3 GM/DL (ref 3.4–5)
ALP SERPL-CCNC: 205 U/L (ref 34–104)
ALT SERPL-CCNC: 20 U/L (ref 7–52)
ANION GAP SERPL CALC-SCNC: 3 MMOL/L (ref 3–11)
BILIRUB SERPL-MCNC: 2.9 MG/DL (ref 0.2–1)
BUN SERPL-MCNC: 10 MG/DL (ref 6–23)
CALCIUM SERPL-MCNC: 7.1 MG/DL (ref 8.5–10.1)
CHLORIDE SERPL-SCNC: 110 MMOL/L (ref 98–107)
CO2 SERPL-SCNC: 21 MMOL/L (ref 21–32)
CREAT CL PREDICTED SERPL C-G-VRATE: 95 ML/MIN
DEPRECATED RDW RBC: 76.3 FL (ref 36.4–46.3)
GLUCOSE SERPL-MCNC: 90 MG/DL
HCT VFR BLD AUTO: 25.8 % (ref 42–52)
HGB BLD-MCNC: 8.2 G/DL (ref 14–18)
MAGNESIUM SERPL-MCNC: 1.5 MG/DL (ref 1.7–2.4)
MCH RBC QN AUTO: 32.3 PG (ref 25–34)
MCV RBC: 101.6 FL (ref 80–100)
PLATELET # BLD AUTO: 234 K/UL (ref 130–400)
POTASSIUM SERPL-SCNC: 4.2 MMOL/L (ref 3.5–5.1)
PROT SERPL-MCNC: 6.4 GM/DL (ref 6–8.3)
RBC # BLD AUTO: 2.54 M/UL (ref 4.7–6.1)
SODIUM SERPL-SCNC: 134 MMOL/L (ref 136–145)
WBC # BLD AUTO: 8.89 K/UL (ref 4.8–10.8)

## 2022-02-07 RX ADMIN — MAGNESIUM SULFATE IN DEXTROSE SCH MLS/HR: 10 INJECTION, SOLUTION INTRAVENOUS at 10:18

## 2022-02-07 RX ADMIN — MAGNESIUM SULFATE IN DEXTROSE SCH MLS/HR: 10 INJECTION, SOLUTION INTRAVENOUS at 12:23

## 2022-02-07 RX ADMIN — NICOTINE SCH MG: 7 PATCH, EXTENDED RELEASE TRANSDERMAL at 09:38

## 2022-02-07 RX ADMIN — UMECLIDINIUM BROMIDE AND VILANTEROL TRIFENATATE SCH PUFFS: 62.5; 25 POWDER RESPIRATORY (INHALATION) at 09:38

## 2022-02-07 RX ADMIN — Medication SCH: at 09:49

## 2022-02-07 RX ADMIN — AMPICILLIN SODIUM SCH MLS/HR: 1 INJECTION, POWDER, FOR SOLUTION INTRAVENOUS at 09:37

## 2022-02-07 RX ADMIN — LACTULOSE SCH: 20 SOLUTION ORAL at 09:49

## 2022-02-07 RX ADMIN — AMPICILLIN SODIUM SCH MLS/HR: 1 INJECTION, POWDER, FOR SOLUTION INTRAVENOUS at 03:30

## 2022-02-07 RX ADMIN — AMPICILLIN SODIUM SCH MLS/HR: 1 INJECTION, POWDER, FOR SOLUTION INTRAVENOUS at 21:33

## 2022-02-07 RX ADMIN — AMPICILLIN SODIUM SCH MLS/HR: 1 INJECTION, POWDER, FOR SOLUTION INTRAVENOUS at 16:38

## 2022-02-07 RX ADMIN — FOLIC ACID SCH: 1 TABLET ORAL at 09:49

## 2022-02-07 RX ADMIN — PANTOPRAZOLE SODIUM SCH: 40 INJECTION, POWDER, FOR SOLUTION INTRAVENOUS at 09:56

## 2022-02-07 RX ADMIN — Medication SCH MG: at 21:32

## 2022-02-07 NOTE — HOSPITALIST PROGRESS NOTE
Date of Service


February 7, 2022


 





Assessment & Plan


(1) UTI (urinary tract infection): 


(2) Severe sepsis: 


(3) Acute metabolic encephalopathy: 


      Plan: 


61-year-old male h/o alcoholic cirrhosis, alcohol abuse, HTN, HLD, cirrhosis, 

seizure disorder, COPD, tobacco abuse, depression, mood disorder, 

atherosclerosis of the aorta, history of prostate cancer status post 

prostatectomy presented to the ED 1/31 after being found on floor by care aide 

covered in stool and feces. Pt not able to communicate/drowsy and difficult to 

awake. Labs and imaging reviewed.





#. Metabolic encephalopathy 


#. Severe Sepsis 2/2 UTI


#. UTI


#. Hematuria-- likely due to UTI vs traumatic, recent h/o prostate cancer





Per chart, SBP's was in 80s per EMS


Upon presentation, lactic acid is elevated, heart rate and respiratory rate 

elevated, urinalysis suggestive of UTI, hematuria noted in urinary catheter


Upon presentation, patient drowsy, difficult to awake, unable to cooperate. 

Imagings reviewed. 





Patient received IV fluids and cefepime in the ED.


Blood culture negative so far. 


Urine culture growing enterococcus. Zosyn switched to ampicillin


Needs at least 10 days of antibiotics for complicated UTI





Urologist eval appreciated


Hematuria stopped for now





#. Rectal bleeding


#. Acute on chronic anemia


C diff negative


Rectal bleeding stopped for now


GI eval appreciated. Patient to follow up with GI outpatient for EGD and 

colonoscopy





Acute on chronic anemia likely due to rectal bleeding and recent hematuria


Admitting hemoglobin seems to be at his baseline at 8.4


Dropped to 6.7


S/p 2 PRBC


Hb 8.2





Has diaper dermatitis from bowel incontinence, resolving








#. LARA





Baseline Cr 0.8, admitting BUN/Cr 60 and 2.26


likely prerenal in the setting of dehydration vs obstructive uropathy


Admitting CTAP s/o bladder distension (mendez placed and revealed gross 

hematuria)





Cr continues to improve


Currently at 0.94 today


Avoid nephrotoxins. Resume home lasix





#. H/o alcohol abuse


#. Cirrhosis





Drink frequently may be 3-4 drinks, may be every other day, pt not sure how much

he drinks


No signs of withdrawal


Continue thiamine and folic acid





Started on lactulose per GI. Monitor. Hold further doses once patient has 

3BM/day 


Resume home lasix tomorrow





Hyperbilirubinemia


Improving. Tbil peaked at 4.5, down to 2.9


Provided counselling regarding quitting alcohol use and smoking





#. Hypomagnesemia


#. Hypokalemia


#.  Hypophosphatemia


Monitor K, phos and Mag and replete as needed





#DVT prophylaxis: SCDs re: hematuria





Full code





PT/OT noted


CM elidia working on placement





On 2/6/22, I spoke to patient's friend Jamia Rebollar who is one of her contacts


She reports patient's family are not involved in his care


She acknowledged patient's alcohol abuse and poor adherence with followup. 

Patient is at baseline mental status per her report


Updated her on plans and need to followup with GI and PCP. She will try to keep 

encouraging patient to be adherent and avoid alcohol use





Admission and Anticipated Discharge Date


Admission Date: 


January 31, 2022








Subjective


Patient seen and examined.





Patient is currently alert and oriented to person, place and time.


Has intermittent periods of disorientation which appears to be his baseline





Last rectal bleeding was 4 days ago


Last hematuria reported 3 days ago. 





Denied any abd pain, nausea, vomiting


Denies any chest pain,palpitation, shortness of breath


Reports chronic dry cough


Denied dysuria, freq 





Physical Exam








Constitutional:   + well hydrated; no acute distress  


 


Eyes:   PERRL, conjunctivae normal, anicteric sclerae  


 


ENMT:   external ear and nose normal, oropharynx normal  


 


Respiratory:   normal respiratory effort, lungs clear to auscultation  


 


Cardiovascular:   Rate/Rhythm: regular rate and regular rhythm    S1 S2


 


Gastrointestinal (Abdomen):   normal bowel sounds, soft, nontender, no 

hepatosplenomegaly  


 


Musculoskeletal:   no cyanosis or clubbing, extremities motor strength 5/5  


 


Neurologic:   PERRL, EOMI, accommodation nl, no face palsy, no dysarthria  


 


Psychiatric:   Orientation: alert, oriented x 3 and cooperative    Insight: + 

poor insight  








Results & Data


Results & Data ([facility])


Vital Signs (Past 12 Hours)


                                   Vital Signs











  Temp Pulse Resp BP Pulse Ox


 


 02/07/22 07:20  37.1 C  90  18  128/71  96











Laboratory Results





                              Abnormal lab results











  02/07/22 02/07/22 Range/Units





  06:21 06:21 


 


RBC  2.54 L   (4.7-6.1)  M/uL


 


Hgb  8.2 L   (14.0-18.0)  g/dL


 


Hct  25.8 L   (42-52)  %


 


MCV  101.6 H   ()  fL


 


MCHC  31.8 L   (32-36)  g/dL


 


RDW Std Deviation  76.3 H   (36.4-46.3)  fL


 


RDW Coeff of Nell  21.2 H   (11.5-14.5)  %


 


MPV  10.6 H   (7.4-10.4)  fL


 


Sodium   134 L  (136-145)  mmol/L


 


Chloride   110 H  ()  mmol/L


 


Calcium   7.1 L  (8.5-10.1)  mg/dl


 


Magnesium   1.5 L  (1.7-2.4)  mg/dl


 


Total Bilirubin   2.9 H  (0.2-1.0)  mg/dl


 


Direct Bilirubin   1.6 H  (0-0.2)  mg/dl


 


AST   45 H  (13-39)  U/L


 


Alkaline Phosphatase   205 H  ()  U/L


 


Albumin   2.3 L  (3.4-5.0)  gm/dl

## 2022-02-08 LAB
ANION GAP SERPL CALC-SCNC: 4 MMOL/L (ref 3–11)
BUN SERPL-MCNC: 10 MG/DL (ref 6–23)
CALCIUM SERPL-MCNC: 7.4 MG/DL (ref 8.5–10.1)
CHLORIDE SERPL-SCNC: 108 MMOL/L (ref 98–107)
CO2 SERPL-SCNC: 21 MMOL/L (ref 21–32)
CREAT CL PREDICTED SERPL C-G-VRATE: 100.3 ML/MIN
DEPRECATED RDW RBC: 77.5 FL (ref 36.4–46.3)
GLUCOSE SERPL-MCNC: 90 MG/DL
HCT VFR BLD AUTO: 27.7 % (ref 42–52)
HGB BLD-MCNC: 8.8 G/DL (ref 14–18)
MAGNESIUM SERPL-MCNC: 1.6 MG/DL (ref 1.7–2.4)
MCH RBC QN AUTO: 32.4 PG (ref 25–34)
MCV RBC: 101.8 FL (ref 80–100)
PLATELET # BLD AUTO: 245 K/UL (ref 130–400)
POTASSIUM SERPL-SCNC: 4.4 MMOL/L (ref 3.5–5.1)
RBC # BLD AUTO: 2.72 M/UL (ref 4.7–6.1)
SODIUM SERPL-SCNC: 133 MMOL/L (ref 136–145)
WBC # BLD AUTO: 9 K/UL (ref 4.8–10.8)

## 2022-02-08 RX ADMIN — MAGNESIUM SULFATE IN DEXTROSE SCH MLS/HR: 10 INJECTION, SOLUTION INTRAVENOUS at 12:44

## 2022-02-08 RX ADMIN — Medication SCH MG: at 08:48

## 2022-02-08 RX ADMIN — AMPICILLIN SODIUM SCH MLS/HR: 1 INJECTION, POWDER, FOR SOLUTION INTRAVENOUS at 09:59

## 2022-02-08 RX ADMIN — AMPICILLIN SODIUM SCH MLS/HR: 1 INJECTION, POWDER, FOR SOLUTION INTRAVENOUS at 04:13

## 2022-02-08 RX ADMIN — NICOTINE SCH MG: 7 PATCH, EXTENDED RELEASE TRANSDERMAL at 08:48

## 2022-02-08 RX ADMIN — AMOXICILLIN SCH MG: 500 CAPSULE ORAL at 20:05

## 2022-02-08 RX ADMIN — MAGNESIUM SULFATE IN DEXTROSE SCH MLS/HR: 10 INJECTION, SOLUTION INTRAVENOUS at 10:39

## 2022-02-08 RX ADMIN — MAGNESIUM SULFATE IN DEXTROSE SCH MLS/HR: 10 INJECTION, SOLUTION INTRAVENOUS at 14:58

## 2022-02-08 RX ADMIN — UMECLIDINIUM BROMIDE AND VILANTEROL TRIFENATATE SCH PUFFS: 62.5; 25 POWDER RESPIRATORY (INHALATION) at 08:48

## 2022-02-08 RX ADMIN — FUROSEMIDE SCH MG: 20 TABLET ORAL at 08:48

## 2022-02-08 RX ADMIN — AMOXICILLIN SCH MG: 500 CAPSULE ORAL at 14:52

## 2022-02-08 RX ADMIN — Medication SCH MG: at 20:05

## 2022-02-08 RX ADMIN — FOLIC ACID SCH MG: 1 TABLET ORAL at 08:48

## 2022-02-08 RX ADMIN — LACTULOSE SCH GM: 20 SOLUTION ORAL at 08:48

## 2022-02-08 NOTE — HOSPITALIST PROGRESS NOTE
Date of Service


February 8, 2022


 





Assessment & Plan


(1) UTI (urinary tract infection): 


(2) Severe sepsis: 


(3) Acute metabolic encephalopathy: 


      Plan: 


61-year-old male h/o alcoholic cirrhosis, alcohol abuse, HTN, HLD, cirrhosis, 

seizure disorder, COPD, tobacco abuse, depression, mood disorder, 

atherosclerosis of the aorta, history of prostate cancer status post 

prostatectomy presented to the ED 1/31 after being found on floor by care aide 

covered in stool and feces.





Metabolic encephalopathy  - resolved


Severe Sepsis 2/2 UTI - resolved


UTI Hematuria-- likely due to UTI vs traumatic, recent h/o prostate cancer


Urine culture growing enterococcus. Zosyn switched to ampicillin


Will de escalate to oral amoxicillin 500mg TID to complete at end of day on 

2/10/22 to complete 10 total days of antibiotic


Urologist eval appreciated


Hematuria stopped for now


continue mendez cath, can hand irrigate for clots


will need OP urology follow up for TOV


Known hx of radiation cystitis








Hypomagnesemia


mag 1.6 today


replaced 1g mag sulfate x 3 bags


on oral as well


repeat in a.m





Hypokalemia


repleted, resolved





Rectal bleeding


Acute on chronic anemia


C diff negative


Rectal bleeding stopped for now


GI eval appreciated. Patient to follow up with GI outpatient for EGD and 

colonoscopy





Acute on chronic anemia likely due to rectal bleeding and recent hematuria


Admitting hemoglobin seems to be at his baseline at 8.4


Dropped to 6.7


S/p 2 PRBC


Hb 8.8 and uptrending today





Diaper dermatitis 


from bowel incontinence, resolving





LARA


Baseline Cr 0.8, admitting BUN/Cr 60 and 2.26


likely prerenal in the setting of dehydration vs obstructive uropathy


Admitting CTAP s/o bladder distension (mendez placed and revealed gross 

hematuria)


Cr back to baseline, 0.89 today


Avoid nephrotoxins. Resume home lasix





H/o alcohol abuse


Cirrhosis


Drink frequently may be 3-4 drinks, may be every other day, pt not sure how much

he drinks


No signs of withdrawal


Continue thiamine and folic acid


Started on lactulose per GI. Monitor. Hold further doses once patient has 

3BM/day 


Resume home lasix tomorrow


will need OP EGD screen for varices and imaging of liver for screening of HCC





Hyperbilirubinemia


Improving. Tbil peaked at 4.5, down to 2.9


Provided counselling regarding quitting alcohol use and smoking





Hypophosphatemia


replete as needed





DVT prophylaxis: SCDs


Full code





PT/OT noted - recommend inpatient rehab/SNF


CM working on placement





Per previous MD, On 2/6/22, I spoke to patient's friend Jamia Rebollar who is one of

her contacts


She reports patient's family are not involved in his care


She acknowledged patient's alcohol abuse and poor adherence with followup. 

Patient is at baseline mental status per her report


Updated her on plans and need to followup with GI and PCP. She will try to keep 

encouraging patient to be adherent and avoid alcohol use











Pt was seen and examined in collaboration with Dr. Noriega, please see addendum











Admission and Anticipated Discharge Date


Admission Date: 


January 31, 2022








Supervising Physician


Co-Signing Physician Notes


Patient seen and examined





61-year-old male h/o alcoholic cirrhosis, alcohol abuse, HTN, HLD, cirrhosis, 

seizure disorder, COPD, tobacco abuse, depression, mood disorder, 

atherosclerosis of the aorta, history of prostate cancer status post 

prostatectomy presented to the ED 1/31 after being found on floor by care aide 

covered in stool and feces


Managed for severe sepsis due to UTI, Encephalopathy, Lara, Acute on chronic 

anemia, Rectal bleed,  hematuria


S/P 2 PRBC





Patient has no complaints today


Exam is grossly unremarkable. AOX3 without focal deficits





Change antibiotics to po to complete treatment


Patient needs lactulose on dc to ensure at least 3BM per day considering his 

liver cirrhosis


Monitor and replete electrolytes as needed while inpatient. May need some po mag

on dc


Needs to follow up GI outpatient for endoscopies and managment


Need to follow up with [facility] outpatient


CM working on placement





Agree with other plans as detailed by Rita Baker PA-C





Subjective


Patient was seen and examined in 375 -1.


Follow-up sepsis secondary to UTI, hematuria, rectal bleeding, anemia status 

post transfusion.





He is sitting up in bed and offers no acute concerns.  Denies fever, chills, 

sweats, lightheadedness, dizziness, chest pain, shortness of breath, nausea, 

vomiting, abdominal pain.


He had a bowel movement yesterday.





 





Review of Systems








Review of Systems:   All systems reviewed & are unremarkable except as noted in 

HPI & below  








Physical Exam








Physical Exam:   Gen: Elderly, M, flat affect, NAD, A&O x3


HEENT: Normocephalic, atraumatic, conjunctivae moist, sclerae anicteric, mucous 

membranes moist.


Lung: Clear to Auscultation bilaterally, no wheezes/rales/rhonchi 


Heart: Regular rate, regular rhythm, no murmurs, rubs, or gallops


Abdomen: Soft, NT, ND +BS x 4


Extremities: No edema


Skin: Warm, no rash, negative turgor.








Results & Data


Results & Data ([facility])


Vital Signs (Past 12 Hours)


                                   Vital Signs











  Temp Pulse Resp BP Pulse Ox


 


 02/08/22 07:55  36.9 C  96 H  18  129/79  96











Laboratory Results





                                    Short CBC











  02/08/22 Range/Units





  06:08 


 


WBC  9.00  (4.8-10.8)  K/uL


 


Hgb  8.8 L  (14.0-18.0)  g/dL


 


Hct  27.7 L  (42-52)  %


 


Plt Count  245  (130-400)  K/uL








                                       BMP











  02/08/22





  06:08


 


Sodium  133 L


 


Potassium  4.4


 


Chloride  108 H


 


Carbon Dioxide  21


 


BUN  10


 


Creatinine  0.89


 


Glucose  90


 


Calcium  7.4 L











Medications Administered





                          Current Inpatient Medications





Acetaminophen (Acetaminophen 325 Mg Tab)  650 mg PO Q4H PRN


   PRN Reason: Pain or Fever


   Stop: 03/02/22 18:39


   Last Admin: 02/01/22 03:19 Dose:  650 mg


   Documented by: 


Albuterol (Albuterol 0.083% Nebu Soln 3 Ml Vial)  2.5 mg NEB Q6R PRN; Protocol


   PRN Reason: Wheezing


   Stop: 03/04/22 17:29


   Last Admin: 02/05/22 21:22 Dose:  2.5 mg


   Documented by: 


Clotrimazole (Clotrimazole 1% Cr 15 Gm Tube)  1 appln EXT BID PRN


   PRN Reason: buttock rash


   Stop: 02/12/22 16:17


   Last Admin: 02/05/22 21:10 Dose:  1 appln


   Documented by: 


Folic Acid (Folic Acid 1 Mg Tab)  [facility]


   Stop: 03/09/22 08:59


   Last Admin: 02/08/22 08:48 Dose:  1 mg


   Documented by: 


Furosemide (Furosemide 20 Mg Tab)  [facility]


   Stop: 03/10/22 08:59


   Last Admin: 02/08/22 08:48 Dose:  20 mg


   Documented by: 


Sodium Chloride (Nss)  250 mls @ 15 mls/hr IV .[id number] PRN


   PRN Reason: For Transfusion


   Stop: 03/02/22 16:20


Lorazepam (Ativan)  1 mg in 2 mls @ 2 mls/min IV ONE PRN; Protocol


   PRN Reason: EtoH Withdrawal AWSS 6-10


   Stop: 03/02/22 18:39


Ampicillin Sodium 1,000 mg/ (Sodium Chloride)  50 mls @ 100 mls/hr IV Q6H ANDRE


   Stop: 02/13/22 15:59


   Last Infusion: 02/08/22 10:41 Dose:  Infused


   Documented by: 


Magnesium Sulfate/Dextrose (Magnesium Sulfate / D5w)  1 gm in 100 mls @ 50 

mls/hr IV Q2H [facility]


   Stop: 02/08/22 15:59


   Last Admin: 02/08/22 10:39 Dose:  50 mls/hr


   Documented by: 


Lactulose (Lactulose Syrup 20 Gm/30 Ml Udc)  20 gm PO DAILY [facility]


   Stop: 03/07/22 08:59


   Last Admin: 02/08/22 08:48 Dose:  20 gm


   Documented by: 


Magnesium Oxide (Magnesium Oxide 400 Mg Tab)  400 mg PO BID [facility]


   Stop: 03/08/22 10:14


   Last Admin: 02/08/22 08:48 Dose:  400 mg


   Documented by: 


Melatonin (Melatonin 3 Mg Tab)  3 mg PO HS PRN


   PRN Reason: Sleep


   Stop: 03/03/22 21:33


Miscellaneous (Remove Nicoderm Patch)  1 ea N/A DAILY@0859 [facility]


   Stop: 03/06/22 08:58


   Last Admin: 02/08/22 08:49 Dose:  1 ea


   Documented by: 


Nicotine (Nicotine 14 Mg/24 Hr Patch)  14 mg TD QAM [facility]


   Stop: 03/05/22 16:29


   Last Admin: 02/08/22 08:48 Dose:  14 mg


   Documented by: 


Ondansetron HCl (Ondansetron Inj 2 Mg/Ml 2 Ml Vial)  4 mg IV Q6H PRN


   PRN Reason: Nausea


   Stop: 03/02/22 18:39


Pantoprazole Sodium (Pantoprazole 40 Mg Tab)  40 mg PO BID [facility]


   Stop: 03/09/22 08:59


   Last Admin: 02/08/22 08:48 Dose:  40 mg


   Documented by: 


Potassium Citrate (Potassium Citrate 10 Meq Tab)  40 meq PO QAM [facility]


   Stop: 03/05/22 08:59


   Last Admin: 02/05/22 08:36 Dose:  40 meq


   Documented by: 


Thiamine HCl (Thiamine Hcl 100 Mg Tab)  100 mg PO QAM [facility]


   Stop: 03/09/22 08:59


   Last Admin: 02/08/22 08:48 Dose:  100 mg


   Documented by: 


Umeclidinium/Vilanterol (Umeclidinium/Vilanterol 62.5/25mcg 7 Puffs/Inhaler)  1 

puffs INH DAILY [facility]


   Stop: 03/05/22 16:14


   Last Admin: 02/08/22 08:48 Dose:  1 puffs


   Documented by:

## 2022-02-09 LAB
ANION GAP SERPL CALC-SCNC: 4 MMOL/L (ref 3–11)
BUN SERPL-MCNC: 11 MG/DL (ref 6–23)
CALCIUM SERPL-MCNC: 7.4 MG/DL (ref 8.5–10.1)
CHLORIDE SERPL-SCNC: 106 MMOL/L (ref 98–107)
CO2 SERPL-SCNC: 21 MMOL/L (ref 21–32)
CREAT CL PREDICTED SERPL C-G-VRATE: 91.1 ML/MIN
DEPRECATED RDW RBC: 77.5 FL (ref 36.4–46.3)
GLUCOSE SERPL-MCNC: 96 MG/DL
HCT VFR BLD AUTO: 25.9 % (ref 42–52)
HGB BLD-MCNC: 8.2 G/DL (ref 14–18)
MAGNESIUM SERPL-MCNC: 1.7 MG/DL (ref 1.7–2.4)
MCH RBC QN AUTO: 32.5 PG (ref 25–34)
MCV RBC: 102.8 FL (ref 80–100)
PLATELET # BLD AUTO: 248 K/UL (ref 130–400)
POTASSIUM SERPL-SCNC: 4.1 MMOL/L (ref 3.5–5.1)
RBC # BLD AUTO: 2.52 M/UL (ref 4.7–6.1)
SODIUM SERPL-SCNC: 131 MMOL/L (ref 136–145)
WBC # BLD AUTO: 7.88 K/UL (ref 4.8–10.8)

## 2022-02-09 RX ADMIN — FUROSEMIDE SCH MG: 20 TABLET ORAL at 07:42

## 2022-02-09 RX ADMIN — NICOTINE SCH MG: 7 PATCH, EXTENDED RELEASE TRANSDERMAL at 07:41

## 2022-02-09 RX ADMIN — Medication SCH MG: at 07:42

## 2022-02-09 RX ADMIN — LACTULOSE SCH: 20 SOLUTION ORAL at 07:46

## 2022-02-09 RX ADMIN — Medication SCH MG: at 20:32

## 2022-02-09 RX ADMIN — AMOXICILLIN SCH MG: 500 CAPSULE ORAL at 20:33

## 2022-02-09 RX ADMIN — AMOXICILLIN SCH MG: 500 CAPSULE ORAL at 14:20

## 2022-02-09 RX ADMIN — AMOXICILLIN SCH MG: 500 CAPSULE ORAL at 07:43

## 2022-02-09 RX ADMIN — UMECLIDINIUM BROMIDE AND VILANTEROL TRIFENATATE SCH PUFFS: 62.5; 25 POWDER RESPIRATORY (INHALATION) at 07:41

## 2022-02-09 RX ADMIN — FOLIC ACID SCH MG: 1 TABLET ORAL at 07:42

## 2022-02-09 NOTE — HOSPITALIST PROGRESS NOTE
Date of Service


February 9, 2022


 





Assessment & Plan


(1) UTI (urinary tract infection): 


(2) Severe sepsis: 


(3) Acute metabolic encephalopathy: 


      Plan: 


61-year-old male h/o alcoholic cirrhosis, alcohol abuse, HTN, HLD, cirrhosis, 

seizure disorder, COPD, tobacco abuse, depression, mood disorder, 

atherosclerosis of the aorta, history of prostate cancer status post 

prostatectomy presented to the ED 1/31 after being found on floor by care aide 

covered in stool and feces.





Metabolic encephalopathy  - resolved


Severe Sepsis 2/2 UTI - resolved


UTI Hematuria-- likely due to UTI vs traumatic, recent h/o prostate cancer


Urine culture growing enterococcus. Zosyn switched to ampicillin


Will de escalate to oral amoxicillin 500mg TID to complete at end of day on 

2/10/22 to complete 10 total days of antibiotic


Urologist eval appreciated


Hematuria stopped for now


mendez cath removed, urinating w/o difficulty


Known hx of radiation cystitis








Hypomagnesemia


mag 1.7today


continue oral supplementation


monitor





Hypokalemia


repleted, resolved





Rectal bleeding


Acute on chronic anemia


C diff negative


Rectal bleeding stopped for now


GI eval appreciated. Patient to follow up with GI outpatient for EGD and 

colonoscopy





Acute on chronic anemia likely due to rectal bleeding and recent hematuria


Admitting hemoglobin seems to be at his baseline at 8.4


Dropped to 6.7


S/p 2 PRBC


Hb 8.2 today


try to limit labs at this point unless acute change to allow hgb to rebound





Diaper dermatitis 


from bowel incontinence, resolving





LARA


Baseline Cr 0.8, admitting BUN/Cr 60 and 2.26


likely prerenal in the setting of dehydration vs obstructive uropathy


Admitting CTAP s/o bladder distension (mendez placed and revealed gross 

hematuria)


Cr back to baseline, 0.89 today


Avoid nephrotoxins. Resume home lasix, monitor sodium levels





H/o alcohol abuse


Cirrhosis


Drink frequently may be 3-4 drinks, may be every other day, pt not sure how much

he drinks


No signs of withdrawal


Continue thiamine and folic acid


Started on lactulose per GI. Monitor. Hold further doses once patient has 

3BM/day 


Resume home lasix tomorrow


will need OP EGD screen for varices and imaging of liver for screening of HCC





Hyperbilirubinemia


Improving. Tbil peaked at 4.5, down to 2.9


Provided counselling regarding quitting alcohol use and smoking





Hypophosphatemia


replete as needed





DVT prophylaxis: SCDs


Full code





PT/OT noted - recommend inpatient rehab/SNF


Pt medically stable for discharge, waiting for placement





Per previous MD, "On 2/6/22, I spoke to patient's friend Jamia Rebollar who is one 

of her contacts


She reports patient's family are not involved in his care


She acknowledged patient's alcohol abuse and poor adherence with followup. 

Patient is at baseline mental status per her report


Updated her on plans and need to followup with GI and PCP. She will try to keep 

encouraging patient to be adherent and avoid alcohol use"





Pt was seen and examined in collaboration with frankie Rodriguez see addendum











Admission and Anticipated Discharge Date


Admission Date: 


January 31, 2022








Supervising Physician


Co-Signing Physician Notes





Patient is seen and examined at bedside.  States feeling well today.  Offers no 

complaints. Denies any dysuria, hematuria.  On exam patient is thin, chronically

appearing, no apparent distress, EOMI, normocephalic atraumatic, lungs are clear

to auscultation, S1-S2, no murmur, abdomen soft, nontender, normal bowel sounds,

no pedal edema, grossly no focal deficits.  Metabolic encephalopathy resolved.  

Continue antibiotics for UTI, severe sepsis.  No recurrence of hematuria.  

Replace electrolytes as needed.  Needs follow-up with GI as outpatient for EGD, 

colonoscopy for further evaluation of rectal bleeding.  Currently no active 

bleeding.  Monitor CBC. I personally reviewed the record. Patient is interviewed

and examined at bedside. Patient's care is coordinated with Rita CUEVAS. Please refer to the documentation above for details of patient's presentation

and for discussion of other issues.











Subjective


Patient was seen and examined in 375 -1.


Follow-up sepsis secondary to UTI, hematuria, rectal bleeding, anemia status 

post transfusion.





He is lying in bed and states he doesn't fell well today, "It's the weather."


Denies cp, sob, n/v/d, dizziness, lightheaded, cough.


He is urinating w/o difficulty.





 





Review of Systems








Review of Systems:   All systems reviewed & are unremarkable except as noted in 

HPI & below  








Physical Exam








Physical Exam:   Gen: Elderly, M, flat affect, NAD, A&O x3


HEENT: Normocephalic, atraumatic, conjunctivae moist, sclerae anicteric, mucous 

membranes moist.


Lung: Clear to Auscultation bilaterally, no wheezes/rales/rhonchi 


Heart: Regular rate, regular rhythm, no murmurs, rubs, or gallops


Abdomen: Soft, NT, ND +BS x 4


Extremities: No edema


Skin: Warm, no rash, negative turgor.








Results & Data


Results & Data ([facility])


Vital Signs (Past 12 Hours)


                                   Vital Signs











  Temp Pulse Resp BP Pulse Ox


 


 02/09/22 07:36  37.1 C  88  16  131/79  96











Laboratory Results





                                    Short CBC











  01/31/22 01/31/22 02/01/22 Range/Units





  13:15 20:21 08:11 


 


WBC     (4.8-10.8)  K/uL


 


Hgb     (14.0-18.0)  g/dL


 


Hct     (42-52)  %


 


Plt Count     (130-400)  K/uL


 


Sodium  137  135 L  138  (136-145)  mmol/L














  02/02/22 02/03/22 02/04/22 Range/Units





  05:44 05:56 06:21 


 


WBC     (4.8-10.8)  K/uL


 


Hgb     (14.0-18.0)  g/dL


 


Hct     (42-52)  %


 


Plt Count     (130-400)  K/uL


 


Sodium  135 L  136  132 L  (136-145)  mmol/L














  02/05/22 02/06/22 02/07/22 Range/Units





  06:03 06:07 06:21 


 


WBC     (4.8-10.8)  K/uL


 


Hgb     (14.0-18.0)  g/dL


 


Hct     (42-52)  %


 


Plt Count     (130-400)  K/uL


 


Sodium  133 L  135 L  134 L  (136-145)  mmol/L














  02/08/22 02/09/22 02/09/22 Range/Units





  06:08 07:06 07:06 


 


WBC   7.88   (4.8-10.8)  K/uL


 


Hgb   8.2 L   (14.0-18.0)  g/dL


 


Hct   25.9 L   (42-52)  %


 


Plt Count   248   (130-400)  K/uL


 


Sodium  133 L   131 L  (136-145)  mmol/L








                                       BMP











  02/09/22





  07:06


 


Sodium  131 L


 


Potassium  4.1


 


Chloride  106


 


Carbon Dioxide  21


 


BUN  11


 


Creatinine  0.98


 


Glucose  96


 


Calcium  7.4 L











Medications Administered





                          Current Inpatient Medications





Acetaminophen (Acetaminophen 325 Mg Tab)  650 mg PO Q4H PRN


   PRN Reason: Pain or Fever


   Stop: 03/02/22 18:39


   Last Admin: 02/01/22 03:19 Dose:  650 mg


   Documented by: 


Albuterol (Albuterol 0.083% Nebu Soln 3 Ml Vial)  2.5 mg NEB Q6R PRN; Protocol


   PRN Reason: Wheezing


   Stop: 03/04/22 17:29


   Last Admin: 02/05/22 21:22 Dose:  2.5 mg


   Documented by: 


Amoxicillin (Amoxicillin 500 Mg Cap)  500 mg PO TID ANDRE


   Stop: 02/10/22 22:00


   Last Admin: 02/09/22 07:43 Dose:  500 mg


   Documented by: 


Clotrimazole (Clotrimazole 1% Cr 15 Gm Tube)  1 appln EXT BID PRN


   PRN Reason: buttock rash


   Stop: 02/12/22 16:17


   Last Admin: 02/05/22 21:10 Dose:  1 appln


   Documented by: 


Folic Acid (Folic Acid 1 Mg Tab)  1 mg PO QAM ANDRE


   Stop: 03/09/22 08:59


   Last Admin: 02/09/22 07:42 Dose:  1 mg


   Documented by: 


Furosemide (Furosemide 20 Mg Tab)  20 mg PO QAM ANDRE


   Stop: 03/10/22 08:59


   Last Admin: 02/09/22 07:42 Dose:  20 mg


   Documented by: 


Sodium Chloride (Nss)  250 mls @ 15 mls/hr IV .[id number] PRN


   PRN Reason: For Transfusion


   Stop: 03/02/22 16:20


Lorazepam (Ativan)  1 mg in 2 mls @ 2 mls/min IV ONE PRN; Protocol


   PRN Reason: EtoH Withdrawal AWSS 6-10


   Stop: 03/02/22 18:39


Lactulose (Lactulose Syrup 20 Gm/30 Ml Udc)  20 gm PO DAILY ANDRE


   Stop: 03/07/22 08:59


   Last Admin: 02/09/22 07:46 Dose:  Not Given


   Documented by: 


Magnesium Oxide (Magnesium Oxide 400 Mg Tab)  400 mg PO BID ANDRE


   Stop: 03/08/22 10:14


   Last Admin: 02/09/22 07:42 Dose:  400 mg


   Documented by: 


Melatonin (Melatonin 3 Mg Tab)  3 mg PO HS PRN


   PRN Reason: Sleep


   Stop: 03/03/22 21:33


Miscellaneous (Remove Nicoderm Patch)  1 ea N/A DAILY@0859 [facility]


   Stop: 03/06/22 08:58


   Last Admin: 02/09/22 07:43 Dose:  1 ea


   Documented by: 


Nicotine (Nicotine 14 Mg/24 Hr Patch)  14 mg TD QAM [facility]


   Stop: 03/05/22 16:29


   Last Admin: 02/09/22 07:41 Dose:  14 mg


   Documented by: 


Ondansetron HCl (Ondansetron Inj 2 Mg/Ml 2 Ml Vial)  4 mg IV Q6H PRN


   PRN Reason: Nausea


   Stop: 03/02/22 18:39


Pantoprazole Sodium (Pantoprazole 40 Mg Tab)  40 mg PO BID [facility]


   Stop: 03/09/22 08:59


   Last Admin: 02/09/22 07:43 Dose:  40 mg


   Documented by: 


Potassium Citrate (Potassium Citrate 10 Meq Tab)  40 meq PO QAM [facility]


   Stop: 03/05/22 08:59


   Last Admin: 02/05/22 08:36 Dose:  40 meq


   Documented by: 


Thiamine HCl (Thiamine Hcl 100 Mg Tab)  100 mg PO QAM [facility]


   Stop: 03/09/22 08:59


   Last Admin: 02/09/22 07:42 Dose:  100 mg


   Documented by: 


Umeclidinium/Vilanterol (Umeclidinium/Vilanterol 62.5/25mcg 7 Puffs/Inhaler)  1 

puffs INH DAILY [facility]


   Stop: 03/05/22 16:14


   Last Admin: 02/09/22 07:41 Dose:  1 puffs


   Documented by:

## 2022-02-10 RX ADMIN — Medication SCH MG: at 21:05

## 2022-02-10 RX ADMIN — NICOTINE SCH MG: 7 PATCH, EXTENDED RELEASE TRANSDERMAL at 08:57

## 2022-02-10 RX ADMIN — LACTULOSE SCH: 20 SOLUTION ORAL at 08:57

## 2022-02-10 RX ADMIN — UMECLIDINIUM BROMIDE AND VILANTEROL TRIFENATATE SCH PUFFS: 62.5; 25 POWDER RESPIRATORY (INHALATION) at 08:57

## 2022-02-10 RX ADMIN — Medication SCH MG: at 08:56

## 2022-02-10 RX ADMIN — FUROSEMIDE SCH MG: 20 TABLET ORAL at 08:57

## 2022-02-10 RX ADMIN — AMOXICILLIN SCH MG: 500 CAPSULE ORAL at 08:56

## 2022-02-10 RX ADMIN — AMOXICILLIN SCH MG: 500 CAPSULE ORAL at 13:50

## 2022-02-10 RX ADMIN — AMOXICILLIN SCH MG: 500 CAPSULE ORAL at 21:05

## 2022-02-10 RX ADMIN — FOLIC ACID SCH MG: 1 TABLET ORAL at 08:56

## 2022-02-10 NOTE — HOSPITALIST PROGRESS NOTE
Date of Service


February 10, 2022


 





Assessment & Plan


(1) UTI (urinary tract infection): 


(2) Severe sepsis: 


(3) Acute metabolic encephalopathy: 


      Plan: 


This is a 61-year-old male h/o alcoholic cirrhosis, alcohol abuse, HTN, HLD, 

cirrhosis, seizure disorder, COPD, tobacco abuse, depression, mood disorder, 

atherosclerosis of the aorta, history of prostate cancer status post 

prostatectomy presented to the ED 1/31 after being found on floor by care aide 

covered in stool and feces.





Metabolic encephalopathy  - resolved


Severe Sepsis 2/2 UTI - resolved


UTI Hematuria-- likely due to UTI vs traumatic, recent h/o prostate cancer


Urine culture growing enterococcus. Zosyn switched to ampicillin


Will de-escalate to oral amoxicillin 500mg TID to complete at end of day on 

2/10/22 to complete 10 total days of antibiotic


Urologist evaluation appreciated


Hematuria stopped for now


Mendez cath removed, urinating w/o difficulty


Known hx of radiation cystitis








Hypomagnesemia


mag 1.7today


continue oral supplementation


monitor





Hypokalemia


repleted, resolved





Rectal bleeding


Acute on chronic anemia


C diff negative


Rectal bleeding stopped for now


GI eval appreciated. Patient to follow up with GI outpatient for EGD and 

colonoscopy





Acute on chronic anemia likely due to rectal bleeding and recent hematuria


Admitting hemoglobin seems to be at his baseline at 8.4


Dropped to 6.7


S/p 2 PRBC


Hb 8.2 today


try to limit labs at this point unless acute change to allow hgb to rebound





Diaper dermatitis 


from bowel incontinence, resolving





LARA


Baseline Cr 0.8, admitting BUN/Cr 60 and 2.26


likely prerenal in the setting of dehydration vs obstructive uropathy


Admitting CTAP s/o bladder distension (mendez placed and revealed gross 

hematuria)


Cr back to baseline, 0.89 today


Avoid nephrotoxins. Resume home lasix, monitor sodium levels





H/o alcohol abuse


Cirrhosis


Drink frequently- estimates 3-4 drinks daily but unsure


No signs of withdrawal


Continue thiamine and folic acid


Started on lactulose per GI. Monitor. Hold further doses once patient has 3 

BM/day 


Resume home Lasix tomorrow


Will need OP EGD screen for varices and imaging of liver for screening of HCC





Hyperbilirubinemia


Improving. Tbil peaked at 4.5, down to 2.9


Provided counselling regarding quitting alcohol use and smoking





Hypophosphatemia


Replete as needed





DVT prophylaxis: SCDs


Full code





PT/OT noted - recommend inpatient rehab/SNF


Pt medically stable for discharge, waiting for placement





Per previous MD, "On 2/6/22, I spoke to patient's friend Jamia Rebollar who is one 

of her contacts


She reports patient's family are not involved in his care


She acknowledged patient's alcohol abuse and poor adherence with followup. 

Patient is at baseline mental status per her report


Updated her on plans and need to followup with GI and PCP. She will try to keep 

encouraging patient to be adherent and avoid alcohol use"





Pt was seen and examined in collaboration with frankie Rodriguez see addendum











Admission and Anticipated Discharge Date


Admission Date: 


January 31, 2022








Supervising Physician


Co-Signing Physician Notes





Patient is seen and examined at bedside.  Sleeping during my encounter. No new 

complaints. Denies recurrence of hematuria, rectal bleeding. On exam patient is 

thin, chronically appearing, no apparent distress, EOMI, normocephalic 

atraumatic, lungs are clear to auscultation, S1-S2, no murmur, abdomen soft, 

nontender, normal bowel sounds, no pedal edema, grossly no focal deficits.  

Metabolic encephalopathy resolved.  Continue antibiotics for UTI, severe sepsis 

to complete 10 day course.  Replace electrolytes as needed.  Needs follow-up 

with GI as outpatient for EGD, colonoscopy for further evaluation of rectal 

bleeding.  Monitor CBC. I personally reviewed the record. Patient is interviewed

and examined at bedside. Patient's care is coordinated with Janel Yañez PA-C. 

Please refer to the documentation above for details of patient's presentation 

and for discussion of other issues.











Subjective


Patient was seen and examined in 375 -1. Follow-up sepsis secondary to UTI, 

hematuria, rectal bleeding, anemia status post transfusion. No new symptoms 

overnight. Resting comfortably. No F/C, CP, SOB, n/v/d, dizziness, 

lightheadedness or cough. Urinating w/o difficulty. Endorses bowel movement 

yesterday. 





 





Review of Systems








Review of Systems:   At least ten systems reviewed and negative except as noted 

in the HPI. 








Physical Exam








Physical Exam:   Gen: Thin, chronically ill appearing, flat affect, NAD, A&O x3


HEENT: Normocephalic, atraumatic, conjunctivae moist, sclerae anicteric, mucous 

membranes moist.


Lung: Clear to Auscultation bilaterally, no wheezes/rales/rhonchi


Heart: Regular rate, regular rhythm, no murmurs, rubs, or gallops


Abdomen: Soft, NT, ND +BS x 4


Extremities: No edema


Skin: Warm, no rash








Results & Data


Results & Data ([facility])


Vital Signs (Past 12 Hours)


                                   Vital Signs











  Temp Pulse Resp BP Pulse Ox


 


 02/10/22 08:16  36.9 C  98 H  16  101/63  93

## 2022-02-11 LAB
ANION GAP SERPL CALC-SCNC: 6 MMOL/L (ref 3–11)
BUN SERPL-MCNC: 13 MG/DL (ref 6–23)
CALCIUM SERPL-MCNC: 7.5 MG/DL (ref 8.5–10.1)
CHLORIDE SERPL-SCNC: 107 MMOL/L (ref 98–107)
CO2 SERPL-SCNC: 20 MMOL/L (ref 21–32)
CREAT CL PREDICTED SERPL C-G-VRATE: 79 ML/MIN
GLUCOSE SERPL-MCNC: 94 MG/DL
HCT VFR BLD AUTO: 26.7 % (ref 42–52)
HGB BLD-MCNC: 8.4 G/DL (ref 14–18)
MAGNESIUM SERPL-MCNC: 1.4 MG/DL (ref 1.7–2.4)
POTASSIUM SERPL-SCNC: 4.1 MMOL/L (ref 3.5–5.1)
SODIUM SERPL-SCNC: 133 MMOL/L (ref 136–145)

## 2022-02-11 RX ADMIN — LACTULOSE SCH: 20 SOLUTION ORAL at 08:28

## 2022-02-11 RX ADMIN — Medication SCH MG: at 08:27

## 2022-02-11 RX ADMIN — NICOTINE SCH MG: 7 PATCH, EXTENDED RELEASE TRANSDERMAL at 08:27

## 2022-02-11 RX ADMIN — FOLIC ACID SCH MG: 1 TABLET ORAL at 08:27

## 2022-02-11 RX ADMIN — FUROSEMIDE SCH MG: 20 TABLET ORAL at 08:27

## 2022-02-11 RX ADMIN — UMECLIDINIUM BROMIDE AND VILANTEROL TRIFENATATE SCH PUFFS: 62.5; 25 POWDER RESPIRATORY (INHALATION) at 08:28

## 2022-02-11 RX ADMIN — Medication SCH MG: at 21:12

## 2022-02-11 NOTE — HOSPITALIST PROGRESS NOTE
Date of Service


February 11, 2022


 





Assessment & Plan


(1) UTI (urinary tract infection): 


(2) Severe sepsis: 


(3) Acute metabolic encephalopathy: 


      Plan: 


This is a 61-year-old male h/o alcoholic cirrhosis, alcohol abuse, HTN, HLD, 

cirrhosis, seizure disorder, COPD, tobacco abuse, depression, mood disorder, 

atherosclerosis of the aorta, history of prostate cancer status post 

prostatectomy presented to the ED 1/31 after being found on floor by care aide 

covered in stool and feces.





Metabolic encephalopathy  - resolved


Severe Sepsis 2/2 UTI - resolved


UTI Hematuria-- likely due to UTI vs traumatic, recent h/o prostate cancer


Urine culture growing enterococcus. Zosyn switched to ampicillin


Will de-escalate to oral amoxicillin 500mg TID. Completed 10 total days of 

antibiotic as of 2/10/22


Urologist evaluation appreciated


Hematuria stopped for now


Mendez cath removed, urinating w/o difficulty


Known hx of radiation cystitis








Hypomagnesemia


mag 1.4 today - replaced 


continue oral supplementation


monitor





Hypokalemia


repleted, resolved





Rectal bleeding


Acute on chronic anemia


C diff negative


Rectal bleeding stopped for now


GI eval appreciated. Patient to follow up with GI outpatient for EGD and 

colonoscopy





Acute on chronic anemia likely due to rectal bleeding and recent hematuria


Admitting hemoglobin seems to be at his baseline at 8.4


Dropped to 6.7


S/p 2 PRBC


Hb 8.2 today


try to limit labs at this point unless acute change to allow hgb to rebound





Diaper dermatitis 


from bowel incontinence, resolving





LARA


Baseline Cr 0.8, admitting BUN/Cr 60 and 2.26


likely prerenal in the setting of dehydration vs obstructive uropathy


Admitting CTAP s/o bladder distension (mendez placed and revealed gross 

hematuria)


Cr back to baseline, 0.89 today


Avoid nephrotoxins. Resume home lasix, monitor sodium levels





H/o alcohol abuse


Cirrhosis


Drink frequently- estimates 3-4 drinks daily but unsure


No signs of withdrawal


Continue thiamine and folic acid


Started on lactulose per GI. Monitor. Hold further doses once patient has 3 

BM/day 


Resume home Lasix tomorrow


Will need OP EGD screen for varices and imaging of liver for screening of HCC





Hyperbilirubinemia


Improving. Tbil peaked at 4.5, down to 2.9


Provided counselling regarding quitting alcohol use and smoking





Hypophosphatemia


Replete as needed





DVT prophylaxis: SCDs


Full code





PT/OT noted - recommend inpatient rehab/SNF


Pt medically stable for discharge, waiting for placement at SNF vs home with 

increased HH resources. Appreciate CM efforts 





Per previous MD, "On 2/6/22, I spoke to patient's friend Jamia Rebollar who is one 

of her contacts


She reports patient's family are not involved in his care


She acknowledged patient's alcohol abuse and poor adherence with followup. 

Patient is at baseline mental status per her report


Updated her on plans and need to followup with GI and PCP. She will try to keep 

encouraging patient to be adherent and avoid alcohol use"





Pt was seen and examined in collaboration with Dr. Loving, please see addendum











Admission and Anticipated Discharge Date


Admission Date: 


January 31, 2022








Supervising Physician


Co-Signing Physician Notes





Patient is seen and examined at bedside.  No new complaints. On exam patient is 

thin, chronically appearing, no apparent distress, EOMI, normocephalic 

atraumatic, lungs are clear to auscultation, S1-S2, no murmur, abdomen soft, 

nontender, normal bowel sounds, no pedal edema, grossly no focal deficits.  

Metabolic encephalopathy resolved.  Completed antibiotic course for UTI, severe 

sepsis.  Replace electrolytes as needed.  Needs follow-up with GI as outpatient 

for EGD, colonoscopy for further evaluation of rectal bleeding.  Monitor CBC. I 

personally reviewed the record. Patient is interviewed and examined at bedside. 

Patient's care is coordinated with Janel Yañez PA-C. Please refer to the 

documentation above for details of patient's presentation and for discussion of 

other issues.











Subjective


Patient was seen and examined in 375 -1. Follow-up sepsis secondary to UTI, 

hematuria, rectal bleeding, anemia status post transfusion. No new symptoms 

overnight. Resting comfortably. Per nursing, patient is up and moving well with 

1 assist. No F/C, CP, SOB, n/v/d, dizziness, lightheadedness or cough. Urinating

w/o difficulty. Endorses bowel movement yesterday. 





 





Review of Systems








Review of Systems:   At least ten systems reviewed and negative except as noted 

in the HPI. 








Physical Exam








Physical Exam:   Gen: Thin, chronically ill appearing, flat affect, NAD, A&O x3


HEENT: Normocephalic, atraumatic, conjunctivae moist, sclerae anicteric, mucous 

membranes moist.


Lung: Clear to Auscultation bilaterally, no wheezes/rales/rhonchi


Heart: Regular rate, regular rhythm, no murmurs, rubs, or gallops


Abdomen: Soft, NT, ND +BS x 4


Extremities: No edema


Skin: Warm, no rash








Results & Data


Results & Data ([facility])


Vital Signs (Past 12 Hours)


                                   Vital Signs











  Temp Pulse Resp BP Pulse Ox


 


 02/11/22 15:19  36.8 C  89  16  106/67  97


 


 02/11/22 07:09  36.9 C  116 H  16  120/65  95











Laboratory Results





                                    Short CBC











  02/11/22 Range/Units





  05:52 


 


Hgb  8.4 L  (14.0-18.0)  g/dL


 


Hct  26.7 L  (42-52)  %








                                       BMP











  02/11/22





  05:52


 


Sodium  133 L


 


Potassium  4.1


 


Chloride  107


 


Carbon Dioxide  20 L


 


BUN  13


 


Creatinine  1.13


 


Glucose  94


 


Calcium  7.5 L











Diagnostic Findings





                                        





Abdomen/Pelvis CT  01/31/22 12:43


CT SCAN OF THE ABDOMEN AND PELVIS WITHOUT IV CONTRAST


 


CLINICAL HISTORY:   Hematuria.


 


COMPARISON STUDY:  Abdominal CT dated 6/7/2021.


 


TECHNIQUE: CT scan of the abdomen and pelvis is performed from the lung bases to

the proximal femora. Images are reviewed in the axial, sagittal, and coronal 

planes. IV contrast was not administered for this examination. A dose lowering 

technique was utilized adhering to the principles of ALARA. The patient was 

scanned twice due to significant motion artifact.


 


CT DOSE: 1732.90 mGycm


 


FINDINGS:


 


Lung bases: The heart is normal in size and without pericardial effusion. The 

lung bases are clear. There is a tiny hiatal hernia.


 


Liver: The unenhanced liver is cirrhotic in morphology and heterogeneous in 

attenuation. There is hypertrophy of the left lobe and nodularity of the hepatic

surface contour. There is no intrahepatic biliary ductal dilatation. There is 

recanalization of the periumbilical vein.


 


Gallbladder: Surgically absent noting clips in the gallbladder fossa.


 


Spleen: Normal in size and attenuation.


 


Pancreas: Unremarkable.


 


Adrenal glands: Unremarkable.


 


Kidneys: The unenhanced kidneys are normal in size and without hydronephrosis. 

There are no renal calculi identified. There is no evidence of contour deforming

renal mass lesion.


 


Abdominal vasculature: The abdominal aorta is normal in course and caliber 

noting advanced atherosclerotic calcification. 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 

trunk which measures up to 11 mm in diameter.


 


Bowel: There are scattered colonic diverticula without CT evidence of acute 

diverticulitis. No bowel obstruction is identified. The appendix is  well-

visualized and normal.


 


Peritoneum: There is no intraperitoneal free air or abdominal ascites. There is 

a fat-containing umbilical hernia.


 


Lymphadenopathy: None.


 


Pelvic viscera: The bladder is distended and appears mildly thick-walled. There 

are tiny bladder calculi versus postoperative change at the base of the bladder 

seen on image #374. The prostate gland is surgically absent.


 


Skeletal structures: The skeletal structures are osteopenic. There are mild 

chronic superior endplate compression deformities of T11, T12, and L1. Mild 

lumbosacral spondylosis is observed. No lytic or blastic lesions are seen. There

are subacute appearing right lateral rib fractures. Additional chronic rib 

fractures are seen bilaterally. There is chronic posttraumatic deformity of the 

sacrum.


 


 


IMPRESSION: 


 


1. The bladder is markedly distended and appears mildly thick-walled.


 


2. The prostate gland is surgically absent.


 


3. There are tiny bladder calculi versus surgical material the base of the 

bladder.


 


4. Cirrhotic liver morphology.


 


5. There are subacute/healing right lateral rib fractures.


 


6. Additional findings as above.


 


 


 


: Negative or not required by law.


 


 


 


 


Electronically signed by:  Valente Sultana M.D.


1/31/2022 2:46 PM








Chest X-Ray  01/31/22 12:43


SINGLE VIEW CHEST


 


CLINICAL HISTORY:  Generalized weakness.


 


FINDINGS: An AP, portable, upright chest radiograph is compared to study dated 

7/21 and correlated with chest CT dated 6/7/2021. The heart is top normal for 

projection. The mediastinal contour is within normal limits. Emphysema and 

chronic interstitial thickening is similar to previous. No airspace 

consolidation or large pleural effusion is identified. Scarring/atelectasis is 

noted at the lung bases. No pneumothorax is seen. The skeletal structures are 

osteopenic. The bony thorax is grossly intact.


 


IMPRESSION: Emphysematous change with no acute cardiopulmonary abnormality.


 


 


 


: Negative or not required by law. 


 


 


 


 


Electronically signed by:  Valente Sultana M.D.


1/31/2022 1:37 PM








Head CT  01/31/22 12:43


HEAD CT NONCONTRAST


 


CT DOSE: 638.56 mGycm


 


HISTORY: weakness


 


TECHNIQUE: Multiaxial CT images of the head were performed without the use of in

travenous contrast. Automated exposure control was utilized for this study.  A 

dose lowering technique was utilized adhering to the principles of ALARA.


 


Comparison: Head CT 6/7/2021


 


Findings: The paranasal sinuses and mastoid air cells are clear. The calvarium 

and skull base are intact. There is no mass, hematoma, midline shift, acute 

infarct. White matter hypodensity is nonspecific but suggestive of microvascular

ischemic change. The ventricles and sulci demonstrate mild age-related 

involutional changes. Focal areas of encephalomalacia within the bilateral 

frontal lobes and left anterior temporal lobe remain unchanged.


 


Impression:


No significant change compared to the prior study. No acute intracranial 

abnormality. 


 


 


: Negative or not required by law.


 


 


 


 


Electronically signed by:  Jose Bo M.D.


1/31/2022 2:19 PM








Chest X-Ray  02/01/22 21:25


XR chest 1V portable


 


HISTORY: Cough.  wheeze


 


COMPARISON: Chest 1/31/2022.


 


FINDINGS: Mild emphysema. No focal lung consolidations to suggest pneumonia. 

There are old, healed left lower rib fractures again noted. No evidence for 

pulmonary edema. The heart is normal in size. No pleural effusions. No 

pneumothorax.


 


IMPRESSION:


Emphysema. No focal lung consolidations to suggest pneumonia.


 


 


: Negative or not required by law.


 


 


 


 


Electronically signed by:  Jose Bo M.D.


2/2/2022 7:56 AM

## 2022-02-12 LAB
ANION GAP SERPL CALC-SCNC: 5 MMOL/L (ref 3–11)
BUN SERPL-MCNC: 15 MG/DL (ref 6–23)
CALCIUM SERPL-MCNC: 7.7 MG/DL (ref 8.5–10.1)
CHLORIDE SERPL-SCNC: 106 MMOL/L (ref 98–107)
CO2 SERPL-SCNC: 23 MMOL/L (ref 21–32)
CREAT CL PREDICTED SERPL C-G-VRATE: 85.8 ML/MIN
GLUCOSE SERPL-MCNC: 98 MG/DL
HCT VFR BLD AUTO: 27.1 % (ref 42–52)
HGB BLD-MCNC: 8.3 G/DL (ref 14–18)
MAGNESIUM SERPL-MCNC: 1.5 MG/DL (ref 1.7–2.4)
POTASSIUM SERPL-SCNC: 4.1 MMOL/L (ref 3.5–5.1)
SODIUM SERPL-SCNC: 134 MMOL/L (ref 136–145)

## 2022-02-12 RX ADMIN — NICOTINE SCH MG: 7 PATCH, EXTENDED RELEASE TRANSDERMAL at 09:20

## 2022-02-12 RX ADMIN — FOLIC ACID SCH MG: 1 TABLET ORAL at 09:20

## 2022-02-12 RX ADMIN — Medication SCH MG: at 14:59

## 2022-02-12 RX ADMIN — Medication SCH MG: at 09:20

## 2022-02-12 RX ADMIN — LACTULOSE SCH: 20 SOLUTION ORAL at 09:23

## 2022-02-12 RX ADMIN — LACTULOSE SCH GM: 20 SOLUTION ORAL at 09:20

## 2022-02-12 RX ADMIN — UMECLIDINIUM BROMIDE AND VILANTEROL TRIFENATATE SCH PUFFS: 62.5; 25 POWDER RESPIRATORY (INHALATION) at 09:21

## 2022-02-12 RX ADMIN — FUROSEMIDE SCH: 20 TABLET ORAL at 11:22

## 2022-02-12 RX ADMIN — Medication SCH MG: at 20:38

## 2022-02-12 NOTE — HOSPITALIST PROGRESS NOTE
Date of Service


February 12, 2022


 





Assessment & Plan


(1) UTI (urinary tract infection): 


(2) Severe sepsis: 


(3) Acute metabolic encephalopathy: 


      Plan: 


Patient is a 61 yr male with H/O Alcoholic cirrhosis, alcohol abuse, HTN, HLD, 

cirrhosis, seizure disorder, COPD, tobacco abuse, depression, mood disorder, 

atherosclerosis of the aorta, history of prostate cancer status post 

prostatectomy presented to the ED 1/31 after being found on floor by care aide 

covered in stool and feces.








Acute Metabolic encephalopathy  - resolved


Severe Sepsis 2/2 UTI - resolved


UTI Hematuria-- likely due to UTI vs traumatic, recent h/o prostate cancer


Known hx of radiation cystitis


Urine culture growing enterococcus. Zosyn switched to ampicillin


De-escalate to oral amoxicillin 500mg TID


Completed 10 total days of antibiotic as of 2/10/22


Appreciate Urology Input 


Hematuria resolved 











Hypomagnesemia


replace as needed 


continue oral supplementation


monitor








Hypokalemia


replete as needed








Rectal bleeding


Acute on chronic anemia


C diff negative


Rectal bleeding resolved


GI eval appreciated. Patient to follow up with GI outpatient for EGD and 

colonoscopy


Needs follow-up with GI upon discharge











Acute on chronic anemia likely due to rectal bleeding and recent hematuria


Admitting hemoglobin seems to be at his baseline at 8.4


Dropped to 6.7


S/p 2 PRBC


Hb 8.3 today








Diaper dermatitis 


From bowel incontinence, resolving








LARA


Baseline Cr 0.8, admitting BUN/Cr 60 and 2.26


likely prerenal in the setting of dehydration vs obstructive uropathy


Admitting CTAP s/o bladder distension (mendez placed and revealed gross 

hematuria)


Cr back to baseline


Avoid nephrotoxins as able 








H/o alcohol abuse


Cirrhosis


Drink frequently- estimates 3-4 drinks daily but unsure


No signs of withdrawal


Continue thiamine and folic acid


Started on lactulose per GI. Monitor. Hold further doses once patient has 3 

BM/day 


Continue home Lasix as able with holding parameters as BP relatively low  











Hyperbilirubinemia


Improving. Tbil peaked at 4.5, down to 2.9


Provided counselling regarding quitting alcohol use and smoking











Hypophosphatemia


Replete as needed








DVT Px:


SCDs








Code Status 


Full code











Disposition 


PT/OT noted - recommend inpatient rehab/SNF


Pt refuses Rehab placement 


Will plan to discharge home on Monday when increased HH resources arranged.








Admission and Anticipated Discharge Date


Admission Date: 


January 31, 2022








Subjective


Patient is seen and examined at bedside


Offers no complaints today


Denies any recurrence of rectal bleeding, hematuria


Also denies any chest pain, shortness of breath, dizziness, nausea, abdominal 

pain


Refuses rehab placement


 





Review of Systems








Review of Systems:   All systems reviewed & are unremarkable except as noted in 

Subjective  








Physical Exam








Physical Exam:   Physical Exam:


Vitals signs as noted above 


General Appearance: Chronic ill appearing, no apparent distress


Head:  normocephalic, Atraumatic 


Eyes:  normal inspection, EOMI


Neck:  supple, Trachea midline


Respiratory/Chest: Normal breath sounds, CTA, No accessory muscle use


Cardiovascular: S1, S2, No murmur


Abdomen/GI:Soft, Non tender, Bowel sounds present


Extremities/Musculoskeletal:normal inspection, no edema 


Neurologic/Psych:AAOX3, grossly no focal neurological deficits 


Skin:  normal color, warm














Results & Data


Results & Data ([facility])


Vital Signs (Past 12 Hours)


                                   Vital Signs











  Temp Pulse Resp BP BP Pulse Ox


 


 02/12/22 14:58  37 C  92 H  18  126/77   97


 


 02/12/22 10:10   94 H  18   89/55 L  96


 


 02/12/22 06:03  36.8 C  95 H  16   96/60 L  96











Laboratory Results





                                    Short CBC











  02/12/22 Range/Units





  10:53 


 


Hgb  8.3 L  (14.0-18.0)  g/dL


 


Hct  27.1 L  (42-52)  %








                                       BMP











  02/12/22





  10:53


 


Sodium  134 L


 


Potassium  4.1


 


Chloride  106


 


Carbon Dioxide  23


 


BUN  15


 


Creatinine  1.04


 


Glucose  98


 


Calcium  7.7 L

## 2022-02-13 LAB
ANION GAP SERPL CALC-SCNC: 5 MMOL/L (ref 3–11)
BUN SERPL-MCNC: 17 MG/DL (ref 6–23)
CALCIUM SERPL-MCNC: 8.2 MG/DL (ref 8.5–10.1)
CHLORIDE SERPL-SCNC: 109 MMOL/L (ref 98–107)
CO2 SERPL-SCNC: 21 MMOL/L (ref 21–32)
CREAT CL PREDICTED SERPL C-G-VRATE: 80.4 ML/MIN
GLUCOSE SERPL-MCNC: 99 MG/DL
HCT VFR BLD AUTO: 29.1 % (ref 42–52)
HGB BLD-MCNC: 9 G/DL (ref 14–18)
MAGNESIUM SERPL-MCNC: 1.7 MG/DL (ref 1.7–2.4)
POTASSIUM SERPL-SCNC: 3.8 MMOL/L (ref 3.5–5.1)
SODIUM SERPL-SCNC: 135 MMOL/L (ref 136–145)

## 2022-02-13 RX ADMIN — Medication SCH MG: at 09:46

## 2022-02-13 RX ADMIN — UMECLIDINIUM BROMIDE AND VILANTEROL TRIFENATATE SCH PUFFS: 62.5; 25 POWDER RESPIRATORY (INHALATION) at 09:46

## 2022-02-13 RX ADMIN — FUROSEMIDE SCH MG: 20 TABLET ORAL at 09:46

## 2022-02-13 RX ADMIN — Medication SCH MG: at 19:38

## 2022-02-13 RX ADMIN — NICOTINE SCH MG: 7 PATCH, EXTENDED RELEASE TRANSDERMAL at 09:45

## 2022-02-13 RX ADMIN — Medication SCH MG: at 14:54

## 2022-02-13 RX ADMIN — FOLIC ACID SCH MG: 1 TABLET ORAL at 09:46

## 2022-02-13 RX ADMIN — LACTULOSE SCH: 20 SOLUTION ORAL at 09:07

## 2022-02-13 NOTE — HOSPITALIST PROGRESS NOTE
Date of Service


February 13, 2022


 





Assessment & Plan


(1) UTI (urinary tract infection): 


(2) Severe sepsis: 


(3) Acute metabolic encephalopathy: 


      Plan: 


Patient is a 61 yr male with H/O Alcoholic cirrhosis, alcohol abuse, HTN, HLD, 

cirrhosis, seizure disorder, COPD, tobacco abuse, depression, mood disorder, 

atherosclerosis of the aorta, history of prostate cancer status post 

prostatectomy presented to the ED 1/31 after being found on floor by care aide 

covered in stool and feces.








Acute Metabolic encephalopathy  - resolved


Severe Sepsis 2/2 UTI - resolved


UTI Hematuria-- likely due to UTI vs traumatic, recent h/o prostate cancer


Known hx of radiation cystitis


Urine culture growing enterococcus. Zosyn switched to ampicillin


De-escalate to oral amoxicillin 500mg TID


Completed 10 total days of antibiotic as of 2/10/22


Appreciate Urology Input 


Hematuria resolved 











Hypomagnesemia


replace as needed 


continue oral supplementation


monitor








Hypokalemia


replete as needed








Rectal bleeding


Acute on chronic anemia


C diff negative


Rectal bleeding resolved


GI eval appreciated. Patient to follow up with GI outpatient for EGD and 

colonoscopy


Needs follow-up with GI upon discharge


No recurrence of bleeding 











Acute on chronic anemia likely due to rectal bleeding and recent hematuria


Admitting hemoglobin seems to be at his baseline at 8.4


Dropped to 6.7


S/p 2 PRBC


Hb 9.0 today








Diaper dermatitis 


From bowel incontinence, resolving








LARA


Baseline Cr 0.8, admitting BUN/Cr 60 and 2.26


likely prerenal in the setting of dehydration vs obstructive uropathy


Admitting CTAP s/o bladder distension (mendez placed and revealed gross 

hematuria)


Cr back to baseline


Avoid nephrotoxins as able 








H/o alcohol abuse


Cirrhosis


Drink frequently- estimates 3-4 drinks daily but unsure


No signs of withdrawal


Continue thiamine and folic acid


Started on lactulose per GI. Monitor. Hold further doses once patient has 3 

BM/day 


Continue home Lasix as able with holding parameters as BP relatively low  











Hyperbilirubinemia


Improving. Tbil peaked at 4.5, down to 2.9


Provided counselling regarding quitting alcohol use and smoking











Hypophosphatemia


Replete as needed








DVT Px:


SCDs








Code Status 


Full code











Disposition 


PT/OT noted - recommend inpatient rehab/SNF


Pt refuses Rehab placement 


Will plan to discharge home on tomorrow when increased HH resources arranged.








Admission and Anticipated Discharge Date


Admission Date: 


January 31, 2022








Subjective


Patient is seen and examined at bedside


No new complaints 


Denies any chest pain, shortness of breath, dizziness, nausea, abdominal pain


Eager to get discharged 





 





Review of Systems








Review of Systems:   All systems reviewed & are unremarkable except as noted in 

Subjective  








Physical Exam








Physical Exam:   Physical Exam:


Vitals signs as noted above 


General Appearance: Chronic ill appearing, no apparent distress


Head:  normocephalic, Atraumatic 


Eyes:  normal inspection, EOMI


Neck:  supple, Trachea midline


Respiratory/Chest: Normal breath sounds, CTA, No accessory muscle use


Cardiovascular: S1, S2, No murmur


Abdomen/GI:Soft, Non tender, Bowel sounds present


Extremities/Musculoskeletal:normal inspection, no edema 


Neurologic/Psych:AAOX3, grossly no focal neurological deficits 


Skin:  normal color, warm














Results & Data


Results & Data ([facility])


Vital Signs (Past 12 Hours)


                                   Vital Signs











  Temp Pulse Resp BP Pulse Ox


 


 02/13/22 14:52  36.8 C  87  16  108/58 L  95


 


 02/13/22 07:15  37 C  89  16  104/62  96











Laboratory Results





                                    Short CBC











  02/13/22 Range/Units





  08:39 


 


Hgb  9.0 L  (14.0-18.0)  g/dL


 


Hct  29.1 L  (42-52)  %








                                       BMP











  02/13/22





  08:39


 


Sodium  135 L


 


Potassium  3.8


 


Chloride  109 H


 


Carbon Dioxide  21


 


BUN  17


 


Creatinine  1.11


 


Glucose  99


 


Calcium  8.2 L

## 2022-02-14 LAB
ANION GAP SERPL CALC-SCNC: 6 MMOL/L (ref 3–11)
BUN SERPL-MCNC: 20 MG/DL (ref 6–23)
CALCIUM SERPL-MCNC: 8.2 MG/DL (ref 8.5–10.1)
CHLORIDE SERPL-SCNC: 111 MMOL/L (ref 98–107)
CO2 SERPL-SCNC: 19 MMOL/L (ref 21–32)
CREAT CL PREDICTED SERPL C-G-VRATE: 76.9 ML/MIN
GLUCOSE SERPL-MCNC: 110 MG/DL
HCT VFR BLD AUTO: 28.5 % (ref 42–52)
HGB BLD-MCNC: 8.8 G/DL (ref 14–18)
MAGNESIUM SERPL-MCNC: 1.6 MG/DL (ref 1.7–2.4)
POTASSIUM SERPL-SCNC: 3.8 MMOL/L (ref 3.5–5.1)
SODIUM SERPL-SCNC: 136 MMOL/L (ref 136–145)

## 2022-02-14 RX ADMIN — Medication SCH MG: at 19:56

## 2022-02-14 RX ADMIN — Medication SCH MG: at 08:00

## 2022-02-14 RX ADMIN — FOLIC ACID SCH MG: 1 TABLET ORAL at 08:00

## 2022-02-14 RX ADMIN — Medication SCH MG: at 12:46

## 2022-02-14 RX ADMIN — Medication SCH MG: at 08:01

## 2022-02-14 RX ADMIN — UMECLIDINIUM BROMIDE AND VILANTEROL TRIFENATATE SCH PUFFS: 62.5; 25 POWDER RESPIRATORY (INHALATION) at 08:01

## 2022-02-14 RX ADMIN — FUROSEMIDE SCH MG: 20 TABLET ORAL at 08:01

## 2022-02-14 RX ADMIN — LACTULOSE SCH GM: 20 SOLUTION ORAL at 08:02

## 2022-02-14 RX ADMIN — NICOTINE SCH MG: 7 PATCH, EXTENDED RELEASE TRANSDERMAL at 08:01

## 2022-02-14 NOTE — HOSPITALIST PROGRESS NOTE
Date of Service


February 14, 2022


 





Assessment & Plan


(1) UTI (urinary tract infection): 


      Plan: 


UTI Hematuria-- likely due to UTI vs traumatic, recent h/o prostate cancer


Known hx of radiation cystitis


Urine culture growing enterococcus. Zosyn switched to ampicillin


De-escalate to oral amoxicillin 500mg TID


Completed 10 total days of antibiotic as of 2/10/22


Appreciate Urology Input 


(2) Severe sepsis: 


      Plan: 


present on admission.  treated and resolved


(3) Acute metabolic encephalopathy: 


      Plan: 


present on admission.  treated and resolved


      Plan: 





Hypomagnesemia


Hypokalemia


Hypophosphatemia


replace as needed 





Rectal bleeding


Acute on chronic anemia


C diff negative


Rectal bleeding resolved


GI eval appreciated. Patient to follow up with GI outpatient for EGD and 

colonoscopy


Needs follow-up with GI upon discharge


No recurrence of bleeding 








Acute on chronic anemia likely due to rectal bleeding and recent hematuria


Admitting hemoglobin seems to be at his baseline at 8.4


Dropped to 6.7


S/p 2 PRBC


Hb 9.0 today








Diaper dermatitis 


From bowel incontinence, resolving








LARA


Baseline Cr 0.8, admitting BUN/Cr 60 and 2.26


likely prerenal in the setting of dehydration vs obstructive uropathy


Admitting CTAP s/o bladder distension (mendez placed and revealed gross 

hematuria)


Cr back to baseline


Avoid nephrotoxins as able 








H/o alcohol abuse


Cirrhosis


Drink frequently- estimates 3-4 drinks daily but unsure


No signs of withdrawal


Continue thiamine and folic acid


Started on lactulose per GI. Monitor goal 3 BM daily.


Continue home Lasix as able with holding parameters  


Appears euvolemic currently








Hyperbilirubinemia


Improving. Tbil peaked at 4.5, down to 2.9


Provided counselling regarding quitting alcohol use and smoking








DVT Px:


SCDs








Code Status 


Full code











Disposition 


PT/OT noted - recommend inpatient rehab/SNF


Pt refuses Rehab placement 


Will plan to discharge home when home health and increased home care services 

have been arranged.  Patient is medically stable for discharge pending safe 

dispo plan








Admission and Anticipated Discharge Date


Admission Date: 


January 31, 2022








Subjective


Feels well.  No issues overnight


Denies chest pain, abdominal pain.  Tolerating diet.  Looking forward to going 

home 





Physical Exam








Physical Exam:   appears older than stated age, chronically ill, no acute 

distress


 


Respiratory:   breathing comfortably on room air, no wheezing/rhonchi


 


Cardiovascular:   regular rate and rhythm, no murmurs/rubs


 


Gastrointestinal (Abdomen):   soft, non tender


 


Musculoskeletal:   no edema


 


Neurologic:   awake, alert, answering questions appropriately








Results & Data


Results & Data ([facility])


Vital Signs (Past 12 Hours)


                                   Vital Signs











  Temp Pulse Resp BP Pulse Ox


 


 02/14/22 15:08  37.1 C  89  16  118/76  99


 


 02/14/22 07:31  37.1 C  90  18  101/63  96











Laboratory Results





                                    Short CBC











  02/14/22 Range/Units





  06:58 


 


Hgb  8.8 L  (14.0-18.0)  g/dL


 


Hct  28.5 L  (42-52)  %








                                       BMP











  02/14/22





  06:58


 


Sodium  136


 


Potassium  3.8


 


Chloride  111 H


 


Carbon Dioxide  19 L


 


BUN  20


 


Creatinine  1.16


 


Glucose  110 H


 


Calcium  8.2 L











Medications Administered





                          Current Inpatient Medications





Acetaminophen (Acetaminophen 325 Mg Tab)  650 mg PO Q4H PRN


   PRN Reason: Pain or Fever


   Stop: 03/02/22 18:39


   Last Admin: 02/01/22 03:19 Dose:  650 mg


   Documented by: 


Albuterol (Albuterol 0.083% Nebu Soln 3 Ml Vial)  2.5 mg NEB Q6R PRN; Protocol


   PRN Reason: Wheezing


   Stop: 03/04/22 17:29


   Last Admin: 02/05/22 21:22 Dose:  2.5 mg


   Documented by: 


Folic Acid (Folic Acid 1 Mg Tab)  1 mg PO QAM ANDRE


   Stop: 03/09/22 08:59


   Last Admin: 02/14/22 08:00 Dose:  1 mg


   Documented by: 


Furosemide (Furosemide 20 Mg Tab)  20 mg PO QAM ANDRE


   Stop: 03/10/22 08:59


   Last Admin: 02/14/22 08:01 Dose:  20 mg


   Documented by: 


Sodium Chloride (Nss)  250 mls @ 15 mls/hr IV .[id number] PRN


   PRN Reason: For Transfusion


   Stop: 03/02/22 16:20


Lorazepam (Ativan)  1 mg in 2 mls @ 2 mls/min IV ONE PRN; Protocol


   PRN Reason: EtoH Withdrawal AWSS 6-10


   Stop: 03/02/22 18:39


Lactulose (Lactulose Syrup 20 Gm/30 Ml Udc)  20 gm PO DAILY ANDRE


   Stop: 03/07/22 08:59


   Last Admin: 02/14/22 08:02 Dose:  20 gm


   Documented by: 


Magnesium Chloride (Magnesium Chloride 64mg Delayed Rel Tab)  64 mg PO TID [facility]


   Stop: 03/14/22 13:59


   Last Admin: 02/14/22 12:46 Dose:  64 mg


   Documented by: 


Melatonin (Melatonin 3 Mg Tab)  3 mg PO HS PRN


   PRN Reason: Sleep


   Stop: 03/03/22 21:33


Miscellaneous (Remove Nicoderm Patch)  1 ea N/A DAILY@[id number] [facility]


   Stop: 03/06/22 08:58


   Last Admin: 02/14/22 08:00 Dose:  1 ea


   Documented by: 


Nicotine (Nicotine 14 Mg/24 Hr Patch)  14 mg TD QAM [facility]


   Stop: 03/05/22 16:29


   Last Admin: 02/14/22 08:01 Dose:  14 mg


   Documented by: 


Ondansetron HCl (Ondansetron Inj 2 Mg/Ml 2 Ml Vial)  4 mg IV Q6H PRN


   PRN Reason: Nausea


   Stop: 03/02/22 18:39


Pantoprazole Sodium (Pantoprazole 40 Mg Tab)  40 mg PO BID [facility]


   Stop: 03/09/22 08:59


   Last Admin: 02/14/22 08:00 Dose:  40 mg


   Documented by: 


Thiamine HCl (Thiamine Hcl 100 Mg Tab)  100 mg PO QAM [facility]


   Stop: 03/09/22 08:59


   Last Admin: 02/14/22 08:01 Dose:  100 mg


   Documented by: 


Umeclidinium/Vilanterol (Umeclidinium/Vilanterol 62.5/25mcg 7 Puffs/Inhaler)  1 

puffs INH DAILY [facility]


   Stop: 03/05/22 16:14


   Last Admin: 02/14/22 08:01 Dose:  1 puffs


   Documented by:

## 2022-02-15 RX ADMIN — LACTULOSE SCH: 20 SOLUTION ORAL at 09:11

## 2022-02-15 RX ADMIN — FUROSEMIDE SCH MG: 20 TABLET ORAL at 09:08

## 2022-02-15 RX ADMIN — NICOTINE SCH MG: 7 PATCH, EXTENDED RELEASE TRANSDERMAL at 09:09

## 2022-02-15 RX ADMIN — Medication SCH MG: at 09:08

## 2022-02-15 RX ADMIN — LACTULOSE SCH GM: 20 SOLUTION ORAL at 09:09

## 2022-02-15 RX ADMIN — FOLIC ACID SCH MG: 1 TABLET ORAL at 09:09

## 2022-02-15 RX ADMIN — UMECLIDINIUM BROMIDE AND VILANTEROL TRIFENATATE SCH PUFFS: 62.5; 25 POWDER RESPIRATORY (INHALATION) at 09:09

## 2022-02-15 NOTE — DISCHARGE SUMMARY
Date of Service


February 15, 2022








Admission HPI


Per Admitting Provider


This is a 61-year-old male who has significant past medical history of HTN, HLD,

cirrhosis, alcoholic liver disease, seizure disorder, COPD, tobacco abuse, 

depression, mood disorder, atherosclerosis of the aorta, history of prostate 

cancer status post prostatectomy who presents to ED after being found on floor 

by care aide covered in stool and feces.  History is limited from patient 

secondary to mental status.  History obtained from ER provider and EMS notes.  

Over the past week EMS has been summoned to House on a few occasions due to 

weakness and falling.  Today he was summoned after being found on floor by care 

aide incontinent of stool, urine and confused.  When EMS arrived patient was 

found to be hypotensive with SBP's in the 80s.  He was euglycemic.  It is 

unknown how long patient was down.  He received 1 L of IV fluid in route.  In ED

patient was normotensive but mildly tachycardic.  Was found to have severe 

electrolyte derangements with hypokalemia hypomagnesemia, hypocalcemia along 

with LARA.  Initial lactic acid was elevated 2.5.  He was felt to be jaundiced 

and did have mild elevation of total bilirubin and AST.  His urinalysis was 

concerning for UTI. Blood and urine cultures were obtained.  He did receive IV 

cefepime.  Electrolytes were being replaced upon admission with potassium, 

magnesium and calcium ordered.  Attempted to call Denzel collado 

without success.











Admission Exam


Per Admitting Provider


Constitutional:  [place], M, appears older than stated age, jaundice, acutely 

ill, vitals as above, NAD, sitting up in bed, alert to self and place


Head: Normocephalic, Atraumatic


Eyes:  PERRL, conjunctivae normal, +icteric sclerae


ENMT:  external ear and nose normal, oropharynx normal dry membranes


Neck:  trachea midline, no thyromegaly  normal visual inspection


Respiratory:  normal respiratory effort, lungs clear to auscultation, no wheeze,

rales, rhonchi.  Normal insp/exp effort, no accessory muscle use


Cardiovascular:  RRR, no murmur, no edema  Vessels: no JVD or carotid bruit


Chest: normal inspection of chest


Abdomen: normal bowel sounds, soft, nontender, no hepatosplenomegaly


Musculoskeletal:  no cyanosis or clubbing,


Skin: +Jaundice, no rashes, warm and dry  normal turgor


Neurologic:  PERRL, EOMI, accommodation nl, no face palsy, no dysarthria  CN's 

II-XI intact bilaterally and moves all extremities


Psychiatric:  A+Ox2, lethargic, easy to arouse but falls asleep quickly


Lymphatic:  no cervical or axillary lymphadenopathy


: deferred





Principal Diagnosis


Enterococcus UTI


Rectal bleeding


Acute on chronic anemia


H/O alcohol abuse 





Discharge Exam


Constitutional


WD/WN, vitals as above


Respiratory


normal respiratory effort, lungs clear to auscultation


Cardiovascular


Rate/Rhythm: regular rate and regular rhythm


Vessels: normal peripheral pulses


Extremities: no edema


Gastrointestinal (Abdomen)


Percussion/Palpation: abdomen soft; abdomen nontender


Skin


no rashes, warm and dry


Neurologic


no focal motor deficits


Psychiatric


A+Ox3, euthymic affect





Discharge Data


Allergies














Allergy/AdvReac Type Severity Reaction Status Date / Time


 


lisinopril Allergy Severe ANGIOEDEMA Verified 01/31/22 14:40


 


oxybutynin [From Ditropan] AdvReac Unknown fever & Verified 01/31/22 14:40





   perhaps  





   seizures,  





   was  





   detoxing  





   @time  











Consultations


01/31/22 15:56


Consult Urology Routine 





02/03/22 16:05


Consult Gastroenterology Routine 











Ordered Studies





                               Laboratory Results











WBC  7.88 K/uL (4.8-10.8)   02/09/22  07:06    


 


RBC  2.52 M/uL (4.7-6.1)  L  02/09/22  07:06    


 


Hgb  8.8 g/dL (14.0-18.0)  L  02/14/22  06:58    


 


Hct  28.5 % (42-52)  L  02/14/22  06:58    


 


MCV  102.8 fL ()  H  02/09/22  07:06    


 


MCH  32.5 pg (25-34)   02/09/22  07:06    


 


MCHC  31.7 g/dL (32-36)  L  02/09/22  07:06    


 


RDW Std Deviation  77.5 fL (36.4-46.3)  H  02/09/22  07:06    


 


RDW Coeff of Nell  20.9 % (11.5-14.5)  H  02/09/22  07:06    


 


Plt Count  248 K/uL (130-400)   02/09/22  07:06    


 


MPV  10.4 fL (7.4-10.4)   02/09/22  07:06    


 


Immature Gran % (Auto)  0.2 %  02/01/22  02:08    


 


Neut % (Auto)  60.1 %  02/01/22  02:08    


 


Lymph % (Auto)  21.9 %  02/01/22  02:08    


 


Mono % (Auto)  17.0 %  02/01/22  02:08    


 


Eos % (Auto)  0.6 %  02/01/22  02:08    


 


Baso % (Auto)  0.2 %  02/01/22  02:08    


 


Neut # (Auto)  5.07 K/uL (1.4-6.5)   02/01/22  02:08    


 


Lymph # (Auto)  1.85 K/uL (1.2-3.4)   02/01/22  02:08    


 


Mono # (Auto)  1.44 K/uL (0.11-0.59)  H  02/01/22  02:08    


 


Eos # (Auto)  0.05 K/uL (0-0.5)   02/01/22  02:08    


 


Baso # (Auto)  0.02 K/uL (0-0.2)   02/01/22  02:08    


 


Immature Gran # (Auto)  0.02 K/uL (0.00-0.02)   02/01/22  02:08    


 


Platelet Estimate  Decreased  (Normal)  L  02/01/22  02:08    


 


Polychromasia  1+   02/01/22  02:08    


 


Anisocytosis  Present   02/01/22  02:08    


 


PT  13.0 Seconds (9.0-12.0)  H  02/01/22  08:11    


 


INR  1.3  (0.9-1.1)  H  02/01/22  08:11    


 


APTT  29.1 Seconds (21.0-31.0)   01/31/22  11:55    


 


PTT Ratio  1.1   01/31/22  11:55    


 


Sodium  136 mmol/L (136-145)   02/14/22  06:58    


 


Potassium  3.8 mmol/L (3.5-5.1)   02/14/22  06:58    


 


Chloride  111 mmol/L ()  H  02/14/22  06:58    


 


Carbon Dioxide  19 mmol/L (21-32)  L  02/14/22  06:58    


 


Anion Gap  6  (3-11)   02/14/22  06:58    


 


BUN  20 mg/dl (6-23)   02/14/22  06:58    


 


Creatinine  1.16 mg/dl (0.6-1.4)   02/14/22  06:58    


 


Est Cr Clr Drug Dosing  76.9 ml/min  02/14/22  06:58    


 


Est GFR ( Amer)  77.2 ml/min  02/14/22  06:58    


 


Est GFR (Non-Af Amer)  66.7 ml/min  02/14/22  06:58    


 


BUN/Creatinine Ratio  17.2  (10-20)   02/14/22  06:58    


 


Glucose  110 mg/dl (70-99(Fasting))  H  02/14/22  06:58    


 


POC Glucose  147 mg/dl (70-99)  H  02/03/22  07:25    


 


Lactate  1.6 mmol/L (0.4-2.0)   01/31/22  15:36    


 


Calcium  8.2 mg/dl (8.5-10.1)  L  02/14/22  06:58    


 


Phosphorus  2.6 mg/dl (2.5-4.9)   02/09/22  07:06    


 


Magnesium  1.6 mg/dl (1.7-2.4)  L  02/14/22  06:58    


 


Iron  39 mcg/dl ()   02/01/22  08:11    


 


Transferrin  104 mg/dl (200-360)  L  02/01/22  08:11    


 


Ferritin  206.3 ng/ml (8-388)   02/01/22  08:11    


 


Total Bilirubin  2.9 mg/dl (0.2-1.0)  H  02/07/22  06:21    


 


Direct Bilirubin  1.6 mg/dl (0-0.2)  H  02/07/22  06:21    


 


AST  45 U/L (13-39)  H  02/07/22  06:21    


 


ALT  20 U/L (7-52)   02/07/22  06:21    


 


Alkaline Phosphatase  205 U/L ()  H  02/07/22  06:21    


 


Ammonia  45.0 umol/L (18-72)   01/31/22  14:17    


 


Total Creatine Kinase  156 U/L ()   01/31/22  13:15    


 


Troponin I  < 0.03 ng/ml (0-0.04)   01/31/22  13:15    


 


Total Protein  6.4 gm/dl (6.0-8.3)   02/07/22  06:21    


 


Albumin  2.3 gm/dl (3.4-5.0)  L  02/07/22  06:21    


 


Globulin  4.1 gm/dl (2.5-4.0)  H  02/04/22  06:21    


 


Albumin/Globulin Ratio  0.6  (0.9-2)  L  02/04/22  06:21    


 


Folate  > 22.30 ng/ml (>5.38)   02/01/22  08:11    


 


TSH  2.414 uIu/ml (0.300-4.500)   01/31/22  13:13    


 


Urine Color  Orange   01/31/22  14:26    


 


Urine Appearance  Turbid  (Clear)  A  01/31/22  14:26    


 


Urine pH  7.0  (4.5-7.5)   01/31/22  14:26    


 


Ur Specific Gravity  1.016  (1.000-1.030)   01/31/22  14:26    


 


Urine Protein  3+  (Negative)  H  01/31/22  14:26    


 


Urine Glucose (UA)  Negative  (Negative)   01/31/22  14:26    


 


Urine Ketones  Negative  (Negative)   01/31/22  14:26    


 


Urine Blood  3+  (Negative)  H  01/31/22  14:26    


 


Urine Nitrite  Positive  (Negative)  A  01/31/22  14:26    


 


Urine Bilirubin  2+  (Negative)  H  01/31/22  14:26    


 


Urine Urobilinogen  Negative  (Negative)   01/31/22  14:26    


 


Ur Leukocyte Esterase  3+  (Negative)  H  01/31/22  14:26    


 


Urine WBC (Auto)  >30 /hpf (0-5)  H  01/31/22  14:26    


 


Urine RBC (Auto)  >30 /hpf (0-4)  H  01/31/22  14:26    


 


U Hyaline Cast (Auto)  1-5 /lpf (0-5)   01/31/22  14:26    


 


U Epithel Cells (Auto)  5-10 /lpf (0-5)  H  01/31/22  14:26    


 


Urine Bacteria (Auto)  Negative  (Negative)   01/31/22  14:26    


 


Urine Yeast  Not Reportable   01/31/22  14:26    


 


Nasal Screen MRSA (PCR)  Negative  (Negative)   02/02/22  Unknown


 


Stool Occult Bld Scrn  Positive  (Negative)  A  02/03/22  Unknown


 


Stl C. diff Tox B Gene  Negative Cdiff Gene  (Neg)   02/03/22  Unknown


 


Urine Opiates Screen  Neg  (Neg)   02/02/22  05:30    


 


Ur Methadone, Qual  Neg  (Neg)   02/02/22  05:30    


 


Urine Barbiturates  Neg  (Neg)   02/02/22  05:30    


 


Ur Phencyclidine (PCP)  Neg  (Neg)   02/02/22  05:30    


 


U Amphetamin/Meth Scrn  Neg  (Neg)   02/02/22  05:30    


 


MDMA (Ecstasy) Screen  Neg  (Neg)   02/02/22  05:30    


 


U Benzodiazepines Scrn  Neg  (Neg)   02/02/22  05:30    


 


Ur Cocaine Metabolite  Neg  (Neg)   02/02/22  05:30    


 


U Marijuana (THC) Screen  Neg  (Neg)   02/02/22  05:30    


 


Ethyl Alcohol mg/dL  < 10.0 mg/dl (<10.0)   01/31/22  13:15    


 


Influ A Molecular Assay  Negative  (Negative)   01/31/22  13:28    


 


Influ B Molecular Assay  Negative  (Negative)   01/31/22  13:28    


 


SARS-CoV-2, RNA, NAAT  NEGATIVE  (NEGATIVE)   01/31/22  13:28    


 


Blood Type  A Positive   02/05/22  08:20    


 


Antibody Screen  NEGATIVE   02/05/22  08:20    


 


Crossmatch  See Detail   02/05/22  08:20    








                                   Impressions





Abdomen/Pelvis CT  01/31/22 12:43


CT SCAN OF THE ABDOMEN AND PELVIS WITHOUT IV CONTRAST


 


CLINICAL HISTORY:   Hematuria.


 


COMPARISON STUDY:  Abdominal CT dated 6/7/2021.


 


TECHNIQUE: CT scan of the abdomen and pelvis is performed from the lung bases to

the proximal femora. Images are reviewed in the axial, sagittal, and coronal 

planes. IV contrast was not administered for this examination. A dose lowering 

technique was utilized adhering to the principles of ALARA. The patient was 

scanned twice due to significant motion artifact.


 


CT DOSE: 1732.90 mGycm


 


FINDINGS:


 


Lung bases: The heart is normal in size and without pericardial effusion. The 

lung bases are clear. There is a tiny hiatal hernia.


 


Liver: The unenhanced liver is cirrhotic in morphology and heterogeneous in 

attenuation. There is hypertrophy of the left lobe and nodularity of the hepatic

surface contour. There is no intrahepatic biliary ductal dilatation. There is 

recanalization of the periumbilical vein.


 


Gallbladder: Surgically absent noting clips in the gallbladder fossa.


 


Spleen: Normal in size and attenuation.


 


Pancreas: Unremarkable.


 


Adrenal glands: Unremarkable.


 


Kidneys: The unenhanced kidneys are normal in size and without hydronephrosis. 

There are no renal calculi identified. There is no evidence of contour deforming

renal mass lesion.


 


Abdominal vasculature: The abdominal aorta is normal in course and caliber 

noting advanced atherosclerotic calcification. 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 

trunk which measures up to 11 mm in diameter.


 


Bowel: There are scattered colonic diverticula without CT evidence of acute 

diverticulitis. No bowel obstruction is identified. The appendix is  well-

visualized and normal.


 


Peritoneum: There is no intraperitoneal free air or abdominal ascites. There is 

a fat-containing umbilical hernia.


 


Lymphadenopathy: None.


 


Pelvic viscera: The bladder is distended and appears mildly thick-walled. There 

are tiny bladder calculi versus postoperative change at the base of the bladder 

seen on image #374. The prostate gland is surgically absent.


 


Skeletal structures: The skeletal structures are osteopenic. There are mild 

chronic superior endplate compression deformities of T11, T12, and L1. Mild l

umbosacral spondylosis is observed. No lytic or blastic lesions are seen. There 

are subacute appearing right lateral rib fractures. Additional chronic rib 

fractures are seen bilaterally. There is chronic posttraumatic deformity of the 

sacrum.


 


 


IMPRESSION: 


 


1. The bladder is markedly distended and appears mildly thick-walled.


 


2. The prostate gland is surgically absent.


 


3. There are tiny bladder calculi versus surgical material the base of the 

bladder.


 


4. Cirrhotic liver morphology.


 


5. There are subacute/healing right lateral rib fractures.


 


6. Additional findings as above.


 


 


 


: Negative or not required by law.


 


 


 


 


Electronically signed by:  Valente Sultana M.D.


1/31/2022 2:46 PM








Head CT  01/31/22 12:43


HEAD CT NONCONTRAST


 


CT DOSE: 638.56 mGycm


 


HISTORY: weakness


 


TECHNIQUE: Multiaxial CT images of the head were performed without the use of 

intravenous contrast. Automated exposure control was utilized for this study.  A

dose lowering technique was utilized adhering to the principles of ALARA.


 


Comparison: Head CT 6/7/2021


 


Findings: The paranasal sinuses and mastoid air cells are clear. The calvarium 

and skull base are intact. There is no mass, hematoma, midline shift, acute 

infarct. White matter hypodensity is nonspecific but suggestive of microvascular

ischemic change. The ventricles and sulci demonstrate mild age-related 

involutional changes. Focal areas of encephalomalacia within the bilateral 

frontal lobes and left anterior temporal lobe remain unchanged.


 


Impression:


No significant change compared to the prior study. No acute intracranial 

abnormality. 


 


 


: Negative or not required by law.


 


 


 


 


Electronically signed by:  Jose Bo M.D.


1/31/2022 2:19 PM








Chest X-Ray  02/01/22 21:25


XR chest 1V portable


 


HISTORY: Cough.  wheeze


 


COMPARISON: Chest 1/31/2022.


 


FINDINGS: Mild emphysema. No focal lung consolidations to suggest pneumonia. 

There are old, healed left lower rib fractures again noted. No evidence for 

pulmonary edema. The heart is normal in size. No pleural effusions. No 

pneumothorax.


 


IMPRESSION:


Emphysema. No focal lung consolidations to suggest pneumonia.


 


 


: Negative or not required by law.


 


 


 


 


Electronically signed by:  Jose Bo M.D.


2/2/2022 7:56 AM














Hospital Course


(1) UTI (urinary tract infection): 


      UTI Hematuria-- likely due to UTI vs traumatic, recent h/o prostate 

cancer


Known hx of radiation cystitis


Urine culture growing enterococcus. Zosyn switched to ampicillin


Completed 10 total days of antibiotic as of 2/10/22


Mendez placed and subsequently removed, patient urinating without difficulty.  

Hematuria resolved.


Urology consulted, input appreciated.  Patient will need outpatient follow-up 

with [facility] urologist.








(2) Severe sepsis: 


      present on admission, resolved








(3) Acute metabolic encephalopathy: 


      present on admission, resolved





Hypomagnesemia


Hypokalemia


Hypophosphatemia


Replaced, resolved





Rectal bleeding


Acute on chronic anemia


C diff negative


Rectal bleeding resolved


GI eval appreciated. Patient to follow up with GI outpatient for EGD and 

colonoscopy.


Admitting hemoglobin seems to be at his baseline at 8.4


S/p 1 unit PRBC on 2/1 (hgb 8.6) and 1 unit PRBC on 2/5 (hgb 6.9)


Hgb 8.82/14


Will need follow-up CBC





LARA


Baseline Cr 0.8, admitting BUN/Cr 60 and 2.26


likely prerenal in the setting of dehydration vs obstructive uropathy


Admitting CTAP s/o bladder distension (mendez placed and revealed gross 

hematuria)


Creatinine 1.1 on 2/14





H/o alcohol abuse


Cirrhosis


Drink frequently- estimates 3-4 drinks daily but unsure


No signs of withdrawal


GI ordered lactulose however patient refusing


Continue home Lasix 


OP GI f/u for management of what appears to be ETOH cirrhosis w/o ascites. He 

needs OP EGD for screening for varices and Q 6m imaging of the liver for 

screening for HCC.





Hyperbilirubinemia


Improving. Tbil peaked at 4.5, down to 2.9





Dispo -PT/OT recommending SNF however patient refusing.  Routine caregivers will

evaluate the patient tomorrow, possibly increase weekly hours.  Case management 

spoke with patient's ex-wife who will check in on the patient this evening.  

Home health also arranged.





Total Time


Total Time Spent


Total Time Spent (In Minutes): 


40





Discharge Plan


Discharge Items


Patient Disposition: Home - Home Health Services





Reason For Visit: Urinating Blood





Discharge Diagnosis:


UTI


Rectal Bleeding


Dehydration





Activity: As commented below





Activity Comment: as tolerated





Non-emergency contact: Primary Care Provider





Call non-emergency contact if: you have any medication questions, your pain is 

not controlled and you have a fever





Follow-up/Referrals:


Dorian Castellon MD [Primary Care Provider] - 


(Date & Time 


2/17/2022 10:40 AM Provider 


Dorian Castellon MD Department 


Family Medicine [facility]





)


Max Glaser MD [Hospitalist] - 02/24/22 8:20 am ([facility] office)


Bigg Lopez PA-C [Outside Practitioners] - 


(Date & Time 


3/18/2022 9:30 AM Provider 


Bigg Lopez PA-C Department 


Urology, [facility]





)





Diet: Regular





Addtl Attending Provider Instructions:


You were admitted to the hospital for urinating blood. You were found to have a 

UTI. You completed antibiotics while in the hospital. Due to history of prostate

cancer, please continue to follow with your urologist. An appointment has been 

for you. 


You also developed rectal bleeding in the hospital - you will need to follow up 

with GI to have an EGD and colonoscopy. A follow up appointment has been made 

for you.


You required a blood transfusion while in the hospital. Your PCP will need to 

monitor your blood work. 


Avoid alcohol - you were found to have liver damage, likely from alcohol use. GI

will follow you for this. 





Pending Studies at Discharge: No





Stand-Alone Forms:  My [facility], Smoking Cessation





Medications and DC Order


Prescriptions:


Continued


  folic acid 1 mg tablet 


   1 mg PO QAM RF: 0


  trazodone 300 mg tablet 


   300 mg PO HS RF: 0


  fluticasone propionate 50 mcg/actuation spray,suspension 


   2 spray INTRANASAL DAILY RF: 0


  furosemide 20 mg tablet 


   20 mg PO QAM RF: 0


  Myrbetriq 25 mg tablet extended release 24 hr 


   25 mg PO QAM RF: 0


  polyethylene glycol 3350 [Miralax] 17 gram/dose powder 


   17 g PO DAILY PRN (Reason: constipation) Qty: 119 RF: 0


  nicotine [Nicoderm CQ] 21 mg/24 hr Patch 24 Hour 


   21 mg transdermal QAM Qty: 30 RF: 0





Discharge Orders:


Discharge Order  (Routine); Ordered 02/15/22


   Ordered By:  Anne-Marie Cooley/Other Patient Handouts:  Understanding Alcoholism, Alcohol Addiction





Admission Data


Admit Date/Time: 01/31/22 15:18





Attending Provider: Cullen Rao





Admit Provider: [place] Bowman





Primary Care Provider: Dorian Castellon





Other Providers: Maribell Seo at [place] ; [facility] ; [facility], ; 

Florida Bowman ; Tuan Bonilla ; Mily Loera ; [facility] ; 

Janel Yañez ; Jarrod Loving





Other


Interventions:


Discharge Summary Assessment (RN)   Last Done: 02/15/22 11:34








Supervising Physician


Co-Signing Physician Notes


Patient was seen and evaluated independently.  Chart was reviewed.  Case was 

discussed with LANCE.  Agree with assessment and plan as above





Home Health Attestation





I certify that this patient is under my care and that I, or a physicians solomon luis working with me, had a face to-face encounter that meets the home health 

face-to-face encounter requirements with this patient.





The encounter with the patient was in whole, or in part, for the following 

medical condition, which is the primary reason for home health care (list 

medical condition): 


LARA, AMS





I certify that, based on my findings, the following services are medically 

necessary home health services: 








My clinical findings support the need for the above services because:


PT Assessment for Endurance / Balance / Strength


PT Eval for Safety and Mobility


PT Eval for Safety, Gait Training, Assistive Devices


PT Gait and Balance Training, Strengthening and Safety


Skilled Nsg Assessment


Skilled Nsg Instruction New Medications





Further, I certify that my clinical findings support that this patient is 

homebound (i.e. absences from home require considerable and taxing effort and 

are for medical reasons or [employer] services or infrequently or of short 

duration when for other reasons) because: 


Transportation Assistance/Unable to Leave Home Unassisted








Certification for Home Health Services:


Based on the above findings, I certify that this patient is confined to the home

and needs intermittent skilled nursing care, physical therapy and/or speech t

herapy or continues to need occupational therapy. The patient is under my care, 

and I have initiated the establishment of the plan of care. This patient will be

followed by a physician who will periodically review the plan of care.

## 2022-03-09 ENCOUNTER — HOSPITAL ENCOUNTER (INPATIENT)
Dept: HOSPITAL 45 - ED | Age: 62
LOS: 15 days | Discharge: HOME | DRG: 699 | End: 2022-03-24

## 2022-03-09 LAB
ALBUMIN SERPL-MCNC: 2.8 GM/DL (ref 3.4–5)
ALBUMIN/GLOB SERPL: 0.8 {RATIO} (ref 0.9–2)
ALP SERPL-CCNC: 135 U/L (ref 34–104)
ALT SERPL-CCNC: 14 U/L (ref 7–52)
ANION GAP SERPL CALC-SCNC: 12 MMOL/L (ref 3–11)
APTT BLD: 27.9 SECONDS (ref 21–31)
BASE EXCESS BLDV CALC-SCNC: -4.1 MEQ/L
BILIRUB SERPL-MCNC: 1.6 MG/DL (ref 0.2–1)
BUN SERPL-MCNC: 27 MG/DL (ref 6–23)
CALCIUM SERPL-MCNC: 7.7 MG/DL (ref 8.5–10.1)
CHLORIDE SERPL-SCNC: 104 MMOL/L (ref 98–107)
CK SERPL-CCNC: 55 U/L (ref 30–223)
CO2 SERPL-SCNC: 17 MMOL/L (ref 21–32)
CREAT CL PREDICTED SERPL C-G-VRATE: 45.5 ML/MIN
GLOBULIN SER CALC-MCNC: 3.7 GM/DL (ref 2.5–4)
GLUCOSE SERPL-MCNC: 101 MG/DL
HCO3 BLDV-SCNC: 19 MMOL/L
HCT VFR BLD AUTO: 9.9 % (ref 42–52)
HGB BLD-MCNC: 2.9 G/DL (ref 14–18)
IMM GRANULOCYTES # BLD: 0.02 K/UL (ref 0–0.02)
IMM GRANULOCYTES NFR BLD AUTO: 0.2 %
LIPASE SERPL-CCNC: 53 U/L (ref 11–82)
MAGNESIUM SERPL-MCNC: 1.9 MG/DL (ref 1.7–2.4)
MCH RBC QN AUTO: 31.5 PG (ref 25–34)
MCV RBC: 107.6 FL (ref 80–100)
PCO2 BLDV: 28 MMHG (ref 38–50)
PH BLDV: 7.46 [PH] (ref 7.36–7.41)
PLATELET # BLD AUTO: 184 K/UL (ref 130–400)
PO2 BLDV: 29 MMHG
POTASSIUM SERPL-SCNC: 3.9 MMOL/L (ref 3.5–5.1)
PROT SERPL-MCNC: 6.5 GM/DL (ref 6–8.3)
PROTHROM ACT/NOR PPP: 1.3 % (ref 0.9–1.1)
PROTHROM ACT/NOR PPP: 14 SECONDS (ref 9–12)
RBC # BLD AUTO: 0.92 M/UL (ref 4.7–6.1)
SAO2 % BLDV: < 60 %
SODIUM SERPL-SCNC: 133 MMOL/L (ref 136–145)
TROPONIN I: < 0.03 NG/ML (ref 0–0.04)
WBC # BLD AUTO: 9.73 K/UL (ref 4.8–10.8)

## 2022-03-09 NOTE — EMERGENCY DEPARTMENT NOTE
Impression & Plan


 Symptomatic anemia, Hematuria, Acute renal failure, Metabolic acidosis





ED Provider Note





 NAME: CITLALY FERRO JR


AGE: 61 SEX: M


ARRIVES VIA: Ambulance


INFORMANT: Patient


ED PROVIDER(S): Gera Braun MD





CHIEF COMPLAINT: Weakness, hematuria





PLAN:


Disposition: Admit





MEDICAL DECISION MAKING:


The patient is a 61-year-old gentleman with a past medical history of alcoholic 

liver disease/cirrhosis, seizure disorder, COPD, hypertension, hyperlipidemia, 

depression/mood disorder, atherosclerosis, prostate cancer status post 

prostatectomy who presents to the emergency department via EMS after he called 

for assistance due to continued hematuria and urinary urgency. The patient 

reports that he wanted to be brought to his PCPs office but because of his acute

condition brought to the emergency department. The patient adds as an aside 

that he he fell approximately 5 days ago and could not get up for 2 days until 

finally he crawled over to his bed to get up. He reports he did not seek 

medical attention at that time. The patient is a poor historian.





On arrival the patient is acute on chronically ill-appearing, unkept but no 

acute distress, afebrile with heart rate in the 110s and vital signs otherwise 

stable. He appears clinically dry. He has superficial abrasions and resolving 

contusions of bilateral upper and lower extremities. His abdomen is mildly 

distended in the suprapubic region without discrete tenderness. Bedside 

abdominal US performed and demonstrated urinary retention. Alas catheter 

placed. 


EKG without overt acute ischemia. CXR negative for acute cardiopulmonary 

process. 


WBC and platelets wnl. H/H 2.9/9.9, which on istat appeared consistent. Repeat 

H/H was ordered but did not get performed. Patient was consented for blood 

transfusion and was ordered for 4 prbcs. VBG with pH 7.46. Pco 28. Chemistry 

with metabolic acidosis with bicarbonate fo 17. AGap 12. Cr 1.6 increased from 

recent. Lactate 1.6 wnl. LFTs without significant abnormalities. cpk wnl. Lipase

wnl. Procalcitonin 1.05. Medical etoh undetectable.


CT head negative for acute process. CT abd/pelvis demonstrates blood products in

bladder c/w patient's hematuria. 


Patient ordered for empiric Cefepime and Daptomycin per prior urine Culture. 

Patient agrees with plan for admission.


Case was discussed with Dr. Carmona, [facility] Hospitalist who will evaluate the 

patient for admission.





Triage Nursing notes reviewed and agree them.


Prior medical records reviewed


Vital Signs: reviewed and remarkable for tachycardia.





Differential diagnosis:


Infection, dehydration, metabolic abnormality, hypo/hyperglycemia, electrolyte 

disturbance, anemia, hypoxia, cardiac sources, intracerebral event, toxicologic,

neurologic, as well as other pathologies. 





ER treatment provided:


See below.





Diagnostics interpreted by me:


ECG: Sinus tachycardia, 121 bpm, no ectopy, no overt ST elevation or depression.


Cardiac Monitoring: An order for continuous cardiac monitoring was placed and 

demonstrated Sinus tachycardia, 121 bpm, no ectopy.





Laboratory studies:


See below





Imaging studies:


See below





Consultation(s):


Case was discussed with Dr. Carmona, [facility] hospitalist who will evaluate the 

patient for admission.





HPI: The patient is a 61-year-old gentleman with a past medical history of 

alcoholic liver disease/cirrhosis, seizure disorder, COPD, hypertension, 

hyperlipidemia, depression/mood disorder, atherosclerosis, prostate cancer 

status post prostatectomy who presents to the emergency department via EMS after

he called for assistance due to continued hematuria and urinary urgency. The 

patient reports that he wanted to be brought to his PCPs office but because of 

his acute condition brought to the emergency department. The patient adds as an

aside that he he fell approximately 5 days ago and could not get up for 2 days 

until finally he crawled over to his bed to get up. He reports he did not seek 

medical attention at that time. The patient is a poor historian.





ROS: See above HPI for pertinent positives & negatives. A total of 10 systems 

reviewed and were otherwise negative.





VITALS:See Below


PHYSICAL EXAMINATION:


GENERAL: Awake, alert, acute on chronically ill-appearing, unkempt in no 

distress 


HENT: Normocephalic, atraumatic. Oropharynx with dry mucous membranes and 

otherwise unremarkable.


EYES: Normal conjunctiva. Sclera non-icteric.


NECK: Supple. No nuchal rigidity. FROM. No JVD.


RESPIRATORY: Clear to auscultation.


CARDIAC: Tachycardic rate, normal rhythm. Extremities warm and well perfused. 

Pulses equal.


ABDOMEN: Mild suprapubic distention but soft and no tenderness to palpation. No 

rebound or guarding. No masses.


RECTAL: Deferred.


MUSCULOSKELETAL: Chest examination reveals no tenderness. The back is 

symmetrical on inspection without obvious abnormality. There is no CVA 

tenderness to palpation. No joint edema.


LOWER EXTREMITIES: Calves are equal size bilaterally and non-tender. No edema. 

No discoloration.


NEURO: Normal sensorium. No sensory or motor deficits noted.


SKIN: Moderate pallor. No rash or jaundice noted. Superficial abrasions and 

resolving contusions of bilateral upper and lower extremities. 








ED COURSE:


Critical Care:


I have personally spent greater than 95 minutes of critical care time in the 

direct management of this patient.  This includes bedside care, interpretation 

of diagnostic studies, and testing, discussion with consultants, patient, and 

family members, and other required patient management activities.  This 95 

minutes is in excess of all separately billable procedures.








Gera Braun MD 








Past Med/Surg History


Medical History (Updated 03/10/22 @ 05:11 by Gera Braun MD)





Altered mental status


Confusion


Depression


Elevated troponin


History of torn meniscus of left knee


History of torn meniscus of right knee


Prostate cancer (06/02/14)


   "Rising PSA


   Status post ultrasound-guided biopsies 06/02/2014


   Biopsy stage T2c Nano 4+5 with perineural invasion


   Status post robotic prostatectomy 08/25/2014


   Pathologic stage [id number] Truchas 4+4 Tertiary 5


   Post prostatectomy rising PSA


   Status post completion of radiation therapy 05/19/2015 received 7040 cGy"


   


   *** On 05/26/15 16:14 Jeanine Horowitz wrote ***


   "Rising PSA


   Status post ultrasound-guided biopsies 06/02/2014


   Biopsy stage T2c Nano 4+5 with perineural invasion


   Status post robotic prostatectomy 08/25/2014


   Pathologic stage [id number] Truchas 4+4


   Post prostatectomy rising PSA


   Status post completion of radiation therapy 05/19/2015 received 7040 cGy"


Psychiatric exam requested by authority


Right knee sprain


UTI (urinary tract infection)








Surgical History (Reviewed 03/10/22 @ 05:10 by Gera Braun MD)





H/O knee surgery


H/O prostate biopsy





Family History (Reviewed 03/10/22 @ 05:10 by Gera Braun MD)


Other   Family history unobtainable











Social History (Reviewed 03/10/22 @ 05:10 by Gera Braun MD)


Smoking Status:  Current every day smoker 


Tobacco Type:  Cigarettes 


Cigarettes Per Day:  12; 


Second Hand Exposure:  No; 


Tobacco Cessation Education Requested by Patient:  No 


Hx Alcohol Use:  Yes Alcohol type: hard liquor 


Hx Substance Use:  No 


Preferred Language:  English 


Communication Ability:  Impaired 


 Required:  No 


Beliefs That Will Affect Care:  None 


Current Living Situation:  Alone 


Current Living Situation Comment:  unknown 


How many Children do You have:  3 


Other Information That Helps Us Care for You:  No 


Feels Safe at Home:  Yes 


Assistive Devices:  None 











Allergies


                                    Allergies











Allergy/AdvReac Type Severity Reaction Status Date / Time


 


lisinopril Allergy Severe ANGIOEDEMA Verified 03/09/22 20:16


 


oxybutynin [From Ditropan] AdvReac Unknown fever & Verified 03/09/22 20:16





   perhaps  





   seizures,  





   was  





   detoxing  





   @time  

















Home Meds


                                Home Medications











 Medication  Instructions  Recorded  Confirmed


 


folic acid 1 mg tablet 1 mg PO QAM 04/02/19 03/09/22


 


fluticasone propionate 50 2 spray INTRANASAL DAILY PRN 04/30/21 03/09/22





mcg/actuation nasal   





spray,suspension   


 


trazodone 300 mg tablet 300 mg PO HS PRN 04/30/21 03/09/22


 


acetaminophen 325 mg tablet 650 mg PO QID PRN 03/09/22 03/09/22





(Tylenol)   


 


multivitamin 1 tab PO 3XWK 03/09/22 03/09/22








                                  Previous Rx's











 Medication  Instructions  Recorded


 


polyethylene glycol 3350 17 17 g PO DAILY PRN #119 g 06/29/21





gram/dose oral powder (Miralax)  

















Results & Data (ED)


Vital Signs


                               Vital Signs - 24 hr











 03/09/22


16:20 03/09/22


17:24 03/09/22


17:30


 


Temperature 36.7 C  


 


Temperature Source Oral  


 


Pulse Rate 118 H  


 


Pulse Rate [Left Finger]  119 H 115 H


 


Pulse Rhythm Irregular  


 


Pulse Rhythm [Left Finger]  Regular Regular


 


Pulse Strength Normal  


 


Pulse Strength [Left Finger]  Normal Normal


 


Respiratory Rate 20 17 18


 


Respiratory Effort / Characteristics Non-Labored


Spontaneous Non-Labored


Spontaneous Non-Labored


Spontaneous


 


Respiratory Depth Normal Normal Normal


 


Blood Pressure 112/67  


 


Blood Pressure [Left Arm]  135/44 L 135/44 L


 


Blood Pressure Mean 82  


 


Blood Pressure Mean [Left Arm]  74 74


 


Blood Pressure Position Lying  


 


Blood Pressure Position [Left Arm]  Lying Lying


 


Pulse Oximetry 92 100 100


 


Oxygen Delivery Method Room Air Room Air Room Air


 


Sepsis Recent Fever Within 48 Hours No  


 


Sepsis New/Unexplained Change in Mental Status No  


 


Sepsis Action Taken by Nursing No Action


Required  














 03/09/22


18:00 03/09/22


19:38 03/09/22


19:45


 


Temperature 36.6 C 37.1 C 37.1 C


 


Temperature Source Oral Oral Oral


 


Pulse Rate  106 H 107 H


 


Pulse Rate [Left Finger] 109 H  


 


Pulse Rhythm  Regular Regular


 


Pulse Rhythm [Left Finger] Regular  


 


Pulse Strength  Normal Normal


 


Pulse Strength [Left Finger] Normal  


 


Respiratory Rate 20 20 17


 


Respiratory Effort / Characteristics Non-Labored


Spontaneous  


 


Respiratory Depth Normal  


 


Blood Pressure  102/61 107/59 L


 


Blood Pressure [Left Arm] 106/63  


 


Blood Pressure Mean  74 75


 


Blood Pressure Mean [Left Arm] 77  


 


Blood Pressure Position   Lying


 


Blood Pressure Position [Left Arm] Lying  


 


Pulse Oximetry 100 95 100


 


Oxygen Delivery Method Room Air  


 


Sepsis Recent Fever Within 48 Hours   


 


Sepsis New/Unexplained Change in Mental Status   


 


Sepsis Action Taken by Nursing   














Laboratory Data


Attestation: I reviewed the patient's lab results.





Result diagrams: 


                                                        03/09/22 17:21          





                                                        03/09/22 17:21          





                                   Lab Results











  03/09/22 03/09/22 03/09/22 Range/Units





  17:21 17:21 17:21 


 


WBC     (4.8-10.8)  K/uL


 


RBC     (4.7-6.1)  M/uL


 


Hgb     (14.0-18.0)  g/dL


 


Hct     (42-52)  %


 


MCV     ()  fL


 


MCH     (25-34)  pg


 


MCHC     (32-36)  g/dL


 


Plt Count     (130-400)  K/uL


 


Immature Gran % (Auto)     %


 


Neut % (Auto)     %


 


Lymph % (Auto)     %


 


Mono % (Auto)     %


 


Eos % (Auto)     %


 


Baso % (Auto)     %


 


Neut # (Auto)     (1.4-6.5)  K/uL


 


Lymph # (Auto)     (1.2-3.4)  K/uL


 


Mono # (Auto)     (0.11-0.59)  K/uL


 


Eos # (Auto)     (0-0.5)  K/uL


 


Baso # (Auto)     (0-0.2)  K/uL


 


Immature Gran # (Auto)     (0.00-0.02)  K/uL


 


Hypochromasia     


 


PT     (9.0-12.0)  Seconds


 


INR     (0.9-1.1)  


 


APTT     (21.0-31.0)  Seconds


 


PTT Ratio     


 


VBG pH    7.46 H  (7.36-7.41)  


 


VBG pCO2    28 L  (38-50)  mmHg


 


VBG pO2    29  mmHg


 


VBG HCO3    19  mmol/L


 


VBG O2 Saturation    < 60.0  %


 


VBG Base Excess    -4.1  mEq/L


 


Barometric Pressure    730.6  mm/Hg


 


Sodium     (136-145)  mmol/L


 


Potassium     (3.5-5.1)  mmol/L


 


Chloride     ()  mmol/L


 


Carbon Dioxide     (21-32)  mmol/L


 


Anion Gap     (3-11)  


 


BUN     (6-23)  mg/dl


 


Creatinine     (0.6-1.4)  mg/dl


 


Est Cr Clr Drug Dosing     ml/min


 


Est GFR ( Amer)     ml/min


 


Est GFR (Non-Af Amer)     ml/min


 


BUN/Creatinine Ratio     (10-20)  


 


Glucose     (70-99(Fasting))  mg/dl


 


Lactate     (0.4-2.0)  mmol/L


 


Calcium     (8.5-10.1)  mg/dl


 


Phosphorus     (2.5-4.9)  mg/dl


 


Magnesium     (1.7-2.4)  mg/dl


 


Total Bilirubin     (0.2-1.0)  mg/dl


 


AST     (13-39)  U/L


 


ALT     (7-52)  U/L


 


Alkaline Phosphatase     ()  U/L


 


Total Creatine Kinase     ()  U/L


 


Troponin I     (0-0.04)  ng/ml


 


Total Protein     (6.0-8.3)  gm/dl


 


Albumin     (3.4-5.0)  gm/dl


 


Globulin     (2.5-4.0)  gm/dl


 


Albumin/Globulin Ratio     (0.9-2)  


 


Lipase     (11-82)  U/L


 


Procalcitonin   1.05 H   (0-0.5)  ng/ml


 


Ethyl Alcohol mg/dL  < 10.0    (<10.0)  mg/dl


 


Blood Type     


 


Antibody Screen     


 


Crossmatch     














  03/09/22 03/09/22 03/09/22 Range/Units





  17:21 17:21 17:21 


 


WBC  9.73    (4.8-10.8)  K/uL


 


RBC  0.92 L    (4.7-6.1)  M/uL


 


Hgb  2.9 L*    (14.0-18.0)  g/dL


 


Hct  9.9 L*    (42-52)  %


 


MCV  107.6 H    ()  fL


 


MCH  31.5    (25-34)  pg


 


MCHC  29.3 L    (32-36)  g/dL


 


Plt Count  184    (130-400)  K/uL


 


Immature Gran % (Auto)  0.2    %


 


Neut % (Auto)  70.6    %


 


Lymph % (Auto)  15.8    %


 


Mono % (Auto)  12.5    %


 


Eos % (Auto)  0.7    %


 


Baso % (Auto)  0.2    %


 


Neut # (Auto)  6.86 H    (1.4-6.5)  K/uL


 


Lymph # (Auto)  1.54    (1.2-3.4)  K/uL


 


Mono # (Auto)  1.22 H    (0.11-0.59)  K/uL


 


Eos # (Auto)  0.07    (0-0.5)  K/uL


 


Baso # (Auto)  0.02    (0-0.2)  K/uL


 


Immature Gran # (Auto)  0.02    (0.00-0.02)  K/uL


 


Hypochromasia  Present    


 


PT   14.0 H   (9.0-12.0)  Seconds


 


INR   1.3 H   (0.9-1.1)  


 


APTT   27.9   (21.0-31.0)  Seconds


 


PTT Ratio   1.0   


 


VBG pH     (7.36-7.41)  


 


VBG pCO2     (38-50)  mmHg


 


VBG pO2     mmHg


 


VBG HCO3     mmol/L


 


VBG O2 Saturation     %


 


VBG Base Excess     mEq/L


 


Barometric Pressure     mm/Hg


 


Sodium    133 L  (136-145)  mmol/L


 


Potassium    3.9  (3.5-5.1)  mmol/L


 


Chloride    104  ()  mmol/L


 


Carbon Dioxide    17 L  (21-32)  mmol/L


 


Anion Gap    12 H  (3-11)  


 


BUN    27 H  (6-23)  mg/dl


 


Creatinine    1.66 H  (0.6-1.4)  mg/dl


 


Est Cr Clr Drug Dosing    45.5  ml/min


 


Est GFR ( Amer)    50.1  ml/min


 


Est GFR (Non-Af Amer)    43.2  ml/min


 


BUN/Creatinine Ratio    16.3  (10-20)  


 


Glucose    101 H  (70-99(Fasting))  mg/dl


 


Lactate     (0.4-2.0)  mmol/L


 


Calcium    7.7 L  (8.5-10.1)  mg/dl


 


Phosphorus    3.7  (2.5-4.9)  mg/dl


 


Magnesium    1.9  (1.7-2.4)  mg/dl


 


Total Bilirubin    1.6 H  (0.2-1.0)  mg/dl


 


AST    28  (13-39)  U/L


 


ALT    14  (7-52)  U/L


 


Alkaline Phosphatase    135 H  ()  U/L


 


Total Creatine Kinase     ()  U/L


 


Troponin I    < 0.03  (0-0.04)  ng/ml


 


Total Protein    6.5  (6.0-8.3)  gm/dl


 


Albumin    2.8 L  (3.4-5.0)  gm/dl


 


Globulin    3.7  (2.5-4.0)  gm/dl


 


Albumin/Globulin Ratio    0.8 L  (0.9-2)  


 


Lipase     (11-82)  U/L


 


Procalcitonin     (0-0.5)  ng/ml


 


Ethyl Alcohol mg/dL     (<10.0)  mg/dl


 


Blood Type     


 


Antibody Screen     


 


Crossmatch     














  03/09/22 03/09/22 03/09/22 Range/Units





  17:21 17:21 17:21 


 


WBC     (4.8-10.8)  K/uL


 


RBC     (4.7-6.1)  M/uL


 


Hgb     (14.0-18.0)  g/dL


 


Hct     (42-52)  %


 


MCV     ()  fL


 


MCH     (25-34)  pg


 


MCHC     (32-36)  g/dL


 


Plt Count     (130-400)  K/uL


 


Immature Gran % (Auto)     %


 


Neut % (Auto)     %


 


Lymph % (Auto)     %


 


Mono % (Auto)     %


 


Eos % (Auto)     %


 


Baso % (Auto)     %


 


Neut # (Auto)     (1.4-6.5)  K/uL


 


Lymph # (Auto)     (1.2-3.4)  K/uL


 


Mono # (Auto)     (0.11-0.59)  K/uL


 


Eos # (Auto)     (0-0.5)  K/uL


 


Baso # (Auto)     (0-0.2)  K/uL


 


Immature Gran # (Auto)     (0.00-0.02)  K/uL


 


Hypochromasia     


 


PT     (9.0-12.0)  Seconds


 


INR     (0.9-1.1)  


 


APTT     (21.0-31.0)  Seconds


 


PTT Ratio     


 


VBG pH     (7.36-7.41)  


 


VBG pCO2     (38-50)  mmHg


 


VBG pO2     mmHg


 


VBG HCO3     mmol/L


 


VBG O2 Saturation     %


 


VBG Base Excess     mEq/L


 


Barometric Pressure     mm/Hg


 


Sodium     (136-145)  mmol/L


 


Potassium     (3.5-5.1)  mmol/L


 


Chloride     ()  mmol/L


 


Carbon Dioxide     (21-32)  mmol/L


 


Anion Gap     (3-11)  


 


BUN     (6-23)  mg/dl


 


Creatinine     (0.6-1.4)  mg/dl


 


Est Cr Clr Drug Dosing     ml/min


 


Est GFR ( Amer)     ml/min


 


Est GFR (Non-Af Amer)     ml/min


 


BUN/Creatinine Ratio     (10-20)  


 


Glucose     (70-99(Fasting))  mg/dl


 


Lactate  1.6    (0.4-2.0)  mmol/L


 


Calcium     (8.5-10.1)  mg/dl


 


Phosphorus     (2.5-4.9)  mg/dl


 


Magnesium     (1.7-2.4)  mg/dl


 


Total Bilirubin     (0.2-1.0)  mg/dl


 


AST     (13-39)  U/L


 


ALT     (7-52)  U/L


 


Alkaline Phosphatase     ()  U/L


 


Total Creatine Kinase    55  ()  U/L


 


Troponin I     (0-0.04)  ng/ml


 


Total Protein     (6.0-8.3)  gm/dl


 


Albumin     (3.4-5.0)  gm/dl


 


Globulin     (2.5-4.0)  gm/dl


 


Albumin/Globulin Ratio     (0.9-2)  


 


Lipase    53  (11-82)  U/L


 


Procalcitonin     (0-0.5)  ng/ml


 


Ethyl Alcohol mg/dL     (<10.0)  mg/dl


 


Blood Type   A Positive   


 


Antibody Screen   NEGATIVE   


 


Crossmatch   See Detail   














Administered Medications








Trazodone HCl (Trazodone Hcl 100 Mg Tab)  300 mg PO HS [facility]


   Stop: 04/08/22 21:37


   Last Admin: 03/10/22 01:26 Dose:  Not Given


   Documented by: [id number]





Discontinued Medications





Furosemide (Furosemide Inj 20 Mg/2 Ml Vial)  20 mg IV ONE ONE


   Stop: 03/09/22 20:36


   Last Admin: 03/09/22 22:45  Dose: 20 mg


   Documented by: [id number]


Furosemide (Furosemide Inj 20 Mg/2 Ml Vial) Confirm Administered Dose 20 mg IV 

.STK-MED ONE


   Stop: 03/09/22 22:45


   Last Admin: 03/10/22 01:03 Dose:  Not Given


   Documented by: [id number]


Sodium Chloride (Nss 1000ml)  1,000 mls @ 999 mls/hr IV .Q1H1M ONE


   Stop: 03/09/22 17:41


   Last Admin: 03/09/22 17:42 Dose:  Not Given


   Documented by: [id number]


Multivitamins 10 ml/ Thiamine HCl 100 mg/ Folic Acid 1 mg/Sodium Chloride  

1,011.2 mls @ 1,011.2 mls/hr IV .Q1H ONE


   Stop: 03/09/22 17:44


   Last Infusion: 03/09/22 19:08  Dose: 0 mls/hr


   Documented by: [id number]


   Admin: 03/09/22 17:44  Dose: 1,011.2 mls/hr


   Documented by: [id number]


Thiamine HCl 200 mg/ Sodium (Chloride)  52 mls @ 208 mls/hr IV NOW STA


   Stop: 03/09/22 16:46


   Last Infusion: 03/09/22 19:17  Dose: 0 mls/hr


   Documented by: [id number]


   Admin: 03/09/22 19:09  Dose: 208 mls/hr


   Documented by: [id number]


Daptomycin 450 mg/ Syringe  9 mls @ 4.5 mls/min IV NOW ONE; Protocol


   Stop: 03/09/22 19:25


   Last Admin: 03/09/22 22:45  Dose: 4.5 mls/min


   Documented by: [id number]











Imaging Data


Radiologist's Impression: 


                                        





Chest X-Ray  03/09/22 16:39


XR chest 1V portable


 


CLINICAL HISTORY: SEPSIS


 


TECHNIQUE: Single frontal radiograph of the chest was obtained.  


 


Comparison: Comparison is made to chest one view 2/1/2022


 


FINDINGS: 


 


No lines and tubes are seen. Calcified aortic knob is seen. The lungs are clear.

No evidence of pleural effusion or pneumothorax.


 


IMPRESSION: 


No acute chest disease.


 


: Negative or not required by law.


 


 


 


 


Electronically signed by:  Bigg Armstrong M.D.


3/9/2022 5:46 PM








Abdomen/Pelvis CT  03/09/22 18:33


CT abd pelvis wo con


 


 


CLINICAL HISTORY: anemia, urine retention, hematuria, hx prostate ca


 


TECHNIQUE: Helical axial images of the abdomen and pelvis were obtained. 

Automated dose lowering techniques and/or adjustment according to patient size 

were utilized for this exam.  This exam was performed without intravenous 

contrast. 


 


COMPARISON: Comparison is made to CT abdomen pelvis 1/31/2022


 


FINDINGS: 


 


Lower chest:  No acute abnormality


 


Liver: Nodular contour of the liver is seen compatible with cirrhosis. Rec

analization of the ligamentum venosum is seen.


 


Gallbladder and biliary tree: Patient is status post cholecystectomy. No intra- 

or extrahepatic biliary ductal dilation.


 


Pancreas: Unremarkable, no focal lesions.


 


Spleen: Unremarkable.


 


Adrenals: Unremarkable.


 


Kidneys and ureters: Unremarkable.


 


Bladder: A Alas catheter is seen. There is layering hyperdense material 

compatible with blood clots in this patient with history of hematuria.


 


Reproductive organs: Status post prostatectomy.


 


Bowel: Unremarkable appearance of the bowel. The appendix is normal. A hiatal 

hernia is seen.


 


Lymph nodes


Retroperitoneal: Unremarkable.


Mesenteric: Unremarkable.


Pelvic: Unremarkable.


 


Peritoneum: Normal.


 


Vessels: Atherosclerotic calcifications are seen.


 


Abdominal wall: Unremarkable.


 


Bones: Degenerative changes in the visualized spine. Multilevel compression 

deformities are unchanged. Old right rib fracture is seen.


 


IMPRESSION:


1.  Blood products are seen layering in the bladder compatible with history of 

hematuria and urinary retention. A Alas catheter is in place.


2.  Status post prostatectomy.


3.  Cirrhosis.


 


 


: Negative or not required by law.


 


 


 


 


Electronically signed by:  Bigg Armstrong M.D.


3/9/2022 8:55 PM








Head CT  03/09/22 18:33


CT head/brain wo con


 


 


CLINICAL HISTORY: Fall, ams 


 


Technique: Contiguous axial CT images of the head were acquired from the base of

the skull to the vertex without intravenous contrast administration. Images were

viewed in brain, subdural and bone windows. Automated dose lowering techniques 

and/or adjustment according to patient size were utilized for this exam.


 


Comparison: Comparison is made to CT head 1/31/2022


 


Findings: 


 


The ventricles, basal cisterns, and cerebral sulci are normal. There is no acute

intracranial hemorrhage or evidence of acute territorial infarction. Neither 

mass effect, shift of the midline structures, nor abnormal extra-axial fluid 

collections are shown.  


 


Imaged portions of the paranasal sinuses and mastoid air cells are clear.  The 

orbits appear normal.  There are no acute fractures of the calvaria or scalp 

swelling.


 


Impression: 


No acute intracranial hemorrhage, no evidence of acute territorial infarction or

other acute intracranial disease process. 


 


 


: Negative or not required by law.


 


 


 


 


Electronically signed by:  Bigg Armstrong M.D.


3/9/2022 8:41 PM

















Discharge Plan


Visit Data


Chief Complaint: Hematuria





Stated Complaint: HEMATURIA, HYPOTENSION





ED Provider: Gera Braun





Discharge Problem:


 Symptomatic anemia, Hematuria, Acute renal failure, Metabolic acidosis








Patient Disposition: Admitted As Inpatient





Discharge Instructions


Interventions:


ED Discharge Assessment   Last Done: 03/09/22 21:23











Discharge Problem:


Hematuria


Qualifiers:


 Hematuria type: unspecified type Qualified Code(s): R31.9 - Hematuria, 

unspecified





Acute renal failure


Qualifiers:


 Acute renal failure type: unspecified Qualified Code(s): N17.9 - Acute kidney 

failure, unspecified

## 2022-03-09 NOTE — CT SCAN REPORT
CT head/brain wo con





CLINICAL HISTORY: Fall, ams 



Technique: Contiguous axial CT images of the head were acquired from the base of the skull to the alejandrina
[place] without intravenous contrast administration. Images were viewed in brain, subdural and bone windo
ws. Automated dose lowering techniques and/or adjustment according to patient size were utilized for 
this exam.



Comparison: Comparison is made to CT head 1/31/2022



Findings: 



The ventricles, basal cisterns, and cerebral sulci are normal. There is no acute intracranial hemorrh
age or evidence of acute territorial infarction. Neither mass effect, shift of the midline structures
, nor abnormal extra-axial fluid collections are shown.  



Imaged portions of the paranasal sinuses and mastoid air cells are clear.  The orbits appear normal. 
 There are no acute fractures of the calvaria or scalp swelling.



Impression: 

No acute intracranial hemorrhage, no evidence of acute territorial infarction or other acute intracra
nial disease process. 





: Negative or not required by law.









Electronically signed by:  Bigg Armstrong M.D.

3/9/2022 8:41 PM

## 2022-03-09 NOTE — HISTORY & PHYSICAL REPORT
Date of Service


March 9, 2022


 





Assessment & Plan


(1) Acute blood loss anemia: 


      Plan: 


Has been having gross hematuria for the last 2 weeks secondary to cystitis with 

history of prostate cancer with ongoing Lupron therapy


Hemoglobin has been very low at 2.9 without any significant symptoms


Has been getting blood transfusion


Will monitor H&H


(2) Gross hematuria: 


      Plan: 


Secondary to radiation cystitis with history of prostate cancer


Not sure if there is any recurrence or spread of cancer locally


We will get urology consult tomorrow morning


(3) LARA (acute kidney injury): 


      Plan: 


LARA could be secondary to dehydration


Has been getting blood transfusion and will give cautious amount of intravenous 

fluid


Monitor PRP


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 


      Plan: 


And history of prostate cancer


(5) H/O ETOH abuse: 


      Plan: 


Continue folic acid and B6


(6) Prostate cancer: 


      Plan: 


History of prostate cancer status post prostatectomy and also radiation 

treatment


Has been on Lupron injection every 3 months


Has an appointment with urologist as an outpatient in about 2 to 3 weeks


We will get urology consult while in the hospital


(7) Cirrhosis:








History of Present Illness


Chief Complaint: 


Ronni hematuria for the last 2 weeks Primary Care Provider: 


Dorian Castellon MD





 He is a 61-year-old male with significant past medical history of prostate 

cancer status post prostatectomy and has been getting Lupron injection, 

hypertension hyperlipidemia, cirrhosis, alcoholic liver disease, seizure 

disorder, COPD, tobacco abuse, depression and atherosclerosis of the aorta has 

been complaining of ongoing hematuria for the last 2 weeks.  He has been waking 

up in the morning for the last 2 weeks with bed soaking with bloody urine.  He 

denies any symptoms of weakness, tiredness, shortness of breath, fever and/or 

chills or any abdominal pain.  His neighbors called for the ambulance and he was

brought into the emergency room for further evaluation.


He is supposed to go to see his primary care physician sometime in the next week

and he has been waiting for to go and let him know about the ongoing symptoms of

hematuria and also he does have an appointment with his urologist in about 2 to 

3 weeks where is supposed to get the Lupron shot as well.


In the ER he was asymptomatic, looked very pale, with tachycardia and blood 

pressure on the lower side.  He was a started with blood transfusion and is 

going to have CT scan of the abdomen and pelvis to rule out any possible causes.

 He was admitted to [facility] telemetry unit for continuation of care. Allergies 














Allergy/AdvReac Type Severity Reaction Status Date / Time


 


lisinopril Allergy Severe ANGIOEDEMA Verified 01/31/22 14:40


 


oxybutynin [From Ditropan] AdvReac Unknown fever & Verified 01/31/22 14:40





   perhaps  





   seizures,  





   was  





   detoxing  





   @time  








 Home Medications 


                                        











 Medication  Instructions  Recorded  Confirmed  Type


 


folic acid 1 mg tablet 1 mg PO QAM 04/02/19 01/31/22 History


 


fluticasone propionate 50 2 spray INTRANASAL DAILY 04/30/21 01/31/22 History





mcg/actuation nasal    





spray,suspension    


 


trazodone 300 mg tablet 300 mg PO HS 04/30/21 01/31/22 History


 


polyethylene glycol 3350 17 17 g PO DAILY PRN #119 g 06/29/21 01/31/22 Rx





gram/dose oral powder (Miralax)    


 


nicotine 21 mg/24 hr daily 21 mg TRANSDERMAL QAM #30 ea 07/02/21 01/31/22 Rx





transdermal patch (Nicoderm CQ)    


 


furosemide 20 mg tablet 20 mg PO QAM 01/31/22 01/31/22 History


 


mirabegron 25 mg tablet,extended 25 mg PO QAM 01/31/22 01/31/22 History





release 24 hr (Myrbetriq)    








 








Past Med/Surg History


Medical History (Updated 02/04/22 @ 12:08 by TEE Aponte)





Altered mental status


Confusion


Depression


Elevated troponin


History of torn meniscus of left knee


History of torn meniscus of right knee


Prostate cancer (06/02/14)


   "Rising PSA


   Status post ultrasound-guided biopsies 06/02/2014


   Biopsy stage T2c [place] 4+5 with perineural invasion


   Status post robotic prostatectomy 08/25/2014


   Pathologic stage [id number] [place] 4+4 Tertiary 5


   Post prostatectomy rising PSA


   Status post completion of radiation therapy 05/19/2015 received 7040 cGy"


   


   *** On 05/26/15 16:14 Jeanine Horowitz wrote ***


   "Rising PSA


   Status post ultrasound-guided biopsies 06/02/2014


   Biopsy stage T2c [place] 4+5 with perineural invasion


   Status post robotic prostatectomy 08/25/2014


   Pathologic stage [id number] [place] 4+4


   Post prostatectomy rising PSA


   Status post completion of radiation therapy 05/19/2015 received 7040 cGy"


Psychiatric exam requested by authority


Right knee sprain


UTI (urinary tract infection)








Surgical History (Reviewed 01/31/22 @ 20:43 by Gilbert Mcrae PA-C)





H/O knee surgery


H/O prostate biopsy





Family History (Reviewed 01/31/22 @ 20:43 by Gilbert Mcrae PA-C)


Other   Family history unobtainable











Social History (Reviewed 01/31/22 @ 20:43 by Gilbert Mcrae PA-C)


Smoking Status:  Current every day smoker 


Tobacco Type:  Cigarettes 


Cigarettes Per Day:  12; 


Second Hand Exposure:  No; 


Hx Alcohol Use:  Yes Alcohol type: hard liquor 


Hx Substance Use:  No 


Preferred Language:  English 


Communication Ability:  Impaired 


 Required:  No 


Beliefs That Will Affect Care:  None 


Current Living Situation:  Alone 


Current Living Situation Comment:  unknown 


How many Children do You have:  3 


Feels Safe at Home:  Yes 


Assistive Devices:  None 














Review of Systems








Review of Systems:   All systems reviewed & are unremarkable except as noted in 

HPI & below  


 


Genitourinary:   + hematuria (For the last 2 weeks without any pain)  











Physical Exam








Physical Exam:   Lying in bed comfortably but looks very pale


 


Constitutional:   + ill appearing and average body habitus  


 


Eyes:   PERRL, conjunctivae normal, anicteric sclerae  


 


ENMT:   external ear and nose normal, oropharynx normal  


 


Neck:   trachea midline, no thyromegaly  


 


Respiratory:   no respiratory distress    Auscultation: lungs clear to 

auscultation bilaterally; no crackles  


 


Cardiovascular:   Rate/Rhythm: regular rate, regular rhythm and + tachycardic   

Heart Sounds: normal S1, normal S2 and + murmur (2/6 ESM over precordium)    

Extremities: no edema  


 


Gastrointestinal (Abdomen):   Inspection/Auscultation: normal bowel sounds; 

abdomen not distended    Percussion/Palpation: abdomen soft; abdomen nontender  


 


Musculoskeletal:   No acute arthritis in any joint


 


Neurologic:   Alert, awake and oriented x3.  No focal sensory and motor deficit 

appreciated











Results & Data


Results & Data ([facility])


Vital Signs (Past 12 Hours)


                                   Vital Signs











  Temp Pulse Pulse Resp BP BP Pulse Ox


 


 03/09/22 19:45  37.1 C  107 H   17  107/59 L   100


 


 03/09/22 19:38  37.1 C  106 H   20  102/61   95


 


 03/09/22 17:30    115 H  18   135/44 L  100


 


 03/09/22 17:24    119 H  17   135/44 L  100


 


 03/09/22 16:20  36.7 C  118 H   20  112/67   92











Laboratory Results





                                    Short CBC











  03/09/22 Range/Units





  17:21 


 


WBC  9.73  (4.8-10.8)  K/uL


 


Hgb  2.9 L*  (14.0-18.0)  g/dL


 


Hct  9.9 L*  (42-52)  %


 


Plt Count  184  (130-400)  K/uL








                                       BMP











  03/09/22





  17:21


 


Sodium  133 L


 


Potassium  3.9


 


Chloride  104


 


Carbon Dioxide  17 L


 


BUN  27 H


 


Creatinine  1.66 H


 


Glucose  101 H


 


Calcium  7.7 L








                                 Cardiac Enzymes











  03/09/22 03/09/22 Range/Units





  17:21 17:21 


 


Total Creatine Kinase   55  ()  U/L


 


Troponin I  < 0.03   (0-0.04)  ng/ml








                                 Liver Function











  03/09/22 Range/Units





  17:21 


 


Total Bilirubin  1.6 H  (0.2-1.0)  mg/dl


 


AST  28  (13-39)  U/L


 


ALT  14  (7-52)  U/L


 


Alkaline Phosphatase  135 H  ()  U/L


 


Albumin  2.8 L  (3.4-5.0)  gm/dl











Medications Administered





                          Current Inpatient Medications





Sodium Chloride (Nss)  250 mls @ 15 mls/hr IV .[id number] PRN


   PRN Reason: For Transfusion


   Stop: 03/10/22 04:10


Miscellaneous Information (Daptomycin Consult Active)  1 ea N/A UD PRN


   PRN Reason: Consult


   Stop: 04/08/22 19:23














Code Status & VTE Plan


VTE Prophylaxis Plan


VTE Prophylaxis will be ordered: Yes

## 2022-03-09 NOTE — CT SCAN REPORT
CT abd pelvis wo con





CLINICAL HISTORY: anemia, urine retention, hematuria, hx prostate ca



TECHNIQUE: Helical axial images of the abdomen and pelvis were obtained. Automated dose lowering tech
niques and/or adjustment according to patient size were utilized for this exam.  This exam was perfor
med without intravenous contrast. 



COMPARISON: Comparison is made to CT abdomen pelvis 1/31/2022



FINDINGS: 



Lower chest:  No acute abnormality



Liver: Nodular contour of the liver is seen compatible with cirrhosis. Recanalization of the ligament
um venosum is seen.



Gallbladder and biliary tree: Patient is status post cholecystectomy. No intra- or extrahepatic bilia
ry ductal dilation.



Pancreas: Unremarkable, no focal lesions.



Spleen: Unremarkable.



Adrenals: Unremarkable.



Kidneys and ureters: Unremarkable.



Bladder: A Alas catheter is seen. There is layering hyperdense material compatible with blood clots 
in this patient with history of hematuria.



Reproductive organs: Status post prostatectomy.



Bowel: Unremarkable appearance of the bowel. The appendix is normal. A hiatal hernia is seen.



Lymph nodes

Retroperitoneal: Unremarkable.

Mesenteric: Unremarkable.

Pelvic: Unremarkable.



Peritoneum: Normal.



Vessels: Atherosclerotic calcifications are seen.



Abdominal wall: Unremarkable.



Bones: Degenerative changes in the visualized spine. Multilevel compression deformities are unchanged
. Old right rib fracture is seen.



IMPRESSION:

1.  Blood products are seen layering in the bladder compatible with history of hematuria and urinary 
retention. A Alas catheter is in place.

2.  Status post prostatectomy.

3.  Cirrhosis.





: Negative or not required by law.









Electronically signed by:  Bigg Armstrong M.D.

3/9/2022 8:55 PM

## 2022-03-09 NOTE — XRAY REPORT
XR chest 1V portable



CLINICAL HISTORY: SEPSIS

 

TECHNIQUE: Single frontal radiograph of the chest was obtained.  



Comparison: Comparison is made to chest one view 2/1/2022



FINDINGS: 



No lines and tubes are seen. Calcified aortic knob is seen. The lungs are clear. No evidence of pleur
al effusion or pneumothorax.



IMPRESSION: 

No acute chest disease.



: Negative or not required by law.









Electronically signed by:  Bigg Armstrong M.D.

3/9/2022 5:46 PM

## 2022-03-10 LAB
ANION GAP SERPL CALC-SCNC: 7 MMOL/L (ref 3–11)
BUN SERPL-MCNC: 20 MG/DL (ref 6–23)
CALCIUM SERPL-MCNC: 6.8 MG/DL (ref 8.5–10.1)
CHLORIDE SERPL-SCNC: 108 MMOL/L (ref 98–107)
CO2 SERPL-SCNC: 20 MMOL/L (ref 21–32)
CREAT CL PREDICTED SERPL C-G-VRATE: 68.1 ML/MIN
DEPRECATED RDW RBC: 52.2 FL (ref 36.4–46.3)
EPI CELLS #/AREA URNS LPF: (no result) /LPF (ref 0–5)
GLUCOSE SERPL-MCNC: 97 MG/DL
HCT VFR BLD AUTO: 28 % (ref 42–52)
HGB BLD-MCNC: 9.3 G/DL (ref 14–18)
IMM GRANULOCYTES # BLD: 0.02 K/UL (ref 0–0.02)
IMM GRANULOCYTES NFR BLD AUTO: 0.3 %
MAGNESIUM SERPL-MCNC: 1.7 MG/DL (ref 1.7–2.4)
MCH RBC QN AUTO: 30.2 PG (ref 25–34)
MCV RBC: 90.9 FL (ref 80–100)
PLATELET # BLD AUTO: 107 K/UL (ref 130–400)
POTASSIUM SERPL-SCNC: 3.8 MMOL/L (ref 3.5–5.1)
RBC # BLD AUTO: 3.08 M/UL (ref 4.7–6.1)
SODIUM SERPL-SCNC: 135 MMOL/L (ref 136–145)
WBC # BLD AUTO: 7.32 K/UL (ref 4.8–10.8)

## 2022-03-10 RX ADMIN — FLUTICASONE PROPIONATE SCH: 50 SPRAY, METERED NASAL at 08:49

## 2022-03-10 RX ADMIN — NICOTINE SCH MG: 21 PATCH, EXTENDED RELEASE TRANSDERMAL at 08:50

## 2022-03-10 RX ADMIN — Medication SCH MG: at 08:50

## 2022-03-10 RX ADMIN — FOLIC ACID SCH MG: 1 TABLET ORAL at 08:50

## 2022-03-10 NOTE — UROLOGY CONSULTATION
Date of Consultation


March 10, 2022


 





Assessment & Plan


(1) Hematuria: 


(2) Acute renal failure: 


      60yo M with multiple comorbidities admitted with acute blood loss anemia, 

gross hematuria, LARA.  Urology consulted for hematuria. 





- Plan of care reviewed with Dr. Turpin, on-call urologist. 


- Hx of prostate cancer and prior radiation therapy.


- Gross hematuria likely secondary to prior radiation, now causing radiation 

cystitis.


- CTAP reviewed and showing blood products layering the bladder, Mendez catheter 

in place. 


- Afebrile, labs reviewed - Wbc normal and creatinine down from 1.66-1.11, 

Hemoglobin 9.3 today (pt received PRBCs)


- Empiric Cefepime and Daptomycin given in ED per prior urine culture. 


- Blood cultures pending. Continue antibiotics, follow cultures. 


- 24Fr 3-way Mendez catheter intact with minimal dark red urine in bag.


- Will attempt to hand irrigate catheter this morning at bedside.  If 

unsuccessful, may need to proceed to OR today for clot evac.   


- Will make NPO for now.


- Continue supportive care.


- Continue to trend labs.  Monitor H&H, transfuse as felt necessary per primary 

team.


- Will continue to follow closely with primary team.





Supervising Physician


Co-Signing Physician Notes


I have seen and discussed Mr. Ferrer's case with TEE Watt 

and agree with the above documentation.  Hand irrigation performed at the 

bedside with production of ~500 mL of urine thick with clots.  Eventually no 

further clots were returned, but urine remained cherry-red, suggesting ongoing 

hematuria.  He was started on CBI and urine appeared to be clearing.  We will 

reassess this afternoon for potential need for intervention in OR with 

cystoscopy, clot evacuation & fulguration.  Please maintain NPO for now.  In the

longer term, he follows with an outside urologist, but it would be reasonable to

discuss interventions to help prevent episodes of hematuria in the future 

(possibly hyperbaric oxygen, urinary diversion with nephrostomy tubes, 

intravesical formalin).  We will follow along.





History of Present Illness


Reason for Consultation: 


Hematuria


Attending Physician: 


Cullen Rao MD








History of Present Illness


The patient is a 61 year-old male with significant past medical history of 

prostate cancer status post prostatectomy and has been getting Lupron injection,

hypertension hyperlipidemia, cirrhosis, alcoholic liver disease, seizure 

disorder, COPD, tobacco abuse, depression and atherosclerosis who presented to 

the ED with complaints of persistent hematuria x 2 weeks.  Reported that he had 

been waking up in the morning for the last 2 weeks with bed soaking with bloody 

urine.  His neighbor called for the ambulance and he was brought into the 

emergency room for further evaluation.





On arrival, he was afebrile and tachycardic.  White count 9.73 and creatinine 

1.66.  Hemoglobin critically low at 2.9.  A blood transfusion was started and he

was admitted for continued care.  A CT abdomen pelvis was obtained and notable 

for blood products layering the bladder, a mendez catheter was in place.  Blood 

cultures obtained and pending. He was given empiric Cefepime and Daptomycin in 

ED per prior urine culture. 





Urology consulted for hematuria. The patient is well known to the [facility] urology 

service from his prior hospitalizations. Hx of prostate cancer and prior 

radiation, with recent hospitalizations for acute blood loss anemia, gross 

hematuria, UTI, and LARA. He was seen by [facility] urology during his hospitalization 

in July 2021 and most recently Jan/Feb 2022.  His gross hematuria was felt to be

secondary to radiation cystitis as well as coagulopathy from underlying liver 

disease. In July 2021 he did require cystoscopic intervention with clot 

evacuation and fulguration by Dr. Palomo.  He follows with [facility] urology 

as an outpatient.  





CT abdomen pelvis IMPRESSION:


1. Blood products are seen layering in the bladder compatible with history of 

hematuria and urinary retention. A Mendez catheter is in place.


2. Status post prostatectomy.


3. Cirrhosis.





Pt examined at bedside this AM. Awake, resting in bed on arrival. Denies 

abdominal, flank, and back pain at present. No fevers or chills. No nausea or 

vomiting. Pt reports hematuria and difficulty with urination approximately 2 

weeks prior to arrival in ED.  A mendez catheter was placed on arrival in the ED.

 Mendez catheter currently intact with minimal amount of dark red urine in the 

bag.  Earlier this morning, the catheter was not draining and nursing was unable

to irrigate, so nursing exchanged catheter to a 24FR 3-way catheter.  Urine 

output of 500ml.   The patient was eating breakfast at time of my exam and 

offered no additional complaints. 





Per chart review, pt reported having an upcoming appointment with his urologist 

in about 2 to 3 weeks where is supposed to get the Lupron injection.  





Allergies














Allergy/AdvReac Type Severity Reaction Status Date / Time


 


lisinopril Allergy Severe ANGIOEDEMA Verified 03/09/22 20:16


 


oxybutynin [From Ditropan] AdvReac Unknown fever & Verified 03/09/22 20:16





   perhaps  





   seizures,  





   was  





   detoxing  





   @time  











Home Medications


                                        











 Medication  Instructions  Recorded  Confirmed  Type


 


folic acid 1 mg tablet 1 mg PO QAM 04/02/19 03/09/22 History


 


fluticasone propionate 50 2 spray INTRANASAL DAILY PRN 04/30/21 03/09/22 History





mcg/actuation nasal    





spray,suspension    


 


trazodone 300 mg tablet 300 mg PO HS PRN 04/30/21 03/09/22 History


 


polyethylene glycol 3350 17 17 g PO DAILY PRN #119 g 06/29/21 03/09/22 Rx





gram/dose oral powder (Miralax)    


 


acetaminophen 325 mg tablet 650 mg PO QID PRN 03/09/22 03/09/22 History





(Tylenol)    


 


multivitamin 1 tab PO 3XWK 03/09/22 03/09/22 History














Patient History


Medical History (Reviewed 03/10/22 @ 12:01 by TEE Romero)





Altered mental status


Confusion


Depression


Elevated troponin


History of torn meniscus of left knee


History of torn meniscus of right knee


Prostate cancer (06/02/14)


   "Rising PSA


   Status post ultrasound-guided biopsies 06/02/2014


   Biopsy stage T2c [place] 4+5 with perineural invasion


   Status post robotic prostatectomy 08/25/2014


   Pathologic stage [id number] [place] 4+4 Tertiary 5


   Post prostatectomy rising PSA


   Status post completion of radiation therapy 05/19/2015 received 7040 cGy"


   


   *** On 05/26/15 16:14 Jeanine Horowitz wrote ***


   "Rising PSA


   Status post ultrasound-guided biopsies 06/02/2014


   Biopsy stage T2c [place] 4+5 with perineural invasion


   Status post robotic prostatectomy 08/25/2014


   Pathologic stage [id number] [place] 4+4


   Post prostatectomy rising PSA


   Status post completion of radiation therapy 05/19/2015 received 7040 cGy"


Psychiatric exam requested by authority


Right knee sprain


UTI (urinary tract infection)








Surgical History (Reviewed 03/10/22 @ 12:01 by TEE Romero)





H/O knee surgery


H/O prostate biopsy





Family History (Reviewed 03/10/22 @ 12:01 by TEE Romero)


Other   Family history unobtainable











Social History (Reviewed 03/10/22 @ 12:01 by TEE Romero)


Smoking Status:  Current every day smoker 


Tobacco Type:  Cigarettes 


Cigarettes Per Day:  12; 


Second Hand Exposure:  No; 


Tobacco Cessation Education Requested by Patient:  No 


Hx Alcohol Use:  Yes Alcohol type: hard liquor 


Hx Substance Use:  No 


Preferred Language:  English 


Communication Ability:  Impaired 


 Required:  No 


Beliefs That Will Affect Care:  None 


Current Living Situation:  Alone 


Current Living Situation Comment:  unknown 


How many Children do You have:  3 


Other Information That Helps Us Care for You:  No 


Feels Safe at Home:  Yes 


Assistive Devices:  None 











Review of Systems








Review of Systems:   All systems reviewed & are unremarkable except as noted in 

HPI & below  








Physical Exam








Constitutional:   no acute distress  


 


Neck:   normal visual inspection  


 


Respiratory:   normal respiratory effort and able to speak in complete 

sentences; no labored breathing and no audible wheezes  


 


Cardiovascular:   Extremities: no calf tenderness  


 


Gastrointestinal (Abdomen):   Inspection/Auscultation: abdomen normal to 

inspection    Percussion/Palpation: abdomen soft; abdomen nontender and no 

guarding  


 


Musculoskeletal:   Head/Neck/Chest: normocephalic  


 


Skin:   No visible rashes or lesions to exposed skin areas


 


Neurologic:   moves all extremities and awake  


 


Psychiatric:   Orientation: alert, oriented x 3 and cooperative  


 


Genitourinary:   Mendez catheter intact, draining minimal dark red urine








Results & Data ([facility])


Vital Signs (Past 12 Hours)


                                   Vital Signs











  Temp Pulse Pulse Resp BP BP Pulse Ox


 


 03/10/22 07:00  36.4 C L   94 H  18   123/67  100


 


 03/10/22 05:51  37.1 C  94 H   20  134/82   100


 


 03/10/22 05:26  37.2 C  96 H   20  148/76 H   100


 


 03/10/22 04:56  37.1 C  88   18  120/72   100


 


 03/10/22 04:41  37.1 C  88   18  113/69   100


 


 03/10/22 04:30  36.9 C   92 H  20   124/71  100


 


 03/10/22 04:25  36.9 C  92 H   20  124/71   100


 


 03/10/22 04:17  36.7 C  94 H   20  147/80 H   100


 


 03/10/22 03:49  36.7 C  93 H   20  115/75   100


 


 03/10/22 02:49  36.9 C  94 H   20  136/82   100


 


 03/10/22 01:49  36.9 C  96 H   20  114/74   98


 


 03/10/22 01:19  36.9 C  96 H   18  164/84 H   99


 


 03/10/22 01:04  36.6 C  95 H   18  113/70   100


 


 03/10/22 01:00     18    99


 


 03/10/22 00:44  36.6 C  102 H   20  143/87 H   100


 


 03/10/22 00:37  36.6 C  104 H   20  143/87 H   100


 


 03/10/22 00:30     20   


 


 03/09/22 23:59   102 H     


 


 03/09/22 23:58  36.9 C  100 H   20  114/73   100


 


 03/09/22 23:28  36.7 C  100 H   20  123/74   100


 


 03/09/22 23:13  37.2 C  104 H   18  102/65   100


 


 03/09/22 23:12  36.9 C  114 H   20  121/71   100


 


 03/09/22 22:53  37.2 C  104 H   20  102/65   100


 


 03/09/22 21:40  37.1 C   98 H  18   100/62  100














PG Care Time/CCT


Total # of Minutes Spent


Total Time Spent with Patient: 


Total time spent is greater than 50% in coordination of care (as documented) at 

patient's floor/unit and/or counseling patient:








Coding


Level of Care Code


31308 Inpt Consult Level 4





Diagnoses


Hematuria  R31.9


      Hematuria type: unspecified type


Acute renal failure  N17.9


      Acute renal failure type: unspecified





(1) Hematuria


  Hematuria type: unspecified type  Qualified Code(s): R31.9 - Hematuria, 

unspecified





(2) Acute renal failure


  Acute renal failure type: unspecified  Qualified Code(s): N17.9 - Acute kidney

failure, unspecified

## 2022-03-10 NOTE — ELECTROCARDIOGRAM REPORT
Test Reason : 

Blood Pressure : ***/*** mmHG

Vent. Rate : 121 BPM     Atrial Rate : 121 BPM

   P-R Int : 122 ms          QRS Dur : 076 ms

    QT Int : 364 ms       P-R-T Axes : 048 077 068 degrees

   QTc Int : 516 ms

 

Sinus tachycardia

Septal infarct , age undetermined

Abnormal ECG

When compared with ECG of 31-JAN-2022 12:41,

Premature ventricular complexes are no longer Present

Premature supraventricular complexes are no longer Present

Nonspecific T wave abnormality no longer present

 

Confirmed by Alvarez Quevedo ([id number]) on 3/10/2022 8:40:47 AM

 

Referred By: REFERRED SELF           Confirmed By:Alvarez Quevedo

## 2022-03-10 NOTE — HOSPITALIST PROGRESS NOTE
Date of Service


March 10, 2022


 





Assessment & Plan


(1) Acute blood loss anemia: 


      Plan: 


Hb 2.9 on admission (uncertain if this is real)


-s/p transfusion, repeat Hb 9.3


-trend H/h daily


(2) Gross hematuria: 


      Plan: 


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


-Appreciate Urology input.  Manual clot evacution bedside today.  May possibly 

need OR tomorrow


-Check UA/Urinalysis to evaluate for infection (order placed but specimen has 

NOT yet been sent).  S/p Cefepime and Daptomycin in ER which have not been 

continued.  History of enterococcus and CNS UTI


(3) LARA (acute kidney injury): 


      Plan: 


Likely due to dehydration


improved


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 


(5) H/O ETOH abuse: 


      Plan: 


Continue folic acid and thiamine


(6) Prostate cancer: 


(7) Cirrhosis: 


      Plan: 


Appears compensated


      Plan: 


DVT PPX


-SCDs for now


Admission and Anticipated Discharge Date


Admission Date: 


March 9, 2022








Subjective


had bedside manual clot evacuation by Urology today.  Now on CBI


Patient reports intermittent bladder cramping 





Physical Exam








Physical Exam:   Appears older than stated age, no acute distress


 


Respiratory:   breathing comfortably on room air, no wheezing/rhonchi/rales


 


Cardiovascular:   Regular rate and rhythm, no murmur/rubs/gallops


 


Gastrointestinal (Abdomen):   soft, non tender


 


Musculoskeletal:   no edema


 


Genitourinary:   3 way mendez in place.  Draining bloody urine








Results & Data


Results & Data ([facility])


Vital Signs (Past 12 Hours)


                                   Vital Signs











  Temp Pulse Pulse Pulse Resp BP BP


 


 03/10/22 15:47  36.7 C   103 H   16   102/67


 


 03/10/22 10:56  37.3 C    94 H  17   109/55 L


 


 03/10/22 08:00   94 H     


 


 03/10/22 07:00  36.4 C L    94 H  18   123/67


 


 03/10/22 05:51  37.1 C  94 H    20  134/82 


 


 03/10/22 05:26  37.2 C  96 H    20  148/76 H 


 


 03/10/22 04:56  37.1 C  88    18  120/72 


 


 03/10/22 04:41  37.1 C  88    18  113/69 














  Pulse Ox


 


 03/10/22 15:47  100


 


 03/10/22 10:56  99


 


 03/10/22 08:00  96


 


 03/10/22 07:00  100


 


 03/10/22 05:51  100


 


 03/10/22 05:26  100


 


 03/10/22 04:56  100


 


 03/10/22 04:41  100











Laboratory Results





                                    Short CBC











  03/09/22 03/10/22 Range/Units





  17:21 07:37 


 


WBC  9.73  7.32  (4.8-10.8)  K/uL


 


Hgb  2.9 L*  9.3 L D  (14.0-18.0)  g/dL


 


Hct  9.9 L*  28.0 L  (42-52)  %


 


Plt Count  184  107 L  (130-400)  K/uL








                                       BMP











  03/09/22 03/10/22





  17:21 07:37


 


Sodium  133 L  135 L


 


Potassium  3.9  3.8


 


Chloride  104  108 H


 


Carbon Dioxide  17 L  20 L


 


BUN  27 H  20


 


Creatinine  1.66 H  1.11  D


 


Glucose  101 H  97


 


Calcium  7.7 L  6.8 L








                                 Cardiac Enzymes











  03/09/22 03/09/22 Range/Units





  17:21 17:21 


 


Total Creatine Kinase   55  ()  U/L


 


Troponin I  < 0.03   (0-0.04)  ng/ml








                                 Liver Function











  03/09/22 Range/Units





  17:21 


 


Total Bilirubin  1.6 H  (0.2-1.0)  mg/dl


 


AST  28  (13-39)  U/L


 


ALT  14  (7-52)  U/L


 


Alkaline Phosphatase  135 H  ()  U/L


 


Albumin  2.8 L  (3.4-5.0)  gm/dl











Medications Administered





                          Current Inpatient Medications





Belladonna Alkaloids/Opium (Belladonna/Opium Supp 60 Mg Supp)  60 mg [place] Q6 PRN


   PRN Reason: Bladder pain


   Stop: 03/24/22 12:06


   Last Admin: 03/10/22 12:30 Dose:  60 mg


   Documented by: 


Fluticasone Propionate (Fluticasone Propionate Na Spr 16 Gm Btl)  2 sprays NA 

DAILY [facility]


   Stop: 04/09/22 08:59


   Last Admin: 03/10/22 08:49 Dose:  Not Given


   Documented by: 


Folic Acid (Folic Acid 1 Mg Tab)  1 mg PO QAM [facility]


   Stop: 04/09/22 08:59


   Last Admin: 03/10/22 08:50 Dose:  1 mg


   Documented by: 


Miscellaneous (Remove Nicoderm Patch)  1 ea N/A DAILY@0859 [facility]


   Stop: 04/09/22 08:58


   Last Admin: 03/10/22 08:50 Dose:  Not Given


   Documented by: 


Nicotine (Nicotine 21 Mg/24 Hr Tdsy)  21 mg TD QAM [facility]


   Stop: 04/09/22 08:59


   Last Admin: 03/10/22 08:50 Dose:  21 mg


   Documented by: 


Thiamine HCl (Thiamine Hcl 100 Mg Tab)  100 mg PO QAM [facility]


   Stop: 04/09/22 08:59


   Last Admin: 03/10/22 08:50 Dose:  100 mg


   Documented by: 


Trazodone HCl (Trazodone Hcl 100 Mg Tab)  300 mg PO HS [facility]


   Stop: 04/08/22 21:37


   Last Admin: 03/10/22 01:26 Dose:  Not Given


   Documented by:

## 2022-03-11 LAB
ALBUMIN SERPL-MCNC: 2.5 GM/DL (ref 3.4–5)
ALBUMIN/GLOB SERPL: 0.8 {RATIO} (ref 0.9–2)
ALP SERPL-CCNC: 122 U/L (ref 34–104)
ALT SERPL-CCNC: 15 U/L (ref 7–52)
ANION GAP SERPL CALC-SCNC: 5 MMOL/L (ref 3–11)
BILIRUB SERPL-MCNC: 2.2 MG/DL (ref 0.2–1)
BUN SERPL-MCNC: 16 MG/DL (ref 6–23)
CALCIUM SERPL-MCNC: 6.7 MG/DL (ref 8.5–10.1)
CHLORIDE SERPL-SCNC: 109 MMOL/L (ref 98–107)
CO2 SERPL-SCNC: 22 MMOL/L (ref 21–32)
CREAT CL PREDICTED SERPL C-G-VRATE: 76.1 ML/MIN
DEPRECATED RDW RBC: 56.5 FL (ref 36.4–46.3)
GLOBULIN SER CALC-MCNC: 3.1 GM/DL (ref 2.5–4)
GLUCOSE SERPL-MCNC: 104 MG/DL
HCT VFR BLD AUTO: 23.8 % (ref 42–52)
HGB BLD-MCNC: 7.9 G/DL (ref 14–18)
IMM GRANULOCYTES # BLD: 0.01 K/UL (ref 0–0.02)
IMM GRANULOCYTES NFR BLD AUTO: 0.2 %
MCH RBC QN AUTO: 30.6 PG (ref 25–34)
MCV RBC: 92.2 FL (ref 80–100)
PLATELET # BLD AUTO: 96 K/UL (ref 130–400)
POLYCHROMASIA BLD QL SMEAR: (no result)
POTASSIUM SERPL-SCNC: 3.2 MMOL/L (ref 3.5–5.1)
PROT SERPL-MCNC: 5.6 GM/DL (ref 6–8.3)
RBC # BLD AUTO: 2.58 M/UL (ref 4.7–6.1)
SODIUM SERPL-SCNC: 136 MMOL/L (ref 136–145)
WBC # BLD AUTO: 5.79 K/UL (ref 4.8–10.8)

## 2022-03-11 RX ADMIN — FOLIC ACID SCH MG: 1 TABLET ORAL at 07:49

## 2022-03-11 RX ADMIN — CALCIUM CARBONATE SCH MG: 1250 SUSPENSION ORAL at 10:15

## 2022-03-11 RX ADMIN — CALCIUM CARBONATE SCH MG: 1250 SUSPENSION ORAL at 19:35

## 2022-03-11 RX ADMIN — NICOTINE SCH MG: 21 PATCH, EXTENDED RELEASE TRANSDERMAL at 10:31

## 2022-03-11 RX ADMIN — FLUTICASONE PROPIONATE SCH SPRAYS: 50 SPRAY, METERED NASAL at 07:48

## 2022-03-11 RX ADMIN — Medication SCH MG: at 07:49

## 2022-03-11 RX ADMIN — VANCOMYCIN HYDROCHLORIDE SCH MLS/HR: 1 INJECTION, POWDER, LYOPHILIZED, FOR SOLUTION INTRAVENOUS at 22:10

## 2022-03-11 NOTE — PHARMACY REPORT
Pharmacy Vanc AUC Short Note





- Date of Service


March 11, 2022








- Assessment & Plan


Assessment


* Mr Ferrer is a 61 year old M receiving Vancomycin for treatment of 

  UTI/bacteremia. 


* Pertinent microbiologic data includes:   BCx with GPC in chains in 1/2 

  bottles,  UCx pending 


* PMH includes:  hx prostate CA, radiation cystitis, ongoing Lupron therapy, 

  cirrhosis, COPD, seizure disorder


* Pt presents with gross hematuria/acute blood loss anemia.


* Urology has been consulted.  No surgical intervention is indicated at this 

  time.  Pt started on CBI yesterday, which has been clearing urine nicely.  

  They are hopeful to continue conservative measures and avoid OR.








Plan


Vancomycin


* AUC/MYRANDA is the preferred PK/PD target for vancomycin


* AUC guided dosing is effective and associated with decreased risk of 

  nephrotoxicity compared to traditional trough targets


* Vancomycin 1500mg (~23mg/kg) IV x1, then:


* Vanc 750mg (~11.5mg/kg) IV q12h


* This regimen is anticipated to produce a trough level of 15.2 mcg/mL, and is 

  predicted to achieve target AUC/MYRANDA of 400-600 mg/L.hr.  It may be associated 

  with a 10% risk of nephrotoxicity


* Trough level has been ordered prior to the 4th maintenance dose.





Pharmacy will continue to follow and will adjust dose/frequency as necessary.  

Thank you.

## 2022-03-11 NOTE — UROLOGY PROGRESS NOTE
Date of Service


March 11, 2022


 





Assessment & Plan


(1) Gross hematuria: 


      Plan: 


 62yo M with a history of prostate cancer and prior radiation therapy admitted 

with acute blood loss anemia, gross hematuria, LARA. 





- Plan of care reviewed with Dr. Pappas, on-call urologist.


- Pt remains afebrile, labs reviewed - Wbc and creatinine normal, Hemoglobin 7.9

(9.3 yesterday).


- Urine culture pending; Blood cultures prelim with gram positive cocci, started

on IV Vanco.  Continue antibiotics, follow culture.


- Gross hematuria in the setting of prior radiation, now causing radiation 

cystitis.


- Manual clot evacuation yesterday at bedside and pt started on CBI. 


- Alas catheter currently intact and draining clear yellow to light pink urine 

with CBI on slow-moderate rate.


- Urine appears to be clearing nicely on CBI.  Will continue CBI on slow drip 

and monitor closely. 


- OK to gently hand irrigate as needed for clots, retention, suprapubic pain.


- No surgical intervention indicated at present.


- OK to have diet back for today from  standpoint.  Will plan for NPO again at

midnight and will reassess in AM.


- Continue supportive care and B&O suppositories prn for bladder pain/spasms. 


- Continue to trend labs. Monitor H&H, transfuse as felt necessary per primary 

team.


- Will continue to follow closely with primary team. 


Admission and Anticipated Discharge Date


Admission Date: 


March 9, 2022








Supervising Physician


Co-Signing Physician Notes


Discussed patient with LANCE.  Agree with plan.





Ideally would like to avoid OR if at all possible, so will continue conservative

measures with CBI at this time.  The bladder is typically globally inflamed and 

there is no specific area to cauterize if taken to the OR, which makes surgical 

control less effective.  He appears to be improving, which is promising.  Will 

re-evaluate urine tomorrow.  Make him NPO at midnight in the event that he would

worsen and need cystoscopy with clot evacuation and fulguration tomorrow. 





Subjective


Pt examined at bedside this AM


Awake, resting in bed on arrival.


No acute issues overnight.


No fevers.


Alas catheter intact, draining clear yellow to light pink urine. A few 

occasional small clots noted in tubing.  CBI on slow-mod. 


Denies any pain or discomfort.  No bladder pain or cramping at present. 


No nausea or vomiting.


Has been NPO.  





Review of Systems








Constitutional:   as per Subjective / HPI  


 


Gastrointestinal:   as per Subjective / HPI  


 


Genitourinary:   + as per Subjective / HPI  








Physical Exam


2





Constitutional:   no acute distress  


 


Respiratory:   normal respiratory effort; no respiratory distress and no labored

breathing  


 


Cardiovascular:   Extremities: no calf tenderness  


 


Gastrointestinal (Abdomen):   Inspection/Auscultation: abdomen normal to 

inspection  


 


Skin:   No visible rashes or lesions to exposed skin areas


 


Neurologic:   moves all extremities and awake  


 


Psychiatric:   Orientation: alert, oriented to person, oriented to place and 

cooperative  


 


Genitourinary:   Alas catheter intact, draining light pink to yellow urine with

CBI on slow-moderate rate.











Results & Data ([facility])


Vital Signs (Past 12 Hours)


                                   Vital Signs











  Temp Pulse Pulse Resp BP BP Pulse Ox


 


 03/11/22 07:55  36.7 C   84  18   100/61  99


 


 03/11/22 07:04   91 H     


 


 03/11/22 03:42  36.5 C   89  20   107/57 L  97


 


 03/10/22 23:04  36.7 C   102 H  18  93/51 L   97














PG Care Time/CCT


Total # of Minutes Spent


Total Time Spent with Patient: 


Total time spent is greater than 50% in coordination of care (as documented) at 

patient's floor/unit and/or counseling patient:








Coding


Level of Care Code


28912 Subseq Hosp Care Lvl 2





Diagnoses


Gross hematuria  R31.0

## 2022-03-12 LAB
ALBUMIN SERPL-MCNC: 2.6 GM/DL (ref 3.4–5)
ALBUMIN/GLOB SERPL: 0.7 {RATIO} (ref 0.9–2)
ALP SERPL-CCNC: 128 U/L (ref 34–104)
ALT SERPL-CCNC: 15 U/L (ref 7–52)
ANION GAP SERPL CALC-SCNC: 5 MMOL/L (ref 3–11)
BILIRUB SERPL-MCNC: 1.5 MG/DL (ref 0.2–1)
BUN SERPL-MCNC: 14 MG/DL (ref 6–23)
CALCIUM SERPL-MCNC: 7.1 MG/DL (ref 8.5–10.1)
CHLORIDE SERPL-SCNC: 109 MMOL/L (ref 98–107)
CO2 SERPL-SCNC: 20 MMOL/L (ref 21–32)
CREAT CL PREDICTED SERPL C-G-VRATE: 90.2 ML/MIN
DEPRECATED RDW RBC: 60.2 FL (ref 36.4–46.3)
GLOBULIN SER CALC-MCNC: 3.5 GM/DL (ref 2.5–4)
GLUCOSE SERPL-MCNC: 91 MG/DL
HCT VFR BLD AUTO: 25.4 % (ref 42–52)
HGB BLD-MCNC: 8.2 G/DL (ref 14–18)
IMM GRANULOCYTES # BLD: 0.01 K/UL (ref 0–0.02)
IMM GRANULOCYTES NFR BLD AUTO: 0.2 %
MCH RBC QN AUTO: 30.5 PG (ref 25–34)
MCV RBC: 94.4 FL (ref 80–100)
PLATELET # BLD AUTO: 125 K/UL (ref 130–400)
POTASSIUM SERPL-SCNC: 3.7 MMOL/L (ref 3.5–5.1)
PROT SERPL-MCNC: 6.1 GM/DL (ref 6–8.3)
RBC # BLD AUTO: 2.69 M/UL (ref 4.7–6.1)
SODIUM SERPL-SCNC: 134 MMOL/L (ref 136–145)
WBC # BLD AUTO: 5.97 K/UL (ref 4.8–10.8)

## 2022-03-12 RX ADMIN — FOLIC ACID SCH MG: 1 TABLET ORAL at 08:57

## 2022-03-12 RX ADMIN — Medication SCH MG: at 08:58

## 2022-03-12 RX ADMIN — CALCIUM CARBONATE SCH MG: 1250 SUSPENSION ORAL at 08:56

## 2022-03-12 RX ADMIN — VANCOMYCIN HYDROCHLORIDE SCH MLS/HR: 1 INJECTION, POWDER, LYOPHILIZED, FOR SOLUTION INTRAVENOUS at 08:59

## 2022-03-12 RX ADMIN — CEFEPIME SCH MLS/MIN: 2 INJECTION, POWDER, FOR SOLUTION INTRAVENOUS at 12:01

## 2022-03-12 RX ADMIN — NICOTINE SCH MG: 21 PATCH, EXTENDED RELEASE TRANSDERMAL at 08:58

## 2022-03-12 RX ADMIN — CEFEPIME SCH MLS/MIN: 2 INJECTION, POWDER, FOR SOLUTION INTRAVENOUS at 21:00

## 2022-03-12 RX ADMIN — CALCIUM CARBONATE SCH MG: 1250 SUSPENSION ORAL at 21:01

## 2022-03-12 RX ADMIN — VANCOMYCIN HYDROCHLORIDE SCH MLS/HR: 1 INJECTION, POWDER, LYOPHILIZED, FOR SOLUTION INTRAVENOUS at 21:02

## 2022-03-12 RX ADMIN — FLUTICASONE PROPIONATE SCH SPRAYS: 50 SPRAY, METERED NASAL at 08:56

## 2022-03-12 NOTE — UROLOGY PROGRESS NOTE
Date of Service


March 12, 2022


 





Assessment & Plan


(1) Hematuria: 


(2) Prostate cancer: 


(3) Radiation cystitis: 


      Plan: 





 62yo M with a history of prostate cancer and prior radiation therapy admitted 

with acute blood loss anemia, gross hematuria, LARA.





- Pt remains afebrile, labs reviewed - Wbc and creatinine normal, Hemoglobin 

0.82 (7.9 yesterday).


- Urine culture pending but prelim no growth Blood cultures prelim with gram 

positive cocci, started on IV Vanco. Continue antibiotics, follow culture.


- Gross hematuria in the setting of prior radiation, now likely due to radiation

cystitis.


- Manual clot evacuation yesterday at bedside and pt started on CBI.


- Alas catheter currently intact and draining clear yellow to light pink urine 

with CBI on slow drip.  Spoke with nursing and recommended that they clamp him 

at some time today and have him walk to further evaluate urine characteristics.


- Urine appears to be clearing nicely on CBI. Will continue CBI on slow drip 

and monitor closely.


- OK to gently hand irrigate as needed for clots, retention, suprapubic pain.


- No surgical intervention indicated at present.


- OK to have diet back for today from  standpoint. Will plan for NPO again at

midnight and will reassess in AM.


- Continue supportive care and B&O suppositories prn for bladder pain/spasms.


- Continue to trend labs. Monitor H&H, transfuse as felt necessary per primary 

team.


- Will continue to follow closely with primary team.


Admission and Anticipated Discharge Date


Admission Date: 


March 9, 2022








Subjective


Patient seen on rounds today.  Denies any complaints.  Nursing reports no issues

with catheter.  Hemodynamically stable this morning.  Hemoglobin is 8.2, was 

previously 7.9.  Creatinine is stable at 0.8.  Urine culture is prelim no 

growth.  1 out of 2 blood cultures has gram-positive cocci in chains and repeat 

blood cultures are pending.  He is currently on vancomycin.





Urine was light pink on slow drip.  I turned up to CBI to flush out his bladder 

and 1 old clot came out.  Urine was then crystal-clear.  CBI was turned back 

down to very slow drip.  Told nursing to clamp this at some point today and have

him walk to evaluate the characterization of his urine. 





Review of Systems








Review of Systems:   14 point review of systems negative outside of what is 

listed above in HPI








Physical Exam








Physical Exam:   General: Alert and oriented, no acute distress


HEENT: Normocephalic, mucous membranes moist


Pulmonary: Nonlabored respirations


Abdomen: Nondistended, soft, nontender


: Three-way catheter draining pink-tinged urine on slow drip.


Extremities: Moves all 4 spontaneously


Neuro: No gross deficits


Skin: Warm, dry, no rashes noted








Results & Data ([facility])


Vital Signs (Past 12 Hours)


                                   Vital Signs











  Temp Pulse Pulse Pulse Resp BP Pulse Ox


 


 03/12/22 07:32  36.6 C   92 H   17  138/80  98


 


 03/12/22 03:54  37.1 C    90  20  103/62  96


 


 03/11/22 23:59   94 H     


 


 03/11/22 23:33  37.1 C    89  16  100/64  97














PG Care Time/CCT


Total # of Minutes Spent


Total Time Spent with Patient: 


Total time spent is greater than 50% in coordination of care (as documented) at 

patient's floor/unit and/or counseling patient:








Coding


Level of Care Code


Established [place] 13902 Subseq Hosp Care Lvl 2


Patient Type


Established





Diagnoses


Hematuria  R31.9


      Hematuria type: unspecified type


Prostate cancer  C61


Radiation cystitis  N30.40





(1) Hematuria


  Hematuria type: unspecified type  Qualified Code(s): R31.9 - Hematuria, 

unspecified

## 2022-03-12 NOTE — HOSPITALIST PROGRESS NOTE
Date of Service


March 12, 2022


 





Assessment & Plan


(1) Acute blood loss anemia: 


      Plan: 


Hb 2.9 on admission (uncertain if this is real)


-s/p 4 units packed RBC, repeat Hb 9.3 --> down trended to 7.9 yesterday--> 8.2 

today 


-No active bleed currently.  Repeat CBC daily.  Transfuse if Hb< 7 or if 

symptoms


 





(2) Gross hematuria: 


      Plan: 


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


-Appreciate Urology input.  Manual clot evacution bedside 3/10.  


-UA reviewed.  Urine culture so far negative


(3) LARA (acute kidney injury): 


      Plan: 


Likely due to dehydration


improved


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 


(5) H/O ETOH abuse: 


      Plan: 


Continue folic acid and thiamine


(6) Prostate cancer: 


(7) Cirrhosis: 


      Plan: 


Appears compensated


(8) Bacteremia: 


      Plan: 


started vancomycin 3/11.  Cefepime added today


Will await final blood culture data


Repeat blood culture obtained today


      Plan: 


DVT PPX


-SCDs for now





Disposition


-Per , Adult Protective Services is involved in his case and has a court order

Monday or Tuesday next week.  He will need to remain in the hospital in the 

meantime, they are concerned he is not safe to return home.


Admission and Anticipated Discharge Date


Admission Date: 


March 9, 2022








Subjective


Yesterday notified by lab that blood cultures +gram positive cocci, now changed 

to gram negative bacilli


Currently on vancomycin and cefepime started


Remains afebrile, denies chills


CBI remains clear 





Physical Exam








Physical Exam:   Appears older than stated age, non toxic


 


Respiratory:   breathing comfortably on room air, no wheezing/rhonchi/rales


 


Cardiovascular:   mildly tachycardic, no murmurs/rubs/gallops


 


Gastrointestinal (Abdomen):   soft, non tender


 


Musculoskeletal:   no edema


 


Genitourinary:   CBI draining clear urine.  Urine in bag is clear and pink , no 

clots noted








Results & Data


Results & Data ([facility])


Vital Signs (Past 12 Hours)


                                   Vital Signs











  Temp Pulse Resp BP Pulse Ox


 


 03/12/22 15:40  36.7 C  103 H  18  102/63  98


 


 03/12/22 11:50  37.2 C  98 H  19  99/63 L  98


 


 03/12/22 07:32  36.6 C  92 H  17  138/80  98











Laboratory Results





                                    Short CBC











  03/12/22 Range/Units





  07:20 


 


WBC  5.97  (4.8-10.8)  K/uL


 


Hgb  8.2 L  (14.0-18.0)  g/dL


 


Hct  25.4 L  (42-52)  %


 


Plt Count  125 L  (130-400)  K/uL








                                       BMP











  03/12/22





  07:20


 


Sodium  134 L


 


Potassium  3.7


 


Chloride  109 H


 


Carbon Dioxide  20 L


 


BUN  14


 


Creatinine  0.80


 


Glucose  91


 


Calcium  7.1 L








                                 Liver Function











  03/12/22 Range/Units





  07:20 


 


Total Bilirubin  1.5 H  (0.2-1.0)  mg/dl


 


AST  27  (13-39)  U/L


 


ALT  15  (7-52)  U/L


 


Alkaline Phosphatase  128 H  ()  U/L


 


Albumin  2.6 L  (3.4-5.0)  gm/dl











Medications Administered





                          Current Inpatient Medications





Belladonna Alkaloids/Opium (Belladonna/Opium Supp 60 Mg Supp)  60 mg [place] Q6 PRN


   PRN Reason: Bladder pain


   Stop: 03/24/22 12:06


   Last Admin: 03/10/22 12:30 Dose:  60 mg


   Documented by: 


Calcium Carbonate (Calcium Carbonate 1,250 Mg/5 Ml Udc)  1,250 mg PO BID [facility]


   Stop: 04/10/22 09:29


   Last Admin: 03/12/22 08:56 Dose:  1,250 mg


   Documented by: 


Fluticasone Propionate (Fluticasone Propionate Na Spr 16 Gm Btl)  2 sprays NA 

DAILY [facility]


   Stop: 04/09/22 08:59


   Last Admin: 03/12/22 08:56 Dose:  2 sprays


   Documented by: 


Folic Acid (Folic Acid 1 Mg Tab)  1 mg PO QAM [facility]


   Stop: 04/09/22 08:59


   Last Admin: 03/12/22 08:57 Dose:  1 mg


   Documented by: 


Vancomycin HCl 750 mg/ Sodium (Chloride)  265 mls @ 200 mls/hr IV Q12H [facility]


   Stop: 03/25/22 21:59


   Last Infusion: 03/12/22 10:21 Dose:  Infused


   Documented by: 


Cefepime HCl 2,000 mg/ Syringe  20 mls @ 5 mls/min IV Q8H [facility]; Protocol


   Stop: 03/26/22 11:59


   Last Admin: 03/12/22 12:01 Dose:  5 mls/min


   Documented by: 


Miscellaneous (Remove Nicoderm Patch)  1 ea N/A DAILY@0859 [facility]


   Stop: 04/09/22 08:58


   Last Admin: 03/12/22 08:56 Dose:  1 ea


   Documented by: 


Miscellaneous Information (Vancomycin Consult Active)  1 ea N/A UD PRN


   PRN Reason: Consult


   Stop: 04/10/22 12:16


Nicotine (Nicotine 21 Mg/24 Hr Tdsy)  21 mg TD QAM [facility]


   Stop: 04/09/22 08:59


   Last Admin: 03/12/22 08:58 Dose:  21 mg


   Documented by: 


Saccharomyces Boulardii (Saccharomyces Boulardii 250 Mg Cap)  250 mg PO DAILY 

[facility]


   Stop: 04/11/22 08:59


   Last Admin: 03/12/22 08:58 Dose:  250 mg


   Documented by: 


Thiamine HCl (Thiamine Hcl 100 Mg Tab)  100 mg PO QAM [facility]


   Stop: 04/09/22 08:59


   Last Admin: 03/12/22 08:58 Dose:  100 mg


   Documented by: 


Trazodone HCl (Trazodone Hcl 100 Mg Tab)  300 mg PO HS [facility]


   Stop: 04/08/22 21:37


   Last Admin: 03/11/22 19:34 Dose:  300 mg


   Documented by:

## 2022-03-13 LAB
ALBUMIN SERPL-MCNC: 2.3 GM/DL (ref 3.4–5)
ALBUMIN/GLOB SERPL: 0.7 {RATIO} (ref 0.9–2)
ALP SERPL-CCNC: 112 U/L (ref 34–104)
ALT SERPL-CCNC: 13 U/L (ref 7–52)
ANION GAP SERPL CALC-SCNC: 4 MMOL/L (ref 3–11)
BILIRUB SERPL-MCNC: 1 MG/DL (ref 0.2–1)
BUN SERPL-MCNC: 15 MG/DL (ref 6–23)
CALCIUM SERPL-MCNC: 7 MG/DL (ref 8.5–10.1)
CHLORIDE SERPL-SCNC: 110 MMOL/L (ref 98–107)
CO2 SERPL-SCNC: 20 MMOL/L (ref 21–32)
CREAT CL PREDICTED SERPL C-G-VRATE: 97.5 ML/MIN
DEPRECATED RDW RBC: 61.4 FL (ref 36.4–46.3)
GLOBULIN SER CALC-MCNC: 3.2 GM/DL (ref 2.5–4)
GLUCOSE SERPL-MCNC: 95 MG/DL
HCT VFR BLD AUTO: 23 % (ref 42–52)
HGB BLD-MCNC: 7.4 G/DL (ref 14–18)
IMM GRANULOCYTES # BLD: 0.01 K/UL (ref 0–0.02)
IMM GRANULOCYTES NFR BLD AUTO: 0.2 %
MCH RBC QN AUTO: 30.7 PG (ref 25–34)
MCV RBC: 95.4 FL (ref 80–100)
PLATELET # BLD AUTO: 102 K/UL (ref 130–400)
POLYCHROMASIA BLD QL SMEAR: (no result)
POTASSIUM SERPL-SCNC: 3.9 MMOL/L (ref 3.5–5.1)
PROT SERPL-MCNC: 5.5 GM/DL (ref 6–8.3)
RBC # BLD AUTO: 2.41 M/UL (ref 4.7–6.1)
SODIUM SERPL-SCNC: 134 MMOL/L (ref 136–145)
WBC # BLD AUTO: 5.88 K/UL (ref 4.8–10.8)

## 2022-03-13 RX ADMIN — CALCIUM CARBONATE SCH MG: 1250 SUSPENSION ORAL at 09:35

## 2022-03-13 RX ADMIN — Medication SCH MG: at 09:37

## 2022-03-13 RX ADMIN — NICOTINE SCH MG: 21 PATCH, EXTENDED RELEASE TRANSDERMAL at 09:36

## 2022-03-13 RX ADMIN — CEFEPIME SCH MLS/MIN: 2 INJECTION, POWDER, FOR SOLUTION INTRAVENOUS at 04:22

## 2022-03-13 RX ADMIN — CEFEPIME SCH MLS/MIN: 2 INJECTION, POWDER, FOR SOLUTION INTRAVENOUS at 19:53

## 2022-03-13 RX ADMIN — CALCIUM CARBONATE SCH MG: 1250 SUSPENSION ORAL at 19:55

## 2022-03-13 RX ADMIN — FOLIC ACID SCH MG: 1 TABLET ORAL at 09:36

## 2022-03-13 RX ADMIN — FLUTICASONE PROPIONATE SCH: 50 SPRAY, METERED NASAL at 09:36

## 2022-03-13 RX ADMIN — VANCOMYCIN HYDROCHLORIDE SCH MLS/HR: 1 INJECTION, POWDER, LYOPHILIZED, FOR SOLUTION INTRAVENOUS at 21:43

## 2022-03-13 RX ADMIN — VANCOMYCIN HYDROCHLORIDE SCH MLS/HR: 1 INJECTION, POWDER, LYOPHILIZED, FOR SOLUTION INTRAVENOUS at 09:34

## 2022-03-13 RX ADMIN — CEFEPIME SCH MLS/MIN: 2 INJECTION, POWDER, FOR SOLUTION INTRAVENOUS at 11:47

## 2022-03-13 NOTE — UROLOGY PROGRESS NOTE
Date of Service


March 13, 2022


 





Assessment & Plan


(1) Radiation cystitis: 


(2) Hematuria: 


(3) Prostate cancer: 


      Plan: 








 62yo M with a history of prostate cancer and prior radiation therapy admitted 

with acute blood loss anemia, gross hematuria, LARA.





- Pt remains afebrile, labs reviewed - Wbc and creatinine normal, Hemoglobin 7.4

(8.2 yesterday).


- Urine culture finalized no growth. Blood cultures prelim with gram negative 

bacilli now, started on IV Vanco with cefepime now added. Continue antibiotics,

follow culture.


- Gross hematuria in the setting of prior radiation, now likely due to radiation

cystitis.  Resolved


- Manual clot evacuation yesterday at bedside and pt started on CBI.


- Alas catheter currently intact and draining clear yellow with CBI off since 

3/12.


- Urine has cleared nicely on CBI.


- OK to gently hand irrigate as needed for clots, retention, suprapubic pain.


- No surgical intervention indicated at present.


- OK to have diet back for today from  standpoint,


- Continue supportive care and B&O suppositories prn for bladder pain/spasms.


- Continue to trend labs. Monitor H&H, transfuse as felt necessary per primary 

team.


-Ideally patient will be discharged in next few days so he can keep his 

[facility] urologic appointment in the next week to discuss radiation cystitis.


- Urology to see tomorrow morning.  If urine remains clear, stable for discharge

from urologic perspective.


Admission and Anticipated Discharge Date


Admission Date: 


March 9, 2022








Subjective


Saw patient on rounds today.  Hemodynamically stable.  No complaints.  Urine is 

clear yellow off CBI.  Hemoglobin 7.4.  Creatinine stable. 





Review of Systems








Review of Systems:   14 point review of systems negative outside of what is 

listed above in HPI








Physical Exam








Physical Exam:   General: Alert and oriented, no acute distress


HEENT: Normocephalic, mucous membranes moist


Cardiovascular: Regular rate


Pulmonary: Nonlabored respirations


Abdomen: Nondistended


: Three-way catheter draining clear yellow urine off CBI


Extremities: Moves all 4 spontaneously


Neuro: No gross deficits


Skin: Warm, dry, no rashes noted








Results & Data ([facility])


Vital Signs (Past 12 Hours)


                                   Vital Signs











  Temp Pulse Pulse Pulse Resp BP BP


 


 03/13/22 07:18  36.6 C   88   17   119/68


 


 03/13/22 04:33  36.7 C    86  16  127/76 


 


 03/13/22 01:43   97 H     


 


 03/12/22 23:51  36.7 C    93 H  16  115/66 


 


 03/12/22 20:44  36.8 C    95 H  16  117/66 














  Pulse Ox


 


 03/13/22 07:18  99


 


 03/13/22 04:33  97


 


 03/13/22 01:43 


 


 03/12/22 23:51  98


 


 03/12/22 20:44  99














PG Care Time/CCT


Total # of Minutes Spent


Total Time Spent with Patient: 


Total time spent is greater than 50% in coordination of care (as documented) at 

patient's floor/unit and/or counseling patient:








Coding


Level of Care Code


Establ[place] [address]98 Subseq Hosp Care Lvl 2


Patient Type


Established





Diagnoses


Radiation cystitis  N30.40


Hematuria  R31.9


      Hematuria type: unspecified type


Prostate cancer  C61





(1) Hematuria


  Hematuria type: unspecified type  Qualified Code(s): R31.9 - Hematuria, 

unspecified

## 2022-03-13 NOTE — PHARMACY REPORT
Pharmacy Vanc AUC Short Note





- Date of Service


March 13, 2022








- Assessment & Plan


Assessment








61 year old M receiving IV vancomycin for treatment of bacteremia. Pertinent 

microbiologic data includes: Blood culture on 3/9 previously shown to be growing

GPC chains, but later changed by lab to be growing gram negative bacilli; repeat

blood culture shows no growth; urine culture shows no growth. 


Day #3 of antimicrobial therapy.











Plan








Vancomycin


* AUC/MYRANDA is the preferred PK/PD target for vancomycin


* AUC guided dosing is effective and associated with decreased risk of 

  nephrotoxicity compared to traditional trough targets


* Current vancomycin regimen is 750 mg IV q12h; resulted in a trough of 12.2, 

  which had a predicated target AUC/MCI less than 400 (379)


* Trough level of 11.4 mcg/mL is predicted to achieve target AUC/MYRANDA of 400-600 

  mg/L.hr and may be associated with a 7 % risk of nephrotoxicity


* Continue dose of 750 mg IV every 12 hours due to no growth in repeat cultures


* May increase dose if there is a growth in any of the cultures


* Trough level would be ordered prior to the 4th dose or sooner if renal 

  function changes





Cefepime


* 2g IV q8h - appropriate for the gram negative bacilli potentially growing in 

  the blood culture from 3/9





Pharmacy will continue to follow and will adjust dose/frequency as necessary.  

Thank you.

## 2022-03-13 NOTE — HOSPITALIST PROGRESS NOTE
Date of Service


March 13, 2022


 





Assessment & Plan


(1) Acute blood loss anemia: 


      Plan: 


Hb 2.9 on admission (uncertain if this is real)


-s/p 4 units packed RBC, repeat Hb 9.3 --> down trended to 7.9 -> 8.2--> 7.4 

today 


-No active bleed currently.  Repeat CBC daily.  


-Transfuse if Hb< 7 or if symptoms


 





(2) Gross hematuria: 


      Plan: 


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


-Appreciate Urology input.  Manual clot evacution bedside 3/10.  


-UA reviewed.  Urine culture so far negative


(3) LARA (acute kidney injury): 


      Plan: 


Likely due to dehydration


improved


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 


(5) H/O ETOH abuse: 


      Plan: 


Continue folic acid and thiamine


(6) Prostate cancer: 


(7) Cirrhosis: 


      Plan: 


Appears compensated


(8) Bacteremia: 


      Plan: 


started vancomycin 3/11.  Cefepime added 3/12


Will await final blood culture data


Repeat blood culture obtained 3/12


      Plan: 


DVT PPX


-SCDs for now





Disposition


-Per CM, Adult Protective Services is involved in his case and has a court order

Monday or Tuesday next week.  He will need to remain in the hospital in the 

meantime, they are concerned he is not safe to return home.


Admission and Anticipated Discharge Date


Admission Date: 


March 9, 2022








Subjective


CBI clear today


No issues overnight


Patient remains afebrile, no chills, overall feels well 





Physical Exam








Physical Exam:   Appears older than stated age, no acute distress


 


Respiratory:   breathing comfortably on room air, no wheezing/rhonchi/rales


 


Cardiovascular:   regular rate and rhythm, no murmurs/rubs


 


Gastrointestinal (Abdomen):   soft, non tender, non distended


 


Musculoskeletal:   no edema


 


Neurologic:   awake, alert, spontaneously moving extremities


 


Genitourinary:   CBI draining clear urine








Results & Data


Results & Data ([facility])


Vital Signs (Past 12 Hours)


                                   Vital Signs











  Temp Pulse Pulse Pulse Resp BP BP


 


 03/13/22 11:31  36.6 C   104 H   20  97/60 L 


 


 03/13/22 07:18  36.6 C   88   17   119/68


 


 03/13/22 04:33  36.7 C    86  16  127/76 


 


 03/13/22 01:43   97 H     


 


 03/12/22 23:51  36.7 C    93 H  16  115/66 














  Pulse Ox


 


 03/13/22 11:31  100


 


 03/13/22 07:18  99


 


 03/13/22 04:33  97


 


 03/13/22 01:43 


 


 03/12/22 23:51  98











Laboratory Results





                                    Short CBC











  03/13/22 Range/Units





  07:10 


 


WBC  5.88  (4.8-10.8)  K/uL


 


Hgb  7.4 L  (14.0-18.0)  g/dL


 


Hct  23.0 L  (42-52)  %


 


Plt Count  102 L  (130-400)  K/uL








                                       BMP











  03/13/22





  07:10


 


Sodium  134 L


 


Potassium  3.9


 


Chloride  110 H


 


Carbon Dioxide  20 L


 


BUN  15


 


Creatinine  0.75


 


Glucose  95


 


Calcium  7.0 L








                                 Liver Function











  03/13/22 Range/Units





  07:10 


 


Total Bilirubin  1.0  D  (0.2-1.0)  mg/dl


 


AST  21  (13-39)  U/L


 


ALT  13  (7-52)  U/L


 


Alkaline Phosphatase  112 H  ()  U/L


 


Albumin  2.3 L  (3.4-5.0)  gm/dl











Medications Administered





                          Current Inpatient Medications





Belladonna Alkaloids/Opium (Belladonna/Opium Supp 60 Mg Supp)  60 mg [place] Q6 PRN


   PRN Reason: Bladder pain


   Stop: 03/24/22 12:06


   Last Admin: 03/10/22 12:30 Dose:  60 mg


   Documented by: 


Calcium Carbonate (Calcium Carbonate 1,250 Mg/5 Ml Udc)  1,250 mg PO BID ANDRE


   Stop: 04/10/22 09:29


   Last Admin: 03/13/22 09:35 Dose:  1,250 mg


   Documented by: 


Fluticasone Propionate (Fluticasone Propionate Na Spr 16 Gm Btl)  2 sprays NA 

DAILY ANDRE


   Stop: 04/09/22 08:59


   Last Admin: 03/13/22 09:36 Dose:  Not Given


   Documented by: 


Folic Acid (Folic Acid 1 Mg Tab)  1 mg PO QAM [facility]RE


   Stop: 04/09/22 08:59


   Last Admin: 03/13/22 09:36 Dose:  1 mg


   Documented by: 


Vancomycin HCl 750 mg/ Sodium (Chloride)  265 mls @ 200 mls/hr IV Q12H ANDRE


   Stop: 03/25/22 21:59


   Last Infusion: 03/13/22 11:01 Dose:  Infused


   Documented by: 


Cefepime HCl 2,000 mg/ Syringe  20 mls @ 5 mls/min IV Q8H [facility]; Protocol


   Stop: 03/26/22 11:59


   Last Admin: 03/13/22 11:47 Dose:  5 mls/min


   Documented by: 


Miscellaneous (Remove Nicoderm Patch)  1 ea N/A DAILY@0859 [facility]


   Stop: 04/09/22 08:58


   Last Admin: 03/13/22 09:35 Dose:  1 ea


   Documented by: 


Miscellaneous Information (Vancomycin Consult Active)  1 ea N/A UD PRN


   PRN Reason: Consult


   Stop: 04/10/22 12:16


Nicotine (Nicotine 21 Mg/24 Hr Tdsy)  21 mg TD QAM [facility]


   Stop: 04/09/22 08:59


   Last Admin: 03/13/22 09:36 Dose:  21 mg


   Documented by: 


Saccharomyces Boulardii (Saccharomyces Boulardii 250 Mg Cap)  250 mg PO DAILY 

[facility]


   Stop: 04/11/22 08:59


   Last Admin: 03/13/22 09:37 Dose:  250 mg


   Documented by: 


Saccharomyces Boulardii (Saccharomyces Boulardii 250 Mg Cap)  250 mg PO DAILY 

[facility]


   Stop: 04/12/22 08:59


   Last Admin: 03/13/22 09:37 Dose:  250 mg


   Documented by: 


Thiamine HCl (Thiamine Hcl 100 Mg Tab)  100 mg PO QAM [facility]


   Stop: 04/09/22 08:59


   Last Admin: 03/13/22 09:37 Dose:  100 mg


   Documented by: 


Trazodone HCl (Trazodone Hcl 100 Mg Tab)  300 mg PO HS [facility]


   Stop: 04/08/22 21:37


   Last Admin: 03/12/22 21:01 Dose:  300 mg


   Documented by:

## 2022-03-14 LAB
ANION GAP SERPL CALC-SCNC: 3 MMOL/L (ref 3–11)
BUN SERPL-MCNC: 12 MG/DL (ref 6–23)
CALCIUM SERPL-MCNC: 8 MG/DL (ref 8.5–10.1)
CHLORIDE SERPL-SCNC: 112 MMOL/L (ref 98–107)
CO2 SERPL-SCNC: 20 MMOL/L (ref 21–32)
CREAT CL PREDICTED SERPL C-G-VRATE: 112.9 ML/MIN
DEPRECATED RDW RBC: 63.3 FL (ref 36.4–46.3)
GLUCOSE SERPL-MCNC: 94 MG/DL
HCT VFR BLD AUTO: 23.2 % (ref 42–52)
HGB BLD-MCNC: 7.4 G/DL (ref 14–18)
MCH RBC QN AUTO: 30.7 PG (ref 25–34)
MCV RBC: 96.3 FL (ref 80–100)
PLATELET # BLD AUTO: 120 K/UL (ref 130–400)
POTASSIUM SERPL-SCNC: 4 MMOL/L (ref 3.5–5.1)
RBC # BLD AUTO: 2.41 M/UL (ref 4.7–6.1)
SODIUM SERPL-SCNC: 135 MMOL/L (ref 136–145)
WBC # BLD AUTO: 4.88 K/UL (ref 4.8–10.8)

## 2022-03-14 RX ADMIN — Medication SCH: at 07:49

## 2022-03-14 RX ADMIN — CALCIUM CARBONATE SCH MG: 1250 SUSPENSION ORAL at 07:48

## 2022-03-14 RX ADMIN — CALCIUM CARBONATE SCH MG: 1250 SUSPENSION ORAL at 22:05

## 2022-03-14 RX ADMIN — Medication SCH MG: at 07:49

## 2022-03-14 RX ADMIN — CEFEPIME SCH MLS/MIN: 2 INJECTION, POWDER, FOR SOLUTION INTRAVENOUS at 20:35

## 2022-03-14 RX ADMIN — FOLIC ACID SCH MG: 1 TABLET ORAL at 07:49

## 2022-03-14 RX ADMIN — Medication SCH MG: at 07:50

## 2022-03-14 RX ADMIN — CEFEPIME SCH MLS/MIN: 2 INJECTION, POWDER, FOR SOLUTION INTRAVENOUS at 03:50

## 2022-03-14 RX ADMIN — CEFEPIME SCH MLS/MIN: 2 INJECTION, POWDER, FOR SOLUTION INTRAVENOUS at 11:51

## 2022-03-14 RX ADMIN — NICOTINE SCH MG: 21 PATCH, EXTENDED RELEASE TRANSDERMAL at 07:47

## 2022-03-14 RX ADMIN — FLUTICASONE PROPIONATE SCH: 50 SPRAY, METERED NASAL at 07:49

## 2022-03-14 NOTE — UROLOGY PROGRESS NOTE
Date of Service


March 14, 2022


 





Assessment & Plan


(1) Gross hematuria: 


(2) Radiation cystitis: 


      Plan: 


60yo M with a history of prostate cancer and prior radiation therapy admitted 

with acute blood loss anemia, gross hematuria, LARA.





- Afebrile, labs reviewed - Wbc and creatinine normal, Hemoglobin 7.4   


- Urine culture finalized no growth. Blood cultures prelim with gram negative 

bacilli, repeat prelim no growth x 48 hours, on IV Vanco and cefepime. 


- Continue antibiotics, follow cultures.


- Pt with gross hematuria likely due to radiation cystitis -  Hematuria has now 

resolved.


- Alas catheter currently intact and draining clear yellow urine with CBI off 

since 3/12.


- No surgical intervention indicated at this time.


- Given urine remains clear, OK to attempt voiding trial prior to discharge and 

monitor ability to void. 


- Ideally patient would be discharged in next few days so he can keep his 

[facility] urologic appointment later this week to discuss radiation cystitis.


- Continue supportive care, antibiotic therapy, and management per primary team.


- Urology will sign-off.  Thank you for allowing us to participate in the acute 

care of Mr. Ferrer. Please reconsult us with additional questions, 

concerns or changes in patient status.





Admission and Anticipated Discharge Date


Admission Date: 


March 9, 2022








Subjective


Pt examined at bedside this AM.


Awake, resting in bed on arrival.


Offers no complaints this morning.


Denies pain.


No fevers.


Alas catheter intact, draining clear yellow urine.  


 





Review of Systems








Constitutional:   as per Subjective / HPI  


 


Genitourinary:   + as per Subjective / HPI  








Physical Exam








Constitutional:   no acute distress  


 


Respiratory:   no respiratory distress and no labored breathing  


 


Gastrointestinal (Abdomen):   Inspection/Auscultation: abdomen normal to 

inspection  


 


Neurologic:   moves all extremities and awake  


 


Psychiatric:   Orientation: alert, oriented x 3 and cooperative  


 


Genitourinary:   Alas catheter intact, draining clear yellow urine








Results & Data ([facility])


Vital Signs (Past 12 Hours)


                                   Vital Signs











  Temp Pulse Pulse Pulse Resp BP BP


 


 03/14/22 07:34  36.7 C   84   14   103/60


 


 03/14/22 04:38  36.6 C    85  14  109/67 


 


 03/14/22 00:15  36.7 C    85  16  114/66 


 


 03/13/22 23:34   93 H     














  Pulse Ox


 


 03/14/22 07:34  98


 


 03/14/22 04:38  97


 


 03/14/22 00:15  97


 


 03/13/22 23:34 














PG Care Time/CCT


Total # of Minutes Spent


Total Time Spent with Patient: 


Total time spent is greater than 50% in coordination of care (as documented) at 

patient's floor/unit and/or counseling patient:








Coding


Level of Care Code


19464 Subseq Hosp Care Lvl 2





Diagnoses


Gross hematuria  R31.0


Radiation cystitis  N30.40

## 2022-03-14 NOTE — HOSPITALIST PROGRESS NOTE
Date of Service


March 14, 2022


 





Assessment & Plan


(1) Acute blood loss anemia: 


      Plan: 


Hb 2.9 on admission (uncertain if this is real)


-s/p 4 units packed RBC, repeat Hb 9.3 --> down trended to 7.9 -> 8.2--> 7.4--> 

7.4


-No active bleed currently.  Repeat CBC daily.  


-Transfuse if Hb< 7 or if symptoms


 





(2) Gross hematuria: 


      Plan: 


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


-Appreciate Urology input.  Manual clot evacution bedside 3/10.  Current on CBI.

 Management per Urology


-UA reviewed.  Urine culture so far negative


(3) LARA (acute kidney injury): 


      Plan: 


Likely due to dehydration


improved


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 


(5) H/O ETOH abuse: 


      Plan: 


Continue folic acid and thiamine


(6) Prostate cancer: 


(7) Cirrhosis: 


      Plan: 


Appears compensated


(8) Bacteremia: 


      Plan: 


Initially report was +GPC then corrected to GNR.  started vancomycin 3/11.  

Cefepime added 3/12.  will d/c vancomycin


Will await final blood culture data


Repeat blood culture obtained 3/12 negative to date


      Plan: 


DVT PPX


-SCDs for now





Disposition


-Per , Adult Protective Services is involved in his case and has a court order

Monday or Tuesday next week.  He will need to remain in the hospital in the 

meantime, they are concerned he is not safe to return home.


Admission and Anticipated Discharge Date


Admission Date: 


March 9, 2022








Subjective


No issues overnight


CBI has remained clear


Denies fever/chills, chest pain, shortness of breath or other complaints 





Physical Exam








Physical Exam:   appears older than stated age, no acute distress, non toxic


 


Respiratory:   breathing comfortably, no wheezing/rhonchi/rales


 


Cardiovascular:   regular rate and rhythm, no murmurs/rubs/gallops


 


Gastrointestinal (Abdomen):   soft, non tender


 


Musculoskeletal:   no edema


 


Neurologic:   awake, alert, spontaneously moving extremities


 


Genitourinary:   CBI clear








Results & Data


Results & Data ([facility])


Vital Signs (Past 12 Hours)


                                   Vital Signs











  Temp Pulse Pulse Resp BP BP Pulse Ox


 


 03/14/22 11:19  37.1 C   88  12  93/57 L   98


 


 03/14/22 07:34  36.7 C  84   14   103/60  98


 


 03/14/22 04:38  36.6 C   85  14  109/67   97











Laboratory Results





                                    Short CBC











  03/14/22 Range/Units





  05:56 


 


WBC  4.88  (4.8-10.8)  K/uL


 


Hgb  7.4 L  (14.0-18.0)  g/dL


 


Hct  23.2 L  (42-52)  %


 


Plt Count  120 L  (130-400)  K/uL








                                       BMP











  03/14/22





  05:56


 


Sodium  135 L


 


Potassium  4.0


 


Chloride  112 H


 


Carbon Dioxide  20 L


 


BUN  12


 


Creatinine  0.67


 


Glucose  94


 


Calcium  8.0 L











Medications Administered





                          Current Inpatient Medications





Belladonna Alkaloids/Opium (Belladonna/Opium Supp 60 Mg Supp)  60 mg [place] Q6 PRN


   PRN Reason: Bladder pain


   Stop: 03/24/22 12:06


   Last Admin: 03/10/22 12:30 Dose:  60 mg


   Documented by: 


Calcium Carbonate (Calcium Carbonate 1,250 Mg/5 Ml Udc)  1,250 mg PO BID [facility]


   Stop: 04/10/22 09:29


   Last Admin: 03/14/22 07:48 Dose:  1,250 mg


   Documented by: 


Fluticasone Propionate (Fluticasone Propionate Na Spr 16 Gm Btl)  2 sprays NA 

DAILY [facility]


   Stop: 04/09/22 08:59


   Last Admin: 03/14/22 07:49 Dose:  Not Given


   Documented by: 


Folic Acid (Folic Acid 1 Mg Tab)  1 mg PO QAM [facility]


   Stop: 04/09/22 08:59


   Last Admin: 03/14/22 07:49 Dose:  1 mg


   Documented by: 


Cefepime HCl 2,000 mg/ Syringe  20 mls @ 5 mls/min IV Q8H [facility]; Protocol


   Stop: 03/26/22 11:59


   Last Admin: 03/14/22 11:51 Dose:  5 mls/min


   Documented by: 


Miscellaneous (Remove Nicoderm Patch)  1 ea N/A DAILY@0859 [facility]


   Stop: 04/09/22 08:58


   Last Admin: 03/14/22 07:48 Dose:  1 ea


   Documented by: 


Nicotine (Nicotine 21 Mg/24 Hr Tdsy)  21 mg TD QAM [facility]


   Stop: 04/09/22 08:59


   Last Admin: 03/14/22 07:47 Dose:  21 mg


   Documented by: 


Saccharomyces Boulardii (Saccharomyces Boulardii 250 Mg Cap)  250 mg PO DAILY 

[facility]


   Stop: 04/11/22 08:59


   Last Admin: 03/14/22 07:49 Dose:  250 mg


   Documented by: 


Saccharomyces Boulardii (Saccharomyces Boulardii 250 Mg Cap)  250 mg PO DAILY [facility]

[facility]


   Stop: 04/12/22 08:59


   Last Admin: 03/14/22 07:49 Dose:  Not Given


   Documented by: 


Thiamine HCl (Thiamine Hcl 100 Mg Tab)  [facility]


   Stop: 04/09/22 08:59


   Last Admin: 03/14/22 07:50 Dose:  100 mg


   Documented by: 


Trazodone HCl (Trazodone Hcl 100 Mg Tab)  300 mg PO [facility]


   Stop: 04/08/22 21:37


   Last Admin: 03/13/22 19:56 Dose:  300 mg


   Documented by:

## 2022-03-15 RX ADMIN — CEFEPIME SCH MLS/MIN: 2 INJECTION, POWDER, FOR SOLUTION INTRAVENOUS at 04:11

## 2022-03-15 RX ADMIN — CALCIUM CARBONATE SCH MG: 1250 SUSPENSION ORAL at 22:35

## 2022-03-15 RX ADMIN — FOLIC ACID SCH MG: 1 TABLET ORAL at 07:54

## 2022-03-15 RX ADMIN — Medication SCH: at 07:55

## 2022-03-15 RX ADMIN — CEFEPIME SCH MLS/MIN: 2 INJECTION, POWDER, FOR SOLUTION INTRAVENOUS at 11:56

## 2022-03-15 RX ADMIN — NICOTINE SCH MG: 21 PATCH, EXTENDED RELEASE TRANSDERMAL at 07:51

## 2022-03-15 RX ADMIN — CALCIUM CARBONATE SCH MG: 1250 SUSPENSION ORAL at 07:54

## 2022-03-15 RX ADMIN — Medication SCH MG: at 07:55

## 2022-03-15 RX ADMIN — CEFEPIME SCH MLS/MIN: 2 INJECTION, POWDER, FOR SOLUTION INTRAVENOUS at 22:33

## 2022-03-15 RX ADMIN — FLUTICASONE PROPIONATE SCH: 50 SPRAY, METERED NASAL at 07:54

## 2022-03-15 RX ADMIN — Medication SCH MG: at 07:54

## 2022-03-15 NOTE — HOSPITALIST PROGRESS NOTE
Date of Service


March 15, 2022


 





Assessment & Plan


(1) Acute blood loss anemia: 


      Plan: 


Hb 2.9 on admission (uncertain if this is real)


-s/p 4 units packed RBC, repeat Hb 9.3 --> down trended to 7.9 -> 8.2--> 7.4--> 

7.4


-No active bleed currently.  


-Transfuse if Hb< 7 or if symptoms


-will repeat CBC tomorrow





 





(2) Gross hematuria: 


      Plan: 


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


-Appreciate Urology input.  Manual clot evacuation bedside 3/10.  cleared on 

CBI.  Urine has been clear for several days now.  Discussed with Urology, ok to 

remove mendez catheter.


-He needs to follow up with Urology OP


-UA reviewed.  Urine culture so far negative


(3) LARA (acute kidney injury): 


      Plan: 


Likely due to dehydration


improved


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 


(5) H/O ETOH abuse: 


      Plan: 


Continue folic acid and thiamine


(6) Prostate cancer: 


(7) Cirrhosis: 


      Plan: 


Appears compensated


(8) Bacteremia: 


      Plan: 


Initially report was +GPC then corrected to GNR.  started vancomycin 3/11--later

discontinued.  Cefepime added 3/12.    


Will await final blood culture data


Repeat blood culture obtained 3/12 negative to date


-discussed with Microbiology today, culture from 3/9 was sent out for 

identification, will expect final result by Friday.


-likely contaminant, but will continue Cefepime until final culture result


      Plan: 


DVT PPX


-SCDs for now, he is at increased risk for DVT with prolonged hospitalization.  

will start SQ heparin BID tomorrow





Disposition


-Per CM, Adult Protective Services is involved in his case and there is concern 

he is unsafe to return home.  We are still waiting to hear back from [employer].  In 

the meantime, he needs to remain here.  Patient refusing SNF.   


Admission and Anticipated Discharge Date


Admission Date: 


March 9, 2022








Subjective


No acute events overnight


Mendez catheter has remained clear


Patient with no complaints





We are still waiting to hear back from Adult Protective Services 





Physical Exam








Physical Exam:   Appears older than stated age, no acute distress, non toxic


 


Respiratory:   breathing comfortably on room air, no wheezing/rhonchi/rales


 


Cardiovascular:   regular rate and rhythm, no murmurs/rubs/gallops


 


Gastrointestinal (Abdomen):   soft, non tender, non distended


 


Musculoskeletal:   no edema, no cyanosis


 


Neurologic:   awake, alert


 


Genitourinary:   mendez is draining clear yellow urine








Results & Data


Results & Data ([facility])


Vital Signs (Past 12 Hours)


                                   Vital Signs











  Temp Pulse Resp BP BP Pulse Ox


 


 03/15/22 11:37  36.7 C  87  18   97/58 L  98


 


 03/15/22 07:42  36.8 C  74  16  98/62 L   97











Medications Administered





                          Current Inpatient Medications





Belladonna Alkaloids/Opium (Belladonna/Opium Supp 60 Mg Supp)  60 mg AL Q6 PRN


   PRN Reason: Bladder pain


   Stop: 03/24/22 12:06


   Last Admin: 03/10/22 12:30 Dose:  60 mg


   Documented by: 


Calcium Carbonate (Calcium Carbonate 1,250 Mg/5 Ml Udc)  1,250 mg PO BID [facility]


   Stop: 04/10/22 09:29


   Last Admin: 03/15/22 07:54 Dose:  1,250 mg


   Documented by: 


Fluticasone Propionate (Fluticasone Propionate Na Spr 16 Gm Btl)  2 sprays NA 

DAILY [facility]


   Stop: 04/09/22 08:59


   Last Admin: 03/15/22 07:54 Dose:  Not Given


   Documented by: 


Folic Acid (Folic Acid 1 Mg Tab)  1 mg PO QAM [facility]


   Stop: 04/09/22 08:59


   Last Admin: 03/15/22 07:54 Dose:  1 mg


   Documented by: 


Cefepime HCl 2,000 mg/ Syringe  20 mls @ 5 mls/min IV Q8H [facility]; Protocol


   Stop: 03/26/22 11:59


   Last Admin: 03/15/22 11:56 Dose:  5 mls/min


   Documented by: 


Miscellaneous (Remove Nicoderm Patch)  1 ea N/A DAILY@0859 [facility]


   Stop: 04/09/22 08:58


   Last Admin: 03/15/22 07:53 Dose:  1 ea


   Documented by: 


Nicotine (Nicotine 21 Mg/24 Hr Tdsy)  21 mg TD QAM [facility]


   Stop: 04/09/22 08:59


   Last Admin: 03/15/22 07:51 Dose:  21 mg


   Documented by: 


Saccharomyces Boulardii (Saccharomyces Boulardii 250 Mg Cap)  250 mg PO DAILY 

[facility]


   Stop: 04/11/22 08:59


   Last Admin: 03/15/22 07:54 Dose:  250 mg


   Documented by: 


Saccharomyces Boulardii (Saccharomyces Boulardii 250 Mg Cap)  250 mg PO DAILY 

[facility]


   Stop: 04/12/22 08:59


   Last Admin: 03/15/22 07:55 Dose:  Not Given


   Documented by: 


Thiamine HCl (Thiamine Hcl 100 Mg Tab)  [facility]


   Stop: 04/09/22 08:59


   Last Admin: 03/15/22 07:55 Dose:  100 mg


   Documented by: 


Trazodone HCl (Trazodone Hcl 100 Mg Tab)  300 mg PO [facility]


   Stop: 04/08/22 21:37


   Last Admin: 03/14/22 22:06 Dose:  300 mg


   Documented by:

## 2022-03-16 LAB
ANION GAP SERPL CALC-SCNC: 4 MMOL/L (ref 3–11)
BUN SERPL-MCNC: 15 MG/DL (ref 6–23)
CALCIUM SERPL-MCNC: 8.1 MG/DL (ref 8.5–10.1)
CHLORIDE SERPL-SCNC: 109 MMOL/L (ref 98–107)
CO2 SERPL-SCNC: 19 MMOL/L (ref 21–32)
CREAT CL PREDICTED SERPL C-G-VRATE: 84 ML/MIN
DEPRECATED RDW RBC: 60.6 FL (ref 36.4–46.3)
GLUCOSE SERPL-MCNC: 119 MG/DL
HCT VFR BLD AUTO: 27.4 % (ref 42–52)
HGB BLD-MCNC: 8.6 G/DL (ref 14–18)
MCH RBC QN AUTO: 30.5 PG (ref 25–34)
MCV RBC: 97.2 FL (ref 80–100)
PLATELET # BLD AUTO: 72 K/UL (ref 130–400)
POTASSIUM SERPL-SCNC: 4.2 MMOL/L (ref 3.5–5.1)
RBC # BLD AUTO: 2.82 M/UL (ref 4.7–6.1)
SODIUM SERPL-SCNC: 132 MMOL/L (ref 136–145)
WBC # BLD AUTO: 3.95 K/UL (ref 4.8–10.8)

## 2022-03-16 RX ADMIN — CEFEPIME SCH MLS/MIN: 2 INJECTION, POWDER, FOR SOLUTION INTRAVENOUS at 21:28

## 2022-03-16 RX ADMIN — HEPARIN SODIUM SCH UNITS: 10000 INJECTION, SOLUTION INTRAVENOUS; SUBCUTANEOUS at 21:28

## 2022-03-16 RX ADMIN — Medication SCH MG: at 09:25

## 2022-03-16 RX ADMIN — Medication SCH MG: at 09:24

## 2022-03-16 RX ADMIN — CALCIUM CARBONATE SCH MG: 1250 SUSPENSION ORAL at 09:23

## 2022-03-16 RX ADMIN — NICOTINE SCH MG: 21 PATCH, EXTENDED RELEASE TRANSDERMAL at 09:24

## 2022-03-16 RX ADMIN — CEFEPIME SCH MLS/MIN: 2 INJECTION, POWDER, FOR SOLUTION INTRAVENOUS at 05:13

## 2022-03-16 RX ADMIN — CALCIUM CARBONATE SCH MG: 1250 SUSPENSION ORAL at 21:25

## 2022-03-16 RX ADMIN — FOLIC ACID SCH MG: 1 TABLET ORAL at 09:24

## 2022-03-16 RX ADMIN — HEPARIN SODIUM SCH UNITS: 10000 INJECTION, SOLUTION INTRAVENOUS; SUBCUTANEOUS at 09:24

## 2022-03-16 RX ADMIN — CEFEPIME SCH MLS/MIN: 2 INJECTION, POWDER, FOR SOLUTION INTRAVENOUS at 13:09

## 2022-03-16 RX ADMIN — FLUTICASONE PROPIONATE SCH SPRAYS: 50 SPRAY, METERED NASAL at 09:23

## 2022-03-16 NOTE — HOSPITALIST PROGRESS NOTE
Date of Service


March 16, 2022


 





Assessment & Plan


(1) Acute blood loss anemia: 


      Plan: 


Hb 2.9 on admission (uncertain if this is real)


-s/p 4 units packed RBC, repeat Hb 9.3 --> down trended to 7.9 -> 8.2--> 7.4--> 

7.4->8.6


-No active bleed currently. No more hematuria. Check CBC intermittently; 

Transfuse if Hb< 7 or if symptoms


(2) Gross hematuria: 


      Plan: 


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


-Appreciate Urology input.  Manual clot evacuation bedside 3/10.  cleared on 

CBI.  Urine has been clear for several days now.  Alas removed now. 


-He needs to follow up with Urology OP


-UA reviewed.  Urine culture so far negative


(3) LARA (acute kidney injury): 


      Plan: 


Likely due to dehydration


improved


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 


(5) H/O ETOH abuse: 


      Plan: 


Continue folic acid and thiamine


(6) Prostate cancer: 


(7) Cirrhosis: 


      Plan: 


Appears compensated


(8) Bacteremia: 


      Plan: 


Initially report was +GPC then corrected to GNR.  started vancomycin 3/11--later

discontinued.  Cefepime added 3/12.    


Will await final blood culture data


Repeat blood culture obtained 3/12 negative to date


- Per Microbiology discussion 3/15, culture from 3/9 was sent out for 

identification, will expect final result by Friday. continue Cefepime until 

final culture result


      Plan: 


DVT PPX- sc heparin 





Disposition


-Per , Adult Protective Services is involved in his case and there is concern 

he is unsafe to return home.  We are still waiting to hear back from APS.  In 

the meantime, he needs to remain here.  Patient refusing SNF.   


Admission and Anticipated Discharge Date


Admission Date: 


March 9, 2022








Subjective


Feels fine. Denies any issues. Denies any fever, chills, chest pain, shortness 

of breath, nausea or vomiting. Normal appetite. Regular bowel movements. Denies 

any further hematuria. 





Physical Exam








Physical Exam:   General: Lying comfortably in bed, not in distress, on room air


HEENT: EOMI, WILLIAN, MMM


Chest: Clear breath sounds bilaterally, no wheezes or crackles


CVS: Regular rate and rhythm, normal heart sounds, no murmur


Abdomen: Soft, non tender, not distended, normal bowel sounds


Neuro: Awake, alert, oriented, conversing well, non focal


Extremities: No cyanosis, clubbing or edema











Results & Data


Results & Data ([facility])


Vital Signs (Past 12 Hours)


                                   Vital Signs











  Temp Pulse Resp BP Pulse Ox


 


 03/16/22 07:54  36.6 C  86  18  113/67  97











Laboratory Results





                                    Short CBC











  03/16/22 Range/Units





  11:14 


 


WBC  3.95 L  (4.8-10.8)  K/uL


 


Hgb  8.6 L  (14.0-18.0)  g/dL


 


Hct  27.4 L  (42-52)  %


 


Plt Count  72 L  (130-400)  K/uL








                                       BMP











  03/16/22





  11:14


 


Sodium  132 L


 


Potassium  4.2


 


Chloride  109 H


 


Carbon Dioxide  19 L


 


BUN  15


 


Creatinine  0.90


 


Glucose  119 H


 


Calcium  8.1 L











Medications Administered





                          Current Inpatient Medications





Belladonna Alkaloids/Opium (Belladonna/Opium Supp 60 Mg Supp)  60 mg [place] Q6 PRN


   PRN Reason: Bladder pain


   Stop: 03/24/22 12:06


   Last Admin: 03/10/22 12:30 Dose:  60 mg


   Documented by: 


Calcium Carbonate (Calcium Carbonate 1,250 Mg/5 Ml Udc)  1,250 mg PO BID [facility]


   Stop: 04/10/22 09:29


   Last Admin: 03/16/22 09:23 Dose:  1,250 mg


   Documented by: 


Fluticasone Propionate (Fluticasone Propionate Na Spr 16 Gm Btl)  2 sprays NA 

DAILY [facility]


   Stop: 04/09/22 08:59


   Last Admin: 03/16/22 09:23 Dose:  2 sprays


   Documented by: 


Folic Acid (Folic Acid 1 Mg Tab)  1 mg PO QAM [facility]


   Stop: 04/09/22 08:59


   Last Admin: 03/16/22 09:24 Dose:  1 mg


   Documented by: 


Heparin Sodium (Porcine) (Heparin Sod 5,000 Unit/0.5 Ml Vial)  5,000 units SQ 

Q12 [facility]


   Stop: 04/15/22 08:59


   Last Admin: 03/16/22 09:24 Dose:  5,000 units


   Documented by: 


Cefepime HCl 2,000 mg/ Syringe  20 mls @ 5 mls/min IV Q8H [facility]; Protocol


   Stop: 03/26/22 11:59


   Last Admin: 03/16/22 13:09 Dose:  5 mls/min


   Documented by: 


Miscellaneous (Remove Nicoderm Patch)  1 ea N/A DAILY@0859 [facility]


   Stop: 04/09/22 08:58


   Last Admin: 03/16/22 09:23 Dose:  1 ea


   Documented by: 


Nicotine (Nicotine 21 Mg/24 Hr Tdsy)  21 mg TD QAM [facility]


   Stop: 04/09/22 08:59


   Last Admin: 03/16/22 09:24 Dose:  21 mg


   Documented by: 


Saccharomyces Boulardii (Saccharomyces Boulardii 250 Mg Cap)  250 mg PO DAILY 

[facility]


   Stop: 04/11/22 08:59


   Last Admin: 03/16/22 09:24 Dose:  250 mg


   Documented by: 


Saccharomyces Boulardii (Saccharomyces Boulardii 250 Mg Cap)  250 mg PO DAILY 

[facility]


   Stop: 04/12/22 08:59


   Last Admin: 03/16/22 09:25 Dose:  250 mg


   Documented by: 


Thiamine HCl (Thiamine Hcl 100 Mg Tab)  100 mg PO QAM [facility]


   Stop: 04/09/22 08:59


   Last Admin: 03/16/22 09:24 Dose:  100 mg


   Documented by: 


Trazodone HCl (Trazodone Hcl 100 Mg Tab)  300 mg PO HS [facility]


   Stop: 04/08/22 21:37


   Last Admin: 03/15/22 22:35 Dose:  300 mg


   Documented by:

## 2022-03-17 LAB
DEPRECATED RDW RBC: 59.7 FL (ref 36.4–46.3)
HCT VFR BLD AUTO: 22.1 % (ref 42–52)
HGB BLD-MCNC: 6.9 G/DL (ref 14–18)
MCH RBC QN AUTO: 29.9 PG (ref 25–34)
MCV RBC: 95.7 FL (ref 80–100)
PLATELET # BLD AUTO: 147 K/UL (ref 130–400)
RBC # BLD AUTO: 2.31 M/UL (ref 4.7–6.1)
WBC # BLD AUTO: 4.14 K/UL (ref 4.8–10.8)

## 2022-03-17 RX ADMIN — FLUTICASONE PROPIONATE SCH: 50 SPRAY, METERED NASAL at 09:42

## 2022-03-17 RX ADMIN — CALCIUM CARBONATE SCH MG: 1250 SUSPENSION ORAL at 21:51

## 2022-03-17 RX ADMIN — CEFEPIME SCH MLS/MIN: 2 INJECTION, POWDER, FOR SOLUTION INTRAVENOUS at 11:33

## 2022-03-17 RX ADMIN — Medication SCH MG: at 09:43

## 2022-03-17 RX ADMIN — CEFEPIME SCH MLS/MIN: 2 INJECTION, POWDER, FOR SOLUTION INTRAVENOUS at 04:30

## 2022-03-17 RX ADMIN — HEPARIN SODIUM SCH UNITS: 10000 INJECTION, SOLUTION INTRAVENOUS; SUBCUTANEOUS at 21:51

## 2022-03-17 RX ADMIN — HEPARIN SODIUM SCH: 10000 INJECTION, SOLUTION INTRAVENOUS; SUBCUTANEOUS at 09:43

## 2022-03-17 RX ADMIN — CALCIUM CARBONATE SCH MG: 1250 SUSPENSION ORAL at 09:43

## 2022-03-17 RX ADMIN — NICOTINE SCH MG: 21 PATCH, EXTENDED RELEASE TRANSDERMAL at 09:43

## 2022-03-17 RX ADMIN — CEFEPIME SCH MLS/MIN: 2 INJECTION, POWDER, FOR SOLUTION INTRAVENOUS at 21:51

## 2022-03-17 RX ADMIN — FOLIC ACID SCH MG: 1 TABLET ORAL at 09:43

## 2022-03-17 NOTE — HOSPITALIST PROGRESS NOTE
Date of Service


March 17, 2022


 





Assessment & Plan


(1) Acute blood loss anemia: 


      Plan: 


Hb 2.9 on admission (uncertain if this is real)


-s/p 4 units packed RBC, repeat Hb 9.3 --> down trended to 7.9 -> 8.2--> 7.4--> 

7.4->8.6


- Hb down to 6.9 today. Will transfuse 1 U PRBC today. Denies any more 

hematuria. Will check intermittently.


(2) Gross hematuria: 


      Plan: 


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


-Appreciate Urology input.  Manual clot evacuation bedside 3/10.  cleared on 

CBI.  Urine has been clear for several days now.  Alas removed now. 


-He needs to follow up with Urology OP


-UA reviewed.  Urine culture so far negative


(3) LARA (acute kidney injury): 


      Plan: 


Likely due to dehydration


improved


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 


(5) H/O ETOH abuse: 


      Plan: 


Continue folic acid and thiamine


(6) Prostate cancer: 


(7) Cirrhosis: 


      Plan: 


Appears compensated


(8) Bacteremia: 


      Plan: 


Initially report was +GPC then corrected to GNR.  started vancomycin 3/11--later

discontinued.  Cefepime added 3/12.    


Will await final blood culture data


Repeat blood culture obtained 3/12 negative to date


- Per Microbiology discussion 3/15, culture from 3/9 was sent out for 

identification, will expect final result by Friday. continue Cefepime until 

final culture result


      Plan: 


DVT PPX- sc heparin 





Disposition


-Per , Adult Protective Services is involved in his case and there is concern 

he is unsafe to return home.  We are still waiting to hear back from APS.  In 

the meantime, he needs to remain here.  Patient refusing SNF.   


Admission and Anticipated Discharge Date


Admission Date: 


March 9, 2022








Subjective


No new issues. Feels fine. Normal appetite. No fever, chills, chest pain, 

shortness of breath. Denies any more hematuria.  





Physical Exam








Physical Exam:   General: Lying comfortably in bed, not in distress, on room air


HEENT: EOMI, WILLIAN, MMM


Chest: Clear breath sounds bilaterally, no wheezes or crackles


CVS: Regular rate and rhythm, normal heart sounds, no murmur


Abdomen: Soft, non tender, not distended, normal bowel sounds


Neuro: Awake, alert, oriented, conversing well, non focal


Extremities: No cyanosis, clubbing or edema











Results & Data


Results & Data ([facility])


Vital Signs (Past 12 Hours)


                                   Vital Signs











  Temp Pulse Pulse Resp BP BP BP


 


 03/17/22 14:54  37.1 C   82  18   120/76 


 


 03/17/22 14:02  36.6 C  77   18  127/68  


 


 03/17/22 13:05  36.8 C  79   18  133/81  


 


 03/17/22 12:07  37.1 C  78   18  100/64  


 


 03/17/22 11:28  36.8 C  78   18  94/59 L  


 


 03/17/22 10:58  36.7 C  77   18  113/73  


 


 03/17/22 10:43  36.7 C  77   18  96/60 L  


 


 03/17/22 10:25  37.1 C  82   18  106/69  


 


 03/17/22 07:38  36.9 C   85  18    115/75














  Pulse Ox


 


 03/17/22 14:54  98


 


 03/17/22 14:02  100


 


 03/17/22 13:05  99


 


 03/17/22 12:07  99


 


 03/17/22 11:28  97


 


 03/17/22 10:58  98


 


 03/17/22 10:43  99


 


 03/17/22 10:25  99


 


 03/17/22 07:38  97











Laboratory Results





                                    Short CBC











  03/17/22 Range/Units





  06:21 


 


WBC  4.14 L  (4.8-10.8)  K/uL


 


Hgb  6.9 L*  (14.0-18.0)  g/dL


 


Hct  22.1 L  (42-52)  %


 


Plt Count  147  D  (130-400)  K/uL











Medications Administered





                          Current Inpatient Medications





Belladonna Alkaloids/Opium (Belladonna/Opium Supp 60 Mg Supp)  60 mg [place] Q6 PRN


   PRN Reason: Bladder pain


   Stop: 03/24/22 12:06


   Last Admin: 03/10/22 12:30 Dose:  60 mg


   Documented by: 


Calcium Carbonate (Calcium Carbonate 1,250 Mg/5 Ml Udc)  1,250 mg PO BID [facility]


   Stop: 04/10/22 09:29


   Last Admin: 03/17/22 09:43 Dose:  1,250 mg


   Documented by: 


Fluticasone Propionate (Fluticasone Propionate Na Spr 16 Gm Btl)  2 sprays NA 

DAILY [facility]


   Stop: 04/09/22 08:59


   Last Admin: 03/17/22 09:42 Dose:  Not Given


   Documented by: 


Folic Acid (Folic Acid 1 Mg Tab)  1 mg PO QAM [facility]


   Stop: 04/09/22 08:59


   Last Admin: 03/17/22 09:43 Dose:  1 mg


   Documented by: 


Heparin Sodium (Porcine) (Heparin Sod 5,000 Unit/0.5 Ml Vial)  5,000 units SQ 

Q12 [facility]


   Stop: 04/15/22 08:59


   Last Admin: 03/17/22 09:43 Dose:  Not Given


   Documented by: 


Cefepime HCl 2,000 mg/ Syringe  20 mls @ 5 mls/min IV Q8H [facility]; Protocol


   Stop: 03/26/22 11:59


   Last Admin: 03/17/22 11:33 Dose:  5 mls/min


   Documented by: 


Sodium Chloride (Nss)  250 mls @ 15 mls/hr IV [id number] PRN


   PRN Reason: For Transfusion


   Stop: 03/17/22 18:03


Miscellaneous (Remove Nicoderm Patch)  1 ea N/A DAILY@0859 [facility]


   Stop: 04/09/22 08:58


   Last Admin: 03/17/22 09:42 Dose:  1 ea


   Documented by: 


Nicotine (Nicotine 21 Mg/24 Hr Tdsy)  21 mg TD QAM [facility]


   Stop: 04/09/22 08:59


   Last Admin: 03/17/22 09:43 Dose:  21 mg


   Documented by: 


Saccharomyces Boulardii (Saccharomyces Boulardii 250 Mg Cap)  250 mg PO DAILY 

[facility]


   Stop: 04/11/22 08:59


   Last Admin: 03/17/22 09:43 Dose:  250 mg


   Documented by: 


Saccharomyces Boulardii (Saccharomyces Boulardii 250 Mg Cap)  250 mg PO DAILY 

[facility]


   Stop: 04/12/22 08:59


   Last Admin: 03/17/22 09:43 Dose:  250 mg


   Documented by: 


Thiamine HCl (Thiamine Hcl 100 Mg Tab)  100 mg PO QAM [facility]


   Stop: 04/09/22 08:59


   Last Admin: 03/17/22 09:43 Dose:  100 mg


   Documented by: 


Trazodone HCl (Trazodone Hcl 100 Mg Tab)  300 mg PO HS [facility]


   Stop: 04/08/22 21:37


   Last Admin: 03/16/22 21:28 Dose:  300 mg


   Documented by:

## 2022-03-18 RX ADMIN — NICOTINE SCH MG: 21 PATCH, EXTENDED RELEASE TRANSDERMAL at 09:44

## 2022-03-18 RX ADMIN — HEPARIN SODIUM SCH UNITS: 10000 INJECTION, SOLUTION INTRAVENOUS; SUBCUTANEOUS at 09:46

## 2022-03-18 RX ADMIN — FLUTICASONE PROPIONATE SCH: 50 SPRAY, METERED NASAL at 09:46

## 2022-03-18 RX ADMIN — FOLIC ACID SCH MG: 1 TABLET ORAL at 09:46

## 2022-03-18 RX ADMIN — HEPARIN SODIUM SCH: 10000 INJECTION, SOLUTION INTRAVENOUS; SUBCUTANEOUS at 20:04

## 2022-03-18 RX ADMIN — Medication SCH MG: at 09:45

## 2022-03-18 RX ADMIN — CEFEPIME SCH MLS/MIN: 2 INJECTION, POWDER, FOR SOLUTION INTRAVENOUS at 04:01

## 2022-03-18 RX ADMIN — CALCIUM CARBONATE SCH MG: 1250 SUSPENSION ORAL at 09:44

## 2022-03-18 RX ADMIN — CEFEPIME SCH MLS/MIN: 2 INJECTION, POWDER, FOR SOLUTION INTRAVENOUS at 20:03

## 2022-03-18 RX ADMIN — HEPARIN SODIUM SCH: 10000 INJECTION, SOLUTION INTRAVENOUS; SUBCUTANEOUS at 09:50

## 2022-03-18 RX ADMIN — CEFEPIME SCH MLS/MIN: 2 INJECTION, POWDER, FOR SOLUTION INTRAVENOUS at 13:11

## 2022-03-18 RX ADMIN — CALCIUM CARBONATE SCH MG: 1250 SUSPENSION ORAL at 20:04

## 2022-03-18 NOTE — HOSPITALIST PROGRESS NOTE
Date of Service


March 18, 2022


 





Assessment & Plan


(1) Bacteremia: 


      Plan: 


Initially report was +GPC then corrected to GNR.  started vancomycin 3/11--later

discontinued.  Cefepime added 3/12.    


Will await final blood culture data


Repeat blood culture obtained 3/12 negative to date


- Per repeat Microbiology discussion 3/18, culture from 3/9 was sent out for 

identification, anamika reached out to [facility] again today and stated there

should be some culture by next week but no specifics were provided. Patient has 

been very stale and I feel he can be discharged on po antibiotics for his 

bacteremia. Will consult ID for recommendation. Tiger text sent and consult 

placed. 


(2) Acute blood loss anemia: 


      Plan: 


Hb 2.9 on admission (uncertain if this is real)


-s/p 5 units packed RBC, repeat Hb 9.3 --> down trended to 7.9 -> 8.2--> 7.4--> 

7.4->8.6->6.9


- Recheck in am


(3) Gross hematuria: 


      Plan: 


-Resolved with no recurrence. 


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


-Appreciate Urology input.  Manual clot evacuation bedside 3/10.  cleared on 

CBI.  Urine has been clear for several days now.  Alas removed now. 


-He needs to follow up with Urology OP


-UA reviewed.  Urine culture so far negative


(4) LARA (acute kidney injury): 


      Plan: 


Likely due to dehydration


improved


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 


(6) H/O ETOH abuse: 


      Plan: 


Continue folic acid and thiamine


(7) Prostate cancer: 


(8) Cirrhosis: 


      Plan: 


Appears compensated


      Plan: 


DVT PPX- sc heparin 





Disposition


-Per CM, Adult Protective Services is involved in his case and there is concern 

he is unsafe to return home. Patient refusing SNF. Per PT he needs 24/7 care at 

home. Will reach out to  regarding the options.


Admission and Anticipated Discharge Date


Admission Date: 


March 9, 2022








Subjective


No new issues. He feels fine. Denies hematuria. Normal appetite. Regular BM. 

Recommended to be more physically active here.  





Physical Exam








Physical Exam:   General: Lying comfortably in bed, not in distress, on room air


HEENT: EOMI, WILLIAN, MMM


Chest: Clear breath sounds bilaterally, no wheezes or crackles


CVS: Regular rate and rhythm, normal heart sounds, no murmur


Abdomen: Soft, non tender, not distended, normal bowel sounds


Neuro: Awake, alert, oriented, conversing well, non focal


Extremities: No cyanosis, clubbing or edema











Results & Data


Results & Data ([facility])


Vital Signs (Past 12 Hours)


                                   Vital Signs











  Temp Pulse Pulse Resp BP Pulse Ox


 


 03/18/22 15:43  36.9 C   82  16  111/66  99


 


 03/18/22 07:42  36.8 C  76   17  120/70  96


 


 03/18/22 07:12  36.8 C   75  16  119/71  96

## 2022-03-19 LAB
ANION GAP SERPL CALC-SCNC: 3 MMOL/L (ref 3–11)
BUN SERPL-MCNC: 18 MG/DL (ref 6–23)
CALCIUM SERPL-MCNC: 8.3 MG/DL (ref 8.5–10.1)
CHLORIDE SERPL-SCNC: 110 MMOL/L (ref 98–107)
CO2 SERPL-SCNC: 21 MMOL/L (ref 21–32)
CREAT CL PREDICTED SERPL C-G-VRATE: 90 ML/MIN
DEPRECATED RDW RBC: 55.7 FL (ref 36.4–46.3)
GLUCOSE SERPL-MCNC: 88 MG/DL
HCT VFR BLD AUTO: 27.2 % (ref 42–52)
HGB BLD-MCNC: 8.8 G/DL (ref 14–18)
IMM GRANULOCYTES # BLD: 0 K/UL (ref 0–0.02)
IMM GRANULOCYTES NFR BLD AUTO: 0 %
MCH RBC QN AUTO: 30.3 PG (ref 25–34)
MCV RBC: 93.8 FL (ref 80–100)
PLATELET # BLD AUTO: 151 K/UL (ref 130–400)
POTASSIUM SERPL-SCNC: 4.1 MMOL/L (ref 3.5–5.1)
RBC # BLD AUTO: 2.9 M/UL (ref 4.7–6.1)
SODIUM SERPL-SCNC: 134 MMOL/L (ref 136–145)
WBC # BLD AUTO: 4.76 K/UL (ref 4.8–10.8)

## 2022-03-19 RX ADMIN — CALCIUM CARBONATE SCH MG: 1250 SUSPENSION ORAL at 20:03

## 2022-03-19 RX ADMIN — CEFEPIME SCH MLS/MIN: 2 INJECTION, POWDER, FOR SOLUTION INTRAVENOUS at 12:42

## 2022-03-19 RX ADMIN — NICOTINE SCH MG: 21 PATCH, EXTENDED RELEASE TRANSDERMAL at 09:23

## 2022-03-19 RX ADMIN — HEPARIN SODIUM SCH: 10000 INJECTION, SOLUTION INTRAVENOUS; SUBCUTANEOUS at 09:24

## 2022-03-19 RX ADMIN — FLUTICASONE PROPIONATE SCH: 50 SPRAY, METERED NASAL at 09:31

## 2022-03-19 RX ADMIN — HEPARIN SODIUM SCH: 10000 INJECTION, SOLUTION INTRAVENOUS; SUBCUTANEOUS at 20:03

## 2022-03-19 RX ADMIN — Medication SCH MG: at 09:22

## 2022-03-19 RX ADMIN — Medication SCH: at 09:23

## 2022-03-19 RX ADMIN — FLUTICASONE PROPIONATE SCH SPRAYS: 50 SPRAY, METERED NASAL at 09:23

## 2022-03-19 RX ADMIN — CALCIUM CARBONATE SCH MG: 1250 SUSPENSION ORAL at 09:22

## 2022-03-19 RX ADMIN — CEFEPIME SCH MLS/MIN: 2 INJECTION, POWDER, FOR SOLUTION INTRAVENOUS at 03:42

## 2022-03-19 RX ADMIN — CEFEPIME SCH MLS/MIN: 2 INJECTION, POWDER, FOR SOLUTION INTRAVENOUS at 14:11

## 2022-03-19 RX ADMIN — CEFEPIME SCH MLS/MIN: 2 INJECTION, POWDER, FOR SOLUTION INTRAVENOUS at 20:03

## 2022-03-19 RX ADMIN — FOLIC ACID SCH MG: 1 TABLET ORAL at 09:23

## 2022-03-19 NOTE — HOSPITALIST PROGRESS NOTE
Date of Service


March 19, 2022


 





Assessment & Plan


(1) Bacteremia: 


      Plan: 


Initially report was +GPC then corrected to GNR.  started vancomycin 3/11--later

discontinued.  Cefepime added 3/12.    


Will await final blood culture data


Repeat blood culture obtained 3/12 negative to date


- Per repeat Microbiology discussion 3/18, culture from 3/9 was sent out for 

identification, microbio reached out to [facility] again 3/18 and stated there 

should be some culture by next week but no specifics were provided. Discussed 

with ID who recommended continuing current IV antibiotic until speciation. 


(2) Acute blood loss anemia: 


      Plan: 


Hb 2.9 on admission (uncertain if this is real)


-s/p 5 units packed RBC, repeat Hb 9.3 --> down trended to 7.9 -> 8.2--> 7.4--> 

7.4->8.6->6.9->8.8


- Check only intermittently


(3) Gross hematuria: 


      Plan: 


-Resolved with no recurrence. 


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


-Appreciate Urology input.  Manual clot evacuation bedside 3/10.  cleared on 

CBI.  Urine has been clear for several days now.  Alas removed now. 


-He needs to follow up with Urology OP


(4) LARA (acute kidney injury): 


      Plan: 


resolved


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 


(6) H/O ETOH abuse: 


      Plan: 


Continue folic acid and thiamine


(7) Prostate cancer: 


(8) Cirrhosis: 


      Plan: 


Appears compensated


      Plan: 


DVT PPX- sc heparin 





Disposition-Per CM, Adult Protective Services is involved in his case and there 

is concern he is unsafe to return home. Patient refusing SNF. Per PT he needs 

24/7 care at home. He will need to be here anyway for IV antibiotics until blood

culture final.


Admission and Anticipated Discharge Date


Admission Date: 


March 9, 2022








Subjective


No new issues. Explained to him that his blood culture will not be final until 

at least next week and he will have to be on current iv antibiotic until then, 

which is ID recommendation. Normal appetite. No hematuria dysuria. States he 

walked around the [place]. 





Physical Exam








Physical Exam:   General: Sitting comfortably in chair, not in distress, on room

air


HEENT: EOMI, WILLIAN, MMM


Chest: Clear breath sounds bilaterally, no wheezes or crackles


CVS: Regular rate and rhythm, normal heart sounds, no murmur


Abdomen: Soft, non tender, not distended, normal bowel sounds


Neuro: Awake, alert, oriented, conversing well, non focal


Extremities: No cyanosis, clubbing or edema











Results & Data


Results & Data ([facility])


Vital Signs (Past 12 Hours)


                                   Vital Signs











  Temp Pulse Resp BP Pulse Ox


 


 03/19/22 07:46  36.8 C  69  16  131/70  97

## 2022-03-20 RX ADMIN — CEFEPIME SCH MLS/MIN: 2 INJECTION, POWDER, FOR SOLUTION INTRAVENOUS at 12:31

## 2022-03-20 RX ADMIN — CALCIUM CARBONATE SCH MG: 1250 SUSPENSION ORAL at 20:22

## 2022-03-20 RX ADMIN — HEPARIN SODIUM SCH: 10000 INJECTION, SOLUTION INTRAVENOUS; SUBCUTANEOUS at 20:22

## 2022-03-20 RX ADMIN — HEPARIN SODIUM SCH: 10000 INJECTION, SOLUTION INTRAVENOUS; SUBCUTANEOUS at 09:54

## 2022-03-20 RX ADMIN — FLUTICASONE PROPIONATE SCH: 50 SPRAY, METERED NASAL at 09:53

## 2022-03-20 RX ADMIN — Medication SCH: at 09:54

## 2022-03-20 RX ADMIN — CALCIUM CARBONATE SCH MG: 1250 SUSPENSION ORAL at 09:53

## 2022-03-20 RX ADMIN — FOLIC ACID SCH MG: 1 TABLET ORAL at 09:54

## 2022-03-20 RX ADMIN — Medication SCH MG: at 09:54

## 2022-03-20 RX ADMIN — NICOTINE SCH MG: 21 PATCH, EXTENDED RELEASE TRANSDERMAL at 09:54

## 2022-03-20 RX ADMIN — CEFEPIME SCH MLS/MIN: 2 INJECTION, POWDER, FOR SOLUTION INTRAVENOUS at 20:21

## 2022-03-20 RX ADMIN — CEFEPIME SCH MLS/MIN: 2 INJECTION, POWDER, FOR SOLUTION INTRAVENOUS at 03:45

## 2022-03-20 NOTE — HOSPITALIST PROGRESS NOTE
Date of Service


March 20, 2022


 





Assessment & Plan


(1) Bacteremia: 


      Plan: 


Initially report was +GPC then corrected to GNR.  started vancomycin 3/11--later

discontinued.  Cefepime added 3/12.    


Will await final blood culture data


Repeat blood culture obtained 3/12 negative to date


- Per repeat Microbiology discussion 3/18, culture from 3/9 was sent out for 

identification, microbio reached out to [facility] again 3/18 and stated there 

should be some culture by next week but no specifics were provided. Discussed 

with ID who recommended continuing current IV antibiotic until speciation. 


(2) Acute blood loss anemia: 


      Plan: 


Hb 2.9 on admission (uncertain if this is real)


-s/p 5 units packed RBC, repeat Hb 9.3 --> down trended to 7.9 -> 8.2--> 7.4--> 

7.4->8.6->6.9->8.8


- Check only intermittently


(3) Gross hematuria: 


      Plan: 


-Resolved with no recurrence. 


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


-Appreciate Urology input.  Manual clot evacuation bedside 3/10.  cleared on 

CBI.  Urine has been clear for several days now.  Alas removed now. 


-He needs to follow up with Urology OP


(4) LARA (acute kidney injury): 


      Plan: 


resolved


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 


(6) H/O ETOH abuse: 


      Plan: 


Continue folic acid and thiamine


(7) Prostate cancer: 


(8) Cirrhosis: 


      Plan: 


Appears compensated


      Plan: 


DVT PPX- sc heparin 





Disposition-Per , Adult Protective Services is involved in his case and there 

is concern he is unsafe to return home. Patient refusing SNF. Per PT he needs 

24/7 care at home. He will need to be here anyway for IV antibiotics until blood

culture final.


Admission and Anticipated Discharge Date


Admission Date: 


March 9, 2022








Subjective


No new issues. feels fine. Asking when the results would be back and when he can

be discharged. Normal appetite. No pain. No hematuria. No fever or chills. 





Physical Exam








Physical Exam:   General: Sitting comfortably in chair, not in distress, on room

air


HEENT: EOMI, WILLIAN, MMM


Chest: Clear breath sounds bilaterally, no wheezes or crackles


CVS: Regular rate and rhythm, normal heart sounds, no murmur


Abdomen: Soft, non tender, not distended, normal bowel sounds


Neuro: Awake, alert, oriented, conversing well, non focal


Extremities: No cyanosis, clubbing or edema











Results & Data


Results & Data ([facility])


Vital Signs (Past 12 Hours)


                                   Vital Signs











  Temp Pulse Resp BP Pulse Ox


 


 03/20/22 14:34  36.9 C  75  18  101/65  97


 


 03/20/22 07:11  36.7 C  86  18  108/62  98

## 2022-03-21 RX ADMIN — CEFEPIME SCH MLS/MIN: 2 INJECTION, POWDER, FOR SOLUTION INTRAVENOUS at 04:03

## 2022-03-21 RX ADMIN — Medication SCH MG: at 07:46

## 2022-03-21 RX ADMIN — HEPARIN SODIUM SCH: 10000 INJECTION, SOLUTION INTRAVENOUS; SUBCUTANEOUS at 20:16

## 2022-03-21 RX ADMIN — HEPARIN SODIUM SCH UNITS: 10000 INJECTION, SOLUTION INTRAVENOUS; SUBCUTANEOUS at 07:46

## 2022-03-21 RX ADMIN — CEFEPIME SCH MLS/MIN: 2 INJECTION, POWDER, FOR SOLUTION INTRAVENOUS at 11:11

## 2022-03-21 RX ADMIN — CALCIUM CARBONATE SCH MG: 1250 SUSPENSION ORAL at 20:16

## 2022-03-21 RX ADMIN — CEFEPIME SCH MLS/MIN: 2 INJECTION, POWDER, FOR SOLUTION INTRAVENOUS at 20:15

## 2022-03-21 RX ADMIN — FOLIC ACID SCH MG: 1 TABLET ORAL at 07:46

## 2022-03-21 RX ADMIN — CALCIUM CARBONATE SCH MG: 1250 SUSPENSION ORAL at 07:46

## 2022-03-21 RX ADMIN — NICOTINE SCH MG: 21 PATCH, EXTENDED RELEASE TRANSDERMAL at 07:46

## 2022-03-21 RX ADMIN — FLUTICASONE PROPIONATE SCH SPRAYS: 50 SPRAY, METERED NASAL at 07:46

## 2022-03-21 NOTE — HOSPITALIST PROGRESS NOTE
Date of Service


March 21, 2022


 





Assessment & Plan


(1) Bacteremia: 


      Plan: 


Initially report was +GPC then corrected to GNR.  started vancomycin 3/11--later

discontinued.  Cefepime added 3/12.    


Will await final blood culture data


Repeat blood culture obtained 3/12 negative to date


- Per repeat Microbiology discussion 3/18, culture from 3/9 was sent out for 

identification, alfredoo reached out to [facility] again 3/18 and stated there 

should be some culture by next week but no specifics were provided. Discussed 

with ID who recommended continuing current IV antibiotic until speciation. 


Formal ID consult done today- Report awaited.


(2) Acute blood loss anemia: 


      Plan: 


Hb 2.9 on admission (uncertain if this is real)


-s/p 5 units packed RBC, repeat Hb 9.3 --> down trended to 7.9 -> 8.2--> 7.4--> 

7.4->8.6->6.9->8.8


- Check only intermittently


(3) Gross hematuria: 


      Plan: 


-Resolved with no recurrence. 


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


-Appreciate Urology input.  Manual clot evacuation bedside 3/10.  cleared on 

CBI.  Urine has been clear for several days now.  Alas removed now. 


-He needs to follow up with Urology OP


(4) LARA (acute kidney injury): 


      Plan: 


resolved


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 


(6) H/O ETOH abuse: 


      Plan: 


Continue folic acid and thiamine


(7) Prostate cancer: 


(8) Cirrhosis: 


      Plan: 


Appears compensated


      Plan: 


DVT PPX- sc heparin 





Disposition-Per CM, Adult Protective Services is involved in his case and there 

is concern he is unsafe to return home. Patient refusing SNF. Per PT he needs 

24/7 care at home. He will need to be here anyway for IV antibiotics until blood

culture final.


Admission and Anticipated Discharge Date


Admission Date: 


March 9, 2022








Subjective


no new issues. He states he was seen by ID and was told he could be discharged 

but I do not see a note from ID yet. No fever, chills, chest pain, shortness of 

breath, hematuria. Asking when he will be discharged. 





Physical Exam








Physical Exam:   General: Sleeping in bed, not in distress, on room air


HEENT: EOMI, WILLIAN, MMM


Chest: Clear breath sounds bilaterally, no wheezes or crackles


CVS: Regular rate and rhythm, normal heart sounds, no murmur


Abdomen: Soft, non tender, not distended, normal bowel sounds


Neuro: Awake, alert, oriented, conversing well, non focal


Extremities: No cyanosis, clubbing or edema











Results & Data


Results & Data ([facility])


Vital Signs (Past 12 Hours)


                                   Vital Signs











  Temp Pulse Pulse Resp BP Pulse Ox


 


 03/21/22 15:19  36.7 C   75  16  127/70  99


 


 03/21/22 07:19  36.8 C  76   14  114/74  95

## 2022-03-22 RX ADMIN — NICOTINE SCH MG: 21 PATCH, EXTENDED RELEASE TRANSDERMAL at 08:40

## 2022-03-22 RX ADMIN — FOLIC ACID SCH MG: 1 TABLET ORAL at 08:42

## 2022-03-22 RX ADMIN — CALCIUM CARBONATE SCH MG: 1250 SUSPENSION ORAL at 08:42

## 2022-03-22 RX ADMIN — Medication SCH MG: at 08:42

## 2022-03-22 RX ADMIN — HEPARIN SODIUM SCH: 10000 INJECTION, SOLUTION INTRAVENOUS; SUBCUTANEOUS at 20:29

## 2022-03-22 RX ADMIN — CEFEPIME SCH: 2 INJECTION, POWDER, FOR SOLUTION INTRAVENOUS at 20:29

## 2022-03-22 RX ADMIN — CALCIUM CARBONATE SCH: 1250 SUSPENSION ORAL at 20:29

## 2022-03-22 RX ADMIN — CEFEPIME SCH MLS/MIN: 2 INJECTION, POWDER, FOR SOLUTION INTRAVENOUS at 04:00

## 2022-03-22 RX ADMIN — FLUTICASONE PROPIONATE SCH: 50 SPRAY, METERED NASAL at 08:42

## 2022-03-22 RX ADMIN — CEFEPIME SCH: 2 INJECTION, POWDER, FOR SOLUTION INTRAVENOUS at 13:38

## 2022-03-22 RX ADMIN — HEPARIN SODIUM SCH: 10000 INJECTION, SOLUTION INTRAVENOUS; SUBCUTANEOUS at 08:43

## 2022-03-22 NOTE — DISCHARGE SUMMARY
Date of Service


March 22, 2022








Admission HPI


Per Admitting Provider


He is a 61-year-old male with significant past medical history of prostate 

cancer status post prostatectomy and has been getting Lupron injection, 

hypertension hyperlipidemia, cirrhosis, alcoholic liver disease, seizure 

disorder, COPD, tobacco abuse, depression and atherosclerosis of the aorta has 

been complaining of ongoing hematuria for the last 2 weeks.  He has been waking 

up in the morning for the last 2 weeks with bed soaking with bloody urine.  He 

denies any symptoms of weakness, tiredness, shortness of breath, fever and/or 

chills or any abdominal pain.  His neighbors called for the ambulance and he was

brought into the emergency room for further evaluation.


He is supposed to go to see his primary care physician sometime in the next week

and he has been waiting for to go and let him know about the ongoing symptoms of

hematuria and also he does have an appointment with his urologist in about 2 to 

3 weeks where is supposed to get the Lupron shot as well.


In the ER he was asymptomatic, looked very pale, with tachycardia and blood 

pressure on the lower side.  He was a started with blood transfusion and is 

going to have CT scan of the abdomen and pelvis to rule out any possible causes.

 He was admitted to [facility] telemetry unit for continuation of care.





Admission Exam


Per Admitting Provider











Physical Exam: Lying in bed comfortably but looks very pale


 


Constitutional:                       + ill appearing and average body habitus


 


Eyes: PERRL, conjunctivae normal, anicteric sclerae


 


ENMT: external ear and nose normal, oropharynx normal


 


Neck: trachea midline, no thyromegaly


 


Respiratory: no respiratory distress Auscultation: lungs clear to 

auscultation bilaterally; no crackles


 


Cardiovascular: Rate/Rhythm: regular rate, regular rhythm and + tachycardic 

Heart Sounds: normal S1, normal S2 and + murmur (2/6 ESM over precordium) 

Extremities: no edema


 


Gastrointestinal (Abdomen): Inspection/Auscultation: normal bowel sounds; 

abdomen not distended Percussion/Palpation: abdomen soft; abdomen nontender


 


Musculoskeletal: No acute arthritis in any joint


 


Neurologic: Alert, awake and oriented x3. No focal sensory and motor deficit 

appreciated














Principal Diagnosis


Gross hematuria due to radiation cystitis with anemia requiring blood 

transfusion, Gram negative bacteremia





Discharge Exam


General: Lying in bed, not in distress, on room air


HEENT: EOMI, WILLIAN, MMM


Chest: Clear breath sounds bilaterally, no wheezes or crackles


CVS: Regular rate and rhythm, normal heart sounds, no murmur


Abdomen: Soft, non tender, not distended, normal bowel sounds


Neuro: Awake, alert, oriented, conversing well, non focal. Patient has decision 

making capacity. 


Extremities: No cyanosis, clubbing or edema








Discharge Data


Allergies














Allergy/AdvReac Type Severity Reaction Status Date / Time


 


lisinopril Allergy Severe ANGIOEDEMA Verified 03/09/22 20:16


 


oxybutynin [From Ditropan] AdvReac Unknown fever & Verified 03/09/22 20:16





   perhaps  





   seizures,  





   was  





   detoxing  





   @time  











Consultations


                                        





03/09/22 19:24


ED Decision to Admit Stat 





03/10/22 03:38


Consult Urology Routine 





03/18/22 14:24


Consult Infectious Diseases Routine 











Ordered Studies


                                        





03/09/22 18:33


CT abd pelvis wo con Stat 


CT head/brain wo con Stat 














Hospital Course


(1) Bacteremia: 


      Initially report was +GPC then corrected to GNR.  started vancomycin 

3/11--later discontinued.  Cefepime added 3/12. Final blood culture results 3/9 

showed psychrobacter fecalis/pulmoni (1/2 cultures) pansensitive, repeat 

cultures were negative. Seen by ID 3/21- completed 10 days of cefepime, no need 

for further iv or po antibiotic. 


(2) Acute blood loss anemia: 


      Hb 2.9 on admission (uncertain if this is real)


-s/p 5 units packed RBC, repeat Hb 9.3 --> down trended to 7.9 -> 8.2--> 7.4--> 

7.4->8.6->6.9->8.8


- No further hematuria or bleeding, Recommend OP CBC in 1-2 weeks.


(3) Gross hematuria: 


      -Resolved with no recurrence. 


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


-Appreciate Urology input.  Manual clot evacuation bedside 3/10.  cleared on 

CBI.  Urine has been clear for several days now.  Alas removed now. 


-He needs to follow up with Urology OP


(4) LARA (acute kidney injury): 


      resolved


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 


(6) H/O ETOH abuse: 


(7) Prostate cancer: 


(8) Cirrhosis: 


      Appears compensated


      Patient declined rehab and home health. Office of aging involved. He needs

more care at home but unable to provide currently per OOA and can be discharged 

home where they will follow up. He has decision making capacity. OP f/u with PCP

and urology. He is comfortable and stable for discharge. 





Total Time


Total Time Spent


Total Time Spent (In Minutes): 


32





Discharge Plan


Discharge Items


Patient Disposition: Home - Self-Care





Reason For Visit: FREDDIE HEMATURIUIA, H/O CA PROSTATE





Discharge Diagnosis:


Gross hematuria, gram negative bacteremia





Activity: Resume your previous activity





Non-emergency contact: Primary Care Provider





Call non-emergency contact if: you have any medication questions and your 

symptoms worsen





Follow-up/Referrals:


Toribio Ortiz MD [Outside Practitioners] - 


(Date & Time 


4/1/2022 11:00 AM Provider 


Toribio Ortiz MD Department 


Urology, [facility]





)


Dorian Castellon MD [Primary Care Provider] - 


(Date & Time 


3/28/2022 10:00 AM Provider 


Adelita Lynn PA-C Department 


Family Medicine [facility]





)





Diet: Regular





Addtl Attending Provider Instructions:


Follow up with urology and your family doctor


Recommend repeat blood work (CBC) in 1-2 weeks.





Pending Studies at Discharge: No





Stand-Alone Forms:  My [facility] [employer], Smoking Cessation





Medications and DC Order


Prescriptions:


Continued


  folic acid 1 mg tablet 


   1 mg PO QAM RF: 0


  trazodone 300 mg tablet 


   300 mg PO HS PRN (Reason: Sleep) RF: 0


  fluticasone propionate 50 mcg/actuation spray,suspension 


   2 spray INTRANASAL DAILY PRN (Reason: allergies) RF: 0


  polyethylene glycol 3350 [Miralax] 17 gram/dose powder 


   17 g PO DAILY PRN (Reason: constipation) Qty: 119 RF: 0


  multivitamin  Tablet 


   1 tab PO 3XWK RF: 0


  acetaminophen [Tylenol] 325 mg Tablet 


   650 mg PO QID PRN (Reason: Pain) RF: 0





Discharge Orders:


Discharge Order  (Routine); Ordered 03/22/22


   Ordered By:  Sadi Ron





Admission Data


Admit Date/Time: 03/09/22 19:49





Attending Provider: Sadi Ron





Admit Provider: Silvano Carmona





Primary Care Provider: Dorian Castellon





Other Providers: Silvano Carmona ; Roel Pappas ; Otto Jung 

; Truman Luz ; Deonte Yañez ; Johnnie Hennessy II ; Arminda Cordon ;

 Jose David ; Brunner,Robert J.





Other


Interventions:


Discharge Summary Assessment (RN)   Last Done: 03/22/22 13:21

## 2022-03-23 RX ADMIN — CEFEPIME SCH: 2 INJECTION, POWDER, FOR SOLUTION INTRAVENOUS at 03:18

## 2022-03-23 RX ADMIN — HEPARIN SODIUM SCH: 10000 INJECTION, SOLUTION INTRAVENOUS; SUBCUTANEOUS at 21:07

## 2022-03-23 RX ADMIN — CALCIUM CARBONATE SCH: 1250 SUSPENSION ORAL at 21:07

## 2022-03-23 RX ADMIN — FOLIC ACID SCH MG: 1 TABLET ORAL at 08:50

## 2022-03-23 RX ADMIN — FLUTICASONE PROPIONATE SCH SPRAYS: 50 SPRAY, METERED NASAL at 08:50

## 2022-03-23 RX ADMIN — CEFEPIME SCH: 2 INJECTION, POWDER, FOR SOLUTION INTRAVENOUS at 21:08

## 2022-03-23 RX ADMIN — CALCIUM CARBONATE SCH MG: 1250 SUSPENSION ORAL at 08:50

## 2022-03-23 RX ADMIN — CEFEPIME SCH: 2 INJECTION, POWDER, FOR SOLUTION INTRAVENOUS at 12:35

## 2022-03-23 RX ADMIN — HEPARIN SODIUM SCH UNITS: 10000 INJECTION, SOLUTION INTRAVENOUS; SUBCUTANEOUS at 08:51

## 2022-03-23 RX ADMIN — Medication SCH MG: at 08:51

## 2022-03-23 RX ADMIN — NICOTINE SCH MG: 21 PATCH, EXTENDED RELEASE TRANSDERMAL at 08:51

## 2022-03-23 RX ADMIN — CEFEPIME SCH: 2 INJECTION, POWDER, FOR SOLUTION INTRAVENOUS at 21:07

## 2022-03-23 RX ADMIN — Medication SCH MG: at 08:52

## 2022-03-23 NOTE — HOSPITALIST PROGRESS NOTE
Date of Service


March 23, 2022


 





Assessment & Plan


(1) Bacteremia: 


      Plan: 


Initially report was +GPC then corrected to GNR.  started vancomycin 3/11--later

discontinued.  Cefepime added 3/12. Final blood culture results 3/9 showed 

psychrobacter fecalis/pulmoni (1/2 cultures) pansensitive, repeat cultures were 

negative. Seen by ID 3/21- completed 10 days of cefepime, no need for further iv

or po antibiotic. 


(2) Acute blood loss anemia: 


      Plan: 


Hb 2.9 on admission (uncertain if this is real)


-s/p 5 units packed RBC, repeat Hb 9.3 --> down trended to 7.9 -> 8.2--> 7.4--> 

7.4->8.6->6.9->8.8


- No further hematuria or bleeding, Recommend OP CBC in 1-2 weeks.


(3) Gross hematuria: 


      Plan: 


-Resolved with no recurrence. 


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


-Appreciate Urology input.  Manual clot evacuation bedside 3/10.  cleared on 

CBI.  Urine has been clear for several days now.  Alas removed now. 


-He needs to follow up with Urology OP


(4) LARA (acute kidney injury): 


      Plan: 


resolved


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 


(6) H/O ETOH abuse: 


(7) Prostate cancer: 


(8) Cirrhosis: 


      Plan: 


Appears compensated


      Plan: 


Patient declined rehab and home health. Office of aging involved. He needs more 

care at home but unable to provide currently per OOA and can be discharged home 

where they will follow up. He has decision making capacity. OP f/u with PCP and 

urology. He is comfortable and stable for discharge. 


Waiting to get a ride to go home 


Admission and Anticipated Discharge Date


Admission Date: 


March 9, 2022








Subjective


Pt was seen and examined . Lying in bed with no acute distress 


Pt said that he was not able to get any  ride to take him home today 


Denies any chest pain, palpitation, dizziness and SOB  





Review of Systems








Review of Systems:   All systems reviewed & are unremarkable except as noted in 

Subjective  








Physical Exam








Physical Exam:   General- No acute distress


Head-  atraumatic


Eyes- PERRL, EOMI, 


ENT- oropharynx clear


Neck- supple, no JVD


Lungs- clear to auscultation 


Heart- regular rhythm; no murmur


Abdomen- normal bowel sounds, soft, nontender


Extremities-  no calf tenderness


Neuro- alert, oriented x 3; PERRL, EOMI; no facial palsy; no dysarthria


Skin- warm & dry








Results & Data


Results & Data ([facility])


Vital Signs (Past 12 Hours)


                                   Vital Signs











  Temp Pulse Resp BP Pulse Ox


 


 03/23/22 22:05  36.9 C  68  18  136/79  96

## 2022-03-24 RX ADMIN — NICOTINE SCH: 21 PATCH, EXTENDED RELEASE TRANSDERMAL at 08:50

## 2022-03-24 RX ADMIN — HEPARIN SODIUM SCH: 10000 INJECTION, SOLUTION INTRAVENOUS; SUBCUTANEOUS at 08:49

## 2022-03-24 RX ADMIN — CALCIUM CARBONATE SCH: 1250 SUSPENSION ORAL at 08:49

## 2022-03-24 RX ADMIN — Medication SCH: at 08:50

## 2022-03-24 RX ADMIN — FLUTICASONE PROPIONATE SCH: 50 SPRAY, METERED NASAL at 08:49

## 2022-03-24 RX ADMIN — FOLIC ACID SCH: 1 TABLET ORAL at 08:49
